# Patient Record
Sex: MALE | Race: BLACK OR AFRICAN AMERICAN | NOT HISPANIC OR LATINO | URBAN - METROPOLITAN AREA
[De-identification: names, ages, dates, MRNs, and addresses within clinical notes are randomized per-mention and may not be internally consistent; named-entity substitution may affect disease eponyms.]

---

## 2021-03-14 ENCOUNTER — INPATIENT (INPATIENT)
Facility: HOSPITAL | Age: 61
LOS: 16 days | Discharge: LEFT BEFORE DISCHARGE | DRG: 823 | End: 2021-03-31
Attending: INTERNAL MEDICINE | Admitting: INTERNAL MEDICINE
Payer: COMMERCIAL

## 2021-03-14 VITALS
SYSTOLIC BLOOD PRESSURE: 144 MMHG | WEIGHT: 149.91 LBS | HEIGHT: 68 IN | DIASTOLIC BLOOD PRESSURE: 81 MMHG | OXYGEN SATURATION: 98 % | TEMPERATURE: 98 F | HEART RATE: 77 BPM | RESPIRATION RATE: 16 BRPM

## 2021-03-14 DIAGNOSIS — R63.4 ABNORMAL WEIGHT LOSS: ICD-10-CM

## 2021-03-14 LAB
ALBUMIN SERPL ELPH-MCNC: 2.8 G/DL — LOW (ref 3.3–5)
ALP SERPL-CCNC: 130 U/L — HIGH (ref 40–120)
ALT FLD-CCNC: 21 U/L — SIGNIFICANT CHANGE UP (ref 10–45)
ANION GAP SERPL CALC-SCNC: 10 MMOL/L — SIGNIFICANT CHANGE UP (ref 5–17)
AST SERPL-CCNC: 14 U/L — SIGNIFICANT CHANGE UP (ref 10–40)
BASOPHILS # BLD AUTO: 0.05 K/UL — SIGNIFICANT CHANGE UP (ref 0–0.2)
BASOPHILS NFR BLD AUTO: 0.6 % — SIGNIFICANT CHANGE UP (ref 0–2)
BILIRUB SERPL-MCNC: 0.2 MG/DL — SIGNIFICANT CHANGE UP (ref 0.2–1.2)
BUN SERPL-MCNC: 32 MG/DL — HIGH (ref 7–23)
CALCIUM SERPL-MCNC: 9 MG/DL — SIGNIFICANT CHANGE UP (ref 8.4–10.5)
CHLORIDE SERPL-SCNC: 108 MMOL/L — SIGNIFICANT CHANGE UP (ref 96–108)
CO2 SERPL-SCNC: 23 MMOL/L — SIGNIFICANT CHANGE UP (ref 22–31)
CREAT SERPL-MCNC: 1.83 MG/DL — HIGH (ref 0.5–1.3)
EOSINOPHIL # BLD AUTO: 0.1 K/UL — SIGNIFICANT CHANGE UP (ref 0–0.5)
EOSINOPHIL NFR BLD AUTO: 1.2 % — SIGNIFICANT CHANGE UP (ref 0–6)
GLUCOSE BLDC GLUCOMTR-MCNC: 108 MG/DL — HIGH (ref 70–99)
GLUCOSE BLDC GLUCOMTR-MCNC: 256 MG/DL — HIGH (ref 70–99)
GLUCOSE SERPL-MCNC: 302 MG/DL — HIGH (ref 70–99)
HCT VFR BLD CALC: 48.6 % — SIGNIFICANT CHANGE UP (ref 39–50)
HGB BLD-MCNC: 16.1 G/DL — SIGNIFICANT CHANGE UP (ref 13–17)
IMM GRANULOCYTES NFR BLD AUTO: 0.2 % — SIGNIFICANT CHANGE UP (ref 0–1.5)
LYMPHOCYTES # BLD AUTO: 1.26 K/UL — SIGNIFICANT CHANGE UP (ref 1–3.3)
LYMPHOCYTES # BLD AUTO: 14.5 % — SIGNIFICANT CHANGE UP (ref 13–44)
MAGNESIUM SERPL-MCNC: 2 MG/DL — SIGNIFICANT CHANGE UP (ref 1.6–2.6)
MCHC RBC-ENTMCNC: 29.7 PG — SIGNIFICANT CHANGE UP (ref 27–34)
MCHC RBC-ENTMCNC: 33.1 GM/DL — SIGNIFICANT CHANGE UP (ref 32–36)
MCV RBC AUTO: 89.5 FL — SIGNIFICANT CHANGE UP (ref 80–100)
MONOCYTES # BLD AUTO: 0.76 K/UL — SIGNIFICANT CHANGE UP (ref 0–0.9)
MONOCYTES NFR BLD AUTO: 8.7 % — SIGNIFICANT CHANGE UP (ref 2–14)
NEUTROPHILS # BLD AUTO: 6.5 K/UL — SIGNIFICANT CHANGE UP (ref 1.8–7.4)
NEUTROPHILS NFR BLD AUTO: 74.8 % — SIGNIFICANT CHANGE UP (ref 43–77)
NRBC # BLD: 0 /100 WBCS — SIGNIFICANT CHANGE UP (ref 0–0)
PHOSPHATE SERPL-MCNC: 5 MG/DL — HIGH (ref 2.5–4.5)
PLATELET # BLD AUTO: 488 K/UL — HIGH (ref 150–400)
POTASSIUM SERPL-MCNC: 3.9 MMOL/L — SIGNIFICANT CHANGE UP (ref 3.5–5.3)
POTASSIUM SERPL-SCNC: 3.9 MMOL/L — SIGNIFICANT CHANGE UP (ref 3.5–5.3)
PROT SERPL-MCNC: 6.2 G/DL — SIGNIFICANT CHANGE UP (ref 6–8.3)
RBC # BLD: 5.43 M/UL — SIGNIFICANT CHANGE UP (ref 4.2–5.8)
RBC # FLD: 13.1 % — SIGNIFICANT CHANGE UP (ref 10.3–14.5)
SARS-COV-2 RNA SPEC QL NAA+PROBE: SIGNIFICANT CHANGE UP
SODIUM SERPL-SCNC: 141 MMOL/L — SIGNIFICANT CHANGE UP (ref 135–145)
WBC # BLD: 8.69 K/UL — SIGNIFICANT CHANGE UP (ref 3.8–10.5)
WBC # FLD AUTO: 8.69 K/UL — SIGNIFICANT CHANGE UP (ref 3.8–10.5)

## 2021-03-14 PROCEDURE — 76870 US EXAM SCROTUM: CPT | Mod: 26

## 2021-03-14 PROCEDURE — 71260 CT THORAX DX C+: CPT | Mod: 26,QQ

## 2021-03-14 PROCEDURE — 99285 EMERGENCY DEPT VISIT HI MDM: CPT

## 2021-03-14 PROCEDURE — 93975 VASCULAR STUDY: CPT | Mod: 26

## 2021-03-14 PROCEDURE — 74177 CT ABD & PELVIS W/CONTRAST: CPT | Mod: 26,QQ

## 2021-03-14 RX ORDER — INSULIN LISPRO 100/ML
8 VIAL (ML) SUBCUTANEOUS
Refills: 0 | Status: DISCONTINUED | OUTPATIENT
Start: 2021-03-14 | End: 2021-03-17

## 2021-03-14 RX ORDER — INSULIN GLARGINE 100 [IU]/ML
26 INJECTION, SOLUTION SUBCUTANEOUS AT BEDTIME
Refills: 0 | Status: DISCONTINUED | OUTPATIENT
Start: 2021-03-14 | End: 2021-03-15

## 2021-03-14 RX ORDER — SODIUM CHLORIDE 9 MG/ML
1000 INJECTION, SOLUTION INTRAVENOUS
Refills: 0 | Status: DISCONTINUED | OUTPATIENT
Start: 2021-03-14 | End: 2021-03-31

## 2021-03-14 RX ORDER — KETOROLAC TROMETHAMINE 30 MG/ML
15 SYRINGE (ML) INJECTION ONCE
Refills: 0 | Status: DISCONTINUED | OUTPATIENT
Start: 2021-03-14 | End: 2021-03-14

## 2021-03-14 RX ORDER — METOPROLOL TARTRATE 50 MG
50 TABLET ORAL DAILY
Refills: 0 | Status: DISCONTINUED | OUTPATIENT
Start: 2021-03-14 | End: 2021-03-17

## 2021-03-14 RX ORDER — INSULIN LISPRO 100/ML
VIAL (ML) SUBCUTANEOUS
Refills: 0 | Status: DISCONTINUED | OUTPATIENT
Start: 2021-03-14 | End: 2021-03-14

## 2021-03-14 RX ORDER — DEXTROSE 50 % IN WATER 50 %
25 SYRINGE (ML) INTRAVENOUS ONCE
Refills: 0 | Status: DISCONTINUED | OUTPATIENT
Start: 2021-03-14 | End: 2021-03-17

## 2021-03-14 RX ORDER — HEPARIN SODIUM 5000 [USP'U]/ML
5000 INJECTION INTRAVENOUS; SUBCUTANEOUS EVERY 12 HOURS
Refills: 0 | Status: DISCONTINUED | OUTPATIENT
Start: 2021-03-14 | End: 2021-03-17

## 2021-03-14 RX ORDER — SODIUM CHLORIDE 9 MG/ML
1000 INJECTION INTRAMUSCULAR; INTRAVENOUS; SUBCUTANEOUS ONCE
Refills: 0 | Status: COMPLETED | OUTPATIENT
Start: 2021-03-14 | End: 2021-03-14

## 2021-03-14 RX ORDER — ACETAMINOPHEN 500 MG
1000 TABLET ORAL ONCE
Refills: 0 | Status: COMPLETED | OUTPATIENT
Start: 2021-03-14 | End: 2021-03-14

## 2021-03-14 RX ORDER — INSULIN LISPRO 100/ML
VIAL (ML) SUBCUTANEOUS AT BEDTIME
Refills: 0 | Status: DISCONTINUED | OUTPATIENT
Start: 2021-03-14 | End: 2021-03-17

## 2021-03-14 RX ORDER — DEXTROSE 50 % IN WATER 50 %
15 SYRINGE (ML) INTRAVENOUS ONCE
Refills: 0 | Status: DISCONTINUED | OUTPATIENT
Start: 2021-03-14 | End: 2021-03-17

## 2021-03-14 RX ORDER — SODIUM CHLORIDE 9 MG/ML
1000 INJECTION, SOLUTION INTRAVENOUS
Refills: 0 | Status: DISCONTINUED | OUTPATIENT
Start: 2021-03-14 | End: 2021-03-17

## 2021-03-14 RX ORDER — INSULIN GLARGINE 100 [IU]/ML
30 INJECTION, SOLUTION SUBCUTANEOUS AT BEDTIME
Refills: 0 | Status: DISCONTINUED | OUTPATIENT
Start: 2021-03-14 | End: 2021-03-14

## 2021-03-14 RX ORDER — INSULIN LISPRO 100/ML
VIAL (ML) SUBCUTANEOUS
Refills: 0 | Status: DISCONTINUED | OUTPATIENT
Start: 2021-03-14 | End: 2021-03-17

## 2021-03-14 RX ORDER — DEXTROSE 50 % IN WATER 50 %
12.5 SYRINGE (ML) INTRAVENOUS ONCE
Refills: 0 | Status: DISCONTINUED | OUTPATIENT
Start: 2021-03-14 | End: 2021-03-17

## 2021-03-14 RX ORDER — GLUCAGON INJECTION, SOLUTION 0.5 MG/.1ML
1 INJECTION, SOLUTION SUBCUTANEOUS ONCE
Refills: 0 | Status: DISCONTINUED | OUTPATIENT
Start: 2021-03-14 | End: 2021-03-17

## 2021-03-14 RX ORDER — ATORVASTATIN CALCIUM 80 MG/1
80 TABLET, FILM COATED ORAL AT BEDTIME
Refills: 0 | Status: DISCONTINUED | OUTPATIENT
Start: 2021-03-14 | End: 2021-03-17

## 2021-03-14 RX ORDER — AMLODIPINE BESYLATE 2.5 MG/1
10 TABLET ORAL DAILY
Refills: 0 | Status: DISCONTINUED | OUTPATIENT
Start: 2021-03-14 | End: 2021-03-17

## 2021-03-14 RX ADMIN — Medication 1000 MILLIGRAM(S): at 23:30

## 2021-03-14 RX ADMIN — Medication 15 MILLIGRAM(S): at 08:59

## 2021-03-14 RX ADMIN — Medication 400 MILLIGRAM(S): at 22:53

## 2021-03-14 RX ADMIN — ATORVASTATIN CALCIUM 80 MILLIGRAM(S): 80 TABLET, FILM COATED ORAL at 22:54

## 2021-03-14 RX ADMIN — INSULIN GLARGINE 26 UNIT(S): 100 INJECTION, SOLUTION SUBCUTANEOUS at 22:53

## 2021-03-14 RX ADMIN — Medication 6: at 19:37

## 2021-03-14 RX ADMIN — Medication 15 MILLIGRAM(S): at 09:29

## 2021-03-14 RX ADMIN — SODIUM CHLORIDE 1000 MILLILITER(S): 9 INJECTION INTRAMUSCULAR; INTRAVENOUS; SUBCUTANEOUS at 11:08

## 2021-03-14 RX ADMIN — SODIUM CHLORIDE 50 MILLILITER(S): 9 INJECTION, SOLUTION INTRAVENOUS at 22:53

## 2021-03-14 NOTE — ED PROVIDER NOTE - OBJECTIVE STATEMENT
59yo M with hx HTN, DM, HLD, COVID presents with LE pain and scrotal swelling. Patient was hospitalized in [facility] in Feb 2021 where CT Ab/P found 5.8x2.5cm lesion on the right pelvis concerning for malignancy with sclerotic lesion at L5. Patient left AMA before additional malignancy workup was performed. In the last month, patient had 45lb unintentional weight loss and decreased appetite. In the last 2 weeks, he had 2 ep of night sweats and subjective fevers. In addition, he noted that he started to have abdominal swelling, mostly on the left side with tenderness along with b/l scrotal swelling and bilateral inguinal "knots". Similar scrotal swelling when hospitalized in [place] but not aware of Testicular US results. No urinary symptoms.     Today, he came to the ED for 8/10 b/l LE pain, pins and needles. At baseline, he has neuropathy and has been unable to walk since COVID last year. He uses a cane but has frequent falls. Last fall 4 days ago. Tried neurotonin in the past with no relief.     Patient has not seen PCP: Dr. Blank after leaving [place] from [facility] but follows with Endocrinologist for diabetes. No prior colonscopy screening 59yo M with hx HTN, DM, HLD, COVID 2020 presents with LE pain and scrotal swelling. Patient was hospitalized in [facility] in Feb 2021 where CT Ab/P found 5.8x2.5cm lesion on the right pelvis concerning for malignancy with sclerotic lesion at L5. Patient left AMA before additional malignancy workup was performed. In the last month, patient had 45lb unintentional weight loss and decreased appetite. In the last 2 weeks, he had 2 ep of night sweats and subjective fevers. In addition, he noted that he started to have abdominal swelling, mostly on the left side with tenderness along with b/l scrotal swelling and bilateral inguinal "knots". Similar scrotal swelling when hospitalized in [place] but not aware of Testicular US results. No urinary symptoms.     Today, he came to the ED for 8/10 b/l LE pain, pins and needles. At baseline, he has neuropathy and has been unable to walk since COVID last year. He uses a cane but has frequent falls. Last fall 4 days ago. Tried neurotonin in the past with no relief.     Patient has not seen PCP: Dr. Blank after leaving [place] from [facility] but follows with Endocrinologist for diabetes. No prior colonscopy screening

## 2021-03-14 NOTE — ED ADULT NURSE REASSESSMENT NOTE - NS ED NURSE REASSESS COMMENT FT1
pt is awake and alert, resting comfortably in stretcher, speaking in full coherent sentences. Pt endorses no pain or discomfort at this time. Pt admitted as per MD orders, awaiting bed assignment. Will continue to monitor.

## 2021-03-14 NOTE — ED PROVIDER NOTE - CLINICAL SUMMARY MEDICAL DECISION MAKING FREE TEXT BOX
59yo M with hx of HTN, DM, HLD presents with worsening LE pain and 2 weeks of scrotal swelling admitted for malignancy workup in the setting of weight loss. Ordered labs, CT chest, Ab/P, US scrotal. IV tordal for pain 59yo M with hx of HTN, DM, HLD presents with worsening LE pain and 2 weeks of scrotal swelling admitted for malignancy workup in the setting of weight loss. Ordered labs, CT chest, Ab/P, US scrotal. IV tordal for pain. 1L NS

## 2021-03-14 NOTE — CONSULT NOTE ADULT - SUBJECTIVE AND OBJECTIVE BOX
HPI:  59yo M with hx HTN, DM, HLD, COVID 2020 presents with LE pain and scrotal swelling. Patient was hospitalized in [facility] in Feb 2021 where CT Ab/P found 5.8x2.5cm lesion on the right pelvis concerning for malignancy with sclerotic lesion at L5.   Patient left AMA before additional malignancy workup was performed.   In the last month, patient had 45lb unintentional weight loss and decreased appetite.   In the last 2 weeks had some  night sweats and subjective fevers.   In addition, he noted that he started to have abdominal swelling, mostly on the left side with tenderness along with b/l scrotal swelling   Today, he came to the ED for 8/10 b/l LE pain, pins and needles. At baseline, he has neuropathy and has been unable to walk since COVID last year.   He uses a cane but has frequent falls. Last fall 4 days ago. Tried neurontin in the past with no relief.     Nephrology consulted for elevated serum creatinine. The patient was told in the past that he has CKD. he has a PCP but does not remember his name. No urinary sx, he has constipation, he said that he does not take NSAIDa. he has a poor appetite.       PAST MEDICAL & SURGICAL HISTORY:  Diabetes    Hyperlipidemia    Hypertension, unspecified type    No significant past surgical history        MEDICATIONS  (STANDING):  amLODIPine   Tablet 10 milliGRAM(s) Oral daily  atorvastatin 80 milliGRAM(s) Oral at bedtime  dextrose 40% Gel 15 Gram(s) Oral once  dextrose 5%. 1000 milliLiter(s) (50 mL/Hr) IV Continuous <Continuous>  dextrose 5%. 1000 milliLiter(s) (100 mL/Hr) IV Continuous <Continuous>  dextrose 50% Injectable 25 Gram(s) IV Push once  dextrose 50% Injectable 12.5 Gram(s) IV Push once  dextrose 50% Injectable 25 Gram(s) IV Push once  glucagon  Injectable 1 milliGRAM(s) IntraMuscular once  heparin   Injectable 5000 Unit(s) SubCutaneous every 12 hours  insulin glargine Injectable (LANTUS) 30 Unit(s) SubCutaneous at bedtime  insulin lispro (ADMELOG) corrective regimen sliding scale   SubCutaneous three times a day before meals  metoprolol succinate ER 50 milliGRAM(s) Oral daily      Allergies    No Known Allergies    Intolerances        SOCIAL HISTORY:  Denies ETOh,Smoking,     FAMILY HISTORY:  No pertinent family history in first degree relatives        REVIEW OF SYSTEMS:    As per HPI  All other review of systems is negative unless indicated above.    VITAL:  T(C): , Max: 36.8 (03-14-21 @ 11:37)  T(F): , Max: 98.2 (03-14-21 @ 11:37)  HR: 83 (03-14-21 @ 14:46)  BP: 151/75 (03-14-21 @ 14:46)  BP(mean): --  RR: 16 (03-14-21 @ 14:46)  SpO2: 96% (03-14-21 @ 14:46)  Wt(kg): --    I and O's:    Height (cm): 172.7 (03-14 @ 07:40)  Weight (kg): 68 (03-14 @ 07:40)  BMI (kg/m2): 22.8 (03-14 @ 07:40)  BSA (m2): 1.81 (03-14 @ 07:40)    PHYSICAL EXAM:    Constitutional: NAD  HEENT: PERRLA, EOMI,  MMM  Neck: No LAD, No JVD  Respiratory: + wheezing   Cardiovascular: S1 and S2  Gastrointestinal: BS+, soft, NT/ND  Extremities: No peripheral edema  Neurological: A/O x 3, no focal deficits  Psychiatric: Normal mood, normal affect  : No Weir, + scrotal swelling and left inguinal mass      LABS:                        16.1   8.69  )-----------( 488      ( 14 Mar 2021 08:38 )             48.6     03-14    141  |  108  |  32<H>  ----------------------------<  302<H>  3.9   |  23  |  1.83<H>    Ca    9.0      14 Mar 2021 08:38  Phos  5.0     03-14  Mg     2.0     03-14    TPro  6.2  /  Alb  2.8<L>  /  TBili  0.2  /  DBili  x   /  AST  14  /  ALT  21  /  AlkPhos  130<H>  03-14      Urine Studies:          RADIOLOGY & ADDITIONAL STUDIES:        ASSESSMENT:    59yo M with hx HTN, DM, HLD, COVID 2020 presents with LE pain and scrotal swelling    - GUANAKO likely pre-renal azotemia due to decreased po intake                   Patient has underlying CKD but baseline is unknown                  Patient is s/p CT abdomen with IV contrast and IV Toradol x 1 administration in the ER. Toradol and CT were done before BMP resulted  - BP controlled  - Euvolemic    PLAN:   - Urinalysis, Urine lytes, Urine Protein, Urine Creatinine  - LR at 50 cc/hr x 20 hours  - PVR  - Phos level   - BNP  - Avoid NSAIDs and IV contrast   - Trend serum creatinine- watch for KWAKU  - Renal dosing of meds to creatinine clearance of 30 ml/min      d/w ER staff    Thank you for the courtesy of the referral    Paulo Dave MD  [facility]  Cell: [phone]             HPI:  59yo M with hx HTN, DM, HLD, COVID 2020 presents with LE pain and scrotal swelling. Patient was hospitalized in [facility] in Feb 2021 where CT Ab/P found 5.8x2.5cm lesion on the right pelvis concerning for malignancy with sclerotic lesion at L5.   Patient left AMA before additional malignancy workup was performed.   In the last month, patient had 45lb unintentional weight loss and decreased appetite.   In the last 2 weeks had some  night sweats and subjective fevers.   In addition, he noted that he started to have abdominal swelling, mostly on the left side with tenderness along with b/l scrotal swelling   Today, he came to the ED for 8/10 b/l LE pain, pins and needles. At baseline, he has neuropathy and has been unable to walk since COVID last year.   He uses a cane but has frequent falls. Last fall 4 days ago. Tried neurontin in the past with no relief.     Nephrology consulted for elevated serum creatinine. The patient was told in the past that he has CKD. he has a PCP but does not remember his name. No urinary sx, he has constipation, he said that he does not take NSAIDa. he has a poor appetite.       PAST MEDICAL & SURGICAL HISTORY:  Diabetes    Hyperlipidemia    Hypertension, unspecified type    No significant past surgical history        MEDICATIONS  (STANDING):  amLODIPine   Tablet 10 milliGRAM(s) Oral daily  atorvastatin 80 milliGRAM(s) Oral at bedtime  dextrose 40% Gel 15 Gram(s) Oral once  dextrose 5%. 1000 milliLiter(s) (50 mL/Hr) IV Continuous <Continuous>  dextrose 5%. 1000 milliLiter(s) (100 mL/Hr) IV Continuous <Continuous>  dextrose 50% Injectable 25 Gram(s) IV Push once  dextrose 50% Injectable 12.5 Gram(s) IV Push once  dextrose 50% Injectable 25 Gram(s) IV Push once  glucagon  Injectable 1 milliGRAM(s) IntraMuscular once  heparin   Injectable 5000 Unit(s) SubCutaneous every 12 hours  insulin glargine Injectable (LANTUS) 30 Unit(s) SubCutaneous at bedtime  insulin lispro (ADMELOG) corrective regimen sliding scale   SubCutaneous three times a day before meals  metoprolol succinate ER 50 milliGRAM(s) Oral daily      Allergies    No Known Allergies    Intolerances        SOCIAL HISTORY:  Denies ETOh,Smoking,     FAMILY HISTORY:  No pertinent family history in first degree relatives        REVIEW OF SYSTEMS:    As per HPI  All other review of systems is negative unless indicated above.    VITAL:  T(C): , Max: 36.8 (03-14-21 @ 11:37)  T(F): , Max: 98.2 (03-14-21 @ 11:37)  HR: 83 (03-14-21 @ 14:46)  BP: 151/75 (03-14-21 @ 14:46)  BP(mean): --  RR: 16 (03-14-21 @ 14:46)  SpO2: 96% (03-14-21 @ 14:46)  Wt(kg): --    I and O's:    Height (cm): 172.7 (03-14 @ 07:40)  Weight (kg): 68 (03-14 @ 07:40)  BMI (kg/m2): 22.8 (03-14 @ 07:40)  BSA (m2): 1.81 (03-14 @ 07:40)    PHYSICAL EXAM:    Constitutional: NAD  HEENT: PERRLA, EOMI,  MMM  Neck: No LAD, No JVD  Respiratory: + wheezing   Cardiovascular: S1 and S2  Gastrointestinal: BS+, soft, NT/ND  Extremities: No peripheral edema  Neurological: A/O x 3, no focal deficits  Psychiatric: Normal mood, normal affect  : No Weir, + scrotal swelling and left inguinal mass      LABS:                        16.1   8.69  )-----------( 488      ( 14 Mar 2021 08:38 )             48.6     03-14    141  |  108  |  32<H>  ----------------------------<  302<H>  3.9   |  23  |  1.83<H>    Ca    9.0      14 Mar 2021 08:38  Phos  5.0     03-14  Mg     2.0     03-14    TPro  6.2  /  Alb  2.8<L>  /  TBili  0.2  /  DBili  x   /  AST  14  /  ALT  21  /  AlkPhos  130<H>  03-14      Urine Studies:          RADIOLOGY & ADDITIONAL STUDIES:        ASSESSMENT:    59yo M with hx HTN, DM, HLD, COVID 2020 presents with LE pain and scrotal swelling    - GUANAKO likely pre-renal azotemia due to decreased po intake                   Patient has underlying CKD but baseline is unknown                  Patient is s/p CT abdomen with IV contrast and IV Toradol x 1 administration in the ER. Toradol and CT were done before BMP resulted                  Renal cysts noted on CT- too small and bilateral- support CKD Dx  - BP controlled  - Euvolemic    PLAN:   - Urinalysis, Urine lytes, Urine Protein, Urine Creatinine  - LR at 50 cc/hr x 20 hours  - PVR  - Phos level   - BNP  - Avoid NSAIDs and IV contrast   - Trend serum creatinine- watch for KWAKU  - Renal dosing of meds to creatinine clearance of 30 ml/min      d/w ER staff    Thank you for the courtesy of the referral    Paulo Dave MD  [facility]- Nephrology  Cell: [phone]

## 2021-03-14 NOTE — ED PROVIDER NOTE - NS ED ROS FT
Review of Systems:  Constitutional: + fever, +weight loss, poor appetite/po intake  Head: No headache or dizziness   Eyes: No blurry vision, No diplopia  Neuro: No tremors, +muscle weakness   Cardiovascular: No chest pain, No palpitations  Respiratory: No SOB, No cough  GI: No nausea, No vomiting, No diarrhea  : No dysuria, No hematuria  Skin: No rash or lesions  MSK: No joint pain or swelling  Psych: No depression or mood changes

## 2021-03-14 NOTE — H&P ADULT - NSHPLABSRESULTS_GEN_ALL_CORE
16.1   8.69  )-----------( 488      ( 14 Mar 2021 08:38 )             48.6       03-14    141  |  108  |  32<H>  ----------------------------<  302<H>  3.9   |  23  |  1.83<H>    Ca    9.0      14 Mar 2021 08:38  Phos  5.0     03-14  Mg     2.0     03-14    TPro  6.2  /  Alb  2.8<L>  /  TBili  0.2  /  DBili  x   /  AST  14  /  ALT  21  /  AlkPhos  130<H>  03-14                      Lactate Trend            CAPILLARY BLOOD GLUCOSE

## 2021-03-14 NOTE — ED ADULT NURSE NOTE - OBJECTIVE STATEMENT
59 y/o M A&Ox3 PMH DM, HTN, HLD denies PSH presents to the ED c/o abdominal pain. Pt reports L sided abdominal pain and swelling for several weeks. Pt also endorses B/L L/E weakness x several months, weight loss and multiple "lumps" in his groin. Pt reports the lumps are not painful. Pt endorses constipation x2 weeks. Pt reports seeing outpatient physician where doctor told him there was a "concern for cancer", pt got outpatient scans done but is unsure of the result. Upon arrival to ED pt is well appearing. Breathing is even and unlabored, satting 100% RA. Skin is warm, dry & in act. Several firm, moveable nodules noted to pt groin on assessment. Pt abdomen is soft, distended, L side Is tender to palpation. Denies CP, SOB, N/V/D, HA, vision changes, cough. IV access obtained. Call bell within reach, comfort & safety provided.

## 2021-03-14 NOTE — CHART NOTE - NSCHARTNOTEFT_GEN_A_CORE
61yo M with hx HTN, DM, HLD, COVID 2020 presents with LE pain and scrotal swelling. Patient was hospitalized in [facility] in Feb 2021 where CT Ab/P found 5.8x2.5cm lesion on the right pelvis concerning for malignancy with sclerotic lesion at L5. Per H&P patient on tresiba 40units qhs and humalog 8 units tidac.   Endocrine consult for management of DM2    Insulin dosing determined by patient TDD at home  Recommend Lantus 26units qhs   Recommend Admelog 8 units TIDac  Start low correctional scale premeal and qhs   Fs goal 100-180  Fs premeal and qhs   Please obtain A1c    Official consult Tmw    Clare Centeno MD  Endocrine Fellow   Pager  on weekdays 9am- 5pm   Please call  after hours and on weekends

## 2021-03-14 NOTE — H&P ADULT - ASSESSMENT
pt w/ scrotal swellin g / pelvic lesion/ weight loss  r/o malignancy  onc eval  checl lext dopplers  dm  fsg riss  lantus at night  endo eval  dvt proph  htn  c/w meds  ? neuropathy  walking dificulty  neuro eval

## 2021-03-14 NOTE — ED ADULT NURSE REASSESSMENT NOTE - NS ED NURSE REASSESS COMMENT FT1
Pt AAOx3. VSS. No c/o pain or discomfort. Requesting dinner, FS taken. Pt given pre-meal insulin and tray provided. Right 18g AC IVL site patent and wdl. Pt admited pending bed placement.

## 2021-03-14 NOTE — ED ADULT NURSE NOTE - NSIMPLEMENTINTERV_GEN_ALL_ED
Implemented All Fall Risk Interventions:  [place] to call system. Call bell, personal items and telephone within reach. Instruct patient to call for assistance. Room bathroom lighting operational. Non-slip footwear when patient is off stretcher. Physically safe environment: no spills, clutter or unnecessary equipment. Stretcher in lowest position, wheels locked, appropriate side rails in place. Provide visual cue, wrist band, yellow gown, etc. Monitor gait and stability. Monitor for mental status changes and reorient to person, place, and time. Review medications for side effects contributing to fall risk. Reinforce activity limits and safety measures with patient and family.

## 2021-03-14 NOTE — ED PROVIDER NOTE - PHYSICAL EXAMINATION
Vital Signs Last 24 Hrs  T(C): 36.5 (14 Mar 2021 08:30), Max: 36.7 (14 Mar 2021 07:40)  T(F): 97.7 (14 Mar 2021 08:30), Max: 98 (14 Mar 2021 07:40)  HR: 97 (14 Mar 2021 08:30) (77 - 97)  BP: 143/89 (14 Mar 2021 08:30) (143/89 - 144/81)  BP(mean): --  RR: 16 (14 Mar 2021 08:30) (16 - 16)  SpO2: 100% (14 Mar 2021 08:30) (98% - 100%)    PHYSICAL EXAM  GENERAL: NAD, lying comfortably in bed   HEAD:  Atraumatic, Normocephalic  EYES: EOMI b/l, conjunctiva and sclera clear  CHEST/LUNG: Clear to auscultation bilaterally; No wheeze or ronchi. Intermittent inspiratory wheeze  HEART: Regular rate and rhythm; S1 and S2 present, No murmurs, rubs, or gallops  ABDOMEN: Soft, Distended, Tender Left>Right quadrants Bowel sounds present. No rebound  : bilateral inguinal lymphadenopathy 1cm mobile nodules. Bilateral scrotal swelling with tenderness on the left.  EXTREMITIES:  2+ Peripheral Pulses, no edema  NEURO: AAOx3, non-focal. 3/5 LLE motor strength. Limited in RLE 2/2 hip pain   SKIN: No rashes or lesions

## 2021-03-14 NOTE — ED ADULT NURSE REASSESSMENT NOTE - NS ED NURSE REASSESS COMMENT FT1
pt is awake and alert, resting comfortably in stretcher, speaking in full coherent sentences. Pt states his pain is more manageable at this time. Call bell within reach, comfort & safety provided. Will continue to monitor.

## 2021-03-14 NOTE — H&P ADULT - HISTORY OF PRESENT ILLNESS
: 61yo M with hx HTN, DM, HLD, COVID 2020 presents with LE pain and scrotal swelling. Patient was hospitalized in [facility] in Feb 2021 where CT Ab/P found 5.8x2.5cm lesion on the right pelvis concerning for malignancy with sclerotic lesion at L5.   Patient left AMA before additional malignancy workup was performed.   In the last month, patient had 45lb unintentional weight loss and decreased appetite.   In the last 2 weeks had some  night sweats and subjective fevers.   In addition, he noted that he started to have abdominal swelling, mostly on the left side with tenderness along with b/l scrotal swelling    	Today, he came to the ED for 8/10 b/l LE pain, pins and needles. At baseline, he has neuropathy and has been unable to walk since COVID last year.   He uses a cane but has frequent falls. Last fall 4 days ago. Tried neurotonin in the past with no relief.     left ama from [facility]

## 2021-03-14 NOTE — ED PROVIDER NOTE - ATTENDING CONTRIBUTION TO CARE
61 yo male p/w abdominal pain, leg pain, scrotal swelling.  had work-up at OSH concerning for malignancy but signed out AMA and hasn't followed up since.  L sided abdominal TTP, scrotal edema.  will get testicular sonogram, CT abdomen/pelvis, check labs, likely admit for further management.

## 2021-03-15 DIAGNOSIS — E11.65 TYPE 2 DIABETES MELLITUS WITH HYPERGLYCEMIA: ICD-10-CM

## 2021-03-15 DIAGNOSIS — E78.5 HYPERLIPIDEMIA, UNSPECIFIED: ICD-10-CM

## 2021-03-15 DIAGNOSIS — I10 ESSENTIAL (PRIMARY) HYPERTENSION: ICD-10-CM

## 2021-03-15 LAB
A1C WITH ESTIMATED AVERAGE GLUCOSE RESULT: 10.6 % — HIGH (ref 4–5.6)
ANION GAP SERPL CALC-SCNC: 12 MMOL/L — SIGNIFICANT CHANGE UP (ref 5–17)
BUN SERPL-MCNC: 28 MG/DL — HIGH (ref 7–23)
CALCIUM SERPL-MCNC: 9.1 MG/DL — SIGNIFICANT CHANGE UP (ref 8.4–10.5)
CANCER AG19-9 SERPL-ACNC: 4 U/ML — SIGNIFICANT CHANGE UP
CHLORIDE SERPL-SCNC: 110 MMOL/L — HIGH (ref 96–108)
CO2 SERPL-SCNC: 18 MMOL/L — LOW (ref 22–31)
CREAT SERPL-MCNC: 1.46 MG/DL — HIGH (ref 0.5–1.3)
ESTIMATED AVERAGE GLUCOSE: 258 MG/DL — HIGH (ref 68–114)
GLUCOSE BLDC GLUCOMTR-MCNC: 117 MG/DL — HIGH (ref 70–99)
GLUCOSE BLDC GLUCOMTR-MCNC: 147 MG/DL — HIGH (ref 70–99)
GLUCOSE BLDC GLUCOMTR-MCNC: 185 MG/DL — HIGH (ref 70–99)
GLUCOSE BLDC GLUCOMTR-MCNC: 194 MG/DL — HIGH (ref 70–99)
GLUCOSE BLDC GLUCOMTR-MCNC: 221 MG/DL — HIGH (ref 70–99)
GLUCOSE BLDC GLUCOMTR-MCNC: 228 MG/DL — HIGH (ref 70–99)
GLUCOSE BLDC GLUCOMTR-MCNC: 72 MG/DL — SIGNIFICANT CHANGE UP (ref 70–99)
GLUCOSE SERPL-MCNC: 80 MG/DL — SIGNIFICANT CHANGE UP (ref 70–99)
HCT VFR BLD CALC: 46.6 % — SIGNIFICANT CHANGE UP (ref 39–50)
HGB BLD-MCNC: 15.3 G/DL — SIGNIFICANT CHANGE UP (ref 13–17)
MCHC RBC-ENTMCNC: 29.4 PG — SIGNIFICANT CHANGE UP (ref 27–34)
MCHC RBC-ENTMCNC: 32.8 GM/DL — SIGNIFICANT CHANGE UP (ref 32–36)
MCV RBC AUTO: 89.6 FL — SIGNIFICANT CHANGE UP (ref 80–100)
NRBC # BLD: 0 /100 WBCS — SIGNIFICANT CHANGE UP (ref 0–0)
NT-PROBNP SERPL-SCNC: 324 PG/ML — HIGH (ref 0–300)
PHOSPHATE SERPL-MCNC: 4.6 MG/DL — HIGH (ref 2.5–4.5)
PLATELET # BLD AUTO: 480 K/UL — HIGH (ref 150–400)
POTASSIUM SERPL-MCNC: 3.6 MMOL/L — SIGNIFICANT CHANGE UP (ref 3.5–5.3)
POTASSIUM SERPL-SCNC: 3.6 MMOL/L — SIGNIFICANT CHANGE UP (ref 3.5–5.3)
PSA FLD-MCNC: 0.63 NG/ML — SIGNIFICANT CHANGE UP (ref 0–4)
RBC # BLD: 5.2 M/UL — SIGNIFICANT CHANGE UP (ref 4.2–5.8)
RBC # FLD: 13.1 % — SIGNIFICANT CHANGE UP (ref 10.3–14.5)
SARS-COV-2 IGG SERPL QL IA: NEGATIVE — SIGNIFICANT CHANGE UP
SARS-COV-2 IGM SERPL IA-ACNC: 0.07 INDEX — SIGNIFICANT CHANGE UP
SODIUM SERPL-SCNC: 140 MMOL/L — SIGNIFICANT CHANGE UP (ref 135–145)
TSH SERPL-MCNC: 4.86 UIU/ML — HIGH (ref 0.27–4.2)
WBC # BLD: 7.43 K/UL — SIGNIFICANT CHANGE UP (ref 3.8–10.5)
WBC # FLD AUTO: 7.43 K/UL — SIGNIFICANT CHANGE UP (ref 3.8–10.5)

## 2021-03-15 PROCEDURE — 72158 MRI LUMBAR SPINE W/O & W/DYE: CPT | Mod: 26

## 2021-03-15 PROCEDURE — 93970 EXTREMITY STUDY: CPT | Mod: 26

## 2021-03-15 RX ORDER — TRAMADOL HYDROCHLORIDE 50 MG/1
50 TABLET ORAL THREE TIMES A DAY
Refills: 0 | Status: DISCONTINUED | OUTPATIENT
Start: 2021-03-15 | End: 2021-03-17

## 2021-03-15 RX ORDER — INSULIN LISPRO 100/ML
8 VIAL (ML) SUBCUTANEOUS
Qty: 0 | Refills: 0 | DISCHARGE

## 2021-03-15 RX ORDER — INSULIN DEGLUDEC 100 U/ML
40 INJECTION, SOLUTION SUBCUTANEOUS
Qty: 0 | Refills: 0 | DISCHARGE

## 2021-03-15 RX ORDER — ATORVASTATIN CALCIUM 80 MG/1
1 TABLET, FILM COATED ORAL
Qty: 0 | Refills: 0 | DISCHARGE

## 2021-03-15 RX ORDER — METOPROLOL TARTRATE 50 MG
1 TABLET ORAL
Qty: 0 | Refills: 0 | DISCHARGE

## 2021-03-15 RX ORDER — INSULIN GLARGINE 100 [IU]/ML
23 INJECTION, SOLUTION SUBCUTANEOUS AT BEDTIME
Refills: 0 | Status: DISCONTINUED | OUTPATIENT
Start: 2021-03-15 | End: 2021-03-16

## 2021-03-15 RX ORDER — AMLODIPINE BESYLATE AND BENAZEPRIL HYDROCHLORIDE 10; 20 MG/1; MG/1
1 CAPSULE ORAL
Qty: 0 | Refills: 0 | DISCHARGE

## 2021-03-15 RX ADMIN — HEPARIN SODIUM 5000 UNIT(S): 5000 INJECTION INTRAVENOUS; SUBCUTANEOUS at 19:12

## 2021-03-15 RX ADMIN — Medication 50 MILLIGRAM(S): at 05:50

## 2021-03-15 RX ADMIN — ATORVASTATIN CALCIUM 80 MILLIGRAM(S): 80 TABLET, FILM COATED ORAL at 21:21

## 2021-03-15 RX ADMIN — INSULIN GLARGINE 23 UNIT(S): 100 INJECTION, SOLUTION SUBCUTANEOUS at 21:21

## 2021-03-15 RX ADMIN — TRAMADOL HYDROCHLORIDE 50 MILLIGRAM(S): 50 TABLET ORAL at 10:01

## 2021-03-15 RX ADMIN — HEPARIN SODIUM 5000 UNIT(S): 5000 INJECTION INTRAVENOUS; SUBCUTANEOUS at 05:50

## 2021-03-15 RX ADMIN — Medication 1: at 15:11

## 2021-03-15 RX ADMIN — AMLODIPINE BESYLATE 10 MILLIGRAM(S): 2.5 TABLET ORAL at 05:50

## 2021-03-15 RX ADMIN — Medication 1: at 09:56

## 2021-03-15 RX ADMIN — Medication 8 UNIT(S): at 15:11

## 2021-03-15 RX ADMIN — TRAMADOL HYDROCHLORIDE 50 MILLIGRAM(S): 50 TABLET ORAL at 10:45

## 2021-03-15 NOTE — CONSULT NOTE ADULT - ASSESSMENT
This is a 60y year old Male  who presents with the chief complaint of low back pain and leg weakness. 59yo M with hx HTN, DM, HLD, COVID 2020 presents with LE pain and scrotal swelling. Patient was hospitalized in [facility] in Feb 2021 where CT Ab/P found 5.8x2.5cm lesion on the right pelvis concerning for malignancy with sclerotic lesion at L5. Pt states since early 2021 inc pain and weakness in the right leg, at first he was limping but now cannot walk.  He has pain in the feet, describes as pinching and needles like sensation.  Starting to have sx in left leg as well. Patient left AMA before additional malignancy workup was performed.  In the last month, patient had 45lb unintentional weight loss and decreased appetite. Notes abdominal swelling, mostly on the left side with tenderness along with b/l scrotal swelling.  At one point given neurontin, not helpful.  Denies sx in arms, changes in speech, swallow, vision, bladder control.  Says issues with bowel movements.     exam notable for weakness in right leg prox greater than distal, some weakness in left dorsiflexion as well as altered sensation in the right leg  The exam findings also of low tone, diminished reflexes would indicate this is a peripheral nervous system process (not consistent with spinal cord pathology)    Due to this localization as well as report from outside hospital of a sclerotic lesion at L5, would start workup with MRI lumbar.  Due to elevated creatinine, will not order with contrast.      For pain control, his pain does not appear very neuropathic, thus fact neurontin may not work.  As if has sclerotic lesion, then would be bone pain.  As wish to avoid nsaids due to elevated Cr, will write for tramadol.    DVT prophylaxis    d/w pt at bedside

## 2021-03-15 NOTE — PROGRESS NOTE ADULT - ASSESSMENT
pt w/ scrotal swellin g / pelvic lesion/ weight loss  r/o malignancy  onc eval noted  excisional Ln bx by sx  checl lext dopplers  dm/uncontrolled  fsg riss  c/w insulin  endo eval  dvt proph  htn  c/w meds  ? neuropathy  walking difficulty  neuro eval noted  card eval for clearance

## 2021-03-15 NOTE — PROGRESS NOTE ADULT - SUBJECTIVE AND OBJECTIVE BOX
[facility] [phone]        Patient seen and examined         MEDICATIONS  (STANDING):  amLODIPine   Tablet 10 milliGRAM(s) Oral daily  atorvastatin 80 milliGRAM(s) Oral at bedtime  dextrose 40% Gel 15 Gram(s) Oral once  dextrose 5%. 1000 milliLiter(s) (50 mL/Hr) IV Continuous <Continuous>  dextrose 5%. 1000 milliLiter(s) (100 mL/Hr) IV Continuous <Continuous>  dextrose 50% Injectable 25 Gram(s) IV Push once  dextrose 50% Injectable 12.5 Gram(s) IV Push once  dextrose 50% Injectable 25 Gram(s) IV Push once  glucagon  Injectable 1 milliGRAM(s) IntraMuscular once  heparin   Injectable 5000 Unit(s) SubCutaneous every 12 hours  insulin glargine Injectable (LANTUS) 26 Unit(s) SubCutaneous at bedtime  insulin lispro (ADMELOG) corrective regimen sliding scale   SubCutaneous three times a day before meals  insulin lispro (ADMELOG) corrective regimen sliding scale   SubCutaneous at bedtime  insulin lispro Injectable (ADMELOG) 8 Unit(s) SubCutaneous three times a day before meals  lactated ringers. 1000 milliLiter(s) (50 mL/Hr) IV Continuous <Continuous>  metoprolol succinate ER 50 milliGRAM(s) Oral daily      VITAL:  T(C): , Max: 37.1 (03-15-21 @ 00:14)  T(F): , Max: 98.7 (03-15-21 @ 00:14)  HR: 79 (03-15-21 @ 04:32)  BP: 135/91 (03-15-21 @ 04:32)  BP(mean): --  RR: 18 (03-15-21 @ 04:32)  SpO2: 99% (03-15-21 @ 04:32)  Wt(kg): --    I and O's:    03-14 @ 07:01  -  03-15 @ 07:00  --------------------------------------------------------  IN: 0 mL / OUT: 300 mL / NET: -300 mL          PHYSICAL EXAM:    Constitutional: NAD  Neck:  No JVD  Respiratory: CTAB/L  Cardiovascular: S1 and S2  Gastrointestinal: BS+, soft, NT/ND  Extremities: No peripheral edema  Neurological: A/O x 3, no focal deficits  Psychiatric: Normal mood, normal affect  : No Weir  Skin: No rashes  Access: Not applicable    LABS:                        15.3   7.43  )-----------( 480      ( 15 Mar 2021 06:23 )             46.6     03-15    140  |  110<H>  |  28<H>  ----------------------------<  80  3.6   |  18<L>  |  1.46<H>    Ca    9.1      15 Mar 2021 06:23  Phos  4.6     03-15  Mg     2.0     03-14    TPro  6.2  /  Alb  2.8<L>  /  TBili  0.2  /  DBili  x   /  AST  14  /  ALT  21  /  AlkPhos  130<H>  03-14          Urine Studies:          RADIOLOGY & ADDITIONAL STUDIES:             [facility] [phone]        Patient seen and examined in bed.  He had an uneventful night         MEDICATIONS  (STANDING):  amLODIPine   Tablet 10 milliGRAM(s) Oral daily  atorvastatin 80 milliGRAM(s) Oral at bedtime  dextrose 40% Gel 15 Gram(s) Oral once  dextrose 5%. 1000 milliLiter(s) (50 mL/Hr) IV Continuous <Continuous>  dextrose 5%. 1000 milliLiter(s) (100 mL/Hr) IV Continuous <Continuous>  dextrose 50% Injectable 25 Gram(s) IV Push once  dextrose 50% Injectable 12.5 Gram(s) IV Push once  dextrose 50% Injectable 25 Gram(s) IV Push once  glucagon  Injectable 1 milliGRAM(s) IntraMuscular once  heparin   Injectable 5000 Unit(s) SubCutaneous every 12 hours  insulin glargine Injectable (LANTUS) 26 Unit(s) SubCutaneous at bedtime  insulin lispro (ADMELOG) corrective regimen sliding scale   SubCutaneous three times a day before meals  insulin lispro (ADMELOG) corrective regimen sliding scale   SubCutaneous at bedtime  insulin lispro Injectable (ADMELOG) 8 Unit(s) SubCutaneous three times a day before meals  lactated ringers. 1000 milliLiter(s) (50 mL/Hr) IV Continuous <Continuous>  metoprolol succinate ER 50 milliGRAM(s) Oral daily      VITAL:  T(C): , Max: 37.1 (03-15-21 @ 00:14)  T(F): , Max: 98.7 (03-15-21 @ 00:14)  HR: 79 (03-15-21 @ 04:32)  BP: 135/91 (03-15-21 @ 04:32)  BP(mean): --  RR: 18 (03-15-21 @ 04:32)  SpO2: 99% (03-15-21 @ 04:32)  Wt(kg): --    I and O's:    03-14 @ 07:01  -  03-15 @ 07:00  --------------------------------------------------------  IN: 0 mL / OUT: 300 mL / NET: -300 mL          PHYSICAL EXAM:    Constitutional: NAD  Neck:  No JVD  Respiratory: CTAB/L  Cardiovascular: S1 and S2  Gastrointestinal: BS+, soft, NT/ND  Extremities: No peripheral edema  Neurological: A/O x 3, no focal deficits  Psychiatric: Normal mood, normal affect  : No Weir  Skin: No rashes  Access: Not applicable    LABS:                        15.3   7.43  )-----------( 480      ( 15 Mar 2021 06:23 )             46.6     03-15    140  |  110<H>  |  28<H>  ----------------------------<  80  3.6   |  18<L>  |  1.46<H>    Ca    9.1      15 Mar 2021 06:23  Phos  4.6     03-15  Mg     2.0     03-14    TPro  6.2  /  Alb  2.8<L>  /  TBili  0.2  /  DBili  x   /  AST  14  /  ALT  21  /  AlkPhos  130<H>  03-14          Urine Studies:          RADIOLOGY & ADDITIONAL STUDIES:

## 2021-03-15 NOTE — PROGRESS NOTE ADULT - SUBJECTIVE AND OBJECTIVE BOX
DATE OF SERVICE: 03-15-21 @ 14:29  CHIEF COMPLAINT:Patient is a 60y old  Male who presents with a chief complaint of leg weakness (15 Mar 2021 09:05)    	        PAST MEDICAL & SURGICAL HISTORY:  Diabetes    Hyperlipidemia    Hypertension, unspecified type    No significant past surgical history            REVIEW OF SYSTEMS:  weak  RESPIRATORY: No cough, wheezing, chills or hemoptysis; No Shortness of Breath  CARDIOVASCULAR: No chest pain, palpitations, passing out,  GASTROINTESTINAL: No abdominal or epigastric pain. No nausea, vomiting, or hematemesis;   GENITOURINARY: No dysuria, frequency, hematuria, or incontinence  NEUROLOGICAL: No headaches,     Medications:  MEDICATIONS  (STANDING):  amLODIPine   Tablet 10 milliGRAM(s) Oral daily  atorvastatin 80 milliGRAM(s) Oral at bedtime  dextrose 40% Gel 15 Gram(s) Oral once  dextrose 5%. 1000 milliLiter(s) (50 mL/Hr) IV Continuous <Continuous>  dextrose 5%. 1000 milliLiter(s) (100 mL/Hr) IV Continuous <Continuous>  dextrose 50% Injectable 25 Gram(s) IV Push once  dextrose 50% Injectable 12.5 Gram(s) IV Push once  dextrose 50% Injectable 25 Gram(s) IV Push once  glucagon  Injectable 1 milliGRAM(s) IntraMuscular once  heparin   Injectable 5000 Unit(s) SubCutaneous every 12 hours  insulin glargine Injectable (LANTUS) 26 Unit(s) SubCutaneous at bedtime  insulin lispro (ADMELOG) corrective regimen sliding scale   SubCutaneous three times a day before meals  insulin lispro (ADMELOG) corrective regimen sliding scale   SubCutaneous at bedtime  insulin lispro Injectable (ADMELOG) 8 Unit(s) SubCutaneous three times a day before meals  lactated ringers. 1000 milliLiter(s) (50 mL/Hr) IV Continuous <Continuous>  metoprolol succinate ER 50 milliGRAM(s) Oral daily    MEDICATIONS  (PRN):  traMADol 50 milliGRAM(s) Oral three times a day PRN Moderate Pain (4 - 6)    	    PHYSICAL EXAM:  T(C): 36.8 (03-15-21 @ 14:26), Max: 37.1 (03-15-21 @ 00:14)  HR: 73 (03-15-21 @ 14:26) (73 - 84)  BP: 161/72 (03-15-21 @ 14:26) (134/84 - 162/81)  RR: 18 (03-15-21 @ 14:26) (16 - 18)  SpO2: 97% (03-15-21 @ 14:26) (96% - 99%)  Wt(kg): --  I&O's Summary    14 Mar 2021 07:01  -  15 Mar 2021 07:00  --------------------------------------------------------  IN: 0 mL / OUT: 300 mL / NET: -300 mL        Appearance: Normal	  HEENT:   Normal oral mucosa, PERRL, EOMI	  Cardiovascular: Normal S1 S2, No JVD,  Respiratory: Lungs clear to auscultation	  Psychiatry: A & O   Gastrointestinal:  Soft, Non-tender, + BS	  Skin: No rashes, No ecchymoses, No cyanosis	  Neurologic: Non-focal// se neuro note   no acute changes    TELEMETRY: 	    ECG:  	  RADIOLOGY:  OTHER: 	  	  LABS:	 	    CARDIAC MARKERS:                                15.3   7.43  )-----------( 480      ( 15 Mar 2021 06:23 )             46.6     03-15    140  |  110<H>  |  28<H>  ----------------------------<  80  3.6   |  18<L>  |  1.46<H>    Ca    9.1      15 Mar 2021 06:23  Phos  4.6     03-15  Mg     2.0     03-14    TPro  6.2  /  Alb  2.8<L>  /  TBili  0.2  /  DBili  x   /  AST  14  /  ALT  21  /  AlkPhos  130<H>  03-14    proBNP: Serum Pro-Brain Natriuretic Peptide: 324 pg/mL (03-15 @ 06:23)    Lipid Profile:   HgA1c:   TSH: Thyroid Stimulating Hormone, Serum: 4.86 uIU/mL (03-15 @ 09:19)

## 2021-03-15 NOTE — CONSULT NOTE ADULT - ASSESSMENT
60 year old male presents to ED with right groin and back pain, 35 lb weight loss, subjective fevers, sweats - found to have bilateral inguinal adenopathy and a sclerotic L5 lesion.    Inguinal Adenopathy and L1 Sclerotic Lesion  -- Concern for lymphoma based on fevers, weight loss and night sweats  -- Other malignancies also possible  -- Tumor markers - PSA and Ca 19,9 pending. Have ordered LDH and Uric Acid  -- Will need to get a tissue biopsy - would recommend and excisional biopsy of inguinal lymph node which would be necessary for a lymphoma diagnosis    Neuropathy  -- Secondary to COVID infection per patient  -- Doubtful that L5 lesion would cause tenderness to light palpation throughout abdominal wall  -- Neurology input appreciated    We will continue to follow patient. Thank you for the opportunity to participate in MR. Fernandez's care.    Lobo Landeros PA-C  Hematology/Oncology   [phone] (cell)  [phone] (office)  After hours please call MD on call or office   60 year old male presents to ED with right groin and back pain, 35 lb weight loss, subjective fevers, sweats - found to have bilateral inguinal adenopathy and a sclerotic L5 lesion.    Inguinal Adenopathy and L1 Sclerotic Lesion  -- Concern for lymphoma based on fevers, weight loss and night sweats  -- Other malignancies also possible, vs infectious etiology or chornic viral illness   -- Tumor markers - PSA and Ca 19,9 pending. Have ordered LDH and Uric Acid  -- Will need to get a tissue biopsy - would recommend and excisional biopsy of inguinal lymph node which would be necessary for a lymphoma diagnosis    Neuropathy  -- Secondary to COVID infection per patient  -- Doubtful that L5 lesion would cause tenderness to light palpation throughout abdominal wall  -- Neurology input appreciated    We will continue to follow patient. Thank you for the opportunity to participate in MR. Fernandez's care.    Lobo Landeros PA-C  Hematology/Oncology   [phone] (cell)  [phone] (office)  After hours please call MD on call or office

## 2021-03-15 NOTE — CONSULT NOTE ADULT - ASSESSMENT
61yo M with hx HTN, DM2, HLD, COVID 2020 presents with LE pain and scrotal swelling. Patient was hospitalized in [facility] in Feb 2021 where CT Ab/P found 5.8x2.5cm lesion on the right pelvis concerning for malignancy with sclerotic lesion at L5. Patient left AMA before additional malignancy workup was performed. Admitted for malignancy workup.   Endocrine consult requested for management of uncontrolled DM2. A1c 10.6%    Uncontrolled DM2 c/b neuropathy   A1c 10.6%, goal <7%  FS goal 100-180  FS premeal and qhs   Carb consistent diet   Recommend decrease Lantus to 23 units qhs   Recommend Admelog 8units TIDac, hold if NPO   Recommend low correctional scale premeal and qhs     On discharge, will continue basal/bolus insulin, dose to be determined  Recommend follow up with Endo in [place], Patient to find out the name    HTN   goal <130/80  Continue current regimen   Currently on amlodipine and metoprolol, ACEI held due to GUANAKO     HLD  goal LDL<70   Continue stain     Discussed with Dr Darren Murdock-Malou Centeno MD  Endocrine Fellow   Pager  on weekdays 9am- 5pm   Please call  after hours and on weekends 61yo M with hx HTN, DM2, HLD, COVID 2020 presents with LE pain and scrotal swelling. Patient was hospitalized in [facility] in Feb 2021 where CT Ab/P found 5.8x2.5cm lesion on the right pelvis concerning for malignancy with sclerotic lesion at L5. Patient left AMA before additional malignancy workup was performed. Admitted for malignancy workup.   Endocrine consult requested for management of uncontrolled DM2. A1c 10.6%    Uncontrolled DM2 c/b neuropathy   A1c 10.6%, goal <7%  FS goal 100-180  FS premeal and qhs   Carb consistent diet   Will obtain C- peptide   Recommend decrease Lantus to 23 units qhs   Recommend Admelog 8units TIDac, hold if NPO   Recommend low correctional scale premeal and qhs     On discharge, will continue basal/bolus insulin, dose to be determined  Recommend follow up with Endo in [place], Patient to find out the name    HTN   goal <130/80  Continue current regimen   Currently on amlodipine and metoprolol, ACEI held due to GUANAKO     HLD  goal LDL<70   Continue stain     Abnormal TFT  TSH mildly elevated, 4.8  Please repeat TFT 6 weeks after discharge     Discussed with Dr Banks  Discussed with primary team       Clare Centeno MD  Endocrine Fellow   Pager  on weekdays 9am- 5pm   Please call  after hours and on weekends 59yo M with hx HTN, DM2, HLD, COVID 2020 presents with LE pain and scrotal swelling. Patient was hospitalized in [facility] in Feb 2021 where CT Ab/P found 5.8x2.5cm lesion on the right pelvis concerning for malignancy with sclerotic lesion at L5. Patient left AMA before additional malignancy workup was performed. Admitted for malignancy workup.   Endocrine consult requested for management of uncontrolled DM2. A1c 10.6%    Uncontrolled DM2 c/b neuropathy   A1c 10.6%, goal <7%  FS goal 100-180 mg/dL  FS premeal and qhs   Carb consistent diet   Will obtain C- peptide   Recommend decrease Lantus to 23 units qhs   Recommend Admelog 8units TIDac, hold if NPO   Recommend low correctional scale premeal and qhs     On discharge, will continue basal/bolus insulin, dose to be determined  Recommend follow up with Endo in [place], Patient to find out the name    HTN   goal <130/80  Continue current regimen   Currently on amlodipine and metoprolol, ACEI held due to GUANAKO     HLD  goal LDL<70   Continue stain     Abnormal TFT  TSH mildly elevated, 4.8  Please repeat TFT 6 weeks after discharge     Discussed with Dr Juares   Discussed with primary team       Clare Centeno MD  Endocrine Fellow   Pager  on weekdays 9am- 5pm   Please call  after hours and on weekends

## 2021-03-15 NOTE — CONSULT NOTE ADULT - SUBJECTIVE AND OBJECTIVE BOX
Reason for consult:    HPI:  : 59yo M with hx HTN, DM, HLD, COVID 2020 presents with LE pain and scrotal swelling. Patient was hospitalized in [facility] in Feb 2021 where CT Ab/P found 5.8x2.5cm lesion on the right pelvis concerning for malignancy with sclerotic lesion at L5.   Patient left AMA before additional malignancy workup was performed.   In the last month, patient had 45lb unintentional weight loss and decreased appetite.   In the last 2 weeks had some  night sweats and subjective fevers.   In addition, he noted that he started to have abdominal swelling, mostly on the left side with tenderness along with b/l scrotal swelling    	Today, he came to the ED for 8/10 b/l LE pain, pins and needles. At baseline, he has neuropathy and has been unable to walk since COVID last year.   He uses a cane but has frequent falls. Last fall 4 days ago. Tried neurotonin in the past with no relief.     left ama from [facility] (14 Mar 2021 15:37)    Hematology/Oncology consulted for patient with bilateral inguinal adenopathy (R>L), subjective fevers, night sweats, 35 lb weight loss X past 3 months. Patient states he noticed right inguinal swelling about 3 months ago and has been growing. CT also positive for an L5 sclerotic lesion. He had been at [facility] but stated he was in a hot room and they would not change his room so he signed out AMA.      PAST MEDICAL & SURGICAL HISTORY:  Diabetes    Hyperlipidemia    Hypertension, unspecified type    No significant past surgical history        FAMILY HISTORY:  No pertinent family history in first degree relatives        Alcohol Denied  Smoking: Nonsmoker  Drug Use: Denied  Marital Status:         Allergies    No Known Allergies    Intolerances        MEDICATIONS  (STANDING):  amLODIPine   Tablet 10 milliGRAM(s) Oral daily  atorvastatin 80 milliGRAM(s) Oral at bedtime  dextrose 40% Gel 15 Gram(s) Oral once  dextrose 5%. 1000 milliLiter(s) (50 mL/Hr) IV Continuous <Continuous>  dextrose 5%. 1000 milliLiter(s) (100 mL/Hr) IV Continuous <Continuous>  dextrose 50% Injectable 25 Gram(s) IV Push once  dextrose 50% Injectable 12.5 Gram(s) IV Push once  dextrose 50% Injectable 25 Gram(s) IV Push once  glucagon  Injectable 1 milliGRAM(s) IntraMuscular once  heparin   Injectable 5000 Unit(s) SubCutaneous every 12 hours  insulin glargine Injectable (LANTUS) 26 Unit(s) SubCutaneous at bedtime  insulin lispro (ADMELOG) corrective regimen sliding scale   SubCutaneous three times a day before meals  insulin lispro (ADMELOG) corrective regimen sliding scale   SubCutaneous at bedtime  insulin lispro Injectable (ADMELOG) 8 Unit(s) SubCutaneous three times a day before meals  lactated ringers. 1000 milliLiter(s) (50 mL/Hr) IV Continuous <Continuous>  metoprolol succinate ER 50 milliGRAM(s) Oral daily    MEDICATIONS  (PRN):  traMADol 50 milliGRAM(s) Oral three times a day PRN Moderate Pain (4 - 6)      ROS  Fever, sweats per HPI  Weight loss per HPI  No epistaxis, HA, sore throat  No CP, SOB, cough, sputum  No n/v/d, abd pain, melena, hematochezia  No edema  No rash  No anxiety  Back pain  Diffuse skin pain  Lower extremity weakness - as per patient since COVID-19 infection  No bleeding, bruising  No dysuria, hematuria    T(C): 36.9 (03-15-21 @ 04:32), Max: 37.1 (03-15-21 @ 00:14)  HR: 79 (03-15-21 @ 04:32) (77 - 84)  BP: 135/91 (03-15-21 @ 04:32) (135/91 - 162/81)  RR: 18 (03-15-21 @ 04:32) (16 - 18)  SpO2: 99% (03-15-21 @ 04:32) (96% - 99%)  Wt(kg): --    PE  NAD  Awake, alert  Anicteric  RRR  Lungs clear  Abd soft, diffusely tender on light palpation, no masses  No c/c/e  Right inguinal mass - entire groin is tender to light palpation - difficult to examine  No rash grossly                            15.3   7.43  )-----------( 480      ( 15 Mar 2021 06:23 )             46.6       03-15    140  |  110<H>  |  28<H>  ----------------------------<  80  3.6   |  18<L>  |  1.46<H>    Ca    9.1      15 Mar 2021 06:23  Phos  4.6     03-15  Mg     2.0     03-14    TPro  6.2  /  Alb  2.8<L>  /  TBili  0.2  /  DBili  x   /  AST  14  /  ALT  21  /  AlkPhos  130<H>  03-14      EXAM:  US SCROTUM AND CONTENTS                          EXAM:  US DPLX SCROTUM                            PROCEDURE DATE:  03/14/2021            INTERPRETATION:  CLINICAL INFORMATION: Scrotal swelling    COMPARISON: None available.    TECHNIQUE: Testicular ultrasound utilizing color and spectral Doppler.    FINDINGS:    RIGHT:  Right testis: 3.8 cm x 1.9 x  2.4 cm. Normal echogenicity and echotexture with no masses or areas of architectural distortion. Normal arterial and venous blood flow pattern.  Right epididymis: Within normal limits.  Right hydrocele: Large, complex.  Right varicocele: None.    LEFT:  Left testis: 3.8 cm x 2.3 cm x 1.9 cm. Normal echogenicity and echotexture with no masses or areas of architectural distortion. Normal arterial and venous blood flow pattern.  Left epididymis: Not visualized.  Left hydrocele: Large, complex.  Left varicocele: None.    IMPRESSION:    Large complex bilateral hydroceles.    No testicular mass seen.      EXAM:  CT ABDOMEN AND PELVIS IC                          EXAM:  CT CHEST IC                            PROCEDURE DATE:  03/14/2021            INTERPRETATION:  CLINICAL INFORMATION: Weight loss and night sweats. Evaluate for malignancy    COMPARISON: None.    CONTRAST/COMPLICATIONS:  IV Contrast: Omnipaque 350 / 90 cc administered / 10 cc discarded.  Oral Contrast: None  Complications: None reported    PROCEDURE:  CT of the Chest, Abdomen and Pelvis was performed.  Sagittal and coronal reformats were performed.    FINDINGS:  CHEST:  LUNGS AND LARGE AIRWAYS: Patent central airways. Mild emphysema. No pulmonary nodules.  PLEURA: No pleural effusion.  VESSELS: Atherosclerotic changes of the aorta and coronary arteries.  HEART: Heart size is normal. Small pericardial effusion. Aortic valvular calcifications  MEDIASTINUM AND HORACIO: No lymphadenopathy.  CHEST WALL AND LOWER NECK: Gynecomastia.    ABDOMEN AND PELVIS:  LIVER: Within normal limits.  BILE DUCTS: Normal caliber.  GALLBLADDER: Within normal limits.  SPLEEN: Within normal limits.  PANCREAS: Within normal limits.  ADRENALS: Within normal limits.  KIDNEYS/URETERS: Bilateral cysts and additional subcentimeter hypodense foci, too small to characterize.    BLADDER: Within normal limits.  REPRODUCTIVE ORGANS: Prostate within normal limits.    BOWEL: No bowel obstruction. Appendix is normal.  PERITONEUM: No ascites.  VESSELS: Atherosclerotic changes.  RETROPERITONEUM/LYMPH NODES: An enlarged right para iliac lymph node measures 5.9 x 2.4 cm (2, 133). Additional smaller right iliac nodes are seen. Bilateral groin lymph nodes, the largest measuring 2.4 by 1.3 cm on the right. No significant para-aortic adenopathy.  ABDOMINAL WALL: Small fat-containing umbilical hernia.  BONES: Degenerative changes.    IMPRESSION:  Enlarged right iliac lymph node measuring 5.9 x 2.4 cm. Additional smaller right iliac nodes are seen. There are also bilateral groin lymph nodes.

## 2021-03-15 NOTE — CONSULT NOTE ADULT - ATTENDING COMMENTS
Pt seen and examined, agree with the above assessment and plan by NOLAN Sorto.  59 yo M with hx HTN, DM, HLD, COVID 2020 presents with LE pain and scrotal swelling  -concern for malignancy  -heme/onc eval noted  -plan LN excisional biopsy to r/o lymphoma  -check EKG, otherwise can proceed from a cv standpoint   -f/u heme    2. HTN  -bp acceptable  -c/w amlodipine and bb   -can inc bb if needed  -no ace/arb given guanako     3. HLD  -c/w statin    4. GUANAKO  -cr noted  -renal f/u    dvt ppx
Agree with above
Patient seen at bedside this morning. Reports taking Tresiba 40 units in the morning and Humalog 8 units with meals - but sometimes takes it after he eats. He was not aware that bolus insulin should be taken before meals. He is eating a little, po intake appears a little erratic. Adjust insulin regimen as described above.    Yefri Juares MD   Pager # [phone]  On evenings and weekends, please call the office at [phone] or page endocrine fellow on call. Please note that this patient may be followed by different provider tomorrow. If no answer, contact the office.

## 2021-03-15 NOTE — CONSULT NOTE ADULT - SUBJECTIVE AND OBJECTIVE BOX
CC: 60y old Male admitted with a chief complaint of leg weakness, now with right testicular swelling     HPI:   61yo M with hx HTN, DM, HLD, COVID 2020 presents with LE pain and scrotal swelling. Patient was hospitalized in [facility] in Feb 2021 where CT Ab/P found 5.8x2.5cm lesion on the right pelvis concerning for malignancy with sclerotic lesion at L5.   Patient left AMA before additional malignancy workup was performed. In the last month, patient had 45lb unintentional weight loss and decreased appetite. In the last 2 weeks had some  night sweats and subjective fevers.  Surgery consulted for evaluation for lymph node biopsy.     PMHx: Diabetes    Hyperlipidemia    Hypertension, unspecified type      PSHx: No significant past surgical history      Medications (inpatient): amLODIPine   Tablet 10 milliGRAM(s) Oral daily  atorvastatin 80 milliGRAM(s) Oral at bedtime  dextrose 40% Gel 15 Gram(s) Oral once  dextrose 5%. 1000 milliLiter(s) IV Continuous <Continuous>  dextrose 5%. 1000 milliLiter(s) IV Continuous <Continuous>  dextrose 50% Injectable 25 Gram(s) IV Push once  dextrose 50% Injectable 12.5 Gram(s) IV Push once  dextrose 50% Injectable 25 Gram(s) IV Push once  glucagon  Injectable 1 milliGRAM(s) IntraMuscular once  heparin   Injectable 5000 Unit(s) SubCutaneous every 12 hours  insulin glargine Injectable (LANTUS) 26 Unit(s) SubCutaneous at bedtime  insulin lispro (ADMELOG) corrective regimen sliding scale   SubCutaneous three times a day before meals  insulin lispro (ADMELOG) corrective regimen sliding scale   SubCutaneous at bedtime  insulin lispro Injectable (ADMELOG) 8 Unit(s) SubCutaneous three times a day before meals  lactated ringers. 1000 milliLiter(s) IV Continuous <Continuous>  metoprolol succinate ER 50 milliGRAM(s) Oral daily    Medications (PRN):traMADol 50 milliGRAM(s) Oral three times a day PRN    Allergies: No Known Allergies  (Intolerances: )  Social Hx:   Family Hx: No pertinent family history in first degree relatives        Physical Exam  T(C): 36.8  HR: 73 (73 - 84)  BP: 134/84 (134/84 - 162/81)  RR: 18 (16 - 18)  SpO2: 96% (96% - 99%)  Tmax: T(C): , Max: 37.1 (03-15-21 @ 00:14)    03-14-21  -  03-15-21  --------------------------------------------------------  IN:  Total IN: 0 mL    OUT:    Voided (mL): 300 mL  Total OUT: 300 mL    Total NET: -300 mL        General: well developed, well nourished, NAD  Neuro: alert and oriented, no focal deficits, moves all extremities spontaneously  HEENT: NCAT, EOMI, anicteric, mucosa moist  Respiratory: airway patent, respirations unlabored  CVS: regular rate and rhythm  Abdomen: soft, nontender, nondistended  Extremities: no edema, sensation and movement grossly intact, b/l groins with palpable lymphadenopathy R>L   Skin: warm, dry, appropriate color    Labs:                        15.3   7.43  )-----------( 480      ( 15 Mar 2021 06:23 )             46.6       03-15    140  |  110<H>  |  28<H>  ----------------------------<  80  3.6   |  18<L>  |  1.46<H>    Ca    9.1      15 Mar 2021 06:23  Phos  4.6     03-15  Mg     2.0     03-14    TPro  6.2  /  Alb  2.8<L>  /  TBili  0.2  /  DBili  x   /  AST  14  /  ALT  21  /  AlkPhos  130<H>  03-14            Imaging and other studies:  < from: CT Abdomen and Pelvis w/ IV Cont (03.14.21 @ 10:12) >  FINDINGS:  CHEST:  LUNGS AND LARGE AIRWAYS: Patent central airways. Mild emphysema. No pulmonary nodules.  PLEURA: No pleural effusion.  VESSELS: Atherosclerotic changes of the aorta and coronary arteries.  HEART: Heart size is normal. Small pericardial effusion. Aortic valvular calcifications  MEDIASTINUM AND HORACIO: No lymphadenopathy.  CHEST WALL AND LOWER NECK: Gynecomastia.    ABDOMEN AND PELVIS:  LIVER: Within normal limits.  BILE DUCTS: Normal caliber.  GALLBLADDER: Withinnormal limits.  SPLEEN: Within normal limits.  PANCREAS: Within normal limits.  ADRENALS: Within normal limits.  KIDNEYS/URETERS: Bilateral cysts and additional subcentimeter hypodense foci, too small to characterize.    BLADDER: Within normal limits.  REPRODUCTIVE ORGANS: Prostate within normal limits.    BOWEL: No bowel obstruction. Appendix is normal.  PERITONEUM: No ascites.  VESSELS: Atherosclerotic changes.  RETROPERITONEUM/LYMPH NODES: An enlarged right para iliac lymph node measures 5.9 x2.4 cm (2, 133). Additional smaller right iliac nodes are seen. Bilateral groin lymph nodes, the largest measuring 2.4 by 1.3 cm on the right. No significant para-aortic adenopathy.  ABDOMINAL WALL: Small fat-containing umbilical hernia.  BONES: Degenerative changes.    IMPRESSION:  Enlarged right iliac lymph node measuring 5.9 x 2.4 cm. Additional smaller right iliac nodes are seen. There are also bilateral groin lymph nodes.

## 2021-03-15 NOTE — CONSULT NOTE ADULT - SUBJECTIVE AND OBJECTIVE BOX
Admitting Diagnosis:  Abnormal weight loss [R63.4]  ABNORMAL WEIGHT LOSS        HPI:  This is a 60y year old Male  who presents with the chief complaint of low back pain and leg weakness. 61yo M with hx HTN, DM, HLD, COVID 2020 presents with LE pain and scrotal swelling. Patient was hospitalized in [facility] in Feb 2021 where CT Ab/P found 5.8x2.5cm lesion on the right pelvis concerning for malignancy with sclerotic lesion at L5. Pt states since early 2021 inc pain and weakness in the right leg, at first he was limping but now cannot walk.  He has pain in the feet, describes as pinching and needles like sensation.  Starting to have sx in left leg as well. Patient left AMA before additional malignancy workup was performed.  In the last month, patient had 45lb unintentional weight loss and decreased appetite. Notes abdominal swelling, mostly on the left side with tenderness along with b/l scrotal swelling.  At one point given neurontin, not helpful.  Denies sx in arms, changes in speech, swallow, vision, bladder control.  Says issues with bowel movements.   ******    Past Medical History:  Diabetes [E11.9]    Hyperlipidemia [E78.5]    Hypertension, unspecified type [I10]        Past Surgical History:  No significant past surgical history [[id number]]        Social History:  No toxic habits    Family History:  FAMILY HISTORY:  No pertinent family history in first degree relatives        Allergies:  No Known Allergies      ROS:  Constitutional: Patient offers no complaints of fevers or significant weight loss  Ears, Nose, Mouth and Throat: The patient presents with no abnormalities of the head, ears, eyes, nose or throat  Skin: Patient offers no concerns of new rashes or lesions  Respiratory: The patient presents with no abnormalities of the respiratory tract  Cardiovascular: The patient presents with no cardiac abnormalities  Gastrointestinal: The patient presents with no abnormalities of the GI system  Genitourinary: The patient presents with no dysuria, hematuria or frequent urination  Neurological: See HPI  Endocrine: Patient offers no complaints of excessive thirst, urination, or heat/cold intolerance    Advanced care planning reviewed and noted in the chart.    Medications:  amLODIPine   Tablet 10 milliGRAM(s) Oral daily  atorvastatin 80 milliGRAM(s) Oral at bedtime  dextrose 40% Gel 15 Gram(s) Oral once  dextrose 5%. 1000 milliLiter(s) IV Continuous <Continuous>  dextrose 5%. 1000 milliLiter(s) IV Continuous <Continuous>  dextrose 50% Injectable 25 Gram(s) IV Push once  dextrose 50% Injectable 12.5 Gram(s) IV Push once  dextrose 50% Injectable 25 Gram(s) IV Push once  glucagon  Injectable 1 milliGRAM(s) IntraMuscular once  heparin   Injectable 5000 Unit(s) SubCutaneous every 12 hours  insulin glargine Injectable (LANTUS) 26 Unit(s) SubCutaneous at bedtime  insulin lispro (ADMELOG) corrective regimen sliding scale   SubCutaneous three times a day before meals  insulin lispro (ADMELOG) corrective regimen sliding scale   SubCutaneous at bedtime  insulin lispro Injectable (ADMELOG) 8 Unit(s) SubCutaneous three times a day before meals  lactated ringers. 1000 milliLiter(s) IV Continuous <Continuous>  metoprolol succinate ER 50 milliGRAM(s) Oral daily      Labs:  CBC Full  -  ( 15 Mar 2021 06:23 )  WBC Count : 7.43 K/uL  RBC Count : 5.20 M/uL  Hemoglobin : 15.3 g/dL  Hematocrit : 46.6 %  Platelet Count - Automated : 480 K/uL  Mean Cell Volume : 89.6 fl  Mean Cell Hemoglobin : 29.4 pg  Mean Cell Hemoglobin Concentration : 32.8 gm/dL  Auto Neutrophil # : x  Auto Lymphocyte # : x  Auto Monocyte # : x  Auto Eosinophil # : x  Auto Basophil # : x  Auto Neutrophil % : x  Auto Lymphocyte % : x  Auto Monocyte % : x  Auto Eosinophil % : x  Auto Basophil % : x    03-15    140  |  110<H>  |  28<H>  ----------------------------<  80  3.6   |  18<L>  |  1.46<H>    Ca    9.1      15 Mar 2021 06:23  Phos  4.6     03-15  Mg     2.0     03-14    TPro  6.2  /  Alb  2.8<L>  /  TBili  0.2  /  DBili  x   /  AST  14  /  ALT  21  /  AlkPhos  130<H>  03-14    CAPILLARY BLOOD GLUCOSE      POCT Blood Glucose.: 108 mg/dL (14 Mar 2021 22:52)  POCT Blood Glucose.: 256 mg/dL (14 Mar 2021 19:31)    LIVER FUNCTIONS - ( 14 Mar 2021 08:38 )  Alb: 2.8 g/dL / Pro: 6.2 g/dL / ALK PHOS: 130 U/L / ALT: 21 U/L / AST: 14 U/L / GGT: x               Male    Vitals:  Vital Signs Last 24 Hrs  T(C): 36.9 (15 Mar 2021 04:32), Max: 37.1 (15 Mar 2021 00:14)  T(F): 98.4 (15 Mar 2021 04:32), Max: 98.7 (15 Mar 2021 00:14)  HR: 79 (15 Mar 2021 04:32) (75 - 84)  BP: 135/91 (15 Mar 2021 04:32) (135/91 - 162/81)  BP(mean): --  RR: 18 (15 Mar 2021 04:32) (16 - 18)  SpO2: 99% (15 Mar 2021 04:32) (96% - 99%)    NEUROLOGICAL EXAM:    Mental status: Awake, alert, and in no apparent distress. Oriented to person, place and time. Language function is normal. Recent memory, digit span and concentration were normal.     Cranial Nerves: Pupils were equal, round, reactive to light. Extraocular movements were intact. Visual field were full. Fundoscopic exam was deferred. Facial sensation was intact to light touch. There was no facial asymmetry. The palate was upgoing symmetrically and tongue was midline. Hearing acuity was intact to finger rub AU. Shoulder shrug was full bilaterally    Motor exam: Bulk and tone were normal. Strength was 5/5 in the arms.  Left leg  5/5 except dorsi 4+/5.  Right leg - low tone.  hip flexion/knee ext 2/5, dorsiflexion 4/5. Fine finger movements were symmetric and normal. There was no pronator drift    Reflexes: 2+ in the bilateral upper extremities. absent in legs. Toes were downgoing bilaterally.     Sensation: Intact to light touch, temperature, vibration and proprioception. says when touch right leg it is painful    Coordination: Finger-nose-finger was without dysmetria. cannot do heel shin    Gait: unable to ambulate    < from: CT Chest w/ IV Cont (03.14.21 @ 10:12) >    EXAM:  CT ABDOMEN AND PELVIS IC                          EXAM:  CT CHEST IC                          PROCEDURE DATE:  03/14/2021      IMPRESSION:  Enlarged right iliac lymph node measuring 5.9 x 2.4 cm. Additional smaller right iliac nodes are seen. There are also bilateral groin lymph nodes.      MOLINA PUGH M.D., RADIOLOGY RESIDENT  This documenthas been electronically signed.  GINA JENKINS M.D., ATTENDING RADIOLOGIST  This document has been electronically signed. Mar 14 2021 12:50PM    < end of copied text >

## 2021-03-15 NOTE — CONSULT NOTE ADULT - SUBJECTIVE AND OBJECTIVE BOX
HPI: 61yo M with hx HTN, DM2, HLD, COVID 2020 presents with LE pain and scrotal swelling. Patient was hospitalized in [facility] in Feb 2021 where CT Ab/P found 5.8x2.5cm lesion on the right pelvis concerning for malignancy with sclerotic lesion at L5.   Patient left AMA before additional malignancy workup was performed.   In the last month, patient had 45lb unintentional weight loss and decreased appetite.   In the last 2 weeks had some  night sweats and subjective fevers.   In addition, he noted that he started to have abdominal swelling, mostly on the left side with tenderness along with b/l scrotal swelling  Endocrine consult requested for management of uncontrolled DM2. A1c 10.6%    Endocrine History:   Patient reports he was diagnosed with DM2 in 1998, when he was feeling increase fatigue.   Patient reports he was initially started on MENA.   Reports PCP sent him to see and Endocrinologist in [place] (patient can not recall name) last year. Patient reports he was started on Metformin however did not help control BG.   Patient then started on Tresiba followed b      PAST MEDICAL & SURGICAL HISTORY:  Diabetes    Hyperlipidemia    Hypertension, unspecified type    No significant past surgical history        FAMILY HISTORY:  No pertinent family history in first degree relatives        Social History:    Outpatient Medications:    MEDICATIONS  (STANDING):  amLODIPine   Tablet 10 milliGRAM(s) Oral daily  atorvastatin 80 milliGRAM(s) Oral at bedtime  dextrose 40% Gel 15 Gram(s) Oral once  dextrose 5%. 1000 milliLiter(s) (50 mL/Hr) IV Continuous <Continuous>  dextrose 5%. 1000 milliLiter(s) (100 mL/Hr) IV Continuous <Continuous>  dextrose 50% Injectable 25 Gram(s) IV Push once  dextrose 50% Injectable 12.5 Gram(s) IV Push once  dextrose 50% Injectable 25 Gram(s) IV Push once  glucagon  Injectable 1 milliGRAM(s) IntraMuscular once  heparin   Injectable 5000 Unit(s) SubCutaneous every 12 hours  insulin glargine Injectable (LANTUS) 26 Unit(s) SubCutaneous at bedtime  insulin lispro (ADMELOG) corrective regimen sliding scale   SubCutaneous three times a day before meals  insulin lispro (ADMELOG) corrective regimen sliding scale   SubCutaneous at bedtime  insulin lispro Injectable (ADMELOG) 8 Unit(s) SubCutaneous three times a day before meals  lactated ringers. 1000 milliLiter(s) (50 mL/Hr) IV Continuous <Continuous>  metoprolol succinate ER 50 milliGRAM(s) Oral daily    MEDICATIONS  (PRN):  traMADol 50 milliGRAM(s) Oral three times a day PRN Moderate Pain (4 - 6)      Allergies    No Known Allergies    Intolerances      Review of Systems:  Constitutional: No fever  Eyes: No blurry vision  Neuro: No tremors  HEENT: No pain  Cardiovascular: No chest pain, palpitations  Respiratory: No SOB, no cough  GI: No nausea, vomiting, abdominal pain  : No dysuria  Skin: no rash  Psych: no depression  Endocrine: no polyuria, polydipsia  Hem/lymph: no swelling  Osteoporosis: no fractures    ALL OTHER SYSTEMS REVIEWED AND NEGATIVE    UNABLE TO OBTAIN    PHYSICAL EXAM:  VITALS: T(C): 36.8 (03-15-21 @ 14:26)  T(F): 98.3 (03-15-21 @ 14:26), Max: 98.7 (03-15-21 @ 00:14)  HR: 73 (03-15-21 @ 14:26) (73 - 84)  BP: 161/72 (03-15-21 @ 14:26) (134/84 - 162/81)  RR:  (18 - 18)  SpO2:  (96% - 99%)  Wt(kg): --  GENERAL: NAD, well-groomed, well-developed  EYES: No proptosis, no lid lag, anicteric  HEENT:  Atraumatic, Normocephalic, moist mucous membranes  THYROID: Normal size, no palpable nodules  RESPIRATORY: Clear to auscultation bilaterally; No rales, rhonchi, wheezing  CARDIOVASCULAR: Regular rate and rhythm; No murmurs; no peripheral edema  GI: Soft, nontender, non distended, normal bowel sounds  SKIN: Dry, intact, No rashes or lesions  MUSCULOSKELETAL: Full range of motion, normal strength  NEURO: sensation intact, extraocular movements intact, no tremor  PSYCH: Alert and oriented x 3, normal affect, normal mood  CUSHING'S SIGNS: no striae    POCT Blood Glucose.: 194 mg/dL (03-15-21 @ 15:08)  POCT Blood Glucose.: 228 mg/dL (03-15-21 @ 13:33)  POCT Blood Glucose.: 185 mg/dL (03-15-21 @ 09:38)  POCT Blood Glucose.: 108 mg/dL (03-14-21 @ 22:52)  POCT Blood Glucose.: 117 mg/dL (03-14-21 @ 22:31)  POCT Blood Glucose.: 256 mg/dL (03-14-21 @ 19:31)  POCT Blood Glucose.: 147 mg/dL (03-14-21 @ 16:25)                            15.3   7.43  )-----------( 480      ( 15 Mar 2021 06:23 )             46.6       03-15    140  |  110<H>  |  28<H>  ----------------------------<  80  3.6   |  18<L>  |  1.46<H>    EGFR if : 60  EGFR if non : 52<L>    Ca    9.1      03-15  Mg     2.0     03-14  Phos  4.6     03-15    TPro  6.2  /  Alb  2.8<L>  /  TBili  0.2  /  DBili  x   /  AST  14  /  ALT  21  /  AlkPhos  130<H>  03-14      Thyroid Function Tests:  03-15 @ 09:19 TSH 4.86                         HPI: 59yo M with hx HTN, DM2, HLD, COVID 2020 presents with LE pain and scrotal swelling. Patient was hospitalized in [facility] in Feb 2021 where CT Ab/P found 5.8x2.5cm lesion on the right pelvis concerning for malignancy with sclerotic lesion at L5.   Patient left AMA before additional malignancy workup was performed.   In the last month, patient had 45lb unintentional weight loss and decreased appetite.   In the last 2 weeks had some  night sweats and subjective fevers.   In addition, he noted that he started to have abdominal swelling, mostly on the left side with tenderness along with b/l scrotal swelling  Endocrine consult requested for management of uncontrolled DM2. A1c 10.6%    Endocrine History:   Patient reports he was diagnosed with DM2 in 1998, when he was feeling increase fatigue.   Patient reports he was initially started on MENA.   Reports PCP sent him to see and Endocrinologist in [place] (patient can not recall name) last year. Patient reports he was started on Metformin however did not help control BG.   Reports he was started on Tresiba 4 months ago then Humalog added shortly after. Patient currently on Tresiba 40untis qam and Humalog 6units TIDac.  Patient reports only having on hypoglycemic event. Report checking FS twice daily.       PAST MEDICAL & SURGICAL HISTORY:  Diabetes    Hyperlipidemia    Hypertension, unspecified type    No significant past surgical history        FAMILY HISTORY:  No pertinent family history in first degree relatives        Social History:    Outpatient Medications:    MEDICATIONS  (STANDING):  amLODIPine   Tablet 10 milliGRAM(s) Oral daily  atorvastatin 80 milliGRAM(s) Oral at bedtime  dextrose 40% Gel 15 Gram(s) Oral once  dextrose 5%. 1000 milliLiter(s) (50 mL/Hr) IV Continuous <Continuous>  dextrose 5%. 1000 milliLiter(s) (100 mL/Hr) IV Continuous <Continuous>  dextrose 50% Injectable 25 Gram(s) IV Push once  dextrose 50% Injectable 12.5 Gram(s) IV Push once  dextrose 50% Injectable 25 Gram(s) IV Push once  glucagon  Injectable 1 milliGRAM(s) IntraMuscular once  heparin   Injectable 5000 Unit(s) SubCutaneous every 12 hours  insulin glargine Injectable (LANTUS) 26 Unit(s) SubCutaneous at bedtime  insulin lispro (ADMELOG) corrective regimen sliding scale   SubCutaneous three times a day before meals  insulin lispro (ADMELOG) corrective regimen sliding scale   SubCutaneous at bedtime  insulin lispro Injectable (ADMELOG) 8 Unit(s) SubCutaneous three times a day before meals  lactated ringers. 1000 milliLiter(s) (50 mL/Hr) IV Continuous <Continuous>  metoprolol succinate ER 50 milliGRAM(s) Oral daily    MEDICATIONS  (PRN):  traMADol 50 milliGRAM(s) Oral three times a day PRN Moderate Pain (4 - 6)      Allergies    No Known Allergies    Intolerances      Review of Systems:  Constitutional: No fever  Eyes: No blurry vision  Neuro: No tremors  HEENT: No pain  Cardiovascular: No chest pain, palpitations  Respiratory: No SOB, no cough  GI: No nausea, vomiting, abdominal pain  : No dysuria  Skin: no rash  Psych: no depression  Endocrine: no polyuria, polydipsia  Hem/lymph: no swelling  Osteoporosis: no fractures    ALL OTHER SYSTEMS REVIEWED AND NEGATIVE    UNABLE TO OBTAIN    PHYSICAL EXAM:  VITALS: T(C): 36.8 (03-15-21 @ 14:26)  T(F): 98.3 (03-15-21 @ 14:26), Max: 98.7 (03-15-21 @ 00:14)  HR: 73 (03-15-21 @ 14:26) (73 - 84)  BP: 161/72 (03-15-21 @ 14:26) (134/84 - 162/81)  RR:  (18 - 18)  SpO2:  (96% - 99%)  Wt(kg): --  GENERAL: NAD, well-groomed, well-developed  EYES: No proptosis, no lid lag, anicteric  HEENT:  Atraumatic, Normocephalic, moist mucous membranes  THYROID: Normal size, no palpable nodules  RESPIRATORY: Clear to auscultation bilaterally; No rales, rhonchi, wheezing  CARDIOVASCULAR: Regular rate and rhythm; No murmurs; no peripheral edema  GI: Soft, nontender, non distended, normal bowel sounds  SKIN: Dry, intact, No rashes or lesions  MUSCULOSKELETAL: Full range of motion, normal strength  NEURO: sensation intact, extraocular movements intact, no tremor  PSYCH: Alert and oriented x 3, normal affect, normal mood  CUSHING'S SIGNS: no striae    POCT Blood Glucose.: 194 mg/dL (03-15-21 @ 15:08)  POCT Blood Glucose.: 228 mg/dL (03-15-21 @ 13:33)  POCT Blood Glucose.: 185 mg/dL (03-15-21 @ 09:38)  POCT Blood Glucose.: 108 mg/dL (03-14-21 @ 22:52)  POCT Blood Glucose.: 117 mg/dL (03-14-21 @ 22:31)  POCT Blood Glucose.: 256 mg/dL (03-14-21 @ 19:31)  POCT Blood Glucose.: 147 mg/dL (03-14-21 @ 16:25)                            15.3   7.43  )-----------( 480      ( 15 Mar 2021 06:23 )             46.6       03-15    140  |  110<H>  |  28<H>  ----------------------------<  80  3.6   |  18<L>  |  1.46<H>    EGFR if : 60  EGFR if non : 52<L>    Ca    9.1      03-15  Mg     2.0     03-14  Phos  4.6     03-15    TPro  6.2  /  Alb  2.8<L>  /  TBili  0.2  /  DBili  x   /  AST  14  /  ALT  21  /  AlkPhos  130<H>  03-14      Thyroid Function Tests:  03-15 @ 09:19 TSH 4.86                         HPI: 61yo M with hx HTN, DM2, HLD, COVID 2020 presents with LE pain and scrotal swelling. Patient was hospitalized in [facility] in Feb 2021 where CT Ab/P found 5.8x2.5cm lesion on the right pelvis concerning for malignancy with sclerotic lesion at L5.   Patient left AMA before additional malignancy workup was performed.   In the last month, patient had 45lb unintentional weight loss and decreased appetite.   In the last 2 weeks had some  night sweats and subjective fevers.   In addition, he noted that he started to have abdominal swelling, mostly on the left side with tenderness along with b/l scrotal swelling  Endocrine consult requested for management of uncontrolled DM2. A1c 10.6%    Endocrine History:   Patient reports he was diagnosed with DM2 in 1998, when he was feeling increase fatigue.   Patient reports he was initially started on MENA.   Reports PCP sent him to see and Endocrinologist in [place] (patient can not recall name) last year. Patient reports Endo started him on Metformin however did not help control BG.   Reports he was then started on Tresiba 4 months ago then Humalog added shortly after. Patient currently on Tresiba 40untis qam and Humalog 6units TIDac.  Patient reports only having one hypoglycemic event. Report checking FS twice daily. AM fasting 145-250, 5PM -200.   Patient reports neuropathy in RLE. Does not report blurred vision. Has not seen podiatry or opthalmology     PAST MEDICAL & SURGICAL HISTORY:  Diabetes mellitus 2  Hyperlipidemia  Hypertension, unspecified type  No significant past surgical history    FAMILY HISTORY:  Family history of DM in mother and siblings    Social History:  Reports no alcohol, tobacco or substance abuse since at of 28yo     Outpatient Medications:  amlodipine-benazepril 10 mg-40 mg oral capsule: 1 cap(s) orally once a day (15 Mar 2021 11:15)  HumaLOG 100 units/mL injectable solution: 8 unit(s) injectable 3 times a day (before meals) (15 Mar 2021 11:15)  Lipitor 80 mg oral tablet: 1 tab(s) orally once a day (15 Mar 2021 11:15)  Toprol-XL 50 mg oral tablet, extended release: 1 tab(s) orally once a day (15 Mar 2021 11:15)  Tresiba 100 units/mL subcutaneous solution: 40 unit(s) subcutaneous once a day (at bedtime) (15 Mar 2021 11:15)    MEDICATIONS  (STANDING):  amLODIPine   Tablet 10 milliGRAM(s) Oral daily  atorvastatin 80 milliGRAM(s) Oral at bedtime  dextrose 40% Gel 15 Gram(s) Oral once  dextrose 5%. 1000 milliLiter(s) (50 mL/Hr) IV Continuous <Continuous>  dextrose 5%. 1000 milliLiter(s) (100 mL/Hr) IV Continuous <Continuous>  dextrose 50% Injectable 25 Gram(s) IV Push once  dextrose 50% Injectable 12.5 Gram(s) IV Push once  dextrose 50% Injectable 25 Gram(s) IV Push once  glucagon  Injectable 1 milliGRAM(s) IntraMuscular once  heparin   Injectable 5000 Unit(s) SubCutaneous every 12 hours  insulin glargine Injectable (LANTUS) 26 Unit(s) SubCutaneous at bedtime  insulin lispro (ADMELOG) corrective regimen sliding scale   SubCutaneous three times a day before meals  insulin lispro (ADMELOG) corrective regimen sliding scale   SubCutaneous at bedtime  insulin lispro Injectable (ADMELOG) 8 Unit(s) SubCutaneous three times a day before meals  lactated ringers. 1000 milliLiter(s) (50 mL/Hr) IV Continuous <Continuous>  metoprolol succinate ER 50 milliGRAM(s) Oral daily    MEDICATIONS  (PRN):  traMADol 50 milliGRAM(s) Oral three times a day PRN Moderate Pain (4 - 6)    Allergies  No Known Allergies    Review of Systems:  Constitutional: No fever  Eyes: No blurry vision  Neuro: No tremors  HEENT: No pain  Cardiovascular: No chest pain, palpitations  Respiratory: No SOB, no cough  GI: No nausea, vomiting, abdominal pain  : No dysuria  Neuro: RLE weakness and falls   Endocrine: no polyuria, + polydipsia  Hem/lymph: + scrotal swelling    ALL OTHER SYSTEMS REVIEWED AND NEGATIVE    PHYSICAL EXAM:  VITALS: T(C): 36.8 (03-15-21 @ 14:26)  T(F): 98.3 (03-15-21 @ 14:26), Max: 98.7 (03-15-21 @ 00:14)  HR: 73 (03-15-21 @ 14:26) (73 - 84)  BP: 161/72 (03-15-21 @ 14:26) (134/84 - 162/81)  RR:  (18 - 18)  SpO2:  (96% - 99%)  Wt(kg): 68kg   GENERAL: NAD, well-groomed, well-developed  EYES: No proptosis, anicteric  HEENT:  Atraumatic, Normocephalic, moist mucous membranes  THYROID: Normal size, no palpable nodules, nontender   RESPIRATORY: Clear to auscultation bilaterally; No rales, rhonchi, wheezing  CARDIOVASCULAR: Regular rate and rhythm; No murmurs; no peripheral edema  GI: Soft, non distended, normal bowel sounds, left sided tenderness   SKIN: Dry, intact, No rashes or lesions  MUSCULOSKELETAL: Full range of motion, normal strength  NEURO: sensation intact, extraocular movements intact, no tremor  PSYCH: Alert and oriented x 3, reactive affect     POCT Blood Glucose.: 194 mg/dL (03-15-21 @ 15:08)  POCT Blood Glucose.: 228 mg/dL (03-15-21 @ 13:33)  POCT Blood Glucose.: 185 mg/dL (03-15-21 @ 09:38)  POCT Blood Glucose.: 108 mg/dL (03-14-21 @ 22:52)  POCT Blood Glucose.: 117 mg/dL (03-14-21 @ 22:31)  POCT Blood Glucose.: 256 mg/dL (03-14-21 @ 19:31)  POCT Blood Glucose.: 147 mg/dL (03-14-21 @ 16:25)                            15.3   7.43  )-----------( 480      ( 15 Mar 2021 06:23 )             46.6       03-15    140  |  110<H>  |  28<H>  ----------------------------<  80  3.6   |  18<L>  |  1.46<H>    EGFR if : 60  EGFR if non : 52<L>    Ca    9.1      03-15  Mg     2.0     03-14  Phos  4.6     03-15    TPro  6.2  /  Alb  2.8<L>  /  TBili  0.2  /  DBili  x   /  AST  14  /  ALT  21  /  AlkPhos  130<H>  03-14      Thyroid Function Tests:  03-15 @ 09:19 TSH 4.86                         HPI: 60 year old M man with PMH HTN, DM2, HLD, COVID 2020 presents with LE pain and scrotal swelling. Patient was hospitalized in [facility] in Feb 2021 where CT Ab/P found 5.8x2.5cm lesion on the right pelvis concerning for malignancy with sclerotic lesion at L5.   Patient left AMA before additional malignancy workup was performed.   In the last month, patient had 45lb unintentional weight loss and decreased appetite.   In the last 2 weeks had some  night sweats and subjective fevers.   In addition, he noted that he started to have abdominal swelling, mostly on the left side with tenderness along with b/l scrotal swelling  Endocrine consult requested for management of uncontrolled DM2. A1c 10.6%    Endocrine History:   Patient reports he was diagnosed with DM2 in 1998, when he was feeling increased fatigue.   Patient reports he was initially started on MENA.   Reports PCP sent him to see and Endocrinologist in [place] (patient can not recall name) last year. Patient reports Endo started him on Metformin however did not help control BG.   Reports he was then started on Tresiba 4 months ago then Humalog added shortly after. Patient currently on Tresiba 40 units in the morning and Humalog 8 units before meals (but sometimes takes it after he eats). He states that he is adherent to his insulin regimen and does not miss doses.   Patient reports only having one hypoglycemic event. Report checking FS twice daily. AM fasting 145-250, 5PM -200.   Patient reports neuropathy in RLE. Does not report blurred vision. Has not seen podiatry or opthalmology     PAST MEDICAL & SURGICAL HISTORY:  Diabetes mellitus 2  Hyperlipidemia  Hypertension, unspecified type  No significant past surgical history    FAMILY HISTORY:  Family history of DM in mother and siblings    Social History:  Reports no alcohol, tobacco or substance abuse since at of 30yo     Outpatient Medications:  amlodipine-benazepril 10 mg-40 mg oral capsule: 1 cap(s) orally once a day (15 Mar 2021 11:15)  HumaLOG 100 units/mL injectable solution: 8 unit(s) injectable 3 times a day (before meals) (15 Mar 2021 11:15)  Lipitor 80 mg oral tablet: 1 tab(s) orally once a day (15 Mar 2021 11:15)  Toprol-XL 50 mg oral tablet, extended release: 1 tab(s) orally once a day (15 Mar 2021 11:15)  Tresiba 100 units/mL subcutaneous solution: 40 unit(s) subcutaneous once a day (at bedtime) (15 Mar 2021 11:15)    MEDICATIONS  (STANDING):  amLODIPine   Tablet 10 milliGRAM(s) Oral daily  atorvastatin 80 milliGRAM(s) Oral at bedtime  dextrose 40% Gel 15 Gram(s) Oral once  dextrose 5%. 1000 milliLiter(s) (50 mL/Hr) IV Continuous <Continuous>  dextrose 5%. 1000 milliLiter(s) (100 mL/Hr) IV Continuous <Continuous>  dextrose 50% Injectable 25 Gram(s) IV Push once  dextrose 50% Injectable 12.5 Gram(s) IV Push once  dextrose 50% Injectable 25 Gram(s) IV Push once  glucagon  Injectable 1 milliGRAM(s) IntraMuscular once  heparin   Injectable 5000 Unit(s) SubCutaneous every 12 hours  insulin glargine Injectable (LANTUS) 26 Unit(s) SubCutaneous at bedtime  insulin lispro (ADMELOG) corrective regimen sliding scale   SubCutaneous three times a day before meals  insulin lispro (ADMELOG) corrective regimen sliding scale   SubCutaneous at bedtime  insulin lispro Injectable (ADMELOG) 8 Unit(s) SubCutaneous three times a day before meals  lactated ringers. 1000 milliLiter(s) (50 mL/Hr) IV Continuous <Continuous>  metoprolol succinate ER 50 milliGRAM(s) Oral daily    MEDICATIONS  (PRN):  traMADol 50 milliGRAM(s) Oral three times a day PRN Moderate Pain (4 - 6)    Allergies  No Known Allergies    Review of Systems:  Constitutional: No fever  Eyes: No blurry vision  Neuro: No tremors  HEENT: No pain  Cardiovascular: No chest pain, palpitations  Respiratory: No SOB, no cough  GI: No nausea, vomiting, abdominal pain  : No dysuria  Neuro: RLE weakness and falls   Endocrine: no polyuria, + polydipsia  Hem/lymph: + scrotal swelling    ALL OTHER SYSTEMS REVIEWED AND NEGATIVE    PHYSICAL EXAM:  VITALS: T(C): 36.8 (03-15-21 @ 14:26)  T(F): 98.3 (03-15-21 @ 14:26), Max: 98.7 (03-15-21 @ 00:14)  HR: 73 (03-15-21 @ 14:26) (73 - 84)  BP: 161/72 (03-15-21 @ 14:26) (134/84 - 162/81)  RR:  (18 - 18)  SpO2:  (96% - 99%)  Wt(kg): 68kg   GENERAL: NAD, well-groomed, well-developed  EYES: No proptosis, anicteric  HEENT:  Atraumatic, Normocephalic, moist mucous membranes  THYROID: Normal size, no palpable nodules, nontender   RESPIRATORY: Clear to auscultation bilaterally; No rales, rhonchi, wheezing  CARDIOVASCULAR: Regular rate and rhythm; No murmurs; no peripheral edema  GI: Soft, non distended, normal bowel sounds, left sided tenderness   SKIN: Dry, intact, No rashes or lesions  MUSCULOSKELETAL: Full range of motion, normal strength  PSYCH: Alert and oriented x 3, reactive affect, euthymic mood     POCT Blood Glucose.: 194 mg/dL (03-15-21 @ 15:08)  POCT Blood Glucose.: 228 mg/dL (03-15-21 @ 13:33)  POCT Blood Glucose.: 185 mg/dL (03-15-21 @ 09:38)  POCT Blood Glucose.: 108 mg/dL (03-14-21 @ 22:52)  POCT Blood Glucose.: 117 mg/dL (03-14-21 @ 22:31)  POCT Blood Glucose.: 256 mg/dL (03-14-21 @ 19:31)  POCT Blood Glucose.: 147 mg/dL (03-14-21 @ 16:25)                            15.3   7.43  )-----------( 480      ( 15 Mar 2021 06:23 )             46.6       03-15    140  |  110<H>  |  28<H>  ----------------------------<  80  3.6   |  18<L>  |  1.46<H>    EGFR if : 60  EGFR if non : 52<L>    Ca    9.1      03-15  Mg     2.0     03-14  Phos  4.6     03-15    TPro  6.2  /  Alb  2.8<L>  /  TBili  0.2  /  DBili  x   /  AST  14  /  ALT  21  /  AlkPhos  130<H>  03-14      Thyroid Function Tests:  03-15 @ 09:19 TSH 4.86

## 2021-03-15 NOTE — CONSULT NOTE ADULT - ASSESSMENT
59y/o presenting with right testicular swelling and weight loss, surgery consulted for LN excisional biopsy to r/o lymphoma     - palpable LN in right and left groin   - will plan for operative time   - document medical optimization for OR   - care per primary team     Discussed with Dr. Fish   [phone]

## 2021-03-15 NOTE — CONSULT NOTE ADULT - ASSESSMENT
a/p  59 yo M with hx HTN, DM, HLD, COVID 2020 presents with LE pain and scrotal swelling    1. LE pain/scrotal swelling  -concern for malignancy  -heme/onc eval noted  -plan LN excisional biopsy to r/o lymphoma  -check EKG, otherwise can proceed from a cv standpoint   -f/u heme    2. HTN  -bp acceptable  -c/w amlodipine and bb   -can inc bb if needed  -no ace/arb given guanako     3. HLD  -c/w statin    4. GUANAKO  -cr noted  -renal f/u    dvt ppx

## 2021-03-15 NOTE — CONSULT NOTE ADULT - SUBJECTIVE AND OBJECTIVE BOX
CARDIOLOGY CONSULT - Dr. Trimble         HPI:  59 yo M with hx HTN, DM, HLD, COVID 2020 presents with LE pain and scrotal swelling. Patient was hospitalized in [facility] in Feb 2021 where CT Ab/P found 5.8x2.5cm lesion on the right pelvis concerning for malignancy with sclerotic lesion at L5.Patient left AMA before additional malignancy workup was performed. In the last month, patient had 45lb unintentional weight loss and decreased appetite.  In the last 2 weeks had some night sweats and subjective fevers.  In addition, he noted that he started to have abdominal swelling, mostly on the left side with tenderness along with b/l scrotal swelling.  Patient does not f/u with a cardiologist, denies any recent cardiac testing.      Came to the ED for 8/10 b/l LE pain, pins and needles. At baseline, he has neuropathy and has been unable to walk since COVID last year.   He uses a cane but has frequent falls. Last fall 4 days ago. Denies prodromal symptoms prior to fall.      Patient denies any chest pain, dyspnea, palpitations, cough, syncope, exertional symptoms, nausea,  or rash.  Denies any history of CAD, MI, valve disease, cardiomyopathy, or congenital heart disease. Cardiac eval for clearance     PAST MEDICAL & SURGICAL HISTORY:  Diabetes    Hyperlipidemia    Hypertension, unspecified type    No significant past surgical history          PREVIOUS DIAGNOSTIC TESTING:    [ ] Echocardiogram:  [ ]  Catheterization:  [ ] Stress Test:  	    MEDICATIONS:  Home Medications:  amlodipine-benazepril 10 mg-40 mg oral capsule: 1 cap(s) orally once a day (15 Mar 2021 11:15)  HumaLOG 100 units/mL injectable solution: 8 unit(s) injectable 3 times a day (before meals) (15 Mar 2021 11:15)  Lipitor 80 mg oral tablet: 1 tab(s) orally once a day (15 Mar 2021 11:15)  Toprol-XL 50 mg oral tablet, extended release: 1 tab(s) orally once a day (15 Mar 2021 11:15)  Tresiba 100 units/mL subcutaneous solution: 40 unit(s) subcutaneous once a day (at bedtime) (15 Mar 2021 11:15)      MEDICATIONS  (STANDING):  amLODIPine   Tablet 10 milliGRAM(s) Oral daily  atorvastatin 80 milliGRAM(s) Oral at bedtime  dextrose 40% Gel 15 Gram(s) Oral once  dextrose 5%. 1000 milliLiter(s) (50 mL/Hr) IV Continuous <Continuous>  dextrose 5%. 1000 milliLiter(s) (100 mL/Hr) IV Continuous <Continuous>  dextrose 50% Injectable 25 Gram(s) IV Push once  dextrose 50% Injectable 12.5 Gram(s) IV Push once  dextrose 50% Injectable 25 Gram(s) IV Push once  glucagon  Injectable 1 milliGRAM(s) IntraMuscular once  heparin   Injectable 5000 Unit(s) SubCutaneous every 12 hours  insulin glargine Injectable (LANTUS) 26 Unit(s) SubCutaneous at bedtime  insulin lispro (ADMELOG) corrective regimen sliding scale   SubCutaneous three times a day before meals  insulin lispro (ADMELOG) corrective regimen sliding scale   SubCutaneous at bedtime  insulin lispro Injectable (ADMELOG) 8 Unit(s) SubCutaneous three times a day before meals  lactated ringers. 1000 milliLiter(s) (50 mL/Hr) IV Continuous <Continuous>  metoprolol succinate ER 50 milliGRAM(s) Oral daily      FAMILY HISTORY:  No pertinent family history in first degree relatives        SOCIAL HISTORY:    [ ] Non-smoker  [ ] Smoker  [ ] Alcohol    Allergies    No Known Allergies    Intolerances    	    REVIEW OF SYSTEMS:  CONSTITUTIONAL: No fever, weight loss, or fatigue  EYES: No eye pain, visual disturbances, or discharge  ENMT:  No difficulty hearing, tinnitus, vertigo; No sinus or throat pain  NECK: No pain or stiffness  RESPIRATORY: No cough, wheezing, chills or hemoptysis; No Shortness of Breath  CARDIOVASCULAR: No chest pain, palpitations, passing out, dizziness, or leg swelling  GASTROINTESTINAL: No abdominal or epigastric pain. No nausea, vomiting, or hematemesis; No diarrhea or constipation. No melena or hematochezia.  GENITOURINARY: No dysuria, frequency, hematuria, or incontinence  NEUROLOGICAL: No headaches, memory loss, loss of strength, numbness, or tremors  SKIN: No itching, burning, rashes, or lesions   	  see hpi   [ x] All others negative	  [ ] Unable to obtain    PHYSICAL EXAM:  T(C): 36.8 (03-15-21 @ 14:26), Max: 37.1 (03-15-21 @ 00:14)  HR: 73 (03-15-21 @ 14:26) (73 - 84)  BP: 161/72 (03-15-21 @ 14:26) (134/84 - 162/81)  RR: 18 (03-15-21 @ 14:26) (18 - 18)  SpO2: 97% (03-15-21 @ 14:26) (96% - 99%)  Wt(kg): --  I&O's Summary    14 Mar 2021 07:01  -  15 Mar 2021 07:00  --------------------------------------------------------  IN: 0 mL / OUT: 300 mL / NET: -300 mL        Appearance: Normal	  Psychiatry: A & O x 3, Mood & affect appropriate  HEENT:   Normal oral mucosa, PERRL, EOMI	  Lymphatic: No lymphadenopathy  Cardiovascular: Normal S1 S2,RRR, No JVD, No murmurs  Respiratory: coarse 	  Gastrointestinal:  Soft, Non-tender, + BS	  Skin: No rashes, No ecchymoses, No cyanosis	  Neurologic: Non-focal  Extremities: Normal range of motion, No clubbing, cyanosis or edema  Vascular: Peripheral pulses palpable 2+ bilaterally    TELEMETRY: 	    ECG:  	  RADIOLOGY:    CT Chest w/ IV Cont (03.14.21 @ 10:12)   PROCEDURE:  CT of the Chest, Abdomen and Pelvis was performed.  Sagittal and coronal reformatswere performed.    FINDINGS:  CHEST:  LUNGS AND LARGE AIRWAYS: Patent central airways. Mild emphysema. No pulmonary nodules.  PLEURA: No pleural effusion.  VESSELS: Atherosclerotic changes of the aorta and coronary arteries.  HEART: Heart size is normal. Small pericardial effusion. Aortic valvular calcifications  MEDIASTINUM AND HORACIO: No lymphadenopathy.  CHEST WALL AND LOWER NECK: Gynecomastia.    ABDOMEN AND PELVIS:  LIVER: Within normal limits.  BILE DUCTS: Normal caliber.  GALLBLADDER: Withinnormal limits.  SPLEEN: Within normal limits.  PANCREAS: Within normal limits.  ADRENALS: Within normal limits.  KIDNEYS/URETERS: Bilateral cysts and additional subcentimeter hypodense foci, too small to characterize.    BLADDER: Within normal limits.  REPRODUCTIVE ORGANS: Prostate within normal limits.    BOWEL: No bowel obstruction. Appendix is normal.  PERITONEUM: No ascites.  VESSELS: Atherosclerotic changes.  RETROPERITONEUM/LYMPH NODES: An enlarged right para iliac lymph node measures 5.9 x2.4 cm (2, 133). Additional smaller right iliac nodes are seen. Bilateral groin lymph nodes, the largest measuring 2.4 by 1.3 cm on the right. No significant para-aortic adenopathy.  ABDOMINAL WALL: Small fat-containing umbilical hernia.  BONES: Degenerative changes.    IMPRESSION:  Enlarged right iliac lymph node measuring 5.9 x 2.4 cm. Additional smaller right iliac nodes are seen. There are also bilateral groin lymph nodes.        VA Duplex Lower Ext Vein Scan, Bilat (03.15.21 @ 12:03)   FINDINGS: There is a 2.3 cm nodule superficial to the vascular structures in the right inguinal area, likely a lymph node.    RIGHT:  Normal compressibility of the RIGHT common femoral, femoral and popliteal veins.  Doppler examination shows normal spontaneous and phasic flow.  No RIGHT calf vein thrombosis is detected.    LEFT:  Normal compressibility of the LEFT common femoral, femoral and popliteal veins.  Doppler examination shows normal spontaneous and phasic flow.  No LEFT calf vein thrombosis is detected.    IMPRESSION: There is a 2.3 cm nodule superficial to the vascular structures in the right inguinal area, likely a lymph node.    Noevidence of deep venous thrombosis in either lower extremity.        OTHER: 	  	  LABS:	 	    CARDIAC MARKERS:                                  15.3   7.43  )-----------( 480      ( 15 Mar 2021 06:23 )             46.6     03-15    140  |  110<H>  |  28<H>  ----------------------------<  80  3.6   |  18<L>  |  1.46<H>    Ca    9.1      15 Mar 2021 06:23  Phos  4.6     03-15  Mg     2.0     03-14    TPro  6.2  /  Alb  2.8<L>  /  TBili  0.2  /  DBili  x   /  AST  14  /  ALT  21  /  AlkPhos  130<H>  03-14      proBNP: Serum Pro-Brain Natriuretic Peptide: 324 pg/mL (03-15 @ 06:23)    Lipid Profile:   HgA1c:   TSH: Thyroid Stimulating Hormone, Serum: 4.86 uIU/mL (03-15 @ 09:19)

## 2021-03-15 NOTE — PROGRESS NOTE ADULT - ASSESSMENT
61yo M with hx HTN, DM, HLD, COVID 2020 presents with LE pain and scrotal swelling    - GUANAKO likely pre-renal azotemia due to decreased po intake                   Patient has underlying CKD but baseline is unknown                  Patient is s/p CT abdomen with IV contrast and IV Toradol x 1 administration in the ER. Toradol and CT were done before BMP resulted                  Renal cysts noted on CT- too small and bilateral- support CKD Dx  - BP controlled  - Euvolemic    PLAN:   - Urinalysis, Urine lytes, Urine Protein, Urine Creatinine  - LR at 50 cc/hr x 20 hours  - PVR  -   -    - Trend serum creatinine- watch for KWAKU  - Renal dosing of meds to creatinine clearance of 30 ml/min       [facility]- Nephrology  Cell: [phone]       61yo M with hx HTN, DM, HLD, COVID 2020 presents with LE pain and scrotal swelling    - GUANAKO likely pre-renal azotemia due to decreased po intake                   Patient has underlying CKD but baseline is unknown--SP Contrast and toradol.                    Renal cysts noted on CT- too small and bilateral- support CKD Dx  - BP controlled  - Euvolemic    PLAN:   - Creatinine improving and no objection to cont IVF  - Heme/Onc eval noted;  Possible lymphoma-  Excisional lymph node bx of inguinal area needed for diagnosis   - Trend serum creatinine- watch for KWAKU(none present thus far)  - Renal dosing of meds to creatinine clearance of 40 ml/min     [facility]   [facility]   [phone]

## 2021-03-16 ENCOUNTER — APPOINTMENT (OUTPATIENT)
Dept: SURGICAL ONCOLOGY | Facility: HOSPITAL | Age: 61
End: 2021-03-16

## 2021-03-16 ENCOUNTER — TRANSCRIPTION ENCOUNTER (OUTPATIENT)
Age: 61
End: 2021-03-16

## 2021-03-16 LAB
ALBUMIN SERPL ELPH-MCNC: 2.6 G/DL — LOW (ref 3.3–5)
ALP SERPL-CCNC: 125 U/L — HIGH (ref 40–120)
ALT FLD-CCNC: 24 U/L — SIGNIFICANT CHANGE UP (ref 10–45)
ANION GAP SERPL CALC-SCNC: 14 MMOL/L — SIGNIFICANT CHANGE UP (ref 5–17)
AST SERPL-CCNC: 15 U/L — SIGNIFICANT CHANGE UP (ref 10–40)
BILIRUB SERPL-MCNC: 0.2 MG/DL — SIGNIFICANT CHANGE UP (ref 0.2–1.2)
BUN SERPL-MCNC: 23 MG/DL — SIGNIFICANT CHANGE UP (ref 7–23)
C PEPTIDE SERPL-MCNC: 2.2 NG/ML — SIGNIFICANT CHANGE UP (ref 1.1–4.4)
CALCIUM SERPL-MCNC: 9.2 MG/DL — SIGNIFICANT CHANGE UP (ref 8.4–10.5)
CHLORIDE SERPL-SCNC: 108 MMOL/L — SIGNIFICANT CHANGE UP (ref 96–108)
CO2 SERPL-SCNC: 18 MMOL/L — LOW (ref 22–31)
CREAT SERPL-MCNC: 1.26 MG/DL — SIGNIFICANT CHANGE UP (ref 0.5–1.3)
GLUCOSE BLDC GLUCOMTR-MCNC: 100 MG/DL — HIGH (ref 70–99)
GLUCOSE BLDC GLUCOMTR-MCNC: 126 MG/DL — HIGH (ref 70–99)
GLUCOSE BLDC GLUCOMTR-MCNC: 126 MG/DL — HIGH (ref 70–99)
GLUCOSE BLDC GLUCOMTR-MCNC: 147 MG/DL — HIGH (ref 70–99)
GLUCOSE BLDC GLUCOMTR-MCNC: 152 MG/DL — HIGH (ref 70–99)
GLUCOSE SERPL-MCNC: 142 MG/DL — HIGH (ref 70–99)
HCT VFR BLD CALC: 47.7 % — SIGNIFICANT CHANGE UP (ref 39–50)
HCV AB S/CO SERPL IA: 0.12 S/CO — SIGNIFICANT CHANGE UP (ref 0–0.99)
HCV AB SERPL-IMP: SIGNIFICANT CHANGE UP
HGB BLD-MCNC: 16.3 G/DL — SIGNIFICANT CHANGE UP (ref 13–17)
LDH SERPL L TO P-CCNC: 136 U/L — SIGNIFICANT CHANGE UP (ref 50–242)
MCHC RBC-ENTMCNC: 30.3 PG — SIGNIFICANT CHANGE UP (ref 27–34)
MCHC RBC-ENTMCNC: 34.2 GM/DL — SIGNIFICANT CHANGE UP (ref 32–36)
MCV RBC AUTO: 88.7 FL — SIGNIFICANT CHANGE UP (ref 80–100)
NRBC # BLD: 0 /100 WBCS — SIGNIFICANT CHANGE UP (ref 0–0)
PLATELET # BLD AUTO: 501 K/UL — HIGH (ref 150–400)
POTASSIUM SERPL-MCNC: 3.9 MMOL/L — SIGNIFICANT CHANGE UP (ref 3.5–5.3)
POTASSIUM SERPL-SCNC: 3.9 MMOL/L — SIGNIFICANT CHANGE UP (ref 3.5–5.3)
PROT SERPL-MCNC: 6 G/DL — SIGNIFICANT CHANGE UP (ref 6–8.3)
RBC # BLD: 5.38 M/UL — SIGNIFICANT CHANGE UP (ref 4.2–5.8)
RBC # FLD: 13.2 % — SIGNIFICANT CHANGE UP (ref 10.3–14.5)
SODIUM SERPL-SCNC: 140 MMOL/L — SIGNIFICANT CHANGE UP (ref 135–145)
URATE SERPL-MCNC: 6.4 MG/DL — SIGNIFICANT CHANGE UP (ref 3.4–8.8)
WBC # BLD: 7.42 K/UL — SIGNIFICANT CHANGE UP (ref 3.8–10.5)
WBC # FLD AUTO: 7.42 K/UL — SIGNIFICANT CHANGE UP (ref 3.8–10.5)

## 2021-03-16 PROCEDURE — 99255 IP/OBS CONSLTJ NEW/EST HI 80: CPT | Mod: GC

## 2021-03-16 PROCEDURE — 99254 IP/OBS CNSLTJ NEW/EST MOD 60: CPT

## 2021-03-16 PROCEDURE — 71045 X-RAY EXAM CHEST 1 VIEW: CPT | Mod: 26

## 2021-03-16 PROCEDURE — 99223 1ST HOSP IP/OBS HIGH 75: CPT | Mod: 57

## 2021-03-16 RX ORDER — INSULIN GLARGINE 100 [IU]/ML
11 INJECTION, SOLUTION SUBCUTANEOUS ONCE
Refills: 0 | Status: COMPLETED | OUTPATIENT
Start: 2021-03-16 | End: 2021-03-16

## 2021-03-16 RX ADMIN — HEPARIN SODIUM 5000 UNIT(S): 5000 INJECTION INTRAVENOUS; SUBCUTANEOUS at 04:23

## 2021-03-16 RX ADMIN — Medication 50 MILLIGRAM(S): at 04:23

## 2021-03-16 RX ADMIN — Medication 8 UNIT(S): at 18:20

## 2021-03-16 RX ADMIN — Medication 8 UNIT(S): at 09:00

## 2021-03-16 RX ADMIN — TRAMADOL HYDROCHLORIDE 50 MILLIGRAM(S): 50 TABLET ORAL at 19:23

## 2021-03-16 RX ADMIN — TRAMADOL HYDROCHLORIDE 50 MILLIGRAM(S): 50 TABLET ORAL at 20:23

## 2021-03-16 RX ADMIN — TRAMADOL HYDROCHLORIDE 50 MILLIGRAM(S): 50 TABLET ORAL at 04:52

## 2021-03-16 RX ADMIN — ATORVASTATIN CALCIUM 80 MILLIGRAM(S): 80 TABLET, FILM COATED ORAL at 21:34

## 2021-03-16 RX ADMIN — HEPARIN SODIUM 5000 UNIT(S): 5000 INJECTION INTRAVENOUS; SUBCUTANEOUS at 18:16

## 2021-03-16 RX ADMIN — INSULIN GLARGINE 11 UNIT(S): 100 INJECTION, SOLUTION SUBCUTANEOUS at 21:35

## 2021-03-16 RX ADMIN — TRAMADOL HYDROCHLORIDE 50 MILLIGRAM(S): 50 TABLET ORAL at 04:23

## 2021-03-16 RX ADMIN — AMLODIPINE BESYLATE 10 MILLIGRAM(S): 2.5 TABLET ORAL at 04:23

## 2021-03-16 NOTE — CONSULT NOTE ADULT - ASSESSMENT
Annabel Fernandeznelson  60M p/w low back pain and leg weakness. Feb 2021 CT A/P found 5.8x2.5cm lymph node on the right pelvis with nonspecific lesion at L5. Months of worsening RLE radic and weakness, RLE shooting pains. Exam: AAOx3, UE 5/5, RLE HF 2/5, KF/KE 1/5, PF/DF 4/5, LLE 5/5. MRI shows possible septic L4/5 facet, L5/S1 disc lateral bulge, L2 3 right disc bulge. Nonspecific enhancement of nerve roots possible inflammatory.  -RLE possible due to far lateral right L5/S1 disc herniation.  -Nonspecific inflammatory changes in nerve roots, unclear if contributing to clinical condition  -Could possibly benefit from L5/S1 decompression fusion after workup of lymph node for possible malignancy with LP/LN bx. Given chronicity of symptoms, nonemergent.

## 2021-03-16 NOTE — PROGRESS NOTE ADULT - ASSESSMENT
pt w/ scrotal swellin g / pelvic lesion/ weight loss  r/o malignancy  onc eval noted  excisional Ln bx by sx  neg  lext dopplers  dm/uncontrolled  fsg riss  c/w insulin  endo f/u  dvt proph  htn  c/w meds  mri noted  neuro f/u  lp in am   neurosx eval  id eval   walking difficulty  neuro eval noted  card  f/u  urology to see      pt w/ scrotal swellin g / pelvic lesion/ weight loss  r/o malignancy  onc eval noted  excisional Ln bx by sx  neg  lext dopplers  dm/uncontrolled  fsg riss  c/w insulin  endo f/u  dvt proph  htn  c/w meds  mri noted  neuro f/u  lp in am   neurosx eval  id eval   walking difficulty  neuro eval noted  card  f/u  urology to see   pt medically stable / optimized for excisional node bx

## 2021-03-16 NOTE — PROGRESS NOTE ADULT - ASSESSMENT
60 year old male presents to ED with right groin and back pain, 35 lb weight loss, subjective fevers, sweats - found to have bilateral inguinal adenopathy and a sclerotic L5 lesion.    Inguinal Adenopathy and L1 Sclerotic Lesion  -- Concern for lymphoma based on fevers, weight loss and night sweats  -- Other malignancies also possible, as can be benign inflammatory processes  -- Tumor markers - PSA,, Ca 19,9, LDH and Uric Acid are normal  -- Will need to get a tissue biopsy - would recommend and excisional biopsy of inguinal lymph node which would be necessary for a lymphoma diagnosis  --Surgery is on board - planning biopsy    Neuropathy  -- Preseumed to be secondary to COVID infection per patient  -- Markedly abnormal MRI of spine per above  -- Neurosurgery being consulted  -- May need an LP    We will continue to follow patient. Thank you for the opportunity to participate in MR. Fernandez's care.    Lobo Landeros PA-C  Hematology/Oncology   [phone] (cell)  [phone] (office)  After hours please call MD on call or office   60 year old male presents to ED with right groin and back pain, 35 lb weight loss, subjective fevers, sweats - found to have bilateral inguinal adenopathy and a sclerotic L5 lesion.    Inguinal Adenopathy and L1 Sclerotic Lesion  -- Concern for lymphoma based on fevers, weight loss and night sweats  -- Other malignancies also possible, as can be benign inflammatory processes  -- Tumor markers - PSA,, Ca 19,9, LDH and Uric Acid are normal  -- Will need to get a tissue biopsy - would recommend and excisional biopsy of inguinal lymph node which would be necessary for a lymphoma diagnosis  --Surgery is on board - planning biopsy  - ID to be consulted as well     Neuropathy  -- Preseumed to be secondary to COVID infection per patient  -- Markedly abnormal MRI of spine per above  -- Neurosurgery being consulted  -- May need an LP, following up with neuro     We will continue to follow patient. Thank you for the opportunity to participate in MR. Fernandez's care.    Lobo Landeros PA-C  Hematology/Oncology   [phone] (cell)  [phone] (office)  After hours please call MD on call or office

## 2021-03-16 NOTE — PROGRESS NOTE ADULT - SUBJECTIVE AND OBJECTIVE BOX
Patient is a 60y old  Male who presents with a chief complaint of leg weakness (15 Mar 2021 09:05)    Patient seen this morning. Still with groin pain and leg weakness  MEDICATIONS  (STANDING):  amLODIPine   Tablet 10 milliGRAM(s) Oral daily  atorvastatin 80 milliGRAM(s) Oral at bedtime  dextrose 40% Gel 15 Gram(s) Oral once  dextrose 5%. 1000 milliLiter(s) (50 mL/Hr) IV Continuous <Continuous>  dextrose 5%. 1000 milliLiter(s) (100 mL/Hr) IV Continuous <Continuous>  dextrose 50% Injectable 25 Gram(s) IV Push once  dextrose 50% Injectable 12.5 Gram(s) IV Push once  dextrose 50% Injectable 25 Gram(s) IV Push once  glucagon  Injectable 1 milliGRAM(s) IntraMuscular once  heparin   Injectable 5000 Unit(s) SubCutaneous every 12 hours  insulin glargine Injectable (LANTUS) 23 Unit(s) SubCutaneous at bedtime  insulin lispro (ADMELOG) corrective regimen sliding scale   SubCutaneous three times a day before meals  insulin lispro (ADMELOG) corrective regimen sliding scale   SubCutaneous at bedtime  insulin lispro Injectable (ADMELOG) 8 Unit(s) SubCutaneous three times a day before meals  lactated ringers. 1000 milliLiter(s) (50 mL/Hr) IV Continuous <Continuous>  metoprolol succinate ER 50 milliGRAM(s) Oral daily    MEDICATIONS  (PRN):  traMADol 50 milliGRAM(s) Oral three times a day PRN Moderate Pain (4 - 6)      Vital Signs Last 24 Hrs  T(C): 36.9 (16 Mar 2021 08:09), Max: 37.1 (16 Mar 2021 00:13)  T(F): 98.5 (16 Mar 2021 08:09), Max: 98.7 (16 Mar 2021 00:13)  HR: 74 (16 Mar 2021 08:09) (73 - 86)  BP: 158/90 (16 Mar 2021 08:09) (134/84 - 161/72)  BP(mean): --  RR: 18 (16 Mar 2021 08:09) (18 - 18)  SpO2: 96% (16 Mar 2021 08:09) (96% - 97%)    PE  NAD  Awake, alert  Anicteric  No rash grossly                            16.3   7.42  )-----------( 501      ( 16 Mar 2021 07:22 )             47.7       03-16    140  |  108  |  23  ----------------------------<  142<H>  3.9   |  18<L>  |  1.26    Ca    9.2      16 Mar 2021 07:22  Phos  4.6     03-15    TPro  6.0  /  Alb  2.6<L>  /  TBili  0.2  /  DBili  x   /  AST  15  /  ALT  24  /  AlkPhos  125<H>  03-16       Patient is a 60y old  Male who presents with a chief complaint of leg weakness (15 Mar 2021 09:05)    Patient seen this morning. Still with groin pain and leg weakness  MEDICATIONS  (STANDING):  amLODIPine   Tablet 10 milliGRAM(s) Oral daily  atorvastatin 80 milliGRAM(s) Oral at bedtime  dextrose 40% Gel 15 Gram(s) Oral once  dextrose 5%. 1000 milliLiter(s) (50 mL/Hr) IV Continuous <Continuous>  dextrose 5%. 1000 milliLiter(s) (100 mL/Hr) IV Continuous <Continuous>  dextrose 50% Injectable 25 Gram(s) IV Push once  dextrose 50% Injectable 12.5 Gram(s) IV Push once  dextrose 50% Injectable 25 Gram(s) IV Push once  glucagon  Injectable 1 milliGRAM(s) IntraMuscular once  heparin   Injectable 5000 Unit(s) SubCutaneous every 12 hours  insulin glargine Injectable (LANTUS) 23 Unit(s) SubCutaneous at bedtime  insulin lispro (ADMELOG) corrective regimen sliding scale   SubCutaneous three times a day before meals  insulin lispro (ADMELOG) corrective regimen sliding scale   SubCutaneous at bedtime  insulin lispro Injectable (ADMELOG) 8 Unit(s) SubCutaneous three times a day before meals  lactated ringers. 1000 milliLiter(s) (50 mL/Hr) IV Continuous <Continuous>  metoprolol succinate ER 50 milliGRAM(s) Oral daily    MEDICATIONS  (PRN):  traMADol 50 milliGRAM(s) Oral three times a day PRN Moderate Pain (4 - 6)      Vital Signs Last 24 Hrs  T(C): 36.9 (16 Mar 2021 08:09), Max: 37.1 (16 Mar 2021 00:13)  T(F): 98.5 (16 Mar 2021 08:09), Max: 98.7 (16 Mar 2021 00:13)  HR: 74 (16 Mar 2021 08:09) (73 - 86)  BP: 158/90 (16 Mar 2021 08:09) (134/84 - 161/72)  BP(mean): --  RR: 18 (16 Mar 2021 08:09) (18 - 18)  SpO2: 96% (16 Mar 2021 08:09) (96% - 97%)    PE  NAD  Awake, alert  Anicteric  No rash grossly                            16.3   7.42  )-----------( 501      ( 16 Mar 2021 07:22 )             47.7       03-16    140  |  108  |  23  ----------------------------<  142<H>  3.9   |  18<L>  |  1.26    Ca    9.2      16 Mar 2021 07:22  Phos  4.6     03-15    TPro  6.0  /  Alb  2.6<L>  /  TBili  0.2  /  DBili  x   /  AST  15  /  ALT  24  /  AlkPhos  125<H>  03-16        EXAM:  MR SPINE LUMBAR WAW IC                            PROCEDURE DATE:  03/15/2021            INTERPRETATION:  INDICATIONS:  Increased low back pain and right leg weakness for 4 months    TECHNIQUE:  Sagittal T1 weighted and T2 weighted images of the lumbosacral spine as well as axial T1 weighted and T2 weighted images were obtained.    COMPARISON EXAMINATION: None.    FINDINGS:  VERTEBRAL BODIES AND DISCS:  Nonspecific lesion in L1 low signal T1 hyperintense signal T2 with some enhancement.  ALIGNMENT:  No subluxations.  L1-L2 LEVEL:  Normal.  L2-L3 LEVEL:  Asymmetric bulge right with disc material in the region of the right neural foramen at this level. Clinical correlation for right L2 radiculopathy  L3-L4 LEVEL: Symmetric bulge with annular fissure in the 7:30 position. Bilateral facet arthropathy at this level.  L4-L5 LEVEL: Asymmetric bulge to the left with focal left-sided protrusion and some mass impression on the intracanal left L5 root.. Bilateral facet arthropathy.Some changes in the left paraspinal musculature extending from the left facet joint may represent septic facet as enhancement is seen in association with hyperintense T2 signal with associated muscular changes (9:19)  L5-S1 LEVEL:  Slight signal hyperintensity at the disc space with some enhancement ventrally without associated endplate abnormality favors degenerative change. Prominent asymmetric disc bulge with Bilateral foraminal disc material which is causing significant nerve root compression on the right greater than left. May be the cause of the patient's known right leg weakness.  SPINAL CANAL:  Evidence of diffuse nerve root enhancement appreciated involving the roots of the cauda equina with some mild thickening appreciated.  CONUS MEDULLARIS:  Normal.  MISCELLANEOUS:  None    IMPRESSION:  Diffuse nerve root enhancement involving the roots of the cauda equina with some mild root thickening though the dominant finding is the enhancement. Differential possibilities include acute inflammatory demyelinating polyneuropathy( [place]) versus chronic inflammatory demyelinating polyneuropathy(CIDP). Inflammatory pathologies such as sarcoid or infectious etiologies should be considered. Included in the differential is leptomeningeal carcinomatosis. Correlation with CSF strongly recommended. Nonspecific lesion in the L5 vertebral body as described. Enhancement with associated T2 abnormality involving the left L4-5 facet with extension to the paraspinal musculature may indicate a septic facet at this level. Correlation with clinical parameters suggested. Degenerative changes with disc material in the neural foramina at the L2-3 and L5-S1 levels on the right as well as in the L5-S1 neural foramen on the left. Prominent bulge with more focal protrusion L4-5 left

## 2021-03-16 NOTE — PROGRESS NOTE ADULT - SUBJECTIVE AND OBJECTIVE BOX
NEPHROLOGY-[facility] [phone]        Patient seen and examined in bed.  He was in good spirits         MEDICATIONS  (STANDING):  amLODIPine   Tablet 10 milliGRAM(s) Oral daily  atorvastatin 80 milliGRAM(s) Oral at bedtime  dextrose 40% Gel 15 Gram(s) Oral once  dextrose 5%. 1000 milliLiter(s) (50 mL/Hr) IV Continuous <Continuous>  dextrose 5%. 1000 milliLiter(s) (100 mL/Hr) IV Continuous <Continuous>  dextrose 50% Injectable 25 Gram(s) IV Push once  dextrose 50% Injectable 12.5 Gram(s) IV Push once  dextrose 50% Injectable 25 Gram(s) IV Push once  glucagon  Injectable 1 milliGRAM(s) IntraMuscular once  heparin   Injectable 5000 Unit(s) SubCutaneous every 12 hours  insulin glargine Injectable (LANTUS) 23 Unit(s) SubCutaneous at bedtime  insulin lispro (ADMELOG) corrective regimen sliding scale   SubCutaneous three times a day before meals  insulin lispro (ADMELOG) corrective regimen sliding scale   SubCutaneous at bedtime  insulin lispro Injectable (ADMELOG) 8 Unit(s) SubCutaneous three times a day before meals  lactated ringers. 1000 milliLiter(s) (50 mL/Hr) IV Continuous <Continuous>  metoprolol succinate ER 50 milliGRAM(s) Oral daily      VITAL:  T(C): , Max: 37.1 (03-16-21 @ 00:13)  T(F): , Max: 98.7 (03-16-21 @ 00:13)  HR: 74 (03-16-21 @ 08:09)  BP: 158/90 (03-16-21 @ 08:09)  BP(mean): --  RR: 18 (03-16-21 @ 08:09)  SpO2: 96% (03-16-21 @ 08:09)  Wt(kg): --    I and O's:    03-15 @ 07:01  -  03-16 @ 07:00  --------------------------------------------------------  IN: 240 mL / OUT: 600 mL / NET: -360 mL    03-16 @ 07:01 - 03-16 @ 12:21  --------------------------------------------------------  IN: 200 mL / OUT: 0 mL / NET: 200 mL          PHYSICAL EXAM:    Constitutional: NAD  Neck:  No JVD  Respiratory: CTAB/L  Cardiovascular: S1 and S2  Gastrointestinal: BS+, soft, NT/ND  Extremities: No peripheral edema  Neurological: A/O x 3, no focal deficits  Psychiatric: Normal mood, normal affect  : No Weir  Skin: No rashes  Access: Not applicable    LABS:                        16.3   7.42  )-----------( 501      ( 16 Mar 2021 07:22 )             47.7     03-16    140  |  108  |  23  ----------------------------<  142<H>  3.9   |  18<L>  |  1.26    Ca    9.2      16 Mar 2021 07:22  Phos  4.6     03-15    TPro  6.0  /  Alb  2.6<L>  /  TBili  0.2  /  DBili  x   /  AST  15  /  ALT  24  /  AlkPhos  125<H>  03-16          Urine Studies:          RADIOLOGY & ADDITIONAL STUDIES:

## 2021-03-16 NOTE — PROGRESS NOTE ADULT - SUBJECTIVE AND OBJECTIVE BOX
DATE OF SERVICE: 03-16-21 @ 12:40  CHIEF COMPLAINT:Patient is a 60y old  Male who presents with a chief complaint of leg weakness (15 Mar 2021 09:05)    	        PAST MEDICAL & SURGICAL HISTORY:  Diabetes    Hyperlipidemia    Hypertension, unspecified type    No significant past surgical history            REVIEW OF SYSTEMS:  CONSTITUTIONAL: No fever, weight loss, or fatigue  EYES: No eye pain, visual disturbances, or discharge  NECK: No pain or stiffness  RESPIRATORY: No cough, wheezing, chills or hemoptysis; No Shortness of Breath  CARDIOVASCULAR: No chest pain, palpitations,   GASTROINTESTINAL: No abdominal or epigastric pain. No nausea, vomiting, or hematemesis;   GENITOURINARY: No dysuria, frequency, hematuria,  NEUROLOGICAL: No headaches,  scrotal swelling  some back pain     Medications:  MEDICATIONS  (STANDING):  amLODIPine   Tablet 10 milliGRAM(s) Oral daily  atorvastatin 80 milliGRAM(s) Oral at bedtime  dextrose 40% Gel 15 Gram(s) Oral once  dextrose 5%. 1000 milliLiter(s) (50 mL/Hr) IV Continuous <Continuous>  dextrose 5%. 1000 milliLiter(s) (100 mL/Hr) IV Continuous <Continuous>  dextrose 50% Injectable 25 Gram(s) IV Push once  dextrose 50% Injectable 12.5 Gram(s) IV Push once  dextrose 50% Injectable 25 Gram(s) IV Push once  glucagon  Injectable 1 milliGRAM(s) IntraMuscular once  heparin   Injectable 5000 Unit(s) SubCutaneous every 12 hours  insulin glargine Injectable (LANTUS) 23 Unit(s) SubCutaneous at bedtime  insulin lispro (ADMELOG) corrective regimen sliding scale   SubCutaneous three times a day before meals  insulin lispro (ADMELOG) corrective regimen sliding scale   SubCutaneous at bedtime  insulin lispro Injectable (ADMELOG) 8 Unit(s) SubCutaneous three times a day before meals  lactated ringers. 1000 milliLiter(s) (50 mL/Hr) IV Continuous <Continuous>  metoprolol succinate ER 50 milliGRAM(s) Oral daily    MEDICATIONS  (PRN):  traMADol 50 milliGRAM(s) Oral three times a day PRN Moderate Pain (4 - 6)    	    PHYSICAL EXAM:  T(C): 36.9 (03-16-21 @ 08:09), Max: 37.1 (03-16-21 @ 00:13)  HR: 74 (03-16-21 @ 08:09) (73 - 86)  BP: 158/90 (03-16-21 @ 08:09) (134/84 - 161/72)  RR: 18 (03-16-21 @ 08:09) (18 - 18)  SpO2: 96% (03-16-21 @ 08:09) (96% - 97%)  Wt(kg): --  I&O's Summary    15 Mar 2021 07:01  -  16 Mar 2021 07:00  --------------------------------------------------------  IN: 240 mL / OUT: 600 mL / NET: -360 mL    16 Mar 2021 07:01  -  16 Mar 2021 12:40  --------------------------------------------------------  IN: 200 mL / OUT: 0 mL / NET: 200 mL        	  HEENT:   Normal oral mucosa, PERRL, EOMI	    Cardiovascular: Normal S1 S2, No JVD,   Respiratory: Lungs clear to auscultation	  Psychiatry: A & O  Gastrointestinal:  Soft, Non-tender, + BS	  scrotal edema	  Neurologic: Non-focal  Extremities:dec rom  motor equal   sensory intact     TELEMETRY: 	    ECG:  	  RADIOLOGY:  OTHER: 	  	  LABS:	 	    CARDIAC MARKERS:                                16.3   7.42  )-----------( 501      ( 16 Mar 2021 07:22 )             47.7     03-16    140  |  108  |  23  ----------------------------<  142<H>  3.9   |  18<L>  |  1.26    Ca    9.2      16 Mar 2021 07:22  Phos  4.6     03-15    TPro  6.0  /  Alb  2.6<L>  /  TBili  0.2  /  DBili  x   /  AST  15  /  ALT  24  /  AlkPhos  125<H>  03-16    proBNP:   Lipid Profile:   HgA1c:   TSH:

## 2021-03-16 NOTE — CONSULT NOTE ADULT - SUBJECTIVE AND OBJECTIVE BOX
Olivia Rey - [phone]    Patient is a 60y old  Male who presents with a chief complaint of Lymphadenopathy (16 Mar 2021 12:54)    HPI:  60M with DM (A1C=10.6), HTN, chol admitted 3/14/2021 c/o LE pain and scrotal swelling.  Per chart, he signed out AMA from OSH in February when it was noted that CT showed 5.8 x 2.5cm lesion on the right pelvis concerning for malignancy with sclerotic lesion at L5.  Associated with 45 pound weight loss and decreased appetite over past month.  He also notes fever/sweats over the past 2 weeks.  He also notes b/l leg pain, paresthesias and not able to walk since post-COVID.  Here, no fever.  CT repeat and shows enlarging right iliac lymphnode.  Awaiting biopsy.    ID asked to help management    prior hospital charts reviewed [  ]  primary team notes reviewed [ x]  other consultant notes reviewed [ x]    PAST MEDICAL & SURGICAL HISTORY:  Diabetes  Hyperlipidemia  Hypertension, unspecified type    Allergies  No Known Allergies    ANTIMICROBIALS:  none    MEDICATIONS  (STANDING):  amLODIPine   Tablet 10 daily  atorvastatin 80 at bedtime  heparin   Injectable 5000 every 12 hours  insulin glargine Injectable (LANTUS) 23 at bedtime  insulin lispro (ADMELOG) corrective regimen sliding scale  three times a day before meals  insulin lispro (ADMELOG) corrective regimen sliding scale  at bedtime  insulin lispro Injectable (ADMELOG) 8 three times a day before meals  metoprolol succinate ER 50 daily    SOCIAL HISTORY:  former heavy smoker, no drugs    FAMILY HISTORY:    REVIEW OF SYSTEMS  [  ] ROS unobtainable because:    [  ] All other systems negative except as noted below:	    Constitutional:  [ ] fever [ ] chills  [ ] weight loss  [ ] weakness  Skin:  [ ] rash [ ] phlebitis	  Eyes: [ ] icterus [ ] pain  [ ] discharge	  ENMT: [ ] sore throat  [ ] thrush [ ] ulcers [ ] exudates  Respiratory: [ ] dyspnea [ ] hemoptysis [ ] cough [ ] sputum	  Cardiovascular:  [ ] chest pain [ ] palpitations [ ] edema	  Gastrointestinal:  [ ] nausea [ ] vomiting [ ] diarrhea [ ] constipation [ ] pain	  Genitourinary:  [ ] dysuria [ ] frequency [ ] hematuria [ ] discharge [ ] flank pain  [ ] incontinence  Musculoskeletal:  [ ] myalgias [ ] arthralgias [ ] arthritis  [ ] back pain  Neurological:  [ ] headache [ ] seizures  [ ] confusion/altered mental status  Psychiatric:  [ ] anxiety [ ] depression	  Hematology/Lymphatics:  [ ] lymphadenopathy  Endocrine:  [ ] adrenal [ ] thyroid  Allergic/Immunologic:	 [ ] transplant [ ] seasonal    Vital Signs Last 24 Hrs  T(F): 98.5 (03-16-21 @ 08:09), Max: 98.7 (03-15-21 @ 00:14)  Vital Signs Last 24 Hrs  HR: 74 (03-16-21 @ 08:09) (74 - 86)  BP: 158/90 (03-16-21 @ 08:09) (150/74 - 159/78)  RR: 18 (03-16-21 @ 08:09)  SpO2: 96% (03-16-21 @ 08:09) (96% - 97%)  Wt(kg): --    PHYSICAL EXAM:                              16.3   7.42  )-----------( 501      ( 16 Mar 2021 07:22 )             47.7     140  |  108  |  23  ----------------------------<  142<H>  3.9   |  18<L>  |  1.26    Ca    9.2      16 Mar 2021 07:22  Phos  4.6     03-15    TPro  6.0  /  Alb  2.6<L>  /  TBili  0.2  /  DBili  x   /  AST  15  /  ALT  24  /  AlkPhos  125<H>  03-16    Lactate Dehydrogenase, Serum: 136 U/L (03-16-21 @ 07:22)    MICROBIOLOGY:  COVID-19 IgG Antibody Interpretation: Negative (03-15-21 @ 08:26)  COVID-19 PCR: NotDetec (03-14-21 @ 11:17)    RADIOLOGY:  imaging below personally reviewed and agree with findings    MR Lumbar Spine w/wo IV Cont (03.15.21 @ 17:55) >  IMPRESSION:  Diffuse nerve root enhancement involving the roots of the cauda equina with some mild root thickening though the dominant finding is the enhancement. Differential possibilities include acute inflammatorydemyelinating polyneuropathy( [facility] [place]) versus chronic inflammatory demyelinating polyneuropathy(CIDP). Inflammatory pathologies such as sarcoid or infectious etiologies should be considered. Included in the differential is leptomeningeal carcinomatosis. Correlation with CSF strongly recommended. Nonspecific lesion in the L5 vertebral body as described. Enhancement with associated T2 abnormality involving the left L4-5 facet with extension to the paraspinal musculature may indicate a septic facet at this level. Correlation with clinical parameters suggested. Degenerative changes with disc material in the neural foramina at the L2-3 and L5-S1 levels on the right as well as in the L5-S1 neural foramen on the left. Prominent bulge with morefocal protrusion L4-5 left    A Duplex Lower Ext Vein Scan, Bilat (03.15.21 @ 12:03) >  IMPRESSION: There is a 2.3 cm nodule superficial to the vascular structures in the right inguinal area, likely a lymph node.  Noevidence of deep venous thrombosis in either lower extremity.    CT Chest w/ IV Cont (03.14.21 @ 10:12) >  IMPRESSION:  Enlarged right iliac lymph node measuring 5.9 x 2.4 cm. Additional smaller right iliac nodes are seen. There are also bilateral groin lymph nodes.    US Doppler Scrotum (03.14.21 @ 10:57) >  IMPRESSION:  Large complex bilateral hydroceles.  No testicular mass seen   Olivia Rey - [phone]    Patient is a 60y old  Male who presents with a chief complaint of Lymphadenopathy (16 Mar 2021 12:54)    HPI:  60M with DM (A1C=10.6), HTN, chol admitted 3/14/2021 c/o LE pain and scrotal swelling.  Symptoms started early this year and have been getting progressively worse.  R leg very weak c/w left leg.  Paresthesias.  No diarrhea.  No dysuria.  Some pruritis.  No respiratory symptoms.  Was seen at OSH in February when it was noted that CT showed 5.8 x 2.5cm lesion on the right pelvis concerning for malignancy with sclerotic lesion at L5.  Associated with 45 pound weight loss and decreased appetite over past month.  He also notes subjective fever and sweats over the past 2 weeks.  He thinks he may have had COVID19 January 2020, however, at that time there was no testing.  Multiple swabs negative for flu.  Antibody test here negative.  Afterwards (timeline not clear), he started having problems with the right leg and ambulatory difficulty.  NO sick contacts.  Here, no fever.  CT repeat and shows enlarging right iliac lymph node.  Awaiting biopsy.  MRI notes diffuse nerve root enhancement involving the roots of the cauda equina with some mild root thickening though the dominant finding is the enhancement. Concerns include CIDP, per neuro less likely AIDP, sarcoid, leptomeningeal carcinomatosis, infection?  Patient did get his first COVID19 vaccine ([employer]) but does not recall exactly when.  He did not get the 2nd dose since he started to feel sicker.    ID asked to help management    prior hospital charts reviewed [  ]  primary team notes reviewed [ x]  other consultant notes reviewed [ x]    PAST MEDICAL & SURGICAL HISTORY:  Diabetes  Hyperlipidemia  Hypertension, unspecified type    Allergies  No Known Allergies    ANTIMICROBIALS:  none    MEDICATIONS  (STANDING):  amLODIPine   Tablet 10 daily  atorvastatin 80 at bedtime  heparin   Injectable 5000 every 12 hours  insulin glargine Injectable (LANTUS) 23 at bedtime  insulin lispro (ADMELOG) corrective regimen sliding scale  three times a day before meals  insulin lispro (ADMELOG) corrective regimen sliding scale  at bedtime  insulin lispro Injectable (ADMELOG) 8 three times a day before meals  metoprolol succinate ER 50 daily    SOCIAL HISTORY:  former heavy smoker, no drugs, works in environmental, lives with female partner who is a nurse    FAMILY HISTORY:  mother had diabetes    REVIEW OF SYSTEMS  [  ] ROS unobtainable because:    [ x ] All other systems negative except as noted below:	    Constitutional:  [ ] fever [ ] chills  [ x] weight loss  [ x] weakness  Skin:  [ ] rash [ ] phlebitis	  Eyes: [ ] icterus [ ] pain  [ ] discharge	  ENMT: [ ] sore throat  [ ] thrush [ ] ulcers [ ] exudates  Respiratory: [ ] dyspnea [ ] hemoptysis [ ] cough [ ] sputum	  Cardiovascular:  [ ] chest pain [ ] palpitations [ ] edema	  Gastrointestinal:  [ ] nausea [ ] vomiting [ ] diarrhea [ ] constipation [ ] pain	  Genitourinary:  [ ] dysuria [ ] frequency [ ] hematuria [ ] discharge [ ] flank pain  [ ] incontinence  Musculoskeletal:  [ ] myalgias [ ] arthralgias [ ] arthritis  [ ] back pain  Neurological:  [ ] headache [ ] seizures  [ ] confusion/altered mental status  Psychiatric:  [ ] anxiety [ ] depression	  Hematology/Lymphatics:  [x ] lymphadenopathy  Endocrine:  [ ] adrenal [ ] thyroid  Allergic/Immunologic:	 [ ] transplant [ ] seasonal    Vital Signs Last 24 Hrs  T(F): 98.5 (03-16-21 @ 08:09), Max: 98.7 (03-15-21 @ 00:14)  Vital Signs Last 24 Hrs  HR: 74 (03-16-21 @ 08:09) (74 - 86)  BP: 158/90 (03-16-21 @ 08:09) (150/74 - 159/78)  RR: 18 (03-16-21 @ 08:09)  SpO2: 96% (03-16-21 @ 08:09) (96% - 97%)  Wt(kg): --    PHYSICAL EXAM:  Constitutional: non-toxic  HEAD/EYES: anicteric  ENT:  supple,  Cardiovascular:   normal S1, S2  Respiratory:  clear BS bilaterally  GI:  soft, normal bowel sounds, tender left abd but to very light tough, no palpable mass  :  no tran  Musculoskeletal:  no synovitis  Neurologic: awake and alert, significantly decreased strength R leg  Skin:  no rash  Heme/Onc: R > L groin lymphadenopathy   Psychiatric:  awake, alert, appropriate mood                       16.3   7.42  )-----------( 501      ( 16 Mar 2021 07:22 )             47.7     140  |  108  |  23  ----------------------------<  142<H>  3.9   |  18<L>  |  1.26    Ca    9.2      16 Mar 2021 07:22  Phos  4.6     03-15    TPro  6.0  /  Alb  2.6<L>  /  TBili  0.2  /  DBili  x   /  AST  15  /  ALT  24  /  AlkPhos  125<H>  03-16    Lactate Dehydrogenase, Serum: 136 U/L (03-16-21 @ 07:22)    MICROBIOLOGY:  COVID-19 IgG Antibody Interpretation: Negative (03-15-21 @ 08:26)  COVID-19 PCR: NotDetec (03-14-21 @ 11:17)    RADIOLOGY:  imaging below personally reviewed and agree with findings    MR Lumbar Spine w/wo IV Cont (03.15.21 @ 17:55) >  IMPRESSION:  Diffuse nerve root enhancement involving the roots of the cauda equina with some mild root thickening though the dominant finding is the enhancement. Differential possibilities include acute inflammatorydemyelinating polyneuropathy( Guill[facility] [place]) versus chronic inflammatory demyelinating polyneuropathy(CIDP). Inflammatory pathologies such as sarcoid or infectious etiologies should be considered. Included in the differential is leptomeningeal carcinomatosis. Correlation with CSF strongly recommended. Nonspecific lesion in the L5 vertebral body as described. Enhancement with associated T2 abnormality involving the left L4-5 facet with extension to the paraspinal musculature may indicate a septic facet at this level. Correlation with clinical parameters suggested. Degenerative changes with disc material in the neural foramina at the L2-3 and L5-S1 levels on the right as well as in the L5-S1 neural foramen on the left. Prominent bulge with morefocal protrusion L4-5 left    A Duplex Lower Ext Vein Scan, Bilat (03.15.21 @ 12:03) >  IMPRESSION: There is a 2.3 cm nodule superficial to the vascular structures in the right inguinal area, likely a lymph node.  Noevidence of deep venous thrombosis in either lower extremity.    CT Chest w/ IV Cont (03.14.21 @ 10:12) >  IMPRESSION:  Enlarged right iliac lymph node measuring 5.9 x 2.4 cm. Additional smaller right iliac nodes are seen. There are also bilateral groin lymph nodes.    US Doppler Scrotum (03.14.21 @ 10:57) >  IMPRESSION:  Large complex bilateral hydroceles.  No testicular mass seen

## 2021-03-16 NOTE — CONSULT NOTE ADULT - ASSESSMENT
60M with DM (A1C=10.6), HTN, chol admitted 3/14/2021 c/o LE pain, scrotal swelling and weight loss found to have weakness RLE and CT that shows enlarging right iliac lymph node as well as an MRI that shows diffuse nerve root enhancement involving the roots of the cauda equina with some mild root thickening though the dominant finding is the enhancement. DOes not seem infectious.  Could this be lymphoma with B symptoms though the LDH is low.  MRI suggestive of CIDP, per neuro less likely AIDP, sarcoid, leptomeningeal carcinomatosis, infection.      Lymphadenopathy  - r/o lymphoma  - needs LN biopsy  - no antibiotics for now  - please send HIV testing, ACE level, DARRIN  - for LP tomorrow  - please send cell count, CSF PCR, LDH, flow cytometry    I have discussed plan of care as detailed above with NP [phone]

## 2021-03-16 NOTE — PROGRESS NOTE ADULT - SUBJECTIVE AND OBJECTIVE BOX
CARDIOLOGY FOLLOW UP - Dr. Trimble    CC: denies cp, sob, and palpitations       PHYSICAL EXAM:  T(C): 36.9 (03-16-21 @ 08:09), Max: 37.1 (03-16-21 @ 00:13)  HR: 74 (03-16-21 @ 08:09) (73 - 86)  BP: 158/90 (03-16-21 @ 08:09) (134/84 - 161/72)  RR: 18 (03-16-21 @ 08:09) (18 - 18)  SpO2: 96% (03-16-21 @ 08:09) (96% - 97%)  Wt(kg): --  I&O's Summary    15 Mar 2021 07:01  -  16 Mar 2021 07:00  --------------------------------------------------------  IN: 240 mL / OUT: 600 mL / NET: -360 mL    16 Mar 2021 07:01  -  16 Mar 2021 12:26  --------------------------------------------------------  IN: 200 mL / OUT: 0 mL / NET: 200 mL        Appearance: Normal	  Cardiovascular: Normal S1 S2,RRR, No JVD, No murmurs  Respiratory: Lungs clear to auscultation	  Gastrointestinal:  Soft, Non-tender, + BS	  Extremities: Normal range of motion, No clubbing, cyanosis or edema      Home Medications:  amlodipine-benazepril 10 mg-40 mg oral capsule: 1 cap(s) orally once a day (15 Mar 2021 11:15)  HumaLOG 100 units/mL injectable solution: 8 unit(s) injectable 3 times a day (before meals) (15 Mar 2021 11:15)  Lipitor 80 mg oral tablet: 1 tab(s) orally once a day (15 Mar 2021 11:15)  Toprol-XL 50 mg oral tablet, extended release: 1 tab(s) orally once a day (15 Mar 2021 11:15)  Tresiba 100 units/mL subcutaneous solution: 40 unit(s) subcutaneous once a day (at bedtime) (15 Mar 2021 11:15)      MEDICATIONS  (STANDING):  amLODIPine   Tablet 10 milliGRAM(s) Oral daily  atorvastatin 80 milliGRAM(s) Oral at bedtime  dextrose 40% Gel 15 Gram(s) Oral once  dextrose 5%. 1000 milliLiter(s) (50 mL/Hr) IV Continuous <Continuous>  dextrose 5%. 1000 milliLiter(s) (100 mL/Hr) IV Continuous <Continuous>  dextrose 50% Injectable 25 Gram(s) IV Push once  dextrose 50% Injectable 12.5 Gram(s) IV Push once  dextrose 50% Injectable 25 Gram(s) IV Push once  glucagon  Injectable 1 milliGRAM(s) IntraMuscular once  heparin   Injectable 5000 Unit(s) SubCutaneous every 12 hours  insulin glargine Injectable (LANTUS) 23 Unit(s) SubCutaneous at bedtime  insulin lispro (ADMELOG) corrective regimen sliding scale   SubCutaneous three times a day before meals  insulin lispro (ADMELOG) corrective regimen sliding scale   SubCutaneous at bedtime  insulin lispro Injectable (ADMELOG) 8 Unit(s) SubCutaneous three times a day before meals  lactated ringers. 1000 milliLiter(s) (50 mL/Hr) IV Continuous <Continuous>  metoprolol succinate ER 50 milliGRAM(s) Oral daily      TELEMETRY: 	off tele    ECG: not in chart   	  RADIOLOGY:   MR Lumbar Spine w/wo IV Cont (03.15.21 @ 17:55)   FINDINGS:  VERTEBRAL BODIES AND DISCS:  Nonspecific lesion in L1 low signal T1 hyperintense signal T2 with some enhancement.  ALIGNMENT:  No subluxations.  L1-L2 LEVEL:  Normal.  L2-L3 LEVEL:  Asymmetric bulge right with disc material in the region of the right neural foramen at this level. Clinical correlation for right L2 radiculopathy  L3-L4 LEVEL: Symmetric bulge with annular fissure in the 7:30 position. Bilateral facet arthropathy at this level.  L4-L5 LEVEL: Asymmetric bulge to the left with focal left-sided protrusion and some mass impression on the intracanal left L5 root.. Bilateral facet arthropathy.Some changes in the left paraspinal musculature extending from the left facet joint may represent septic facet as enhancement is seen in association with hyperintense T2 signal with associated muscular changes (9:19)  L5-S1 LEVEL:  Slight signal hyperintensityat the disc space with some enhancement ventrally without associated endplate abnormality favors degenerative change. Prominent asymmetric disc bulge with Bilateral foraminal disc material which is causing significant nerve root compression on the right greater than left. May be the cause of the patient's known right leg weakness.  SPINAL CANAL:  Evidence of diffuse nerve root enhancement appreciated involving the roots of the cauda equina with some mild thickening appreciated.  CONUS MEDULLARIS: Normal.  MISCELLANEOUS:  None    IMPRESSION:  Diffuse nerve root enhancement involving the roots of the cauda equina with some mild root thickening though the dominant finding is the enhancement. Differential possibilities include acute inflammatorydemyelinating polyneuropathy( Guillan [place]) versus chronic inflammatory demyelinating polyneuropathy(CIDP). Inflammatory pathologies such as sarcoid or infectious etiologies should be considered. Included in the differential is leptomeningeal carcinomatosis. Correlation with CSF strongly recommended. Nonspecific lesion in the L5 vertebral body as described. Enhancement with associated T2 abnormality involving the left L4-5 facet with extension to the paraspinal musculature may indicate a septic facet at this level. Correlation with clinical parameters suggested. Degenerative changes with disc material in the neural foramina at the L2-3 and L5-S1 levels on the right as well as in the L5-S1 neural foramen on the left. Prominent bulge with morefocal protrusion L4-5 left      DIAGNOSTIC TESTING:  [ ] Echocardiogram:  [ ]  Catheterization:  [ ] Stress Test:    OTHER: 	    LABS:	 	                            16.3   7.42  )-----------( 501      ( 16 Mar 2021 07:22 )             47.7     03-16    140  |  108  |  23  ----------------------------<  142<H>  3.9   |  18<L>  |  1.26    Ca    9.2      16 Mar 2021 07:22  Phos  4.6     03-15    TPro  6.0  /  Alb  2.6<L>  /  TBili  0.2  /  DBili  x   /  AST  15  /  ALT  24  /  AlkPhos  125<H>  03-16

## 2021-03-16 NOTE — PROGRESS NOTE ADULT - ASSESSMENT
Impression:  This is a 60y year old Male  who presents with the chief complaint of low back pain and leg weakness. 59yo M with hx HTN, DM, HLD, COVID 2020 presents with LE pain and scrotal swelling. Patient was hospitalized in [facility] in Feb 2021 where CT Ab/P found 5.8x2.5cm lesion on the right pelvis concerning for malignancy with sclerotic lesion at L5. Pt states since early 2021 inc pain and weakness in the right leg, at first he was limping but now cannot walk.  He has pain in the feet, describes as pinching and needles like sensation.  Starting to have sx in left leg as well. Patient left AMA before additional malignancy workup was performed.  In the last month, patient had 45lb unintentional weight loss and decreased appetite. Notes abdominal swelling, mostly on the left side with tenderness along with b/l scrotal swelling.  At one point given neurontin, not helpful.  Denies sx in arms, changes in speech, swallow, vision, bladder control.  Says issues with bowel movements.     exam notable for weakness in right leg prox greater than distal, some weakness in left dorsiflexion as well as altered sensation in the right leg  The exam findings also of low tone, diminished reflexes would indicate this is a peripheral nervous system process (not consistent with spinal cord pathology)    report from outside hospital of a sclerotic lesion at L5, however this was not seen on our MRI lumbar w/ and w/out, which instead was nerve thickening and enhancement of the cauda equina and a non-specific lesion at L1    Diagnosis:  Uncertain at this time  ? underlying malignancy given enhancement and lymph node and scrotal enlargement  CIDP on differential   AIDP does not fit based on time course of illness    Plan:  will attempt lumbar puncture at bedside tomorrow am  holding DVT ppx tomorrow morning

## 2021-03-16 NOTE — PROGRESS NOTE ADULT - ASSESSMENT
59yo M with hx HTN, DM, HLD, COVID 2020 presents with LE pain and scrotal swelling    - GUANAKO -resolved                  Renal cysts noted on CT- too small and bilateral- support CKD Dx  - BP controlled  - Euvolemic    PLAN:   - Creatinine improving   - Heme/Onc eval noted;  Possible lymphoma-  Excisional lymph node bx of inguinal area needed for diagnosis    - Renal dosing of meds to creatinine clearance of 50 ml/min      Will sign off.  TY      [facility]   [phone]

## 2021-03-16 NOTE — CONSULT NOTE ADULT - SUBJECTIVE AND OBJECTIVE BOX
p ([id number])     HPI:  : 61yo M with hx HTN, DM, HLD, COVID 2020 presents with LE pain and scrotal swelling. Patient was hospitalized in [facility] in Feb 2021 where CT Ab/P found 5.8x2.5cm lesion on the right pelvis concerning for malignancy with sclerotic lesion at L5.   Patient left AMA before additional malignancy workup was performed.   In the last month, patient had 45lb unintentional weight loss and decreased appetite.   In the last 2 weeks had some  night sweats and subjective fevers.   In addition, he noted that he started to have abdominal swelling, mostly on the left side with tenderness along with b/l scrotal swelling    	Today, he came to the ED for 8/10 b/l LE pain, pins and needles. At baseline, he has neuropathy and has been unable to walk since COVID last year.   He uses a cane but has frequent falls. Last fall 4 days ago. Tried neurotonin in the past with no relief.     left ama from [facility] (14 Mar 2021 15:37)      Imaging:    Exam:    --Anticoagulation:  heparin   Injectable 5000 Unit(s) SubCutaneous every 12 hours    =====================  PAST MEDICAL HISTORY   Diabetes    Hyperlipidemia    Hypertension, unspecified type      PAST SURGICAL HISTORY   No significant past surgical history          MEDICATIONS:  Antibiotics:    Neuro:  traMADol 50 milliGRAM(s) Oral three times a day PRN    Other:  amLODIPine   Tablet 10 milliGRAM(s) Oral daily  atorvastatin 80 milliGRAM(s) Oral at bedtime  dextrose 40% Gel 15 Gram(s) Oral once  dextrose 5%. 1000 milliLiter(s) IV Continuous <Continuous>  dextrose 5%. 1000 milliLiter(s) IV Continuous <Continuous>  dextrose 50% Injectable 25 Gram(s) IV Push once  dextrose 50% Injectable 12.5 Gram(s) IV Push once  dextrose 50% Injectable 25 Gram(s) IV Push once  glucagon  Injectable 1 milliGRAM(s) IntraMuscular once  insulin glargine Injectable (LANTUS) 11 Unit(s) SubCutaneous once  insulin lispro (ADMELOG) corrective regimen sliding scale   SubCutaneous three times a day before meals  insulin lispro (ADMELOG) corrective regimen sliding scale   SubCutaneous at bedtime  insulin lispro Injectable (ADMELOG) 8 Unit(s) SubCutaneous three times a day before meals  lactated ringers. 1000 milliLiter(s) IV Continuous <Continuous>  metoprolol succinate ER 50 milliGRAM(s) Oral daily      SOCIAL HISTORY:   Occupation:   Marital Status:     FAMILY HISTORY:  No pertinent family history in first degree relatives        ROS: Negative except per HPI    LABS:                          16.3   7.42  )-----------( 501      ( 16 Mar 2021 07:22 )             47.7     03-16    140  |  108  |  23  ----------------------------<  142<H>  3.9   |  18<L>  |  1.26    Ca    9.2      16 Mar 2021 07:22  Phos  4.6     03-15    TPro  6.0  /  Alb  2.6<L>  /  TBili  0.2  /  DBili  x   /  AST  15  /  ALT  24  /  AlkPhos  125<H>  03-16

## 2021-03-16 NOTE — PROGRESS NOTE ADULT - SUBJECTIVE AND OBJECTIVE BOX
Surgery Red Team Daily Progress Note    SUBJECTIVE: Patient seen and examined during the morning round, no over night event, no acute complaint.     OBJECTIVE:   Vital Signs Last 24 Hrs  T(C): 37.1 (16 Mar 2021 00:13), Max: 37.1 (16 Mar 2021 00:13)  T(F): 98.7 (16 Mar 2021 00:13), Max: 98.7 (16 Mar 2021 00:13)  HR: 86 (16 Mar 2021 00:13) (73 - 86)  BP: 159/78 (16 Mar 2021 00:13) (134/84 - 161/72)  BP(mean): --  RR: 18 (16 Mar 2021 00:13) (18 - 18)  SpO2: 96% (16 Mar 2021 00:13) (96% - 97%)    PHYSICAL EXAM:  Constitutional: resting in bed with no acute distress  Respiratory:  unlabored breathing  Lymph nodes: lymphadenopathy on both right and left inguinal region consistent with CT finding, R larger than L. No cervical or axillary lymphadenopathy noted on exam   Extremities:  No edema, no calf tenderness    RADIOLOGY & ADDITIONAL STUDIES: no new studies performed

## 2021-03-16 NOTE — CHART NOTE - NSCHARTNOTEFT_GEN_A_CORE
Surgery Pre-Op Checklist    Patient is a 60y old  Male who presents with a chief complaint of pain (16 Mar 2021 14:43)    Diagnosis: lymphadenopathy r/o malignancy  Procedure: R, possible L inguinal lymph node biopsy  Surgeon:     Labs:                         16.3   7.42  )-----------( 501      ( 16 Mar 2021 07:22 )             47.7     03-16    140  |  108  |  23  ----------------------------<  142<H>  3.9   |  18<L>  |  1.26    Ca    9.2      16 Mar 2021 07:22  Phos  4.6     03-15    TPro  6.0  /  Alb  2.6<L>  /  TBili  0.2  /  DBili  x   /  AST  15  /  ALT  24  /  AlkPhos  125<H>  03-16         Medications:  MEDICATIONS  (STANDING):  amLODIPine   Tablet 10 milliGRAM(s) Oral daily  atorvastatin 80 milliGRAM(s) Oral at bedtime  dextrose 40% Gel 15 Gram(s) Oral once  dextrose 5%. 1000 milliLiter(s) (50 mL/Hr) IV Continuous <Continuous>  dextrose 5%. 1000 milliLiter(s) (100 mL/Hr) IV Continuous <Continuous>  dextrose 50% Injectable 25 Gram(s) IV Push once  dextrose 50% Injectable 12.5 Gram(s) IV Push once  dextrose 50% Injectable 25 Gram(s) IV Push once  glucagon  Injectable 1 milliGRAM(s) IntraMuscular once  heparin   Injectable 5000 Unit(s) SubCutaneous every 12 hours  insulin glargine Injectable (LANTUS) 23 Unit(s) SubCutaneous at bedtime  insulin lispro (ADMELOG) corrective regimen sliding scale   SubCutaneous three times a day before meals  insulin lispro (ADMELOG) corrective regimen sliding scale   SubCutaneous at bedtime  insulin lispro Injectable (ADMELOG) 8 Unit(s) SubCutaneous three times a day before meals  lactated ringers. 1000 milliLiter(s) (50 mL/Hr) IV Continuous <Continuous>  metoprolol succinate ER 50 milliGRAM(s) Oral daily    MEDICATIONS  (PRN):  traMADol 50 milliGRAM(s) Oral three times a day PRN Moderate Pain (4 - 6)      Pre-Op Checklist:   [x] NPO after midnight  [ ] IVF -- per discretion of primary team; if needed, please start at midnight when patient is NPO  [x] Lantus adjusted -- normal dose 23, halved (11u) in preparation for NPO tomorrow  [x] CXR -- ordered 3/16  [x] EKG -- ordered 3/16  [x] T&S -- 2 ordered for 3/17 @ 4am  [x] AM CBC -- ordered for 3/17 @ 4am  [x] AM BMP -- ordered for 3/17 @ 4am  [x] AM PT, PTT, INR -- ordered for 3/17 @ 4am  [ ] COVID -- ordered STAT 3/16, discussed obtaining STAT with primary team  [ ] Pregnancy test -- N/A  [ ] Documentation of medical optimization -- discussed need for documenting medical optimization with primary team  [ ] Documentation of cardiac optimization -- cardiology note 3/16  [x] Consent -- surgery team will obtain and place in chart      Lynn Corey, PGY-1  Red Team Surgery  #[id number] Surgery Pre-Op Checklist    Patient is a 60y old  Male who presents with a chief complaint of pain (16 Mar 2021 14:43)    Diagnosis: lymphadenopathy r/o malignancy  Procedure: R, possible L inguinal lymph node biopsy  Surgeon:     Labs:                         16.3   7.42  )-----------( 501      ( 16 Mar 2021 07:22 )             47.7     03-16    140  |  108  |  23  ----------------------------<  142<H>  3.9   |  18<L>  |  1.26    Ca    9.2      16 Mar 2021 07:22  Phos  4.6     03-15    TPro  6.0  /  Alb  2.6<L>  /  TBili  0.2  /  DBili  x   /  AST  15  /  ALT  24  /  AlkPhos  125<H>  03-16         Medications:  MEDICATIONS  (STANDING):  amLODIPine   Tablet 10 milliGRAM(s) Oral daily  atorvastatin 80 milliGRAM(s) Oral at bedtime  dextrose 40% Gel 15 Gram(s) Oral once  dextrose 5%. 1000 milliLiter(s) (50 mL/Hr) IV Continuous <Continuous>  dextrose 5%. 1000 milliLiter(s) (100 mL/Hr) IV Continuous <Continuous>  dextrose 50% Injectable 25 Gram(s) IV Push once  dextrose 50% Injectable 12.5 Gram(s) IV Push once  dextrose 50% Injectable 25 Gram(s) IV Push once  glucagon  Injectable 1 milliGRAM(s) IntraMuscular once  heparin   Injectable 5000 Unit(s) SubCutaneous every 12 hours  insulin glargine Injectable (LANTUS) 23 Unit(s) SubCutaneous at bedtime  insulin lispro (ADMELOG) corrective regimen sliding scale   SubCutaneous three times a day before meals  insulin lispro (ADMELOG) corrective regimen sliding scale   SubCutaneous at bedtime  insulin lispro Injectable (ADMELOG) 8 Unit(s) SubCutaneous three times a day before meals  lactated ringers. 1000 milliLiter(s) (50 mL/Hr) IV Continuous <Continuous>  metoprolol succinate ER 50 milliGRAM(s) Oral daily    MEDICATIONS  (PRN):  traMADol 50 milliGRAM(s) Oral three times a day PRN Moderate Pain (4 - 6)      Pre-Op Checklist:   [x] NPO after midnight  [ ] IVF -- per discretion of primary team; if needed, please start at midnight when patient is NPO  [x] Lantus adjusted -- normal dose 23, halved (11u) in preparation for NPO tomorrow  [x] CXR -- ordered 3/16  [x] EKG -- ordered 3/16  [x] T&S -- 2 ordered for 3/17 @ 4am  [x] AM CBC -- ordered for 3/17 @ 4am  [x] AM BMP -- ordered for 3/17 @ 4am  [x] AM PT, PTT, INR -- ordered for 3/17 @ 4am  [ ] COVID -- ordered STAT 3/16, discussed obtaining STAT with primary team  [ ] Pregnancy test -- N/A  [ ] Documentation of medical optimization -- discussed need for documenting medical optimization with primary team  [x] Documentation of cardiac optimization -- cardiology note 3/16  [x] Consent -- surgery team will obtain and place in chart      Lynn Corey, PGY-1  Red Team Surgery  #[id number] Surgery Pre-Op Checklist    Patient is a 60y old  Male who presents with a chief complaint of pain (16 Mar 2021 14:43)    Diagnosis: lymphadenopathy r/o malignancy  Procedure: R, possible L inguinal lymph node biopsy  Surgeon: Dr. Lopez / Dr. Fish    Labs:                         16.3   7.42  )-----------( 501      ( 16 Mar 2021 07:22 )             47.7     03-16    140  |  108  |  23  ----------------------------<  142<H>  3.9   |  18<L>  |  1.26    Ca    9.2      16 Mar 2021 07:22  Phos  4.6     03-15    TPro  6.0  /  Alb  2.6<L>  /  TBili  0.2  /  DBili  x   /  AST  15  /  ALT  24  /  AlkPhos  125<H>  03-16         Medications:  MEDICATIONS  (STANDING):  amLODIPine   Tablet 10 milliGRAM(s) Oral daily  atorvastatin 80 milliGRAM(s) Oral at bedtime  dextrose 40% Gel 15 Gram(s) Oral once  dextrose 5%. 1000 milliLiter(s) (50 mL/Hr) IV Continuous <Continuous>  dextrose 5%. 1000 milliLiter(s) (100 mL/Hr) IV Continuous <Continuous>  dextrose 50% Injectable 25 Gram(s) IV Push once  dextrose 50% Injectable 12.5 Gram(s) IV Push once  dextrose 50% Injectable 25 Gram(s) IV Push once  glucagon  Injectable 1 milliGRAM(s) IntraMuscular once  heparin   Injectable 5000 Unit(s) SubCutaneous every 12 hours  insulin glargine Injectable (LANTUS) 23 Unit(s) SubCutaneous at bedtime  insulin lispro (ADMELOG) corrective regimen sliding scale   SubCutaneous three times a day before meals  insulin lispro (ADMELOG) corrective regimen sliding scale   SubCutaneous at bedtime  insulin lispro Injectable (ADMELOG) 8 Unit(s) SubCutaneous three times a day before meals  lactated ringers. 1000 milliLiter(s) (50 mL/Hr) IV Continuous <Continuous>  metoprolol succinate ER 50 milliGRAM(s) Oral daily    MEDICATIONS  (PRN):  traMADol 50 milliGRAM(s) Oral three times a day PRN Moderate Pain (4 - 6)      Pre-Op Checklist:   [x] NPO after midnight  [ ] IVF -- per discretion of primary team; if needed, please start at midnight when patient is NPO  [x] Lantus adjusted -- normal dose 23, halved (11u) in preparation for NPO tomorrow  [x] CXR -- ordered 3/16  [x] EKG -- ordered 3/16  [x] T&S -- 2 ordered for 3/17 @ 4am  [x] AM CBC -- ordered for 3/17 @ 4am  [x] AM BMP -- ordered for 3/17 @ 4am  [x] AM PT, PTT, INR -- ordered for 3/17 @ 4am  [ ] COVID -- ordered STAT 3/16, discussed obtaining STAT with primary team  [ ] Pregnancy test -- N/A  [ ] Documentation of medical optimization -- discussed need for documenting medical optimization with primary team  [x] Documentation of cardiac optimization -- cardiology note 3/16  [x] Consent -- surgery team will obtain and place in chart      Lynn Corey, PGY-1  Red Team Surgery  #[id number]

## 2021-03-16 NOTE — PROGRESS NOTE ADULT - ASSESSMENT
a/p  59 yo M with hx HTN, DM, HLD, COVID 2020 presents with LE pain and scrotal swelling    1. LE pain/scrotal swelling  -concern for malignancy  -heme/onc eval noted  -plan LN excisional biopsy to r/o lymphoma  -check EKG, otherwise can proceed from a cv standpoint   -MRI noted  -f/u heme/neuro     2. HTN  -bp acceptable  -c/w amlodipine and bb   -can inc bb if needed  -no ace/arb given guanako     3. HLD  -c/w statin    4. GUANAKO  -cr noted  -renal f/u    dvt ppx

## 2021-03-16 NOTE — PROGRESS NOTE ADULT - ASSESSMENT
61y/o presenting with right testicular swelling and weight loss, surgery consulted for LN excisional biopsy to r/o lymphoma     - palpable LN in right and left groin   - will plan for operative time   - document medical optimization for OR   - care per primary team

## 2021-03-16 NOTE — PROGRESS NOTE ADULT - SUBJECTIVE AND OBJECTIVE BOX
Admitting Diagnosis:  Abnormal weight loss [R63.4]  ABNORMAL WEIGHT LOSS      Background:  This is a 60y year old Male  who presents with the chief complaint of low back pain and leg weakness. 61yo M with hx HTN, DM, HLD, COVID 2020 presents with LE pain and scrotal swelling. Patient was hospitalized in [facility] in Feb 2021 where CT Ab/P found 5.8x2.5cm lesion on the right pelvis concerning for malignancy with sclerotic lesion at L5. Pt states since early 2021 inc pain and weakness in the right leg, at first he was limping but now cannot walk.  He has pain in the feet, describes as pinching and needles like sensation.  Starting to have sx in left leg as well. Patient left AMA before additional malignancy workup was performed.  In the last month, patient had 45lb unintentional weight loss and decreased appetite. Notes abdominal swelling, mostly on the left side with tenderness along with b/l scrotal swelling.  At one point given neurontin, not helpful.  Denies sx in arms, changes in speech, swallow, vision, bladder control.  Says issues with bowel movements.     Interval Hx:  continues to feel subjectively worse day by day  MRI done  ******    Past Medical History:  Diabetes [E11.9]    Hyperlipidemia [E78.5]    Hypertension, unspecified type [I10]        Past Surgical History:  No significant past surgical history [[id number]]        Social History:  No toxic habits    Family History:  FAMILY HISTORY:  No pertinent family history in first degree relatives        Allergies:  No Known Allergies      ROS:  Constitutional: Patient offers no complaints of fevers or significant weight loss  Ears, Nose, Mouth and Throat: The patient presents with no abnormalities of the head, ears, eyes, nose or throat  Skin: Patient offers no concerns of new rashes or lesions  Respiratory: The patient presents with no abnormalities of the respiratory tract  Cardiovascular: The patient presents with no cardiac abnormalities  Gastrointestinal: The patient presents with no abnormalities of the GI system  Genitourinary: The patient presents with no dysuria, hematuria or frequent urination  Neurological: See HPI  Endocrine: Patient offers no complaints of excessive thirst, urination, or heat/cold intolerance    Advanced care planning reviewed and noted in the chart.    Medications:  amLODIPine   Tablet 10 milliGRAM(s) Oral daily  atorvastatin 80 milliGRAM(s) Oral at bedtime  dextrose 40% Gel 15 Gram(s) Oral once  dextrose 5%. 1000 milliLiter(s) IV Continuous <Continuous>  dextrose 5%. 1000 milliLiter(s) IV Continuous <Continuous>  dextrose 50% Injectable 25 Gram(s) IV Push once  dextrose 50% Injectable 12.5 Gram(s) IV Push once  dextrose 50% Injectable 25 Gram(s) IV Push once  glucagon  Injectable 1 milliGRAM(s) IntraMuscular once  heparin   Injectable 5000 Unit(s) SubCutaneous every 12 hours  insulin glargine Injectable (LANTUS) 23 Unit(s) SubCutaneous at bedtime  insulin lispro (ADMELOG) corrective regimen sliding scale   SubCutaneous three times a day before meals  insulin lispro (ADMELOG) corrective regimen sliding scale   SubCutaneous at bedtime  insulin lispro Injectable (ADMELOG) 8 Unit(s) SubCutaneous three times a day before meals  lactated ringers. 1000 milliLiter(s) IV Continuous <Continuous>  metoprolol succinate ER 50 milliGRAM(s) Oral daily  traMADol 50 milliGRAM(s) Oral three times a day PRN      Labs:  CBC Full  -  ( 16 Mar 2021 07:22 )  WBC Count : 7.42 K/uL  RBC Count : 5.38 M/uL  Hemoglobin : 16.3 g/dL  Hematocrit : 47.7 %  Platelet Count - Automated : 501 K/uL  Mean Cell Volume : 88.7 fl  Mean Cell Hemoglobin : 30.3 pg  Mean Cell Hemoglobin Concentration : 34.2 gm/dL  Auto Neutrophil # : x  Auto Lymphocyte # : x  Auto Monocyte # : x  Auto Eosinophil # : x  Auto Basophil # : x  Auto Neutrophil % : x  Auto Lymphocyte % : x  Auto Monocyte % : x  Auto Eosinophil % : x  Auto Basophil % : x    03-16    140  |  108  |  23  ----------------------------<  142<H>  3.9   |  18<L>  |  1.26    Ca    9.2      16 Mar 2021 07:22  Phos  4.6     03-15    TPro  6.0  /  Alb  2.6<L>  /  TBili  0.2  /  DBili  x   /  AST  15  /  ALT  24  /  AlkPhos  125<H>  03-16    CAPILLARY BLOOD GLUCOSE      POCT Blood Glucose.: 100 mg/dL (16 Mar 2021 12:34)  POCT Blood Glucose.: 126 mg/dL (16 Mar 2021 08:19)  POCT Blood Glucose.: 221 mg/dL (15 Mar 2021 21:12)  POCT Blood Glucose.: 72 mg/dL (15 Mar 2021 18:49)  POCT Blood Glucose.: 194 mg/dL (15 Mar 2021 15:08)  POCT Blood Glucose.: 228 mg/dL (15 Mar 2021 13:33)    LIVER FUNCTIONS - ( 16 Mar 2021 07:22 )  Alb: 2.6 g/dL / Pro: 6.0 g/dL / ALK PHOS: 125 U/L / ALT: 24 U/L / AST: 15 U/L / GGT: x                   Vitals:  Vital Signs Last 24 Hrs  T(C): 36.9 (16 Mar 2021 08:09), Max: 37.1 (16 Mar 2021 00:13)  T(F): 98.5 (16 Mar 2021 08:09), Max: 98.7 (16 Mar 2021 00:13)  HR: 74 (16 Mar 2021 08:09) (73 - 86)  BP: 158/90 (16 Mar 2021 08:09) (134/84 - 161/72)  BP(mean): --  RR: 18 (16 Mar 2021 08:09) (18 - 18)  SpO2: 96% (16 Mar 2021 08:09) (96% - 97%)    NEUROLOGICAL EXAM:    Mental status: Awake, alert, and in no apparent distress. Oriented to person, place and time. Language function is normal. Recent memory, digit span and concentration were normal.     Cranial Nerves: Pupils were equal, round, reactive to light. Extraocular movements were intact. Visual field were full. Fundoscopic exam was deferred. Facial sensation was intact to light touch. There was no facial asymmetry. The palate was upgoing symmetrically and tongue was midline. Hearing acuity was intact to finger rub AU. Shoulder shrug was full bilaterally    Motor exam: Bulk and tone were normal. Strength was 5/5 in the arms.  Left leg  5/5 except dorsi 4+/5.  Right leg - low tone.  hip flexion/knee ext 2/5, dorsiflexion 4/5. Fine finger movements were symmetric and normal. There was no pronator drift    Reflexes: 2+ in the bilateral upper extremities. absent in legs. Toes were downgoing bilaterally.     Sensation: Intact to light touch, temperature, vibration and proprioception. says when touch right leg it is painful    Coordination: Finger-nose-finger was without dysmetria. cannot do heel shin    Gait: unable to ambulate    < from: CT Chest w/ IV Cont (03.14.21 @ 10:12) >    EXAM:  CT ABDOMEN AND PELVIS IC                          EXAM:  CT CHEST IC                          PROCEDURE DATE:  03/14/2021      IMPRESSION:  Enlarged right iliac lymph node measuring 5.9 x 2.4 cm. Additional smaller right iliac nodes are seen. There are also bilateral groin lymph nodes.      MOLINA PUGH M.D., RADIOLOGY RESIDENT  This documenthas been electronically signed.  GINA JENKINS M.D., ATTENDING RADIOLOGIST  This document has been electronically signed. Mar 14 2021 12:50PM    < end of copied text >          EXAM:  MR SPINE LUMBAR WAW IC                            PROCEDURE DATE:  03/15/2021            INTERPRETATION:  INDICATIONS:  Increased low back pain and right leg weakness for 4 months    TECHNIQUE:  Sagittal T1 weighted and T2 weighted images of the lumbosacral spine as well as axial T1 weighted and T2 weighted images were obtained.    COMPARISON EXAMINATION: None.    FINDINGS:  VERTEBRAL BODIES AND DISCS:  Nonspecific lesion in L1 low signal T1 hyperintense signal T2 with some enhancement.  ALIGNMENT:  No subluxations.  L1-L2 LEVEL:  Normal.  L2-L3 LEVEL:  Asymmetric bulge right with disc material in the region of the right neural foramen at this level. Clinical correlation for right L2 radiculopathy  L3-L4 LEVEL: Symmetric bulge with annular fissure in the 7:30 position. Bilateral facet arthropathy at this level.  L4-L5 LEVEL: Asymmetric bulge to the left with focal left-sided protrusion and some mass impression on the intracanal left L5 root.. Bilateral facet arthropathy.Some changes in the left paraspinal musculature extending from the left facet joint may represent septic facet as enhancement is seen in association with hyperintense T2 signal with associated muscular changes (9:19)  L5-S1 LEVEL:  Slight signal hyperintensity at the disc space with some enhancement ventrally without associated endplate abnormality favors degenerative change. Prominent asymmetric disc bulge with Bilateral foraminal disc material which is causing significant nerve root compression on the right greater than left. May be the cause of the patient's known right leg weakness.  SPINAL CANAL:  Evidence of diffuse nerve root enhancement appreciated involving the roots of the cauda equina with some mild thickening appreciated.  CONUS MEDULLARIS:  Normal.  MISCELLANEOUS:  None    IMPRESSION:  Diffuse nerve root enhancement involving the roots of the cauda equina with some mild root thickening though the dominant finding is the enhancement. Differential possibilities include acute inflammatory demyelinating polyneuropathy( Guillan Spraggs) versus chronic inflammatory demyelinating polyneuropathy(CIDP). Inflammatory pathologies such as sarcoid or infectious etiologies should be considered. Included in the differential is leptomeningeal carcinomatosis. Correlation with CSF strongly recommended. Nonspecific lesion in the L5 vertebral body as described. Enhancement with associated T2 abnormality involving the left L4-5 facet with extension to the paraspinal musculature may indicate a septic facet at this level. Correlation with clinical parameters suggested. Degenerative changes with disc material in the neural foramina at the L2-3 and L5-S1 levels on the right as well as in the L5-S1 neural foramen on the left. Prominent bulge with more focal protrusion L4-5 left                MONTY TRAN MD; Attending Radiologist  This document has been electronically signed. Mar 15 2021  8:19PM

## 2021-03-17 ENCOUNTER — RESULT REVIEW (OUTPATIENT)
Age: 61
End: 2021-03-17

## 2021-03-17 PROBLEM — Z00.00 ENCOUNTER FOR PREVENTIVE HEALTH EXAMINATION: Status: ACTIVE | Noted: 2021-03-17

## 2021-03-17 LAB
ANION GAP SERPL CALC-SCNC: 11 MMOL/L — SIGNIFICANT CHANGE UP (ref 5–17)
APPEARANCE UR: CLEAR — SIGNIFICANT CHANGE UP
APTT BLD: 31.4 SEC — SIGNIFICANT CHANGE UP (ref 27.5–35.5)
BACTERIA # UR AUTO: ABNORMAL
BILIRUB UR-MCNC: NEGATIVE — SIGNIFICANT CHANGE UP
BLD GP AB SCN SERPL QL: NEGATIVE — SIGNIFICANT CHANGE UP
BUN SERPL-MCNC: 25 MG/DL — HIGH (ref 7–23)
CALCIUM SERPL-MCNC: 9.2 MG/DL — SIGNIFICANT CHANGE UP (ref 8.4–10.5)
CHLORIDE SERPL-SCNC: 107 MMOL/L — SIGNIFICANT CHANGE UP (ref 96–108)
CO2 SERPL-SCNC: 20 MMOL/L — LOW (ref 22–31)
COLOR SPEC: SIGNIFICANT CHANGE UP
CREAT ?TM UR-MCNC: 71 MG/DL — SIGNIFICANT CHANGE UP
CREAT SERPL-MCNC: 1.34 MG/DL — HIGH (ref 0.5–1.3)
DIFF PNL FLD: ABNORMAL
EPI CELLS # UR: 2 /HPF — SIGNIFICANT CHANGE UP
GLUCOSE BLDC GLUCOMTR-MCNC: 102 MG/DL — HIGH (ref 70–99)
GLUCOSE BLDC GLUCOMTR-MCNC: 104 MG/DL — HIGH (ref 70–99)
GLUCOSE BLDC GLUCOMTR-MCNC: 114 MG/DL — HIGH (ref 70–99)
GLUCOSE BLDC GLUCOMTR-MCNC: 139 MG/DL — HIGH (ref 70–99)
GLUCOSE SERPL-MCNC: 150 MG/DL — HIGH (ref 70–99)
GLUCOSE UR QL: ABNORMAL
HCT VFR BLD CALC: 48.6 % — SIGNIFICANT CHANGE UP (ref 39–50)
HGB BLD-MCNC: 16.1 G/DL — SIGNIFICANT CHANGE UP (ref 13–17)
HYALINE CASTS # UR AUTO: 4 /LPF — HIGH (ref 0–2)
INR BLD: 0.98 RATIO — SIGNIFICANT CHANGE UP (ref 0.88–1.16)
KETONES UR-MCNC: NEGATIVE — SIGNIFICANT CHANGE UP
LEUKOCYTE ESTERASE UR-ACNC: NEGATIVE — SIGNIFICANT CHANGE UP
MAGNESIUM SERPL-MCNC: 2 MG/DL — SIGNIFICANT CHANGE UP (ref 1.6–2.6)
MCHC RBC-ENTMCNC: 29.4 PG — SIGNIFICANT CHANGE UP (ref 27–34)
MCHC RBC-ENTMCNC: 33.1 GM/DL — SIGNIFICANT CHANGE UP (ref 32–36)
MCV RBC AUTO: 88.7 FL — SIGNIFICANT CHANGE UP (ref 80–100)
NITRITE UR-MCNC: NEGATIVE — SIGNIFICANT CHANGE UP
NRBC # BLD: 0 /100 WBCS — SIGNIFICANT CHANGE UP (ref 0–0)
PH UR: 6.5 — SIGNIFICANT CHANGE UP (ref 5–8)
PHOSPHATE SERPL-MCNC: 5.2 MG/DL — HIGH (ref 2.5–4.5)
PLATELET # BLD AUTO: 511 K/UL — HIGH (ref 150–400)
POTASSIUM SERPL-MCNC: 3.7 MMOL/L — SIGNIFICANT CHANGE UP (ref 3.5–5.3)
POTASSIUM SERPL-SCNC: 3.7 MMOL/L — SIGNIFICANT CHANGE UP (ref 3.5–5.3)
PROT UR-MCNC: ABNORMAL
PROTHROM AB SERPL-ACNC: 11.6 SEC — SIGNIFICANT CHANGE UP (ref 10.6–13.6)
RBC # BLD: 5.48 M/UL — SIGNIFICANT CHANGE UP (ref 4.2–5.8)
RBC # FLD: 13.1 % — SIGNIFICANT CHANGE UP (ref 10.3–14.5)
RBC CASTS # UR COMP ASSIST: 3 /HPF — SIGNIFICANT CHANGE UP (ref 0–4)
RH IG SCN BLD-IMP: POSITIVE — SIGNIFICANT CHANGE UP
SARS-COV-2 RNA SPEC QL NAA+PROBE: SIGNIFICANT CHANGE UP
SODIUM SERPL-SCNC: 138 MMOL/L — SIGNIFICANT CHANGE UP (ref 135–145)
SODIUM UR-SCNC: 72 MMOL/L — SIGNIFICANT CHANGE UP
SP GR SPEC: 1.01 — SIGNIFICANT CHANGE UP (ref 1.01–1.02)
UROBILINOGEN FLD QL: NEGATIVE — SIGNIFICANT CHANGE UP
WBC # BLD: 7.21 K/UL — SIGNIFICANT CHANGE UP (ref 3.8–10.5)
WBC # FLD AUTO: 7.21 K/UL — SIGNIFICANT CHANGE UP (ref 3.8–10.5)
WBC UR QL: 3 /HPF — SIGNIFICANT CHANGE UP (ref 0–5)

## 2021-03-17 PROCEDURE — 88307 TISSUE EXAM BY PATHOLOGIST: CPT | Mod: 26

## 2021-03-17 PROCEDURE — 99232 SBSQ HOSP IP/OBS MODERATE 35: CPT

## 2021-03-17 PROCEDURE — 88360 TUMOR IMMUNOHISTOCHEM/MANUAL: CPT | Mod: 26

## 2021-03-17 PROCEDURE — 88189 FLOWCYTOMETRY/READ 16 & >: CPT

## 2021-03-17 PROCEDURE — 88364 INSITU HYBRIDIZATION (FISH): CPT | Mod: 26

## 2021-03-17 PROCEDURE — 88365 INSITU HYBRIDIZATION (FISH): CPT | Mod: 26,59

## 2021-03-17 PROCEDURE — 88341 IMHCHEM/IMCYTCHM EA ADD ANTB: CPT | Mod: 26,59

## 2021-03-17 PROCEDURE — 88188 FLOWCYTOMETRY/READ 9-15: CPT | Mod: 59

## 2021-03-17 PROCEDURE — 88342 IMHCHEM/IMCYTCHM 1ST ANTB: CPT | Mod: 26,59

## 2021-03-17 PROCEDURE — 88331 PATH CONSLTJ SURG 1 BLK 1SPC: CPT | Mod: 26

## 2021-03-17 PROCEDURE — 38531 OPEN BX/EXC INGUINOFEM NODES: CPT

## 2021-03-17 RX ORDER — TRAMADOL HYDROCHLORIDE 50 MG/1
50 TABLET ORAL THREE TIMES A DAY
Refills: 0 | Status: DISCONTINUED | OUTPATIENT
Start: 2021-03-17 | End: 2021-03-24

## 2021-03-17 RX ORDER — DEXTROSE 50 % IN WATER 50 %
12.5 SYRINGE (ML) INTRAVENOUS ONCE
Refills: 0 | Status: DISCONTINUED | OUTPATIENT
Start: 2021-03-17 | End: 2021-03-31

## 2021-03-17 RX ORDER — HYDROMORPHONE HYDROCHLORIDE 2 MG/ML
0.25 INJECTION INTRAMUSCULAR; INTRAVENOUS; SUBCUTANEOUS
Refills: 0 | Status: DISCONTINUED | OUTPATIENT
Start: 2021-03-17 | End: 2021-03-17

## 2021-03-17 RX ORDER — DEXTROSE 50 % IN WATER 50 %
25 SYRINGE (ML) INTRAVENOUS ONCE
Refills: 0 | Status: DISCONTINUED | OUTPATIENT
Start: 2021-03-17 | End: 2021-03-31

## 2021-03-17 RX ORDER — HEPARIN SODIUM 5000 [USP'U]/ML
5000 INJECTION INTRAVENOUS; SUBCUTANEOUS EVERY 12 HOURS
Refills: 0 | Status: DISCONTINUED | OUTPATIENT
Start: 2021-03-17 | End: 2021-03-30

## 2021-03-17 RX ORDER — SODIUM CHLORIDE 9 MG/ML
1000 INJECTION, SOLUTION INTRAVENOUS
Refills: 0 | Status: DISCONTINUED | OUTPATIENT
Start: 2021-03-17 | End: 2021-03-31

## 2021-03-17 RX ORDER — ACETAMINOPHEN 500 MG
650 TABLET ORAL EVERY 6 HOURS
Refills: 0 | Status: DISCONTINUED | OUTPATIENT
Start: 2021-03-17 | End: 2021-03-31

## 2021-03-17 RX ORDER — ATORVASTATIN CALCIUM 80 MG/1
80 TABLET, FILM COATED ORAL AT BEDTIME
Refills: 0 | Status: DISCONTINUED | OUTPATIENT
Start: 2021-03-17 | End: 2021-03-31

## 2021-03-17 RX ORDER — INSULIN LISPRO 100/ML
VIAL (ML) SUBCUTANEOUS
Refills: 0 | Status: DISCONTINUED | OUTPATIENT
Start: 2021-03-17 | End: 2021-03-20

## 2021-03-17 RX ORDER — INSULIN LISPRO 100/ML
8 VIAL (ML) SUBCUTANEOUS
Refills: 0 | Status: DISCONTINUED | OUTPATIENT
Start: 2021-03-17 | End: 2021-03-18

## 2021-03-17 RX ORDER — DEXTROSE 50 % IN WATER 50 %
15 SYRINGE (ML) INTRAVENOUS ONCE
Refills: 0 | Status: DISCONTINUED | OUTPATIENT
Start: 2021-03-17 | End: 2021-03-31

## 2021-03-17 RX ORDER — AMLODIPINE BESYLATE 2.5 MG/1
10 TABLET ORAL DAILY
Refills: 0 | Status: DISCONTINUED | OUTPATIENT
Start: 2021-03-17 | End: 2021-03-31

## 2021-03-17 RX ORDER — GLUCAGON INJECTION, SOLUTION 0.5 MG/.1ML
1 INJECTION, SOLUTION SUBCUTANEOUS ONCE
Refills: 0 | Status: DISCONTINUED | OUTPATIENT
Start: 2021-03-17 | End: 2021-03-31

## 2021-03-17 RX ORDER — INSULIN GLARGINE 100 [IU]/ML
23 INJECTION, SOLUTION SUBCUTANEOUS AT BEDTIME
Refills: 0 | Status: DISCONTINUED | OUTPATIENT
Start: 2021-03-17 | End: 2021-03-18

## 2021-03-17 RX ORDER — INSULIN LISPRO 100/ML
VIAL (ML) SUBCUTANEOUS AT BEDTIME
Refills: 0 | Status: DISCONTINUED | OUTPATIENT
Start: 2021-03-17 | End: 2021-03-29

## 2021-03-17 RX ORDER — METOPROLOL TARTRATE 50 MG
50 TABLET ORAL DAILY
Refills: 0 | Status: DISCONTINUED | OUTPATIENT
Start: 2021-03-17 | End: 2021-03-22

## 2021-03-17 RX ADMIN — TRAMADOL HYDROCHLORIDE 50 MILLIGRAM(S): 50 TABLET ORAL at 19:31

## 2021-03-17 RX ADMIN — AMLODIPINE BESYLATE 10 MILLIGRAM(S): 2.5 TABLET ORAL at 05:34

## 2021-03-17 RX ADMIN — TRAMADOL HYDROCHLORIDE 50 MILLIGRAM(S): 50 TABLET ORAL at 20:04

## 2021-03-17 RX ADMIN — ATORVASTATIN CALCIUM 80 MILLIGRAM(S): 80 TABLET, FILM COATED ORAL at 21:53

## 2021-03-17 RX ADMIN — INSULIN GLARGINE 23 UNIT(S): 100 INJECTION, SOLUTION SUBCUTANEOUS at 21:53

## 2021-03-17 RX ADMIN — Medication 50 MILLIGRAM(S): at 05:34

## 2021-03-17 NOTE — PROGRESS NOTE ADULT - ASSESSMENT
a/p  61 yo M with hx HTN, DM, HLD, COVID 2020 presents with LE pain and scrotal swelling    1. LE pain/weakness/scrotal swelling  -concern for malignancy  -plan LN excisional biopsy to r/o lymphoma- can proceed from a cv standpoint   -MRI noted  -urology eval noted - No  intervention during this admission  -neurosx eval noted -no interventions at this time, possibly benefit from L5/S1 decompression fusion after workup of lymph node  -f/u heme/neuro     2. HTN  -bp acceptable  -c/w amlodipine and bb   -can inc bb if needed  -no ace/arb given guanako     3. HLD  -c/w statin    4. GUANAKO  -cr noted  -renal f/u    dvt ppx

## 2021-03-17 NOTE — PROGRESS NOTE ADULT - ASSESSMENT
pt w/ scrotal swellin g / pelvic lesion/ weight loss  r/o malignancy  onc eval noted  excisional Ln bx by sx  neg  lext dopplers  dm/uncontrolled  fsg riss  c/w insulin  endo f/u  dvt proph  htn  c/w meds  mri noted  neuro f/u  lp in am   neurosx eval  id eval noted  walking difficulty  neuro eval noted  card  f/u  urology to f'u  b/l hydrocele  pt medically stable / optimized for excisional node bx

## 2021-03-17 NOTE — CONSULT NOTE ADULT - SUBJECTIVE AND OBJECTIVE BOX
Urology Consult Note    Chief Complaint:  scrotal swelling  hydroceles    History of Present Illness:  M with DM and lymphadenopathy presenting with scrotal swelling. Reports swelling for months. In scrotum. Nothing makes it better. Nothing makes it worse. Denies hematuria dysuria urgency frequency renal colic. Has lymphadenopathy Planned for lymph node biopsy during this admission. Differential includes malignancy    PAST MEDICAL & SURGICAL HISTORY:  Diabetes    Hyperlipidemia    Hypertension, unspecified type    No significant past surgical history        FAMILY HISTORY:  No pertinent family history in first degree relatives        Allergies    No Known Allergies    Intolerances        Social History:  Denies tobacco or alcohol use    Review of Systems:   Constitutional: No weight loss, no weakness  HEENT: No visual loss, no hearing loss, no sneezing  Skin: No rash or itching  CV: No chest pain, no chest pressure  Pulm: No shortness of breath, cough, or sputum  GI: No melena, no constipation  : Per HPI  Neuro: No headache, dizziness  MSK: No muscle pain, no joint pain  Heme: No anemia, bruising, or bleeding  Lymphatics: No enlarged nodes, no history of splenectomy  Psych: No depression of anxiety  Endo: No cold or heat intolerance  Allergies: No asthma, hives    Physical Exam:  Vital signs  T(C): 36.8 (21 @ 23:39), Max: 36.8 (21 @ 23:39)  HR: 96 (21 @ 05:32)  BP: 168/95 (21 @ 05:32)  SpO2: 95% (21 @ 23:39)  Wt(kg): --    Gen: No acute distress. Normal mood  HEENT: Normocephalic, neck supple  CV: Hemodynamically stable  Pulm: No increased work of breathing  Abd: soft, non-tender, non-distended  Back: No CVA tenderness, no midline pain  Extremities: No significant deformity or joint abnormality  Neuro: Alert & oriented x3, No focal deficits  Skin: Skin normal color, normal texture  Psych: Normal affect, normal behavior  : Nonpalpable bladder. Normal bladder. Normal phallus, meatus. Scrotum within normal limits. Normal testes, epididymis,   Rectal: Unable to perform due to patient clinical status and positioning.    Labs:       @ 04:26    WBC 7.21  / Hct 48.6  / SCr 1.34      @ 07:22    WBC 7.42  / Hct 47.7  / SCr 1.26         138  |  107  |  25<H>  ----------------------------<  150<H>  3.7   |  20<L>  |  1.34<H>    Ca    9.2      17 Mar 2021 04:26  Phos  5.2       Mg     2.0         TPro  6.0  /  Alb  2.6<L>  /  TBili  0.2  /  DBili  x   /  AST  15  /  ALT  24  /  AlkPhos  125<H>        Urinalysis Basic - ( 17 Mar 2021 04:26 )    Color: Light Yellow / Appearance: Clear / S.015 / pH: x  Gluc: x / Ketone: Negative  / Bili: Negative / Urobili: Negative   Blood: x / Protein: 300 mg/dL / Nitrite: Negative   Leuk Esterase: Negative / RBC: 3 /hpf / WBC 3 /HPF   Sq Epi: x / Non Sq Epi: 2 /hpf / Bacteria: Moderate    US scrotum:  IMPRESSION:    Large complex bilateral hydroceles.    No testicular mass seen.

## 2021-03-17 NOTE — PROGRESS NOTE ADULT - ASSESSMENT
60M with DM (A1C=10.6), HTN, chol admitted 3/14/2021 c/o LE pain, scrotal swelling and weight loss found to have weakness RLE and CT that shows enlarging right iliac lymph node as well as an MRI that shows diffuse nerve root enhancement involving the roots of the cauda equina with some mild root thickening though the dominant finding is the enhancement. DOes not seem infectious.  Could this be lymphoma with B symptoms though the LDH is low.  MRI suggestive of CIDP, per neuro less likely AIDP, sarcoid, leptomeningeal carcinomatosis, infection.  s/p LN biopsy    Lymphadenopathy  - f/u pathology  - no antibiotics for now  - f/u HIV, ACE level, DARRIN  - for LP by IR  - please send cell count, CSF PCR, LDH, flow cytometry

## 2021-03-17 NOTE — CHART NOTE - NSCHARTNOTEFT_GEN_A_CORE
[facility] POST-OP NOTE:     Status post: excisional biopsy of right inguinal lymph node    Subjective:  Patient seen and examined at bedside  In no acute distress  C/o pain at incision site, spoke to RN re-giving pain medications  Voiding appropriately   Denies N/V, CP, SOB, Dizziness     Objective:    Vital Signs Last 24 Hrs  T(C): 36.9 (17 Mar 2021 15:06), Max: 36.9 (17 Mar 2021 15:06)  T(F): 98.4 (17 Mar 2021 15:06), Max: 98.4 (17 Mar 2021 15:06)  HR: 73 (17 Mar 2021 15:06) (73 - 96)  BP: 151/81 (17 Mar 2021 15:06) (142/87 - 168/95)  BP(mean): 114 (17 Mar 2021 14:45) (109 - 114)  RR: 18 (17 Mar 2021 15:06) (12 - 18)  SpO2: 96% (17 Mar 2021 15:06) (95% - 100%)    Physical Exam:  General Appearance:  Appears well, NAD, AAO x3  Chest: Equal expansion bilaterally, equal breath sounds  CV: Pulse regular presently  Abdomen: Soft, nondistended, R. inguinal incision appropriately tender, incision c/d/i with glue    Extremities: Grossly symmetric, Warm and well perfused x4,      I&O's Detail    16 Mar 2021 07:01  -  17 Mar 2021 07:00  --------------------------------------------------------  IN:    Oral Fluid: 440 mL  Total IN: 440 mL    OUT:    Voided (mL): 1400 mL  Total OUT: 1400 mL    Total NET: -960 mL      17 Mar 2021 07:01  -  17 Mar 2021 20:22  --------------------------------------------------------  IN:  Total IN: 0 mL    OUT:    Voided (mL): 325 mL  Total OUT: 325 mL    Total NET: -325 mL          LABS:             16.1   7.21  )-----------( 511      ( 17 Mar 2021 04:26 )             48.6     03-17    138  |  107  |  25<H>  ----------------------------<  150<H>  3.7   |  20<L>  |  1.34<H>    Ca    9.2      17 Mar 2021 04:26  Phos  5.2     03-17  Mg     2.0     03-17    TPro  6.0  /  Alb  2.6<L>  /  TBili  0.2  /  DBili  x   /  AST  15  /  ALT  24  /  AlkPhos  125<H>  03-16    PT/INR - ( 17 Mar 2021 09:34 )   PT: 11.6 sec;   INR: 0.98 ratio    PTT - ( 17 Mar 2021 09:34 )  PTT:31.4 sec    Urinalysis Basic - ( 17 Mar 2021 04:26 )    Color: Light Yellow / Appearance: Clear / S.015 / pH: x  Gluc: x / Ketone: Negative  / Bili: Negative / Urobili: Negative   Blood: x / Protein: 300 mg/dL / Nitrite: Negative   Leuk Esterase: Negative / RBC: 3 /hpf / WBC 3 /HPF   Sq Epi: x / Non Sq Epi: 2 /hpf / Bacteria: Moderate      Daily     Daily Weight in k.7 (17 Mar 2021 10:30)    MEDICATIONS  (STANDING):  amLODIPine   Tablet 10 milliGRAM(s) Oral daily  atorvastatin 80 milliGRAM(s) Oral at bedtime  dextrose 40% Gel 15 Gram(s) Oral once  dextrose 5%. 1000 milliLiter(s) (50 mL/Hr) IV Continuous <Continuous>  dextrose 5%. 1000 milliLiter(s) (100 mL/Hr) IV Continuous <Continuous>  dextrose 50% Injectable 12.5 Gram(s) IV Push once  dextrose 50% Injectable 25 Gram(s) IV Push once  dextrose 50% Injectable 25 Gram(s) IV Push once  glucagon  Injectable 1 milliGRAM(s) IntraMuscular once  heparin   Injectable 5000 Unit(s) SubCutaneous every 12 hours  insulin glargine Injectable (LANTUS) 23 Unit(s) SubCutaneous at bedtime  insulin lispro (ADMELOG) corrective regimen sliding scale   SubCutaneous at bedtime  insulin lispro (ADMELOG) corrective regimen sliding scale   SubCutaneous three times a day before meals  insulin lispro Injectable (ADMELOG) 8 Unit(s) SubCutaneous three times a day before meals  lactated ringers. 1000 milliLiter(s) (50 mL/Hr) IV Continuous <Continuous>  metoprolol succinate ER 50 milliGRAM(s) Oral daily    MEDICATIONS  (PRN):  acetaminophen   Tablet .. 650 milliGRAM(s) Oral every 6 hours PRN Mild Pain (1 - 3)  traMADol 50 milliGRAM(s) Oral three times a day PRN Moderate Pain (4 - 6)      ASSESSMENT: 60M now several hours status post excisional biopsy of right inguinal lymph node, recovering well.    PLAN:  - Pain management: Tylenol + Tramadol PRN  - Diet: regular  - f/u pathology  - Care per primary team   - Please page with any questions    [facility]  [phone]

## 2021-03-17 NOTE — BRIEF OPERATIVE NOTE - OPERATION/FINDINGS
Large right palpable inguinal lymph node. Excisional biopsy sent for intra-operative frozen and permanent culture. The skin and subcutaneous tissue were closed in layers with suture.

## 2021-03-17 NOTE — PROGRESS NOTE ADULT - SUBJECTIVE AND OBJECTIVE BOX
Patient is a 60y old  Male who presents with a chief complaint of Lymphadenopathy (16 Mar 2021 12:54)    f/u abnl MRI    Interval History/ROS:  no fever.  LP unable this morning.  s/p excisional inguinal LN biopsy today.  back in his room.  hungry.    PAST MEDICAL & SURGICAL HISTORY:  Diabetes  Hyperlipidemia  Hypertension, unspecified type    Allergies  No Known Allergies    ANTIMICROBIALS:  none    MEDICATIONS  (STANDING):  amLODIPine   Tablet 10 daily  atorvastatin 80 at bedtime  heparin   Injectable 5000 every 12 hours  insulin glargine Injectable (LANTUS) 23 at bedtime  insulin lispro (ADMELOG) corrective regimen sliding scale  at bedtime  insulin lispro (ADMELOG) corrective regimen sliding scale  three times a day before meals  insulin lispro Injectable (ADMELOG) 8 three times a day before meals  metoprolol succinate ER 50 daily    Vital Signs Last 24 Hrs  T(F): 98.4 (03-17-21 @ 15:06), Max: 98.4 (03-17-21 @ 15:06)  HR: 73 (03-17-21 @ 15:06)  BP: 151/81 (03-17-21 @ 15:06)  RR: 18 (03-17-21 @ 15:06)  SpO2: 96% (03-17-21 @ 15:06) (95% - 100%)    PHYSICAL EXAM:  Constitutional: non-toxic  HEAD/EYES: anicteric  ENT:  supple,  Cardiovascular:   normal S1, S2  Respiratory:  clear BS bilaterally  GI:  soft  :  no tran  Musculoskeletal:  no synovitis  Neurologic: awake and alert, significantly decreased strength R leg  Skin:  no rash  Heme/Onc: post-op groin  Psychiatric:  awake, alert, appropriate mood                                16.1   7.21  )-----------( 511      ( 17 Mar 2021 04:26 )             48.6 03-17    138  |  107  |  25  ----------------------------<  150  3.7   |  20  |  1.34  Ca    9.2      17 Mar 2021 04:26Phos  5.2     03-17Mg     2.0     03-17  TPro  6.0  /  Alb  2.6  /  TBili  0.2  /  DBili  x   /  AST  15  /  ALT  24  /  AlkPhos  125  03-16    Lactate Dehydrogenase, Serum: 136 U/L (03-16-21 @ 07:22)    HIV  ACE  DARRIN  pending    MICROBIOLOGY:  COVID-19 IgG Antibody Interpretation: Negative (03-15-21 @ 08:26)  COVID-19 PCR: NotDetec (03-14-21 @ 11:17)    RADIOLOGY:  imaging below personally reviewed and agree with findings    MR Lumbar Spine w/wo IV Cont (03.15.21 @ 17:55) >  IMPRESSION:  Diffuse nerve root enhancement involving the roots of the cauda equina with some mild root thickening though the dominant finding is the enhancement. Differential possibilities include acute inflammatorydemyelinating polyneuropathy( [facility]) versus chronic inflammatory demyelinating polyneuropathy(CIDP). Inflammatory pathologies such as sarcoid or infectious etiologies should be considered. Included in the differential is leptomeningeal carcinomatosis. Correlation with CSF strongly recommended. Nonspecific lesion in the L5 vertebral body as described. Enhancement with associated T2 abnormality involving the left L4-5 facet with extension to the paraspinal musculature may indicate a septic facet at this level. Correlation with clinical parameters suggested. Degenerative changes with disc material in the neural foramina at the L2-3 and L5-S1 levels on the right as well as in the L5-S1 neural foramen on the left. Prominent bulge with morefocal protrusion L4-5 left    A Duplex Lower Ext Vein Scan, Bilat (03.15.21 @ 12:03) >  IMPRESSION: There is a 2.3 cm nodule superficial to the vascular structures in the right inguinal area, likely a lymph node.  Noevidence of deep venous thrombosis in either lower extremity.    CT Chest w/ IV Cont (03.14.21 @ 10:12) >  IMPRESSION:  Enlarged right iliac lymph node measuring 5.9 x 2.4 cm. Additional smaller right iliac nodes are seen. There are also bilateral groin lymph nodes.    US Doppler Scrotum (03.14.21 @ 10:57) >  IMPRESSION:  Large complex bilateral hydroceles.  No testicular mass seen

## 2021-03-17 NOTE — PROGRESS NOTE ADULT - SUBJECTIVE AND OBJECTIVE BOX
DATE OF SERVICE: 03-17-21 @ 08:56  CHIEF COMPLAINT:Patient is a 60y old  Male who presents with a chief complaint of pain (16 Mar 2021 14:43)    	        PAST MEDICAL & SURGICAL HISTORY:  Diabetes    Hyperlipidemia    Hypertension, unspecified type    No significant past surgical history            some back pain   NECK: No pain or stiffness  RESPIRATORY: No cough, wheezing, chills or hemoptysis; No Shortness of Breath  CARDIOVASCULAR: No chest pain, palpitations, passing out, dizziness,   GASTROINTESTINAL: No abdominal or epigastric pain. No nausea, vomiting, or hematemesis; No diarrhea or constipation.   GENITOURINARY: No dysuria,   swollen scrotum    Medications:  MEDICATIONS  (STANDING):  amLODIPine   Tablet 10 milliGRAM(s) Oral daily  atorvastatin 80 milliGRAM(s) Oral at bedtime  dextrose 40% Gel 15 Gram(s) Oral once  dextrose 5%. 1000 milliLiter(s) (50 mL/Hr) IV Continuous <Continuous>  dextrose 5%. 1000 milliLiter(s) (100 mL/Hr) IV Continuous <Continuous>  dextrose 50% Injectable 25 Gram(s) IV Push once  dextrose 50% Injectable 12.5 Gram(s) IV Push once  dextrose 50% Injectable 25 Gram(s) IV Push once  glucagon  Injectable 1 milliGRAM(s) IntraMuscular once  heparin   Injectable 5000 Unit(s) SubCutaneous every 12 hours  insulin lispro (ADMELOG) corrective regimen sliding scale   SubCutaneous three times a day before meals  insulin lispro (ADMELOG) corrective regimen sliding scale   SubCutaneous at bedtime  insulin lispro Injectable (ADMELOG) 8 Unit(s) SubCutaneous three times a day before meals  lactated ringers. 1000 milliLiter(s) (50 mL/Hr) IV Continuous <Continuous>  metoprolol succinate ER 50 milliGRAM(s) Oral daily    MEDICATIONS  (PRN):  traMADol 50 milliGRAM(s) Oral three times a day PRN Moderate Pain (4 - 6)    	    PHYSICAL EXAM:  T(C): 36.8 (03-16-21 @ 23:39), Max: 36.8 (03-16-21 @ 23:39)  HR: 96 (03-17-21 @ 05:32) (75 - 96)  BP: 168/95 (03-17-21 @ 05:32) (154/89 - 168/95)  RR: 18 (03-16-21 @ 23:39) (18 - 18)  SpO2: 95% (03-16-21 @ 23:39) (95% - 96%)  Wt(kg): --  I&O's Summary    16 Mar 2021 07:01  -  17 Mar 2021 07:00  --------------------------------------------------------  IN: 440 mL / OUT: 1400 mL / NET: -960 mL        Appearance: Normal	  HEENT:   Normal oral mucosa, PERRL, EOMI	  Lymphatic: No lymphadenopathy  Cardiovascular: Normal S1 S2, No JVD,   Respiratory: Lungs clear to auscultation	  Psychiatry: A & O x 3,   Gastrointestinal:  Soft, Non-tender, + BS	  Skin: No rashes, No ecchymoses, No cyanosis	  Neurologic: Non-focal  Extremities: dec rom   scrotal edema    TELEMETRY: 	    ECG:  	  RADIOLOGY:  OTHER: 	  	  LABS:	 	    CARDIAC MARKERS:                                16.1   7.21  )-----------( 511      ( 17 Mar 2021 04:26 )             48.6     03-17    138  |  107  |  25<H>  ----------------------------<  150<H>  3.7   |  20<L>  |  1.34<H>    Ca    9.2      17 Mar 2021 04:26  Phos  5.2     03-17  Mg     2.0     03-17    TPro  6.0  /  Alb  2.6<L>  /  TBili  0.2  /  DBili  x   /  AST  15  /  ALT  24  /  AlkPhos  125<H>  03-16    proBNP:   Lipid Profile:   HgA1c:   TSH:

## 2021-03-17 NOTE — PROGRESS NOTE ADULT - ASSESSMENT
Impression:  This is a 60y year old Male  who presents with the chief complaint of low back pain and leg weakness. 61yo M with hx HTN, DM, HLD, COVID 2020 presents with LE pain and scrotal swelling. Patient was hospitalized in [facility] in Feb 2021 where CT Ab/P found 5.8x2.5cm lesion on the right pelvis concerning for malignancy with sclerotic lesion at L5. Pt states since early 2021 inc pain and weakness in the right leg, at first he was limping but now cannot walk.  He has pain in the feet, describes as pinching and needles like sensation.  Starting to have sx in left leg as well. Patient left AMA before additional malignancy workup was performed.  In the last month, patient had 45lb unintentional weight loss and decreased appetite. Notes abdominal swelling, mostly on the left side with tenderness along with b/l scrotal swelling.  At one point given neurontin, not helpful.  Denies sx in arms, changes in speech, swallow, vision, bladder control.  Says issues with bowel movements.     MRI done showing diffuse nerve root enhancement involving the roots of the cauda equina with some mild root thickening though the dominant finding is the enhancement.  There is also a nonspecific lesion in the L5 vertebral body and a possible septic facet at L4-5.       Attempted lumbar puncture at bedside this morning and was unsuccessful.    Going for lymph node biopsy      Diagnosis:  Uncertain at this time  ? underlying malignancy given enhancement and lymph node and scrotal enlargement  ? infectious etiology given possible septic L4-5 facet   CIDP on differential though this would be an extremely atypical presentation   AIDP does not fit based on time course of illness    Plan:  LP for CSF: cell count, cell culture, gram stain, protein, glucose, fungal culture, AFB, cryptococcal, VDRL, HSV, PCR for infections, and perhaps most importantly cytology and flow cytometry.  Sending a broad w/up given wide differential  I attempted to perform at bedside and was unsuccessful.  Spoke to NP to consult IR to perform under flouro   CT chest not suspicious for sarcoid  Enlarged right iliac lymph node measuring 5.9 x 2.4 cm going for biopsy

## 2021-03-17 NOTE — CONSULT NOTE ADULT - ASSESSMENT
M with scrotal swelling, hydroceles    -He has evidence of hydroceles. Right >L. Bilateral  -Would monitor hydroceles. Likely idiopathic etiology  -Options for management include observation, aspiration vs hydrocelectomy. These options can be considered as outpatient  -No  intervention during this admission  -F/u node biopsy  -Fu paulatez reccs

## 2021-03-17 NOTE — PROGRESS NOTE ADULT - SUBJECTIVE AND OBJECTIVE BOX
CARDIOLOGY FOLLOW UP - Dr. Trimble    CC: denies cp, sob, and palpitations       PHYSICAL EXAM:  T(C): 36.8 (03-17-21 @ 10:21), Max: 36.8 (03-16-21 @ 23:39)  HR: 79 (03-17-21 @ 10:21) (75 - 96)  BP: 151/86 (03-17-21 @ 10:21) (151/86 - 168/95)  RR: 18 (03-17-21 @ 10:21) (18 - 18)  SpO2: 96% (03-17-21 @ 10:21) (95% - 96%)  Wt(kg): --  I&O's Summary    16 Mar 2021 07:01  -  17 Mar 2021 07:00  --------------------------------------------------------  IN: 440 mL / OUT: 1400 mL / NET: -960 mL    17 Mar 2021 07:01  -  17 Mar 2021 13:33  --------------------------------------------------------  IN: 0 mL / OUT: 325 mL / NET: -325 mL        Appearance: Normal	  Cardiovascular: Normal S1 S2,RRR, No JVD, No murmurs  Respiratory: Lungs clear to auscultation	  Gastrointestinal:  Soft, Non-tender, + BS	  Extremities: Normal range of motion, No clubbing, cyanosis or edema      Home Medications:  amlodipine-benazepril 10 mg-40 mg oral capsule: 1 cap(s) orally once a day (15 Mar 2021 11:15)  HumaLOG 100 units/mL injectable solution: 8 unit(s) injectable 3 times a day (before meals) (15 Mar 2021 11:15)  Lipitor 80 mg oral tablet: 1 tab(s) orally once a day (15 Mar 2021 11:15)  Toprol-XL 50 mg oral tablet, extended release: 1 tab(s) orally once a day (15 Mar 2021 11:15)  Tresiba 100 units/mL subcutaneous solution: 40 unit(s) subcutaneous once a day (at bedtime) (15 Mar 2021 11:15)      MEDICATIONS  (STANDING):  amLODIPine   Tablet 10 milliGRAM(s) Oral daily  atorvastatin 80 milliGRAM(s) Oral at bedtime  dextrose 40% Gel 15 Gram(s) Oral once  dextrose 5%. 1000 milliLiter(s) (50 mL/Hr) IV Continuous <Continuous>  dextrose 5%. 1000 milliLiter(s) (100 mL/Hr) IV Continuous <Continuous>  dextrose 50% Injectable 12.5 Gram(s) IV Push once  dextrose 50% Injectable 25 Gram(s) IV Push once  dextrose 50% Injectable 25 Gram(s) IV Push once  glucagon  Injectable 1 milliGRAM(s) IntraMuscular once  heparin   Injectable 5000 Unit(s) SubCutaneous every 12 hours  insulin glargine Injectable (LANTUS) 23 Unit(s) SubCutaneous at bedtime  insulin lispro (ADMELOG) corrective regimen sliding scale   SubCutaneous at bedtime  insulin lispro (ADMELOG) corrective regimen sliding scale   SubCutaneous three times a day before meals  insulin lispro Injectable (ADMELOG) 8 Unit(s) SubCutaneous three times a day before meals  lactated ringers. 1000 milliLiter(s) (50 mL/Hr) IV Continuous <Continuous>  metoprolol succinate ER 50 milliGRAM(s) Oral daily      TELEMETRY: 	    ECG:  	  RADIOLOGY:   DIAGNOSTIC TESTING:  [ ] Echocardiogram:  [ ]  Catheterization:  [ ] Stress Test:    OTHER: 	    LABS:	 	                            16.1   7.21  )-----------( 511      ( 17 Mar 2021 04:26 )             48.6     03-17    138  |  107  |  25<H>  ----------------------------<  150<H>  3.7   |  20<L>  |  1.34<H>    Ca    9.2      17 Mar 2021 04:26  Phos  5.2     03-17  Mg     2.0     03-17    TPro  6.0  /  Alb  2.6<L>  /  TBili  0.2  /  DBili  x   /  AST  15  /  ALT  24  /  AlkPhos  125<H>  03-16    PT/INR - ( 17 Mar 2021 09:34 )   PT: 11.6 sec;   INR: 0.98 ratio         PTT - ( 17 Mar 2021 09:34 )  PTT:31.4 sec

## 2021-03-17 NOTE — PROGRESS NOTE ADULT - SUBJECTIVE AND OBJECTIVE BOX
Admitting Diagnosis:  Abnormal weight loss [R63.4]  ABNORMAL WEIGHT LOSS      Background:  This is a 60y year old Male  who presents with the chief complaint of low back pain and leg weakness. 61yo M with hx HTN, DM, HLD, COVID  presents with LE pain and scrotal swelling. Patient was hospitalized in [facility] in 2021 where CT Ab/P found 5.8x2.5cm lesion on the right pelvis concerning for malignancy with sclerotic lesion at L5. Pt states since early  inc pain and weakness in the right leg, at first he was limping but now cannot walk.  He has pain in the feet, describes as pinching and needles like sensation.  Starting to have sx in left leg as well. Patient left AMA before additional malignancy workup was performed.  In the last month, patient had 45lb unintentional weight loss and decreased appetite. Notes abdominal swelling, mostly on the left side with tenderness along with b/l scrotal swelling.  At one point given neurontin, not helpful.  Denies sx in arms, changes in speech, swallow, vision, bladder control.  Says issues with bowel movements.     MRI done showing diffuse nerve root enhancement involving the roots of the cauda equina with some mild root thickening though the dominant finding is the enhancement.  There is also a nonspecific lesion in the L5 vertebral body and a possible septic facet at L4-5.       Interval Hx:  Attempted lumbar puncture at bedside this morning and was unsuccessful.    Going for lymph node biopsy    ******    Past Medical History:  Diabetes [E11.9]    Hyperlipidemia [E78.5]    Hypertension, unspecified type [I10]        Past Surgical History:  No significant past surgical history [[id number]]        Social History:  No toxic habits    Family History:  FAMILY HISTORY:  No pertinent family history in first degree relatives        Allergies:  No Known Allergies      ROS:  Constitutional: Patient offers no complaints of fevers or significant weight loss  Ears, Nose, Mouth and Throat: The patient presents with no abnormalities of the head, ears, eyes, nose or throat  Skin: Patient offers no concerns of new rashes or lesions  Respiratory: The patient presents with no abnormalities of the respiratory tract  Cardiovascular: The patient presents with no cardiac abnormalities  Gastrointestinal: The patient presents with no abnormalities of the GI system  Genitourinary: The patient presents with no dysuria, hematuria or frequent urination  Neurological: See HPI  Endocrine: Patient offers no complaints of excessive thirst, urination, or heat/cold intolerance    Advanced care planning reviewed and noted in the chart.    Medications:  amLODIPine   Tablet 10 milliGRAM(s) Oral daily  atorvastatin 80 milliGRAM(s) Oral at bedtime  dextrose 40% Gel 15 Gram(s) Oral once  dextrose 5%. 1000 milliLiter(s) IV Continuous <Continuous>  dextrose 5%. 1000 milliLiter(s) IV Continuous <Continuous>  dextrose 50% Injectable 25 Gram(s) IV Push once  dextrose 50% Injectable 12.5 Gram(s) IV Push once  dextrose 50% Injectable 25 Gram(s) IV Push once  glucagon  Injectable 1 milliGRAM(s) IntraMuscular once  heparin   Injectable 5000 Unit(s) SubCutaneous every 12 hours  insulin lispro (ADMELOG) corrective regimen sliding scale   SubCutaneous three times a day before meals  insulin lispro (ADMELOG) corrective regimen sliding scale   SubCutaneous at bedtime  insulin lispro Injectable (ADMELOG) 8 Unit(s) SubCutaneous three times a day before meals  lactated ringers. 1000 milliLiter(s) IV Continuous <Continuous>  metoprolol succinate ER 50 milliGRAM(s) Oral daily  traMADol 50 milliGRAM(s) Oral three times a day PRN      Labs:  CBC Full  -  ( 17 Mar 2021 04:26 )  WBC Count : 7.21 K/uL  RBC Count : 5.48 M/uL  Hemoglobin : 16.1 g/dL  Hematocrit : 48.6 %  Platelet Count - Automated : 511 K/uL  Mean Cell Volume : 88.7 fl  Mean Cell Hemoglobin : 29.4 pg  Mean Cell Hemoglobin Concentration : 33.1 gm/dL  Auto Neutrophil # : x  Auto Lymphocyte # : x  Auto Monocyte # : x  Auto Eosinophil # : x  Auto Basophil # : x  Auto Neutrophil % : x  Auto Lymphocyte % : x  Auto Monocyte % : x  Auto Eosinophil % : x  Auto Basophil % : x    03-    138  |  107  |  25<H>  ----------------------------<  150<H>  3.7   |  20<L>  |  1.34<H>    Ca    9.2      17 Mar 2021 04:26  Phos  5.2     03-17  Mg     2.0     03-17    TPro  6.0  /  Alb  2.6<L>  /  TBili  0.2  /  DBili  x   /  AST  15  /  ALT  24  /  AlkPhos  125<H>  03-16    CAPILLARY BLOOD GLUCOSE      POCT Blood Glucose.: 114 mg/dL (17 Mar 2021 08:06)  POCT Blood Glucose.: 152 mg/dL (16 Mar 2021 21:16)  POCT Blood Glucose.: 147 mg/dL (16 Mar 2021 18:19)  POCT Blood Glucose.: 126 mg/dL (16 Mar 2021 17:16)  POCT Blood Glucose.: 100 mg/dL (16 Mar 2021 12:34)    LIVER FUNCTIONS - ( 16 Mar 2021 07:22 )  Alb: 2.6 g/dL / Pro: 6.0 g/dL / ALK PHOS: 125 U/L / ALT: 24 U/L / AST: 15 U/L / GGT: x           PT/INR - ( 17 Mar 2021 09:34 )   PT: 11.6 sec;   INR: 0.98 ratio         PTT - ( 17 Mar 2021 09:34 )  PTT:31.4 sec  Urinalysis Basic - ( 17 Mar 2021 04:26 )    Color: Light Yellow / Appearance: Clear / S.015 / pH: x  Gluc: x / Ketone: Negative  / Bili: Negative / Urobili: Negative   Blood: x / Protein: 300 mg/dL / Nitrite: Negative   Leuk Esterase: Negative / RBC: 3 /hpf / WBC 3 /HPF   Sq Epi: x / Non Sq Epi: 2 /hpf / Bacteria: Moderate          Vitals:  Vital Signs Last 24 Hrs  T(C): 36.8 (17 Mar 2021 10:21), Max: 36.8 (16 Mar 2021 23:39)  T(F): 98.2 (17 Mar 2021 10:21), Max: 98.3 (16 Mar 2021 23:39)  HR: 79 (17 Mar 2021 10:21) (75 - 96)  BP: 151/86 (17 Mar 2021 10:21) (151/86 - 168/95)  BP(mean): --  RR: 18 (17 Mar 2021 10:21) (18 - 18)  SpO2: 96% (17 Mar 2021 10:21) (95% - 96%)    NEUROLOGICAL EXAM:    Mental status: Awake, alert, and in no apparent distress. Oriented to person, place and time. Language function is normal. Recent memory, digit span and concentration were normal.     Cranial Nerves: Pupils were equal, round, reactive to light. Extraocular movements were intact. Visual field were full. Fundoscopic exam was deferred. Facial sensation was intact to light touch. There was no facial asymmetry. The palate was upgoing symmetrically and tongue was midline. Hearing acuity was intact to finger rub AU. Shoulder shrug was full bilaterally    Motor exam: Bulk and tone were normal. Strength was 5/5 in the arms.  Left leg  5/5 except dorsi 4+/5.  Right leg - low tone.  hip flexion/knee ext 2/5, dorsiflexion 4/5. Fine finger movements were symmetric and normal. There was no pronator drift    Reflexes: 2+ in the bilateral upper extremities. absent in legs. Toes were downgoing bilaterally.     Sensation: Intact to light touch, temperature, vibration and proprioception. says when touch right leg it is painful    Coordination: Finger-nose-finger was without dysmetria. cannot do heel shin    Gait: unable to ambulate    < from: CT Chest w/ IV Cont (21 @ 10:12) >    EXAM:  CT ABDOMEN AND PELVIS IC                          EXAM:  CT CHEST IC                          PROCEDURE DATE:  2021      IMPRESSION:  Enlarged right iliac lymph node measuring 5.9 x 2.4 cm. Additional smaller right iliac nodes are seen. There are also bilateral groin lymph nodes.      MOLINA PUGH M.D., RADIOLOGY RESIDENT  This documenthas been electronically signed.  GINA JENKINS M.D., ATTENDING RADIOLOGIST  This document has been electronically signed. Mar 14 2021 12:50PM    < end of copied text >          EXAM:  MR SPINE LUMBAR WAW IC                            PROCEDURE DATE:  03/15/2021            INTERPRETATION:  INDICATIONS:  Increased low back pain and right leg weakness for 4 months    TECHNIQUE:  Sagittal T1 weighted and T2 weighted images of the lumbosacral spine as well as axial T1 weighted and T2 weighted images were obtained.    COMPARISON EXAMINATION: None.    FINDINGS:  VERTEBRAL BODIES AND DISCS:  Nonspecific lesion in L1 low signal T1 hyperintense signal T2 with some enhancement.  ALIGNMENT:  No subluxations.  L1-L2 LEVEL:  Normal.  L2-L3 LEVEL:  Asymmetric bulge right with disc material in the region of the right neural foramen at this level. Clinical correlation for right L2 radiculopathy  L3-L4 LEVEL: Symmetric bulge with annular fissure in the 7:30 position. Bilateral facet arthropathy at this level.  L4-L5 LEVEL: Asymmetric bulge to the left with focal left-sided protrusion and some mass impression on the intracanal left L5 root.. Bilateral facet arthropathy.Some changes in the left paraspinal musculature extending from the left facet joint may represent septic facet as enhancement is seen in association with hyperintense T2 signal with associated muscular changes (9:19)  L5-S1 LEVEL:  Slight signal hyperintensity at the disc space with some enhancement ventrally without associated endplate abnormality favors degenerative change. Prominent asymmetric disc bulge with Bilateral foraminal disc material which is causing significant nerve root compression on the right greater than left. May be the cause of the patient's known right leg weakness.  SPINAL CANAL:  Evidence of diffuse nerve root enhancement appreciated involving the roots of the cauda equina with some mild thickening appreciated.  CONUS MEDULLARIS:  Normal.  MISCELLANEOUS:  None    IMPRESSION:  Diffuse nerve root enhancement involving the roots of the cauda equina with some mild root thickening though the dominant finding is the enhancement. Differential possibilities include acute inflammatory demyelinating polyneuropathy( Guillan [place]) versus chronic inflammatory demyelinating polyneuropathy(CIDP). Inflammatory pathologies such as sarcoid or infectious etiologies should be considered. Included in the differential is leptomeningeal carcinomatosis. Correlation with CSF strongly recommended. Nonspecific lesion in the L5 vertebral body as described. Enhancement with associated T2 abnormality involving the left L4-5 facet with extension to the paraspinal musculature may indicate a septic facet at this level. Correlation with clinical parameters suggested. Degenerative changes with disc material in the neural foramina at the L2-3 and L5-S1 levels on the right as well as in the L5-S1 neural foramen on the left. Prominent bulge with more focal protrusion L4-5 left                MONTY TRAN MD; Attending Radiologist  This document has been electronically signed. Mar 15 2021  8:19PM

## 2021-03-18 LAB
ACE SERPL-CCNC: <5 U/L — LOW (ref 14–82)
ANA TITR SER: NEGATIVE — SIGNIFICANT CHANGE UP
ANION GAP SERPL CALC-SCNC: 13 MMOL/L — SIGNIFICANT CHANGE UP (ref 5–17)
BUN SERPL-MCNC: 27 MG/DL — HIGH (ref 7–23)
CALCIUM SERPL-MCNC: 8.9 MG/DL — SIGNIFICANT CHANGE UP (ref 8.4–10.5)
CHLORIDE SERPL-SCNC: 105 MMOL/L — SIGNIFICANT CHANGE UP (ref 96–108)
CO2 SERPL-SCNC: 20 MMOL/L — LOW (ref 22–31)
CREAT SERPL-MCNC: 1.45 MG/DL — HIGH (ref 0.5–1.3)
GLUCOSE BLDC GLUCOMTR-MCNC: 106 MG/DL — HIGH (ref 70–99)
GLUCOSE BLDC GLUCOMTR-MCNC: 133 MG/DL — HIGH (ref 70–99)
GLUCOSE BLDC GLUCOMTR-MCNC: 149 MG/DL — HIGH (ref 70–99)
GLUCOSE BLDC GLUCOMTR-MCNC: 170 MG/DL — HIGH (ref 70–99)
GLUCOSE BLDC GLUCOMTR-MCNC: 199 MG/DL — HIGH (ref 70–99)
GLUCOSE SERPL-MCNC: 107 MG/DL — HIGH (ref 70–99)
HCT VFR BLD CALC: 47 % — SIGNIFICANT CHANGE UP (ref 39–50)
HGB BLD-MCNC: 15.2 G/DL — SIGNIFICANT CHANGE UP (ref 13–17)
MCHC RBC-ENTMCNC: 29 PG — SIGNIFICANT CHANGE UP (ref 27–34)
MCHC RBC-ENTMCNC: 32.3 GM/DL — SIGNIFICANT CHANGE UP (ref 32–36)
MCV RBC AUTO: 89.5 FL — SIGNIFICANT CHANGE UP (ref 80–100)
NRBC # BLD: 0 /100 WBCS — SIGNIFICANT CHANGE UP (ref 0–0)
PLATELET # BLD AUTO: 502 K/UL — HIGH (ref 150–400)
POTASSIUM SERPL-MCNC: 3.8 MMOL/L — SIGNIFICANT CHANGE UP (ref 3.5–5.3)
POTASSIUM SERPL-SCNC: 3.8 MMOL/L — SIGNIFICANT CHANGE UP (ref 3.5–5.3)
RBC # BLD: 5.25 M/UL — SIGNIFICANT CHANGE UP (ref 4.2–5.8)
RBC # FLD: 13.2 % — SIGNIFICANT CHANGE UP (ref 10.3–14.5)
SODIUM SERPL-SCNC: 138 MMOL/L — SIGNIFICANT CHANGE UP (ref 135–145)
WBC # BLD: 8.17 K/UL — SIGNIFICANT CHANGE UP (ref 3.8–10.5)
WBC # FLD AUTO: 8.17 K/UL — SIGNIFICANT CHANGE UP (ref 3.8–10.5)

## 2021-03-18 PROCEDURE — 99232 SBSQ HOSP IP/OBS MODERATE 35: CPT | Mod: GC

## 2021-03-18 PROCEDURE — 99232 SBSQ HOSP IP/OBS MODERATE 35: CPT

## 2021-03-18 RX ORDER — INSULIN LISPRO 100/ML
4 VIAL (ML) SUBCUTANEOUS
Refills: 0 | Status: DISCONTINUED | OUTPATIENT
Start: 2021-03-18 | End: 2021-03-18

## 2021-03-18 RX ORDER — INSULIN LISPRO 100/ML
5 VIAL (ML) SUBCUTANEOUS
Refills: 0 | Status: DISCONTINUED | OUTPATIENT
Start: 2021-03-18 | End: 2021-03-19

## 2021-03-18 RX ORDER — INSULIN GLARGINE 100 [IU]/ML
20 INJECTION, SOLUTION SUBCUTANEOUS AT BEDTIME
Refills: 0 | Status: DISCONTINUED | OUTPATIENT
Start: 2021-03-18 | End: 2021-03-20

## 2021-03-18 RX ORDER — POLYETHYLENE GLYCOL 3350 17 G/17G
17 POWDER, FOR SOLUTION ORAL DAILY
Refills: 0 | Status: DISCONTINUED | OUTPATIENT
Start: 2021-03-18 | End: 2021-03-31

## 2021-03-18 RX ADMIN — TRAMADOL HYDROCHLORIDE 50 MILLIGRAM(S): 50 TABLET ORAL at 22:30

## 2021-03-18 RX ADMIN — TRAMADOL HYDROCHLORIDE 50 MILLIGRAM(S): 50 TABLET ORAL at 16:13

## 2021-03-18 RX ADMIN — ATORVASTATIN CALCIUM 80 MILLIGRAM(S): 80 TABLET, FILM COATED ORAL at 22:30

## 2021-03-18 RX ADMIN — INSULIN GLARGINE 20 UNIT(S): 100 INJECTION, SOLUTION SUBCUTANEOUS at 22:31

## 2021-03-18 RX ADMIN — TRAMADOL HYDROCHLORIDE 50 MILLIGRAM(S): 50 TABLET ORAL at 06:12

## 2021-03-18 RX ADMIN — Medication 4 UNIT(S): at 17:32

## 2021-03-18 RX ADMIN — Medication 1: at 17:33

## 2021-03-18 RX ADMIN — HEPARIN SODIUM 5000 UNIT(S): 5000 INJECTION INTRAVENOUS; SUBCUTANEOUS at 17:33

## 2021-03-18 RX ADMIN — TRAMADOL HYDROCHLORIDE 50 MILLIGRAM(S): 50 TABLET ORAL at 23:14

## 2021-03-18 RX ADMIN — TRAMADOL HYDROCHLORIDE 50 MILLIGRAM(S): 50 TABLET ORAL at 05:05

## 2021-03-18 RX ADMIN — HEPARIN SODIUM 5000 UNIT(S): 5000 INJECTION INTRAVENOUS; SUBCUTANEOUS at 05:04

## 2021-03-18 RX ADMIN — Medication 50 MILLIGRAM(S): at 05:04

## 2021-03-18 RX ADMIN — AMLODIPINE BESYLATE 10 MILLIGRAM(S): 2.5 TABLET ORAL at 05:04

## 2021-03-18 RX ADMIN — Medication 4 UNIT(S): at 13:03

## 2021-03-18 NOTE — PROGRESS NOTE ADULT - ASSESSMENT
59yo M with hx HTN, DM2, HLD, COVID 2020 presents with LE pain and scrotal swelling. Patient was hospitalized in [facility] in Feb 2021 where CT Ab/P found 5.8x2.5cm lesion on the right pelvis concerning for malignancy with sclerotic lesion at L5. Patient left AMA before additional malignancy workup was performed. Admitted for malignancy workup.   Endocrine consult requested for management of uncontrolled DM2. A1c 10.6%    Uncontrolled DM2 c/b neuropathy   A1c 10.6%, goal <7%  FS goal 100-180 mg/dL  FS premeal and qhs   Carb consistent diet   C-peptide normal, not obtained with BMP, not suggestive of DM1  Agree with Lantus 20 units qhs   Recommend Admelog 5 units TIDac, hold if NPO   Recommend low correctional scale premeal and qhs     On Discharge recommend tresiba 20 units qhs and NOvolog 6units premeal   Recommend follow up with Endo in [place], Patient to find out the name    HTN   goal <130/80  Continue current regimen   Currently on amlodipine and metoprolol, ACEI held due to GUANAKO     HLD  goal LDL<70   Continue stain     Abnormal TFT  TSH mildly elevated, 4.8  Please repeat TFT 6 weeks after discharge     Discussed with Dr Sanjeev Murdock-Malou Centeno MD  Endocrine Fellow   Pager  on weekdays 9am- 5pm   Please call  after hours and on weekends

## 2021-03-18 NOTE — PROGRESS NOTE ADULT - ASSESSMENT
Cody Fernandez  60M p/w low back pain and leg weakness. Feb 2021 CT A/P found 5.8x2.5cm lymph node on the right pelvis with nonspecific lesion at L5. Months of worsening RLE radic and weakness, RLE shooting pains. Exam: AAOx3, UE 5/5, RLE HF 2/5, KF/KE 1/5, PF/DF 4/5, LLE 5/5. MRI shows possible septic L4/5 facet, L5/S1 disc lateral bulge, L2 3 right disc bulge. Nonspecific enhancement of nerve roots possible inflammatory.  -RLE possible due to far lateral right L5/S1 disc herniation.  -Nonspecific inflammatory changes in nerve roots, unclear if contributing to clinical condition  -Could possibly benefit from L5/S1 decompression fusion after workup of lymph node for possible malignancy with LP/LN bx. Given chronicity of symptoms, nonemergent.  -Differential including lepto, CIDP, AIDP, sarcoid  -Could consider ESR/CRP for inflammatory markers  -S/P biopsy of lymph node, f/u path  -Pending LP

## 2021-03-18 NOTE — PROGRESS NOTE ADULT - SUBJECTIVE AND OBJECTIVE BOX
Patient is a 60y old  Male who presents with a chief complaint of Lymphadenopathy (16 Mar 2021 12:54)    f/u abnl MRI    Interval History/ROS:  no fever.  awaiting LP by IR.  s/p LN biopsy yesterday, per neuro note, frozen abnl.  minimal pain at surgical site.  constipated.  No dysuria or retention.  Remainder of ROS otherwise negative.    PAST MEDICAL & SURGICAL HISTORY:  Diabetes  Hyperlipidemia  Hypertension, unspecified type    Allergies  No Known Allergies    ANTIMICROBIALS:  none    MEDICATIONS  (STANDING):  amLODIPine   Tablet 10 daily  atorvastatin 80 at bedtime  heparin   Injectable 5000 every 12 hours  insulin glargine Injectable (LANTUS) 20 at bedtime  insulin lispro (ADMELOG) corrective regimen sliding scale  at bedtime  insulin lispro (ADMELOG) corrective regimen sliding scale  three times a day before meals  insulin lispro Injectable (ADMELOG) 4 three times a day before meals  metoprolol succinate ER 50 daily    Vital Signs Last 24 Hrs  T(F): 98.8 (03-18-21 @ 08:51), Max: 98.8 (03-18-21 @ 08:51)  HR: 85 (03-18-21 @ 08:51)  BP: 150/75 (03-18-21 @ 08:51)  RR: 18 (03-18-21 @ 08:51)  SpO2: 96% (03-18-21 @ 08:51) (95% - 100%)    PHYSICAL EXAM:  Constitutional: non-toxic  HEAD/EYES: anicteric  ENT:  supple,  Cardiovascular:   normal S1, S2  Respiratory:  clear BS bilaterally  GI:  soft  :  no tran  Musculoskeletal:  no synovitis  Neurologic: awake and alert, significantly decreased strength R leg  Skin:  surgical site c/d/i  Psychiatric:  awake, alert, appropriate mood                           15.2   8.17  )-----------( 502      ( 18 Mar 2021 07:24 )             47.0 03-18    138  |  105  |  27  ----------------------------<  107  3.8   |  20  |  1.45  Ca    8.9      18 Mar 2021 07:14Phos  5.2     03-17Mg     2.0     03-17    Lactate Dehydrogenase, Serum: 136 U/L (03-16-21 @ 07:22)    Pending: HIV, ACE, DARRIN    MICROBIOLOGY:  COVID-19 IgG Antibody Interpretation: Negative (03-15-21 @ 08:26)  COVID-19 PCR: NotDetec (03-14-21 @ 11:17)    RADIOLOGY:  imaging below personally reviewed and agree with findings    MR Lumbar Spine w/wo IV Cont (03.15.21 @ 17:55) >  IMPRESSION:  Diffuse nerve root enhancement involving the roots of the cauda equina with some mild root thickening though the dominant finding is the enhancement. Differential possibilities include acute inflammatorydemyelinating polyneuropathy( Guillan [place]) versus chronic inflammatory demyelinating polyneuropathy(CIDP). Inflammatory pathologies such as sarcoid or infectious etiologies should be considered. Included in the differential is leptomeningeal carcinomatosis. Correlation with CSF strongly recommended. Nonspecific lesion in the L5 vertebral body as described. Enhancement with associated T2 abnormality involving the left L4-5 facet with extension to the paraspinal musculature may indicate a septic facet at this level. Correlation with clinical parameters suggested. Degenerative changes with disc material in the neural foramina at the L2-3 and L5-S1 levels on the right as well as in the L5-S1 neural foramen on the left. Prominent bulge with morefocal protrusion L4-5 left    A Duplex Lower Ext Vein Scan, Bilat (03.15.21 @ 12:03) >  IMPRESSION: There is a 2.3 cm nodule superficial to the vascular structures in the right inguinal area, likely a lymph node.  Noevidence of deep venous thrombosis in either lower extremity.    CT Chest w/ IV Cont (03.14.21 @ 10:12) >  IMPRESSION:  Enlarged right iliac lymph node measuring 5.9 x 2.4 cm. Additional smaller right iliac nodes are seen. There are also bilateral groin lymph nodes.    US Doppler Scrotum (03.14.21 @ 10:57) >  IMPRESSION:  Large complex bilateral hydroceles.  No testicular mass seen

## 2021-03-18 NOTE — PROGRESS NOTE ADULT - ASSESSMENT
60M with scrotal swelling, hydroceles  -He has evidence of hydroceles. Right >L. Bilateral  -Options for management include observation, aspiration vs hydrocelectomy. These options can be considered as outpatient  -No  intervention during this admission, fu outpatient with Dr. Salazar  -F/u pathology  -Please do not hesitate to call  back with additional questions

## 2021-03-18 NOTE — PROGRESS NOTE ADULT - ASSESSMENT
Impression:  This is a 60y year old Male  who presents with the chief complaint of low back pain and leg weakness. 59yo M with hx HTN, DM, HLD, COVID 2020 presents with LE pain and scrotal swelling. Patient was hospitalized in [facility] in Feb 2021 where CT Ab/P found 5.8x2.5cm lesion on the right pelvis concerning for malignancy with sclerotic lesion at L5. Pt states since early 2021 inc pain and weakness in the right leg, at first he was limping but now cannot walk.  He has pain in the feet, describes as pinching and needles like sensation.  Starting to have sx in left leg as well. Patient left AMA before additional malignancy workup was performed.  In the last month, patient had 45lb unintentional weight loss and decreased appetite. Notes abdominal swelling, mostly on the left side with tenderness along with b/l scrotal swelling.  At one point given neurontin, not helpful.  Denies sx in arms, changes in speech, swallow, vision, bladder control.  Says issues with bowel movements.     MRI done showing diffuse nerve root enhancement involving the roots of the cauda equina with some mild root thickening though the dominant finding is the enhancement.  There is also a nonspecific lesion in the L5 vertebral body and a possible septic facet at L4-5.       lymph node biopsy done with frozen section suggestive of B cell lymphoma    Diagnosis:  Suspect B-cell lymphoma with leptomeningeal spread  less likely but still possible would be infectious etiology given possible septic L4-5 facet and potential immunosuppression in setting of possible lymphoma   doubt demyelinating (AIDP or CIDP)    Plan:  LP for CSF: cell count, cell culture, gram stain, protein, glucose, fungal culture, AFB, cryptococcal, VDRL, HSV, PCR for infections, and perhaps most importantly cytology and flow cytometry.  Sending a broad w/up given wide differential  I attempted to perform at bedside and was unsuccessful.  Spoke to NP to consult IR to perform under flouro   CT chest not suspicious for sarcoid  Hematology/oncology for suspected B cell lymphoma  Patient asked my about the lymph node biopsy.  I discussed that frozen section was abnormal but did not go into specifics given my role in this patient's care, defer to heme/onc and/or primary team.  I advised him that there is abnormal pathology and that we need to do further testing to get an exact answer   He is very pleasant and understanding but has poor medical literacy and I educated him on some of the background and medical terms that people have been using

## 2021-03-18 NOTE — PROGRESS NOTE ADULT - ASSESSMENT
61y/o presenting with right testicular swelling and weight loss, s/p lymph node biopsy, frozen + diffuse B cell, follow up pathology     - care per primary team, further work up per oncology. Surgical oncology will sign off, please call back with questions

## 2021-03-18 NOTE — PROGRESS NOTE ADULT - ASSESSMENT
pt w/ scrotal swellin g / pelvic lesion/ weight loss    onc eval noted  excisional Ln bx by sx  + for Bcell  tx asper onc  neg  lext dopplers  dm/uncontrolled  fsg riss  c/w insulin  endo f/u  dvt proph  htn  c/w meds  mri noted  neuro f/u  lp in am   neurosx eval noted  id eval noted  walking difficulty  neuro eval noted  card  f/u  urology f/u noted  no sx now  b/l hydrocele

## 2021-03-18 NOTE — PROGRESS NOTE ADULT - ASSESSMENT
a/p  61 yo M with hx HTN, DM, HLD, COVID 2020 presents with LE pain and scrotal swelling    1. LE pain/weakness/scrotal swelling  -concern for malignancy  -s/p LN excisional biopsy to r/o lymphoma- frozen + diffuse B cell, follow up pathology   -MRI noted  -UA noted  -LE duplex neg for DVT  -urology eval noted - No  intervention during this admission  -neurosx eval noted -no interventions at this time, possibly benefit from L5/S1 decompression fusion after workup of lymph node  -plan for LP tomorrow   -f/u heme/neuro     2. HTN  -bp acceptable  -c/w amlodipine and bb   -can inc bb if needed  -no ace/arb given guanako     3. HLD  -c/w statin    4. GUANAKO  -cr noted  -renal f/u    dvt ppx

## 2021-03-18 NOTE — PROGRESS NOTE ADULT - ASSESSMENT
60 year old male presents to ED with right groin and back pain, 35 lb weight loss, subjective fevers, sweats - found to have bilateral inguinal adenopathy and a sclerotic L5 lesion.    Inguinal Adenopathy and L1 Sclerotic Lesion  -- Concern for lymphoma based on fevers, weight loss and night sweats  -- Other malignancies also possible, as can be benign inflammatory processes  -- Tumor markers - PSA,, Ca 19,9, LDH and Uric Acid are normal  -- S/P excisional biopsy of inguinal lymph node  --Results pending but frozen section consistent with B cell lymphoma  - ID to be consulted as well     Neuropathy  -- Preseumed to be secondary to COVID infection per patient  -- Markedly abnormal MRI of spine per above  -- Neurosurgery being consulted  -- May have leptomeningeal spread  -- May need an LP, following up with neuro     We will continue to follow patient. Thank you for the opportunity to participate in MR. Fernandez's care.    Lobo Landeros PA-C  Hematology/Oncology   [phone] (cell)  [phone] (office)  After hours please call MD on call or office   60 year old male presents to ED with right groin and back pain, 35 lb weight loss, subjective fevers, sweats - found to have bilateral inguinal adenopathy and a sclerotic L5 lesion.    Inguinal Adenopathy and L1 Sclerotic Lesion  -- Concern for lymphoma based on fevers, weight loss and night sweats  -- Other malignancies also possible, as can be benign inflammatory processes  -- Tumor markers - PSA,, Ca 19,9, LDH and Uric Acid are normal  -- S/P excisional biopsy of inguinal lymph node  --Results pending but frozen section consistent with B cell lymphoma; awaiting subtype  - ID to be consulted as well     Neuropathy  -- Preseumed to be secondary to COVID infection per patient  -- Markedly abnormal MRI of spine per above  -- Neurosurgery being consulted  -- May have leptomeningeal spread  -- May need an LP, following up with neuro     We will continue to follow patient. Thank you for the opportunity to participate in MR. Fernandez's care.    Lobo Landeros PA-C  Hematology/Oncology   [phone] (cell)  [phone] (office)  After hours please call MD on call or office

## 2021-03-18 NOTE — PROGRESS NOTE ADULT - SUBJECTIVE AND OBJECTIVE BOX
Patient is a 60y old  Male who presents with a chief complaint of leg pain (18 Mar 2021 12:27)    Patient had lymph node biopsy yesterday. No new complaints      MEDICATIONS  (STANDING):  amLODIPine   Tablet 10 milliGRAM(s) Oral daily  atorvastatin 80 milliGRAM(s) Oral at bedtime  dextrose 40% Gel 15 Gram(s) Oral once  dextrose 5%. 1000 milliLiter(s) (50 mL/Hr) IV Continuous <Continuous>  dextrose 5%. 1000 milliLiter(s) (100 mL/Hr) IV Continuous <Continuous>  dextrose 50% Injectable 12.5 Gram(s) IV Push once  dextrose 50% Injectable 25 Gram(s) IV Push once  dextrose 50% Injectable 25 Gram(s) IV Push once  glucagon  Injectable 1 milliGRAM(s) IntraMuscular once  heparin   Injectable 5000 Unit(s) SubCutaneous every 12 hours  insulin glargine Injectable (LANTUS) 20 Unit(s) SubCutaneous at bedtime  insulin lispro (ADMELOG) corrective regimen sliding scale   SubCutaneous at bedtime  insulin lispro (ADMELOG) corrective regimen sliding scale   SubCutaneous three times a day before meals  insulin lispro Injectable (ADMELOG) 4 Unit(s) SubCutaneous three times a day before meals  lactated ringers. 1000 milliLiter(s) (50 mL/Hr) IV Continuous <Continuous>  metoprolol succinate ER 50 milliGRAM(s) Oral daily    MEDICATIONS  (PRN):  acetaminophen   Tablet .. 650 milliGRAM(s) Oral every 6 hours PRN Mild Pain (1 - 3)  traMADol 50 milliGRAM(s) Oral three times a day PRN Moderate Pain (4 - 6)          Vital Signs Last 24 Hrs  T(C): 36.3 (18 Mar 2021 13:52), Max: 37.1 (18 Mar 2021 08:51)  T(F): 97.4 (18 Mar 2021 13:52), Max: 98.8 (18 Mar 2021 08:51)  HR: 69 (18 Mar 2021 13:52) (69 - 88)  BP: 133/75 (18 Mar 2021 13:52) (133/75 - 174/88)  BP(mean): --  RR: 18 (18 Mar 2021 13:52) (18 - 18)  SpO2: 95% (18 Mar 2021 13:52) (95% - 96%)    PE  NAD  Awake, alert  Anicteric  No rash grossly                            15.2   8.17  )-----------( 502      ( 18 Mar 2021 07:24 )             47.0       03-18    138  |  105  |  27<H>  ----------------------------<  107<H>  3.8   |  20<L>  |  1.45<H>    Ca    8.9      18 Mar 2021 07:14  Phos  5.2     03-17  Mg     2.0     03-17

## 2021-03-18 NOTE — PROGRESS NOTE ADULT - SUBJECTIVE AND OBJECTIVE BOX
CARDIOLOGY FOLLOW UP - Dr. Trimble    CC: denies cp, sob, and palpitations       PHYSICAL EXAM:  T(C): 37.1 (03-18-21 @ 08:51), Max: 37.1 (03-18-21 @ 08:51)  HR: 85 (03-18-21 @ 08:51) (73 - 91)  BP: 150/75 (03-18-21 @ 08:51) (135/72 - 174/88)  RR: 18 (03-18-21 @ 08:51) (12 - 18)  SpO2: 96% (03-18-21 @ 08:51) (95% - 100%)  Wt(kg): --  I&O's Summary    17 Mar 2021 07:01  -  18 Mar 2021 07:00  --------------------------------------------------------  IN: 200 mL / OUT: 1675 mL / NET: -1475 mL    18 Mar 2021 07:01  -  18 Mar 2021 11:44  --------------------------------------------------------  IN: 240 mL / OUT: 400 mL / NET: -160 mL        Appearance: Normal	  Cardiovascular: Normal S1 S2,RRR, No JVD, No murmurs  Respiratory: coarse   Gastrointestinal:  Soft, Non-tender, + BS	  Extremities: Normal range of motion, No clubbing, cyanosis or edema      Home Medications:  amlodipine-benazepril 10 mg-40 mg oral capsule: 1 cap(s) orally once a day (15 Mar 2021 11:15)  HumaLOG 100 units/mL injectable solution: 8 unit(s) injectable 3 times a day (before meals) (15 Mar 2021 11:15)  Lipitor 80 mg oral tablet: 1 tab(s) orally once a day (15 Mar 2021 11:15)  Toprol-XL 50 mg oral tablet, extended release: 1 tab(s) orally once a day (15 Mar 2021 11:15)  Tresiba 100 units/mL subcutaneous solution: 40 unit(s) subcutaneous once a day (at bedtime) (15 Mar 2021 11:15)      MEDICATIONS  (STANDING):  amLODIPine   Tablet 10 milliGRAM(s) Oral daily  atorvastatin 80 milliGRAM(s) Oral at bedtime  dextrose 40% Gel 15 Gram(s) Oral once  dextrose 5%. 1000 milliLiter(s) (50 mL/Hr) IV Continuous <Continuous>  dextrose 5%. 1000 milliLiter(s) (100 mL/Hr) IV Continuous <Continuous>  dextrose 50% Injectable 12.5 Gram(s) IV Push once  dextrose 50% Injectable 25 Gram(s) IV Push once  dextrose 50% Injectable 25 Gram(s) IV Push once  glucagon  Injectable 1 milliGRAM(s) IntraMuscular once  heparin   Injectable 5000 Unit(s) SubCutaneous every 12 hours  insulin glargine Injectable (LANTUS) 20 Unit(s) SubCutaneous at bedtime  insulin lispro (ADMELOG) corrective regimen sliding scale   SubCutaneous at bedtime  insulin lispro (ADMELOG) corrective regimen sliding scale   SubCutaneous three times a day before meals  insulin lispro Injectable (ADMELOG) 4 Unit(s) SubCutaneous three times a day before meals  lactated ringers. 1000 milliLiter(s) (50 mL/Hr) IV Continuous <Continuous>  metoprolol succinate ER 50 milliGRAM(s) Oral daily      TELEMETRY: 	    ECG:  	  RADIOLOGY:   DIAGNOSTIC TESTING:  [ ] Echocardiogram:  [ ]  Catheterization:  [ ] Stress Test:    OTHER: 	    LABS:	 	                            15.2   8.17  )-----------( 502      ( 18 Mar 2021 07:24 )             47.0     03-18    138  |  105  |  27<H>  ----------------------------<  107<H>  3.8   |  20<L>  |  1.45<H>    Ca    8.9      18 Mar 2021 07:14  Phos  5.2     03-17  Mg     2.0     03-17      PT/INR - ( 17 Mar 2021 09:34 )   PT: 11.6 sec;   INR: 0.98 ratio         PTT - ( 17 Mar 2021 09:34 )  PTT:31.4 sec

## 2021-03-18 NOTE — PROGRESS NOTE ADULT - SUBJECTIVE AND OBJECTIVE BOX
Surgery Red Team Daily Progress Note    SUBJECTIVE: Patient seen and examined during the morning round, no over night event, no acute complaint.     OBJECTIVE:   Vital Signs Last 24 Hrs  T(C): 36.9 (18 Mar 2021 04:31), Max: 36.9 (17 Mar 2021 15:06)  T(F): 98.4 (18 Mar 2021 04:31), Max: 98.5 (17 Mar 2021 22:54)  HR: 84 (18 Mar 2021 04:31) (73 - 91)  BP: 174/88 (18 Mar 2021 04:31) (135/72 - 174/88)  BP(mean): 114 (17 Mar 2021 14:45) (109 - 114)  RR: 18 (18 Mar 2021 04:31) (12 - 18)  SpO2: 95% (18 Mar 2021 04:31) (95% - 100%)    PHYSICAL EXAM:  Constitutional: resting in bed with no acute distress  Respiratory:  unlabored breathing  Genitourinary: incision intact and clean, no firmness, no fluid collection    Extremities:  No edema, no calf tenderness    RADIOLOGY & ADDITIONAL STUDIES: no new studies performed

## 2021-03-18 NOTE — PROGRESS NOTE ADULT - SUBJECTIVE AND OBJECTIVE BOX
DATE OF SERVICE: 03-18-21 @ 11:28  CHIEF COMPLAINT:Patient is a 60y old  Male who presents with a chief complaint of leg weakness (18 Mar 2021 09:46)    	        PAST MEDICAL & SURGICAL HISTORY:  Diabetes    Hyperlipidemia    Hypertension, unspecified type    No significant past surgical history            REVIEW OF SYSTEMS:  weal  RESPIRATORY: No cough, wheezing, chills or hemoptysis; No Shortness of Breath  CARDIOVASCULAR: No chest pain, palpitations, passing out,   GASTROINTESTINAL: No abdominal or epigastric pain. No nausea, vomiting, or hematemesis;  GENITOURINARY: No dysuria, frequency, hematuria, or incontinence  NEUROLOGICAL: No headaches,   back pain at times    Medications:  MEDICATIONS  (STANDING):  amLODIPine   Tablet 10 milliGRAM(s) Oral daily  atorvastatin 80 milliGRAM(s) Oral at bedtime  dextrose 40% Gel 15 Gram(s) Oral once  dextrose 5%. 1000 milliLiter(s) (50 mL/Hr) IV Continuous <Continuous>  dextrose 5%. 1000 milliLiter(s) (100 mL/Hr) IV Continuous <Continuous>  dextrose 50% Injectable 12.5 Gram(s) IV Push once  dextrose 50% Injectable 25 Gram(s) IV Push once  dextrose 50% Injectable 25 Gram(s) IV Push once  glucagon  Injectable 1 milliGRAM(s) IntraMuscular once  heparin   Injectable 5000 Unit(s) SubCutaneous every 12 hours  insulin glargine Injectable (LANTUS) 20 Unit(s) SubCutaneous at bedtime  insulin lispro (ADMELOG) corrective regimen sliding scale   SubCutaneous at bedtime  insulin lispro (ADMELOG) corrective regimen sliding scale   SubCutaneous three times a day before meals  insulin lispro Injectable (ADMELOG) 4 Unit(s) SubCutaneous three times a day before meals  lactated ringers. 1000 milliLiter(s) (50 mL/Hr) IV Continuous <Continuous>  metoprolol succinate ER 50 milliGRAM(s) Oral daily    MEDICATIONS  (PRN):  acetaminophen   Tablet .. 650 milliGRAM(s) Oral every 6 hours PRN Mild Pain (1 - 3)  traMADol 50 milliGRAM(s) Oral three times a day PRN Moderate Pain (4 - 6)    	    PHYSICAL EXAM:  T(C): 37.1 (03-18-21 @ 08:51), Max: 37.1 (03-18-21 @ 08:51)  HR: 85 (03-18-21 @ 08:51) (73 - 91)  BP: 150/75 (03-18-21 @ 08:51) (135/72 - 174/88)  RR: 18 (03-18-21 @ 08:51) (12 - 18)  SpO2: 96% (03-18-21 @ 08:51) (95% - 100%)  Wt(kg): --  I&O's Summary    17 Mar 2021 07:01  -  18 Mar 2021 07:00  --------------------------------------------------------  IN: 200 mL / OUT: 1675 mL / NET: -1475 mL    18 Mar 2021 07:01  -  18 Mar 2021 11:28  --------------------------------------------------------  IN: 240 mL / OUT: 400 mL / NET: -160 mL        Appearance: Normal	  HEENT:   Normal oral mucosa, PERRL, EOMI	  Lymphatic: No lymphadenopathy  Cardiovascular: Normal S1 S2, No JVD, No murmurs, No edema  Respiratory: Lungs clear to auscultation	  Psychiatry: A & O   Gastrointestinal:  Soft, Non-tender, + BS	  Skin: No rashes, No ecchymoses, No cyanosis	  Neurologic: Non-focal  Extremities: Normal range of motion, No clubbing, cyanosis or edema  scrotal swelling    TELEMETRY: 	    ECG:  	  RADIOLOGY:  OTHER: 	  	  LABS:	 	    CARDIAC MARKERS:                                15.2   8.17  )-----------( 502      ( 18 Mar 2021 07:24 )             47.0     03-18    138  |  105  |  27<H>  ----------------------------<  107<H>  3.8   |  20<L>  |  1.45<H>    Ca    8.9      18 Mar 2021 07:14  Phos  5.2     03-17  Mg     2.0     03-17      proBNP:   Lipid Profile:   HgA1c:   TSH:

## 2021-03-18 NOTE — PROGRESS NOTE ADULT - SUBJECTIVE AND OBJECTIVE BOX
Patient seen and examined at bedside.    --Anticoagulation--  heparin   Injectable 5000 Unit(s) SubCutaneous every 12 hours    T(C): 36.9 (03-18-21 @ 04:31), Max: 36.9 (03-17-21 @ 15:06)  HR: 84 (03-18-21 @ 04:31) (73 - 91)  BP: 174/88 (03-18-21 @ 04:31) (135/72 - 174/88)  RR: 18 (03-18-21 @ 04:31) (12 - 18)  SpO2: 95% (03-18-21 @ 04:31) (95% - 100%)  Wt(kg): --    Exam:  AAOx3, UE 5/5, RLE HF 2/5, KF/KE 1/5, PF/DF 4/5, LLE 5/5

## 2021-03-18 NOTE — PROGRESS NOTE ADULT - SUBJECTIVE AND OBJECTIVE BOX
Chief Complaint: uncontrolled DM2    History: Patient seen and examined at bedside. Reports appetite improved since he has been in the hospital. However has not been eating as much due to smaller portions.  Patient noted to be taking few bites out of deli sandwich. Asked him to wait until dinner so he can receive insulin first.     MEDICATIONS  (STANDING):  amLODIPine   Tablet 10 milliGRAM(s) Oral daily  atorvastatin 80 milliGRAM(s) Oral at bedtime  dextrose 40% Gel 15 Gram(s) Oral once  dextrose 5%. 1000 milliLiter(s) (50 mL/Hr) IV Continuous <Continuous>  dextrose 5%. 1000 milliLiter(s) (100 mL/Hr) IV Continuous <Continuous>  dextrose 50% Injectable 12.5 Gram(s) IV Push once  dextrose 50% Injectable 25 Gram(s) IV Push once  dextrose 50% Injectable 25 Gram(s) IV Push once  glucagon  Injectable 1 milliGRAM(s) IntraMuscular once  heparin   Injectable 5000 Unit(s) SubCutaneous every 12 hours  insulin glargine Injectable (LANTUS) 20 Unit(s) SubCutaneous at bedtime  insulin lispro (ADMELOG) corrective regimen sliding scale   SubCutaneous at bedtime  insulin lispro (ADMELOG) corrective regimen sliding scale   SubCutaneous three times a day before meals  insulin lispro Injectable (ADMELOG) 4 Unit(s) SubCutaneous three times a day before meals  lactated ringers. 1000 milliLiter(s) (50 mL/Hr) IV Continuous <Continuous>  metoprolol succinate ER 50 milliGRAM(s) Oral daily    MEDICATIONS  (PRN):  acetaminophen   Tablet .. 650 milliGRAM(s) Oral every 6 hours PRN Mild Pain (1 - 3)  traMADol 50 milliGRAM(s) Oral three times a day PRN Moderate Pain (4 - 6)    PHYSICAL EXAM:  VITALS: T(C): 36.3 (03-18-21 @ 13:52)  T(F): 97.4 (03-18-21 @ 13:52), Max: 98.8 (03-18-21 @ 08:51)  HR: 69 (03-18-21 @ 13:52) (69 - 88)  BP: 133/75 (03-18-21 @ 13:52) (133/75 - 174/88)  RR:  (18 - 18)  SpO2:  (95% - 96%)  Wt(kg): 68kg   GENERAL: NAD, well-groomed, well-developed  RESPIRATORY: Clear to auscultation bilaterally; No rales, rhonchi, wheezing, or rubs  CARDIOVASCULAR: Regular rate and rhythm; No murmurs; no peripheral edema  GI: Soft, tender, non distended, normal bowel sounds  MUSCULOSKELETAL: Full range of motion, normal strength  PSYCH: Alert and oriented x 3, reactive affect     POCT Blood Glucose.: 133 mg/dL (03-18-21 @ 12:41) 4   POCT Blood Glucose.: 106 mg/dL (03-18-21 @ 08:25) x  POCT Blood Glucose.: 139 mg/dL (03-17-21 @ 21:37) L23  POCT Blood Glucose.: 104 mg/dL (03-17-21 @ 17:22) x  POCT Blood Glucose.: 102 mg/dL (03-17-21 @ 13:26) x  POCT Blood Glucose.: 114 mg/dL (03-17-21 @ 08:06) x  POCT Blood Glucose.: 152 mg/dL (03-16-21 @ 21:16)  POCT Blood Glucose.: 147 mg/dL (03-16-21 @ 18:19)  POCT Blood Glucose.: 126 mg/dL (03-16-21 @ 17:16)  POCT Blood Glucose.: 100 mg/dL (03-16-21 @ 12:34)  POCT Blood Glucose.: 126 mg/dL (03-16-21 @ 08:19)  POCT Blood Glucose.: 221 mg/dL (03-15-21 @ 21:12)  POCT Blood Glucose.: 72 mg/dL (03-15-21 @ 18:49)      03-18    138  |  105  |  27<H>  ----------------------------<  107<H>  3.8   |  20<L>  |  1.45<H>    EGFR if : 61  EGFR if non : 52<L>    Ca    8.9      03-18  Mg     2.0     03-17  Phos  5.2     03-17    TPro  6.0  /  Alb  2.6<L>  /  TBili  0.2  /  DBili  x   /  AST  15  /  ALT  24  /  AlkPhos  125<H>  03-16    Thyroid Function Tests:  03-15 @ 09:19 TSH 4.86 FreeT4 -- T3 -- Anti TPO -- Anti Thyroglobulin Ab -- TSI --

## 2021-03-18 NOTE — PROGRESS NOTE ADULT - ASSESSMENT
60M with DM (A1C=10.6), HTN, chol admitted 3/14/2021 c/o LE pain, scrotal swelling and weight loss found to have weakness RLE and CT that shows enlarging right iliac lymph node as well as an MRI that shows diffuse nerve root enhancement involving the roots of the cauda equina with some mild root thickening though the dominant finding is the enhancement. DOes not seem infectious.  Could this be lymphoma with B symptoms though the LDH is low.  MRI suggestive of CIDP, per neuro less likely AIDP, sarcoid, leptomeningeal carcinomatosis, infection.  s/p LN biopsy    Lymphadenopathy  - f/u pathology  - no antibiotics for now  - f/u HIV, ACE level, DARRIN  - for LP by IR  - please send cell count, CSF PCR, LDH, fungal/bacterial cultures, flow cytometry

## 2021-03-18 NOTE — PROGRESS NOTE ADULT - SUBJECTIVE AND OBJECTIVE BOX
Urology Progress Note    Overnight Events:  No acute urologic events overnight. Underwent right groin lymph node biopsy yesterday. Minimal pain at incision. no scrotal pain    Review of Systems:   Constitutional: No weight loss, no weakness  CV: No chest pain, no chest pressure  Pulm: No shortness of breath, cough, or sputum    Physical Exam:  Vital signs  T(C): 36.9 (21 @ 04:31), Max: 36.9 (21 @ 15:06)  HR: 84 (21 @ 04:31)  BP: 174/88 (21 @ 04:31)  SpO2: 95% (21 @ 04:31)  Wt(kg): --    Gen: No acute distress. Normal mood  Abd: soft, non-tender, non-distended  : Non-palpable bladder, bilateral scrotal swelling R>L    Labs:       @ 04:26    WBC 7.21  / Hct 48.6  / SCr 1.34         138  |  107  |  25<H>  ----------------------------<  150<H>  3.7   |  20<L>  |  1.34<H>    Ca    9.2      17 Mar 2021 04:26  Phos  5.2       Mg     2.0           PT/INR - ( 17 Mar 2021 09:34 )   PT: 11.6 sec;   INR: 0.98 ratio         PTT - ( 17 Mar 2021 09:34 )  PTT:31.4 sec  Urinalysis Basic - ( 17 Mar 2021 04:26 )    Color: Light Yellow / Appearance: Clear / S.015 / pH: x  Gluc: x / Ketone: Negative  / Bili: Negative / Urobili: Negative   Blood: x / Protein: 300 mg/dL / Nitrite: Negative   Leuk Esterase: Negative / RBC: 3 /hpf / WBC 3 /HPF   Sq Epi: x / Non Sq Epi: 2 /hpf / Bacteria: Moderate

## 2021-03-18 NOTE — PROGRESS NOTE ADULT - SUBJECTIVE AND OBJECTIVE BOX
Admitting Diagnosis:  Abnormal weight loss [R63.4]  ABNORMAL WEIGHT LOSS      Background:  This is a 60y year old Male  who presents with the chief complaint of low back pain and leg weakness. 59yo M with hx HTN, DM, HLD, COVID 2020 presents with LE pain and scrotal swelling. Patient was hospitalized in [facility] in 2021 where CT Ab/P found 5.8x2.5cm lesion on the right pelvis concerning for malignancy with sclerotic lesion at L5. Pt states since early  inc pain and weakness in the right leg, at first he was limping but now cannot walk.  He has pain in the feet, describes as pinching and needles like sensation.  Starting to have sx in left leg as well. Patient left AMA before additional malignancy workup was performed.  In the last month, patient had 45lb unintentional weight loss and decreased appetite. Notes abdominal swelling, mostly on the left side with tenderness along with b/l scrotal swelling.  At one point given neurontin, not helpful.  Denies sx in arms, changes in speech, swallow, vision, bladder control.  Says issues with bowel movements.     MRI done showing diffuse nerve root enhancement involving the roots of the cauda equina with some mild root thickening though the dominant finding is the enhancement.  There is also a nonspecific lesion in the L5 vertebral body and a possible septic facet at L4-5.       Interval Hx:  s/p lymph node biopsy  frozen ? diffuse B cell    ******    Past Medical History:  Diabetes [E11.9]    Hyperlipidemia [E78.5]    Hypertension, unspecified type [I10]        Past Surgical History:  No significant past surgical history [[id number]]        Social History:  No toxic habits    Family History:  FAMILY HISTORY:  No pertinent family history in first degree relatives        Allergies:  No Known Allergies      ROS:  Constitutional: Patient offers no complaints of fevers or significant weight loss  Ears, Nose, Mouth and Throat: The patient presents with no abnormalities of the head, ears, eyes, nose or throat  Skin: Patient offers no concerns of new rashes or lesions  Respiratory: The patient presents with no abnormalities of the respiratory tract  Cardiovascular: The patient presents with no cardiac abnormalities  Gastrointestinal: The patient presents with no abnormalities of the GI system  Genitourinary: The patient presents with no dysuria, hematuria or frequent urination  Neurological: See HPI  Endocrine: Patient offers no complaints of excessive thirst, urination, or heat/cold intolerance    Advanced care planning reviewed and noted in the chart.    Medications:  acetaminophen   Tablet .. 650 milliGRAM(s) Oral every 6 hours PRN  amLODIPine   Tablet 10 milliGRAM(s) Oral daily  atorvastatin 80 milliGRAM(s) Oral at bedtime  dextrose 40% Gel 15 Gram(s) Oral once  dextrose 5%. 1000 milliLiter(s) IV Continuous <Continuous>  dextrose 5%. 1000 milliLiter(s) IV Continuous <Continuous>  dextrose 50% Injectable 12.5 Gram(s) IV Push once  dextrose 50% Injectable 25 Gram(s) IV Push once  dextrose 50% Injectable 25 Gram(s) IV Push once  glucagon  Injectable 1 milliGRAM(s) IntraMuscular once  heparin   Injectable 5000 Unit(s) SubCutaneous every 12 hours  insulin glargine Injectable (LANTUS) 20 Unit(s) SubCutaneous at bedtime  insulin lispro (ADMELOG) corrective regimen sliding scale   SubCutaneous at bedtime  insulin lispro (ADMELOG) corrective regimen sliding scale   SubCutaneous three times a day before meals  insulin lispro Injectable (ADMELOG) 4 Unit(s) SubCutaneous three times a day before meals  lactated ringers. 1000 milliLiter(s) IV Continuous <Continuous>  metoprolol succinate ER 50 milliGRAM(s) Oral daily  traMADol 50 milliGRAM(s) Oral three times a day PRN      Labs:  CBC Full  -  ( 18 Mar 2021 07:24 )  WBC Count : 8.17 K/uL  RBC Count : 5.25 M/uL  Hemoglobin : 15.2 g/dL  Hematocrit : 47.0 %  Platelet Count - Automated : 502 K/uL  Mean Cell Volume : 89.5 fl  Mean Cell Hemoglobin : 29.0 pg  Mean Cell Hemoglobin Concentration : 32.3 gm/dL  Auto Neutrophil # : x  Auto Lymphocyte # : x  Auto Monocyte # : x  Auto Eosinophil # : x  Auto Basophil # : x  Auto Neutrophil % : x  Auto Lymphocyte % : x  Auto Monocyte % : x  Auto Eosinophil % : x  Auto Basophil % : x    03-18    138  |  105  |  27<H>  ----------------------------<  107<H>  3.8   |  20<L>  |  1.45<H>    Ca    8.9      18 Mar 2021 07:14  Phos  5.2     03-17  Mg     2.0     -17      CAPILLARY BLOOD GLUCOSE      POCT Blood Glucose.: 106 mg/dL (18 Mar 2021 08:25)  POCT Blood Glucose.: 139 mg/dL (17 Mar 2021 21:37)  POCT Blood Glucose.: 104 mg/dL (17 Mar 2021 17:22)  POCT Blood Glucose.: 102 mg/dL (17 Mar 2021 13:26)      PT/INR - ( 17 Mar 2021 09:34 )   PT: 11.6 sec;   INR: 0.98 ratio         PTT - ( 17 Mar 2021 09:34 )  PTT:31.4 sec  Urinalysis Basic - ( 17 Mar 2021 04:26 )    Color: Light Yellow / Appearance: Clear / S.015 / pH: x  Gluc: x / Ketone: Negative  / Bili: Negative / Urobili: Negative   Blood: x / Protein: 300 mg/dL / Nitrite: Negative   Leuk Esterase: Negative / RBC: 3 /hpf / WBC 3 /HPF   Sq Epi: x / Non Sq Epi: 2 /hpf / Bacteria: Moderate          Vitals:  Vital Signs Last 24 Hrs  T(C): 37.1 (18 Mar 2021 08:51), Max: 37.1 (18 Mar 2021 08:51)  T(F): 98.8 (18 Mar 2021 08:51), Max: 98.8 (18 Mar 2021 08:51)  HR: 85 (18 Mar 2021 08:51) (73 - 91)  BP: 150/75 (18 Mar 2021 08:51) (135/72 - 174/88)  BP(mean): 114 (17 Mar 2021 14:45) (109 - 114)  RR: 18 (18 Mar 2021 08:51) (12 - 18)  SpO2: 96% (18 Mar 2021 08:51) (95% - 100%)    NEUROLOGICAL EXAM:    Mental status: Awake, alert, and in no apparent distress. Oriented to person, place and time. Language function is normal. Recent memory, digit span and concentration were normal.     Cranial Nerves: Pupils were equal, round, reactive to light. Extraocular movements were intact. Visual field were full. Fundoscopic exam was deferred. Facial sensation was intact to light touch. There was no facial asymmetry. The palate was upgoing symmetrically and tongue was midline. Hearing acuity was intact to finger rub AU. Shoulder shrug was full bilaterally    Motor exam: Bulk and tone were normal. Strength was 5/5 in the arms.  Left leg  5/5 except dorsi 4+/5.  Right leg - low tone.  hip flexion/knee ext 2/5, dorsiflexion 4/5. Fine finger movements were symmetric and normal. There was no pronator drift    Reflexes: 2+ in the bilateral upper extremities. absent in legs. Toes were downgoing bilaterally.     Sensation: Intact to light touch, temperature, vibration and proprioception. says when touch right leg it is painful    Coordination: Finger-nose-finger was without dysmetria. cannot do heel shin    Gait: unable to ambulate    < from: CT Chest w/ IV Cont (21 @ 10:12) >    EXAM:  CT ABDOMEN AND PELVIS IC                          EXAM:  CT CHEST IC                          PROCEDURE DATE:  2021      IMPRESSION:  Enlarged right iliac lymph node measuring 5.9 x 2.4 cm. Additional smaller right iliac nodes are seen. There are also bilateral groin lymph nodes.      MOLINA PUGH M.D., RADIOLOGY RESIDENT  This documenthas been electronically signed.  GINA JENKINS M.D., ATTENDING RADIOLOGIST  This document has been electronically signed. Mar 14 2021 12:50PM    < end of copied text >          EXAM:  MR SPINE LUMBAR WAW IC                            PROCEDURE DATE:  03/15/2021            INTERPRETATION:  INDICATIONS:  Increased low back pain and right leg weakness for 4 months    TECHNIQUE:  Sagittal T1 weighted and T2 weighted images of the lumbosacral spine as well as axial T1 weighted and T2 weighted images were obtained.    COMPARISON EXAMINATION: None.    FINDINGS:  VERTEBRAL BODIES AND DISCS:  Nonspecific lesion in L1 low signal T1 hyperintense signal T2 with some enhancement.  ALIGNMENT:  No subluxations.  L1-L2 LEVEL:  Normal.  L2-L3 LEVEL:  Asymmetric bulge right with disc material in the region of the right neural foramen at this level. Clinical correlation for right L2 radiculopathy  L3-L4 LEVEL: Symmetric bulge with annular fissure in the 7:30 position. Bilateral facet arthropathy at this level.  L4-L5 LEVEL: Asymmetric bulge to the left with focal left-sided protrusion and some mass impression on the intracanal left L5 root.. Bilateral facet arthropathy.Some changes in the left paraspinal musculature extending from the left facet joint may represent septic facet as enhancement is seen in association with hyperintense T2 signal with associated muscular changes (9:19)  L5-S1 LEVEL:  Slight signal hyperintensity at the disc space with some enhancement ventrally without associated endplate abnormality favors degenerative change. Prominent asymmetric disc bulge with Bilateral foraminal disc material which is causing significant nerve root compression on the right greater than left. May be the cause of the patient's known right leg weakness.  SPINAL CANAL:  Evidence of diffuse nerve root enhancement appreciated involving the roots of the cauda equina with some mild thickening appreciated.  CONUS MEDULLARIS:  Normal.  MISCELLANEOUS:  None    IMPRESSION:  Diffuse nerve root enhancement involving the roots of the cauda equina with some mild root thickening though the dominant finding is the enhancement. Differential possibilities include acute inflammatory demyelinating polyneuropathy( [place]) versus chronic inflammatory demyelinating polyneuropathy(CIDP). Inflammatory pathologies such as sarcoid or infectious etiologies should be considered. Included in the differential is leptomeningeal carcinomatosis. Correlation with CSF strongly recommended. Nonspecific lesion in the L5 vertebral body as described. Enhancement with associated T2 abnormality involving the left L4-5 facet with extension to the paraspinal musculature may indicate a septic facet at this level. Correlation with clinical parameters suggested. Degenerative changes with disc material in the neural foramina at the L2-3 and L5-S1 levels on the right as well as in the L5-S1 neural foramen on the left. Prominent bulge with more focal protrusion L4-5 left                MONTY TRAN MD; Attending Radiologist  This document has been electronically signed. Mar 15 2021  8:19PM

## 2021-03-19 ENCOUNTER — RESULT REVIEW (OUTPATIENT)
Age: 61
End: 2021-03-19

## 2021-03-19 LAB
ALBUMIN SERPL ELPH-MCNC: 2.7 G/DL — LOW (ref 3.3–5)
ALP SERPL-CCNC: 132 U/L — HIGH (ref 40–120)
ALT FLD-CCNC: 29 U/L — SIGNIFICANT CHANGE UP (ref 10–45)
ANION GAP SERPL CALC-SCNC: 13 MMOL/L — SIGNIFICANT CHANGE UP (ref 5–17)
APPEARANCE CSF: CLEAR — SIGNIFICANT CHANGE UP
APPEARANCE CSF: CLEAR — SIGNIFICANT CHANGE UP
AST SERPL-CCNC: 33 U/L — SIGNIFICANT CHANGE UP (ref 10–40)
BILIRUB SERPL-MCNC: 0.2 MG/DL — SIGNIFICANT CHANGE UP (ref 0.2–1.2)
BUN SERPL-MCNC: 30 MG/DL — HIGH (ref 7–23)
CALCIUM SERPL-MCNC: 8.9 MG/DL — SIGNIFICANT CHANGE UP (ref 8.4–10.5)
CHLORIDE SERPL-SCNC: 103 MMOL/L — SIGNIFICANT CHANGE UP (ref 96–108)
CO2 SERPL-SCNC: 22 MMOL/L — SIGNIFICANT CHANGE UP (ref 22–31)
COLOR CSF: SIGNIFICANT CHANGE UP
COLOR CSF: SIGNIFICANT CHANGE UP
CREAT SERPL-MCNC: 1.46 MG/DL — HIGH (ref 0.5–1.3)
CRYPTOC AG CSF-ACNC: NEGATIVE — SIGNIFICANT CHANGE UP
CSF PCR RESULT: SIGNIFICANT CHANGE UP
GLUCOSE BLDC GLUCOMTR-MCNC: 117 MG/DL — HIGH (ref 70–99)
GLUCOSE BLDC GLUCOMTR-MCNC: 149 MG/DL — HIGH (ref 70–99)
GLUCOSE BLDC GLUCOMTR-MCNC: 81 MG/DL — SIGNIFICANT CHANGE UP (ref 70–99)
GLUCOSE CSF-MCNC: 78 MG/DL — HIGH (ref 40–70)
GLUCOSE SERPL-MCNC: 98 MG/DL — SIGNIFICANT CHANGE UP (ref 70–99)
GRAM STN FLD: SIGNIFICANT CHANGE UP
HIV 1+2 AB+HIV1 P24 AG SERPL QL IA: SIGNIFICANT CHANGE UP
LABORATORY COMMENT REPORT: SIGNIFICANT CHANGE UP
LDH CSF L TO P-CCNC: 21 U/L — SIGNIFICANT CHANGE UP
LDH FLD-CCNC: 21 U/L — SIGNIFICANT CHANGE UP
LYMPHOCYTES # CSF: 92 % — HIGH (ref 40–80)
LYMPHOCYTES # CSF: 95 % — HIGH (ref 40–80)
MONOS+MACROS NFR CSF: 5 % — LOW (ref 15–45)
MONOS+MACROS NFR CSF: 8 % — LOW (ref 15–45)
NEUTROPHILS # CSF: 0 % — SIGNIFICANT CHANGE UP (ref 0–6)
NEUTROPHILS # CSF: 0 % — SIGNIFICANT CHANGE UP (ref 0–6)
NIGHT BLUE STAIN TISS: SIGNIFICANT CHANGE UP
NRBC NFR CSF: 2 /UL — SIGNIFICANT CHANGE UP (ref 0–5)
NRBC NFR CSF: 3 /UL — SIGNIFICANT CHANGE UP (ref 0–5)
POTASSIUM SERPL-MCNC: 3.7 MMOL/L — SIGNIFICANT CHANGE UP (ref 3.5–5.3)
POTASSIUM SERPL-SCNC: 3.7 MMOL/L — SIGNIFICANT CHANGE UP (ref 3.5–5.3)
PROT CSF-MCNC: 79 MG/DL — HIGH (ref 15–45)
PROT SERPL-MCNC: 5.9 G/DL — LOW (ref 6–8.3)
RBC # CSF: 0 /UL — SIGNIFICANT CHANGE UP (ref 0–0)
RBC # CSF: 0 /UL — SIGNIFICANT CHANGE UP (ref 0–0)
SODIUM SERPL-SCNC: 138 MMOL/L — SIGNIFICANT CHANGE UP (ref 135–145)
SOURCE HSV 1/2: SIGNIFICANT CHANGE UP
SPECIMEN SOURCE: SIGNIFICANT CHANGE UP
SPECIMEN SOURCE: SIGNIFICANT CHANGE UP
TM INTERPRETATION: SIGNIFICANT CHANGE UP
TUBE TYPE: SIGNIFICANT CHANGE UP
TUBE TYPE: SIGNIFICANT CHANGE UP

## 2021-03-19 PROCEDURE — 93306 TTE W/DOPPLER COMPLETE: CPT | Mod: 26

## 2021-03-19 PROCEDURE — 88108 CYTOPATH CONCENTRATE TECH: CPT | Mod: 26

## 2021-03-19 PROCEDURE — 62328 DX LMBR SPI PNXR W/FLUOR/CT: CPT

## 2021-03-19 PROCEDURE — 99232 SBSQ HOSP IP/OBS MODERATE 35: CPT

## 2021-03-19 RX ORDER — DEXAMETHASONE 0.5 MG/5ML
4 ELIXIR ORAL EVERY 6 HOURS
Refills: 0 | Status: DISCONTINUED | OUTPATIENT
Start: 2021-03-19 | End: 2021-03-29

## 2021-03-19 RX ORDER — DEXAMETHASONE 0.5 MG/5ML
10 ELIXIR ORAL ONCE
Refills: 0 | Status: COMPLETED | OUTPATIENT
Start: 2021-03-19 | End: 2021-03-19

## 2021-03-19 RX ORDER — INSULIN LISPRO 100/ML
4 VIAL (ML) SUBCUTANEOUS
Refills: 0 | Status: DISCONTINUED | OUTPATIENT
Start: 2021-03-19 | End: 2021-03-20

## 2021-03-19 RX ORDER — DEXAMETHASONE 0.5 MG/5ML
4 ELIXIR ORAL EVERY 6 HOURS
Refills: 0 | Status: DISCONTINUED | OUTPATIENT
Start: 2021-03-19 | End: 2021-03-19

## 2021-03-19 RX ADMIN — POLYETHYLENE GLYCOL 3350 17 GRAM(S): 17 POWDER, FOR SOLUTION ORAL at 18:42

## 2021-03-19 RX ADMIN — Medication 4 MILLIGRAM(S): at 18:42

## 2021-03-19 RX ADMIN — TRAMADOL HYDROCHLORIDE 50 MILLIGRAM(S): 50 TABLET ORAL at 18:42

## 2021-03-19 RX ADMIN — Medication 5 UNIT(S): at 18:38

## 2021-03-19 RX ADMIN — AMLODIPINE BESYLATE 10 MILLIGRAM(S): 2.5 TABLET ORAL at 05:20

## 2021-03-19 RX ADMIN — INSULIN GLARGINE 20 UNIT(S): 100 INJECTION, SOLUTION SUBCUTANEOUS at 21:26

## 2021-03-19 RX ADMIN — TRAMADOL HYDROCHLORIDE 50 MILLIGRAM(S): 50 TABLET ORAL at 08:28

## 2021-03-19 RX ADMIN — Medication 50 MILLIGRAM(S): at 05:20

## 2021-03-19 RX ADMIN — HEPARIN SODIUM 5000 UNIT(S): 5000 INJECTION INTRAVENOUS; SUBCUTANEOUS at 18:39

## 2021-03-19 RX ADMIN — TRAMADOL HYDROCHLORIDE 50 MILLIGRAM(S): 50 TABLET ORAL at 19:15

## 2021-03-19 RX ADMIN — Medication 5 UNIT(S): at 12:34

## 2021-03-19 RX ADMIN — Medication 102 MILLIGRAM(S): at 18:38

## 2021-03-19 RX ADMIN — ATORVASTATIN CALCIUM 80 MILLIGRAM(S): 80 TABLET, FILM COATED ORAL at 21:08

## 2021-03-19 RX ADMIN — TRAMADOL HYDROCHLORIDE 50 MILLIGRAM(S): 50 TABLET ORAL at 09:00

## 2021-03-19 RX ADMIN — Medication 5 UNIT(S): at 08:28

## 2021-03-19 NOTE — PROGRESS NOTE ADULT - ASSESSMENT
61yo M with hx HTN, DM2, HLD, COVID 2020 presents with LE pain and scrotal swelling. Patient was hospitalized in [facility] in Feb 2021 where CT Ab/P found 5.8x2.5cm lesion on the right pelvis concerning for malignancy with sclerotic lesion at L5. Patient left AMA before additional malignancy workup was performed. Admitted for malignancy workup.   Endocrine consult requested for management of uncontrolled DM2. A1c 10.6%    Uncontrolled DM2 c/b neuropathy   A1c 10.6%, goal <7%  FS goal 100-180 mg/dL  FS premeal and qhs   Carb consistent diet   C-peptide normal, not obtained with BMP, not suggestive of DM1  Agree with Lantus 20 units qhs   Recommend Admelog 5 units TIDac, hold if NPO   Recommend low correctional scale premeal and qhs     On Discharge recommend tresiba 20 units qhs and NOvolog 6units premeal   Recommend follow up with Endo in [place], Patient to find out the name    HTN   goal <130/80  Continue current regimen   Currently on amlodipine and metoprolol, ACEI held due to GUANAKO     HLD  goal LDL<70   Continue stain     Abnormal TFT  TSH mildly elevated, 4.8  Please repeat TFT 6 weeks after discharge     Discussed with Dr Sanjeev Murdock-Malou Centeno MD  Endocrine Fellow   Pager  on weekdays 9am- 5pm   Please call  after hours and on weekends 59yo M with hx HTN, DM2, HLD, COVID 2020 presents with LE pain and scrotal swelling. Patient was hospitalized in [facility] in Feb 2021 where CT Ab/P found 5.8x2.5cm lesion on the right pelvis concerning for malignancy with sclerotic lesion at L5. Patient left AMA before additional malignancy workup was performed. Admitted for malignancy workup.   Endocrine consult requested for management of uncontrolled DM2. A1c 10.6%    Uncontrolled DM2 c/b neuropathy   A1c 10.6%, goal <7%  FS goal 100-180 mg/dL  FS premeal and qhs   Carb consistent diet   C-peptide normal, not obtained with BMP, not suggestive of DM1  Agree with Lantus 20 units qhs   Recommend Admelog 4 units TIDac, hold if NPO   Recommend low correctional scale premeal and qhs     On Discharge recommend tresiba and NOvolog  Recommend follow up with Endo in [place], Patient to find out the name    HTN   goal <130/80  Continue current regimen   Currently on amlodipine and metoprolol, ACEI held due to GUANAKO     HLD  goal LDL<70   Continue stain     Abnormal TFT  TSH mildly elevated, 4.8  Please repeat TFT 6 weeks after discharge

## 2021-03-19 NOTE — PROGRESS NOTE ADULT - SUBJECTIVE AND OBJECTIVE BOX
Status post lumbar puncture at the L2/L3  level utilizing a 20g spinal needle.      20cc of clear  cerebral spinal fluid was obtained.    No immediate complications.    Status post lumbar puncture at the L2/L3  level utilizing a 20g spinal needle.      16 cc of clear cerebral spinal fluid was obtained.    No immediate complications.  Pt tolerated the procedure well.    Tom Valle RPA-ADEBAYO  [facility] [id number]  Ext [phone]

## 2021-03-19 NOTE — PROGRESS NOTE ADULT - SUBJECTIVE AND OBJECTIVE BOX
Patient is a 60y old  Male who presents with a chief complaint of leg pain (18 Mar 2021 12:27)    Patient without new complaints. LP done this morning -  increased lymphocytes - pathology/cytology from lymph node and CSF still pendng.      MEDICATIONS  (STANDING):  amLODIPine   Tablet 10 milliGRAM(s) Oral daily  atorvastatin 80 milliGRAM(s) Oral at bedtime  dextrose 40% Gel 15 Gram(s) Oral once  dextrose 5%. 1000 milliLiter(s) (50 mL/Hr) IV Continuous <Continuous>  dextrose 5%. 1000 milliLiter(s) (100 mL/Hr) IV Continuous <Continuous>  dextrose 50% Injectable 12.5 Gram(s) IV Push once  dextrose 50% Injectable 25 Gram(s) IV Push once  dextrose 50% Injectable 25 Gram(s) IV Push once  glucagon  Injectable 1 milliGRAM(s) IntraMuscular once  heparin   Injectable 5000 Unit(s) SubCutaneous every 12 hours  insulin glargine Injectable (LANTUS) 20 Unit(s) SubCutaneous at bedtime  insulin lispro (ADMELOG) corrective regimen sliding scale   SubCutaneous at bedtime  insulin lispro (ADMELOG) corrective regimen sliding scale   SubCutaneous three times a day before meals  insulin lispro Injectable (ADMELOG) 5 Unit(s) SubCutaneous three times a day before meals  lactated ringers. 1000 milliLiter(s) (50 mL/Hr) IV Continuous <Continuous>  metoprolol succinate ER 50 milliGRAM(s) Oral daily    MEDICATIONS  (PRN):  acetaminophen   Tablet .. 650 milliGRAM(s) Oral every 6 hours PRN Mild Pain (1 - 3)  polyethylene glycol 3350 17 Gram(s) Oral daily PRN Constipation  traMADol 50 milliGRAM(s) Oral three times a day PRN Moderate Pain (4 - 6)      ROS  No fever, sweats, chills  No epistaxis, HA, sore throat  No CP, SOB, cough, sputum  No n/v/d, abd pain, melena, hematochezia  No edema  No rash  No anxiety  No back pain, joint pain  No bleeding, bruising  No dysuria, hematuria    Vital Signs Last 24 Hrs  T(C): 36.7 (19 Mar 2021 05:17), Max: 37 (18 Mar 2021 18:55)  T(F): 98.1 (19 Mar 2021 05:17), Max: 98.6 (18 Mar 2021 18:55)  HR: 67 (19 Mar 2021 05:17) (67 - 103)  BP: 155/72 (19 Mar 2021 05:17) (122/71 - 161/82)  BP(mean): --  RR: 18 (19 Mar 2021 05:17) (18 - 18)  SpO2: 95% (19 Mar 2021 05:17) (95% - 95%)    PE  NAD  Awake, alert  Anicteric  No rash grossly                            15.2   8.17  )-----------( 502      ( 18 Mar 2021 07:24 )             47.0       03-19    138  |  103  |  30<H>  ----------------------------<  98  3.7   |  22  |  1.46<H>    Ca    8.9      19 Mar 2021 07:16    TPro  5.9<L>  /  Alb  2.7<L>  /  TBili  0.2  /  DBili  x   /  AST  33  /  ALT  29  /  AlkPhos  132<H>  03-19      Patient name: MARVIN PHAM  YOB: 1960   Age: 60 (M)   MR#: [id number]  Study Date: 3/19/2021  Location: [address] [place], [place] [id number]: Arleen Queen RDCS  Study quality: Technically fair  Referring Physician: Caden Ennis MD  Blood Pressure: 155/72 mmHg  Height: 173 cm  Weight: 68 kg  BSA: 1.8 m2  ------------------------------------------------------------------------  PROCEDURE: Transthoracic echocardiogram with 2-D, M-Mode  and complete spectral and color flow Doppler.  INDICATION: Hypertensive heart disease without heart  failure (I11.9)  ------------------------------------------------------------------------  Dimensions:    Normal Values:  LA:     2.7    2.0 - 4.0 cm  Ao:     2.9    2.0 - 3.8 cm  SEPTUM: 0.7    0.6 - 1.2 cm  PWT:    1.2    0.6 - 1.1 cm  LVIDd:  4.4    3.0 - 5.6 cm  LVIDs:  3.0    1.8 - 4.0 cm  Derived variables:  LVMI: 76 g/m2  RWT: 0.54  Fractional short: 32 %  Doppler Peak Velocity (m/sec): AoV=1.4  ------------------------------------------------------------------------  Observations:  Mitral Valve: Normal mitral valve. Minimal mitral  regurgitation.  Aortic Valve/Aorta: Calcified trileaflet aortic valve with  normal opening. Peak transaortic valve gradient equals 8 mm  Hg, mean transaortic valve gradient equals 4 mm Hg, aortic  valve velocity time integral equals 25 cm, estimated aortic  valve area equals 2.1 sqcm. Peak left ventricular outflow  tract gradient equals 4 mm Hg, mean gradient is equal to 2  mm Hg, LVOT velocity time integral equals 21 cm.  Aortic Root: 2.9 cm.  Left Atrium: Normal left atrium.  LA volume index = 18  cc/m2.  Left Ventricle: Normal left ventricular systolic function.  No segmental wall motion abnormalities. Increased relative  wall thickness with normal left ventricular mass index,  consistent with concentric left ventricular remodeling.  Mild diastolic dysfunction (Stage I).  Right Heart: Normal right atrium. Normal right ventricular  size and function. Normal tricuspid valve. Mild tricuspid  regurgitation. Normal pulmonic valve. Minimal pulmonic  regurgitation.  Pericardium/Pleura: Smalll to moderate pericardial  effusion, measuring up to 1.5 cm lateral and anterior to  the right ventricle.    No echocardiographic evidence of  pericardial tamponade.  Hemodynamic: Estimated right atrial pressure is 8 mm Hg.  ------------------------------------------------------------------------  Conclusions:  1. Increased relative wall thickness with normal left  ventricular mass index, consistent with concentric left  ventricular remodeling.  2. Normal left ventricular systolic function. No segmental  wall motion abnormalities.  3. Mild diastolic dysfunction (Stage I).  4. Smalll to moderate pericardial effusion, measuring up to  1.5 cm lateral and anterior to the right ventricle.    No  echocardiographic evidence of pericardial tamponade.  *** No previous Echo exam.

## 2021-03-19 NOTE — PROGRESS NOTE ADULT - SUBJECTIVE AND OBJECTIVE BOX
DATE OF SERVICE: 03-19-21 @ 09:25  CHIEF COMPLAINT:Patient is a 60y old  Male who presents with a chief complaint of leg pain (18 Mar 2021 12:27)    	        PAST MEDICAL & SURGICAL HISTORY:  Diabetes    Hyperlipidemia    Hypertension, unspecified type    No significant past surgical history            weak  RESPIRATORY: No cough, wheezing, chills or hemoptysis; No Shortness of Breath  CARDIOVASCULAR: No chest pain, palpitations, passing out, dizziness, or leg swelling  GASTROINTESTINAL: No abdominal or epigastric pain. No nausea, vomiting,   some constipation  GENITOURINARY: No dysuria, frequency, hematuria, or incontinence  NEUROLOGICAL: No headaches,   back pain controlled  Medications:  MEDICATIONS  (STANDING):  amLODIPine   Tablet 10 milliGRAM(s) Oral daily  atorvastatin 80 milliGRAM(s) Oral at bedtime  dextrose 40% Gel 15 Gram(s) Oral once  dextrose 5%. 1000 milliLiter(s) (50 mL/Hr) IV Continuous <Continuous>  dextrose 5%. 1000 milliLiter(s) (100 mL/Hr) IV Continuous <Continuous>  dextrose 50% Injectable 12.5 Gram(s) IV Push once  dextrose 50% Injectable 25 Gram(s) IV Push once  dextrose 50% Injectable 25 Gram(s) IV Push once  glucagon  Injectable 1 milliGRAM(s) IntraMuscular once  heparin   Injectable 5000 Unit(s) SubCutaneous every 12 hours  insulin glargine Injectable (LANTUS) 20 Unit(s) SubCutaneous at bedtime  insulin lispro (ADMELOG) corrective regimen sliding scale   SubCutaneous at bedtime  insulin lispro (ADMELOG) corrective regimen sliding scale   SubCutaneous three times a day before meals  insulin lispro Injectable (ADMELOG) 5 Unit(s) SubCutaneous three times a day before meals  lactated ringers. 1000 milliLiter(s) (50 mL/Hr) IV Continuous <Continuous>  metoprolol succinate ER 50 milliGRAM(s) Oral daily    MEDICATIONS  (PRN):  acetaminophen   Tablet .. 650 milliGRAM(s) Oral every 6 hours PRN Mild Pain (1 - 3)  polyethylene glycol 3350 17 Gram(s) Oral daily PRN Constipation  traMADol 50 milliGRAM(s) Oral three times a day PRN Moderate Pain (4 - 6)    	    PHYSICAL EXAM:  T(C): 36.7 (03-19-21 @ 05:17), Max: 37 (03-18-21 @ 18:55)  HR: 67 (03-19-21 @ 05:17) (67 - 103)  BP: 155/72 (03-19-21 @ 05:17) (122/71 - 161/82)  RR: 18 (03-19-21 @ 05:17) (18 - 18)  SpO2: 95% (03-19-21 @ 05:17) (95% - 95%)  Wt(kg): --  I&O's Summary    18 Mar 2021 07:01  -  19 Mar 2021 07:00  --------------------------------------------------------  IN: 540 mL / OUT: 1800 mL / NET: -1260 mL        Appearance: Normal	  HEENT:   Normal oral mucosa, PERRL, EOMI	  Lymphatic: No lymphadenopathy  Cardiovascular: Normal S1 S2, No JVD,  Respiratory: Lungs clear to auscultation	  Psychiatry: A & O  Gastrointestinal:  Soft, Non-tender, + BS	  Skin: No rashes, No ecchymoses, No cyanosis	  Neurologic: Non-focal  dec rom  sensory intact motor equal   dtr equal   TELEMETRY: 	    ECG:  	  RADIOLOGY:  OTHER: 	  	  LABS:	 	    CARDIAC MARKERS:                                15.2   8.17  )-----------( 502      ( 18 Mar 2021 07:24 )             47.0     03-19    138  |  103  |  30<H>  ----------------------------<  98  3.7   |  22  |  1.46<H>    Ca    8.9      19 Mar 2021 07:16    TPro  5.9<L>  /  Alb  2.7<L>  /  TBili  0.2  /  DBili  x   /  AST  33  /  ALT  29  /  AlkPhos  132<H>  03-19    proBNP:   Lipid Profile:   HgA1c:   TSH:

## 2021-03-19 NOTE — PROGRESS NOTE ADULT - SUBJECTIVE AND OBJECTIVE BOX
CARDIOLOGY FOLLOW UP - Dr. Trimble    CC: denies cp, sob, and palpitations       PHYSICAL EXAM:  T(C): 36.7 (03-19-21 @ 05:17), Max: 37 (03-18-21 @ 18:55)  HR: 67 (03-19-21 @ 05:17) (67 - 103)  BP: 155/72 (03-19-21 @ 05:17) (122/71 - 161/82)  RR: 18 (03-19-21 @ 05:17) (18 - 18)  SpO2: 95% (03-19-21 @ 05:17) (95% - 95%)  Wt(kg): --  I&O's Summary    18 Mar 2021 07:01  -  19 Mar 2021 07:00  --------------------------------------------------------  IN: 540 mL / OUT: 1800 mL / NET: -1260 mL        Appearance: Normal	  Cardiovascular: Normal S1 S2,RRR, No JVD, No murmurs  Respiratory: Lungs clear to auscultation	  Gastrointestinal:  Soft, Non-tender, + BS	  Extremities: Normal range of motion, No clubbing, cyanosis or edema      Home Medications:  amlodipine-benazepril 10 mg-40 mg oral capsule: 1 cap(s) orally once a day (15 Mar 2021 11:15)  HumaLOG 100 units/mL injectable solution: 8 unit(s) injectable 3 times a day (before meals) (15 Mar 2021 11:15)  Lipitor 80 mg oral tablet: 1 tab(s) orally once a day (15 Mar 2021 11:15)  Toprol-XL 50 mg oral tablet, extended release: 1 tab(s) orally once a day (15 Mar 2021 11:15)  Tresiba 100 units/mL subcutaneous solution: 40 unit(s) subcutaneous once a day (at bedtime) (15 Mar 2021 11:15)      MEDICATIONS  (STANDING):  amLODIPine   Tablet 10 milliGRAM(s) Oral daily  atorvastatin 80 milliGRAM(s) Oral at bedtime  dextrose 40% Gel 15 Gram(s) Oral once  dextrose 5%. 1000 milliLiter(s) (50 mL/Hr) IV Continuous <Continuous>  dextrose 5%. 1000 milliLiter(s) (100 mL/Hr) IV Continuous <Continuous>  dextrose 50% Injectable 12.5 Gram(s) IV Push once  dextrose 50% Injectable 25 Gram(s) IV Push once  dextrose 50% Injectable 25 Gram(s) IV Push once  glucagon  Injectable 1 milliGRAM(s) IntraMuscular once  heparin   Injectable 5000 Unit(s) SubCutaneous every 12 hours  insulin glargine Injectable (LANTUS) 20 Unit(s) SubCutaneous at bedtime  insulin lispro (ADMELOG) corrective regimen sliding scale   SubCutaneous at bedtime  insulin lispro (ADMELOG) corrective regimen sliding scale   SubCutaneous three times a day before meals  insulin lispro Injectable (ADMELOG) 5 Unit(s) SubCutaneous three times a day before meals  lactated ringers. 1000 milliLiter(s) (50 mL/Hr) IV Continuous <Continuous>  metoprolol succinate ER 50 milliGRAM(s) Oral daily      TELEMETRY: 	    ECG:  	  RADIOLOGY:   DIAGNOSTIC TESTING:  [ x] Echocardiogram:  Transthoracic Echocardiogram (03.19.21 @ 09:24)   Dimensions:    Normal Values:  LA:     2.7    2.0 - 4.0 cm  Ao:     2.9    2.0 - 3.8 cm  SEPTUM: 0.7    0.6 - 1.2 cm  PWT:    1.2    0.6 - 1.1 cm  LVIDd:  4.4    3.0 - 5.6 cm  LVIDs:  3.0    1.8 - 4.0 cm  Derived variables:  LVMI: 76 g/m2  RWT: 0.54  Fractional short: 32 %  Doppler Peak Velocity (m/sec): AoV=1.4  ------------------------------------------------------------------------  Observations:  Mitral Valve: Normal mitral valve. Minimal mitral  regurgitation.  Aortic Valve/Aorta: Calcified trileaflet aortic valve with  normal opening. Peak transaortic valve gradient equals 8 mm  Hg, mean transaortic valve gradient equals 4 mm Hg, aortic  valve velocity time integral equals 25 cm, estimated aortic  valve area equals 2.1 sqcm. Peak left ventricular outflow  tract gradient equals 4 mm Hg, mean gradient is equal to 2  mm Hg, LVOT velocity time integral equals 21 cm.  Aortic Root: 2.9 cm.  Left Atrium: Normal left atrium.  LA volume index = 18  cc/m2.  Left Ventricle: Normal left ventricular systolic function.  No segmental wall motion abnormalities. Increased relative  wall thickness with normal leftventricular mass index,  consistent with concentric left ventricular remodeling.  Mild diastolic dysfunction (Stage I).  Right Heart: Normal right atrium. Normal right ventricular  size and function. Normal tricuspid valve. Mild tricuspid  regurgitation. Normal pulmonic valve. Minimal pulmonic  regurgitation.  Pericardium/Pleura: Smalll to moderate pericardial  effusion, measuring up to 1.5 cm lateral and anterior to  the right ventricle.    No echocardiographic evidence of  pericardial tamponade.  Hemodynamic: Estimated right atrial pressure is 8 mm Hg.  ------------------------------------------------------------------------  Conclusions:  1. Increased relative wall thickness with normal left  ventricular mass index, consistent with concentric left  ventricular remodeling.  2. Normal left ventricular systolic function. No segmental  wall motion abnormalities.  3. Mild diastolic dysfunction (Stage I).  4. Smalll to moderate pericardial effusion, measuring up to  1.5 cm lateral and anterior to the right ventricle.    No  echocardiographic evidence of pericardial tamponade.  *** No previous Echo exam.        [ ]  Catheterization:  [ ] Stress Test:    OTHER: 	    LABS:	 	                            15.2   8.17  )-----------( 502      ( 18 Mar 2021 07:24 )             47.0     03-19    138  |  103  |  30<H>  ----------------------------<  98  3.7   |  22  |  1.46<H>    Ca    8.9      19 Mar 2021 07:16    TPro  5.9<L>  /  Alb  2.7<L>  /  TBili  0.2  /  DBili  x   /  AST  33  /  ALT  29  /  AlkPhos  132<H>  03-19

## 2021-03-19 NOTE — PROGRESS NOTE ADULT - ASSESSMENT
pt w/ scrotal swellin g / pelvic lesion/ weight loss    onc eval noted  excisional Ln bx by sx  + for Bcell  f/u final path   tx asper onc  neg  lext dopplers  dm/uncontrolled  fsg riss  c/w insulin  endo f/u  dvt proph  htn  c/w meds  mri noted  neuro f/u  lp  pending  neurosx eval noted  id eval noted  walking difficulty  neuro eval noted  card  f/u  urology f/u noted  no sx now  b/l hydrocele  bowel regimen

## 2021-03-19 NOTE — PROGRESS NOTE ADULT - ASSESSMENT
Echo 3/18/21: min MR, nl lv sys fx, small-moderate pericardial effusion, no evidence of pericardial tamponade     a/p  61 yo M with hx HTN, DM, HLD, COVID 2020 presents with LE pain and scrotal swelling    1. LE pain/weakness/scrotal swelling  -concern for malignancy  -s/p LN excisional biopsy to r/o lymphoma- frozen + diffuse B cell, follow up pathology   -MRI noted  -UA noted  -LE duplex neg for DVT  -urology eval noted - No  intervention during this admission  -neurosx eval noted -no interventions at this time, possibly benefit from L5/S1 decompression fusion after workup of lymph node  -s/p LP today   -f/u heme/neuro     2. HTN  -bp acceptable  -c/w amlodipine and bb   -can inc bb if needed    3. HLD  -c/w statin    4. GUANAKO  -cr noted  -renal f/u    5. Pericardial effusion  -echo noted with min MR, nl lv sys fx, small-moderate pericardial effusion, no evidence of pericardial tamponade   -cont to monitor     dvt ppx

## 2021-03-19 NOTE — PROGRESS NOTE ADULT - SUBJECTIVE AND OBJECTIVE BOX
Chief Complaint:     History:    MEDICATIONS  (STANDING):  amLODIPine   Tablet 10 milliGRAM(s) Oral daily  atorvastatin 80 milliGRAM(s) Oral at bedtime  dextrose 40% Gel 15 Gram(s) Oral once  dextrose 5%. 1000 milliLiter(s) (50 mL/Hr) IV Continuous <Continuous>  dextrose 5%. 1000 milliLiter(s) (100 mL/Hr) IV Continuous <Continuous>  dextrose 50% Injectable 12.5 Gram(s) IV Push once  dextrose 50% Injectable 25 Gram(s) IV Push once  dextrose 50% Injectable 25 Gram(s) IV Push once  glucagon  Injectable 1 milliGRAM(s) IntraMuscular once  heparin   Injectable 5000 Unit(s) SubCutaneous every 12 hours  insulin glargine Injectable (LANTUS) 20 Unit(s) SubCutaneous at bedtime  insulin lispro (ADMELOG) corrective regimen sliding scale   SubCutaneous at bedtime  insulin lispro (ADMELOG) corrective regimen sliding scale   SubCutaneous three times a day before meals  insulin lispro Injectable (ADMELOG) 5 Unit(s) SubCutaneous three times a day before meals  lactated ringers. 1000 milliLiter(s) (50 mL/Hr) IV Continuous <Continuous>  metoprolol succinate ER 50 milliGRAM(s) Oral daily    MEDICATIONS  (PRN):  acetaminophen   Tablet .. 650 milliGRAM(s) Oral every 6 hours PRN Mild Pain (1 - 3)  polyethylene glycol 3350 17 Gram(s) Oral daily PRN Constipation  traMADol 50 milliGRAM(s) Oral three times a day PRN Moderate Pain (4 - 6)      Allergies    No Known Allergies    Intolerances      Review of Systems:  Constitutional: No fever  Eyes: No blurry vision  Neuro: No tremors  HEENT: No pain  Cardiovascular: No chest pain, palpitations  Respiratory: No SOB, no cough  GI: No nausea, vomiting, abdominal pain  : No dysuria  Skin: no rash  Psych: no depression  Endocrine: no polyuria, polydipsia  Hem/lymph: no swelling  Osteoporosis: no fractures    ALL OTHER SYSTEMS REVIEWED AND NEGATIVE    UNABLE TO OBTAIN    PHYSICAL EXAM:  VITALS: T(C): 36.7 (03-19-21 @ 05:17)  T(F): 98.1 (03-19-21 @ 05:17), Max: 98.6 (03-18-21 @ 18:55)  HR: 67 (03-19-21 @ 05:17) (67 - 103)  BP: 155/72 (03-19-21 @ 05:17) (122/71 - 161/82)  RR:  (18 - 18)  SpO2:  (95% - 95%)  Wt(kg): --  GENERAL: NAD, well-groomed, well-developed  EYES: No proptosis, no lid lag, anicteric  HEENT:  Atraumatic, Normocephalic, moist mucous membranes  THYROID: Normal size, no palpable nodules  RESPIRATORY: Clear to auscultation bilaterally; No rales, rhonchi, wheezing, or rubs  CARDIOVASCULAR: Regular rate and rhythm; No murmurs; no peripheral edema  GI: Soft, nontender, non distended, normal bowel sounds  SKIN: Dry, intact, No rashes or lesions  MUSCULOSKELETAL: Full range of motion, normal strength  NEURO: sensation intact, extraocular movements intact, no tremor, normal reflexes  PSYCH: Alert and oriented x 3, normal affect, normal mood  CUSHING'S SIGNS: no striae    POCT Blood Glucose.: 81 mg/dL (03-19-21 @ 08:06)  POCT Blood Glucose.: 149 mg/dL (03-18-21 @ 22:23)  POCT Blood Glucose.: 170 mg/dL (03-18-21 @ 21:10)  POCT Blood Glucose.: 199 mg/dL (03-18-21 @ 17:01)  POCT Blood Glucose.: 133 mg/dL (03-18-21 @ 12:41)  POCT Blood Glucose.: 106 mg/dL (03-18-21 @ 08:25)  POCT Blood Glucose.: 139 mg/dL (03-17-21 @ 21:37)  POCT Blood Glucose.: 104 mg/dL (03-17-21 @ 17:22)  POCT Blood Glucose.: 102 mg/dL (03-17-21 @ 13:26)  POCT Blood Glucose.: 114 mg/dL (03-17-21 @ 08:06)  POCT Blood Glucose.: 152 mg/dL (03-16-21 @ 21:16)  POCT Blood Glucose.: 147 mg/dL (03-16-21 @ 18:19)  POCT Blood Glucose.: 126 mg/dL (03-16-21 @ 17:16)  POCT Blood Glucose.: 100 mg/dL (03-16-21 @ 12:34)      03-19    138  |  103  |  30<H>  ----------------------------<  98  3.7   |  22  |  1.46<H>    EGFR if : 60  EGFR if non : 52<L>    Ca    8.9      03-19  Mg     2.0     03-17  Phos  5.2     03-17    TPro  5.9<L>  /  Alb  2.7<L>  /  TBili  0.2  /  DBili  x   /  AST  33  /  ALT  29  /  AlkPhos  132<H>  03-19          Thyroid Function Tests:  03-15 @ 09:19 TSH 4.86 FreeT4 -- T3 -- Anti TPO -- Anti Thyroglobulin Ab -- TSI --                           Chief Complaint: DM2    History: No acute events. Tolerating diet. No hypoglycemia.     MEDICATIONS  (STANDING):  amLODIPine   Tablet 10 milliGRAM(s) Oral daily  atorvastatin 80 milliGRAM(s) Oral at bedtime  dextrose 40% Gel 15 Gram(s) Oral once  dextrose 5%. 1000 milliLiter(s) (50 mL/Hr) IV Continuous <Continuous>  dextrose 5%. 1000 milliLiter(s) (100 mL/Hr) IV Continuous <Continuous>  dextrose 50% Injectable 12.5 Gram(s) IV Push once  dextrose 50% Injectable 25 Gram(s) IV Push once  dextrose 50% Injectable 25 Gram(s) IV Push once  glucagon  Injectable 1 milliGRAM(s) IntraMuscular once  heparin   Injectable 5000 Unit(s) SubCutaneous every 12 hours  insulin glargine Injectable (LANTUS) 20 Unit(s) SubCutaneous at bedtime  insulin lispro (ADMELOG) corrective regimen sliding scale   SubCutaneous at bedtime  insulin lispro (ADMELOG) corrective regimen sliding scale   SubCutaneous three times a day before meals  insulin lispro Injectable (ADMELOG) 5 Unit(s) SubCutaneous three times a day before meals  lactated ringers. 1000 milliLiter(s) (50 mL/Hr) IV Continuous <Continuous>  metoprolol succinate ER 50 milliGRAM(s) Oral daily    MEDICATIONS  (PRN):  acetaminophen   Tablet .. 650 milliGRAM(s) Oral every 6 hours PRN Mild Pain (1 - 3)  polyethylene glycol 3350 17 Gram(s) Oral daily PRN Constipation  traMADol 50 milliGRAM(s) Oral three times a day PRN Moderate Pain (4 - 6)      Allergies    No Known Allergies    Intolerances      Review of Systems:  Constitutional: No fever  Eyes: No blurry vision  Neuro: No tremors  HEENT: No pain  Cardiovascular: No chest pain, palpitations  Respiratory: No SOB, no cough  GI: No nausea, vomiting, abdominal pain  : No dysuria  Skin: no rash  Psych: no depression  Endocrine: no polyuria, polydipsia  Hem/lymph: no swelling  Osteoporosis: no fractures    ALL OTHER SYSTEMS REVIEWED AND NEGATIVE        PHYSICAL EXAM:  VITALS: T(C): 36.7 (03-19-21 @ 05:17)  T(F): 98.1 (03-19-21 @ 05:17), Max: 98.6 (03-18-21 @ 18:55)  HR: 67 (03-19-21 @ 05:17) (67 - 103)  BP: 155/72 (03-19-21 @ 05:17) (122/71 - 161/82)  RR:  (18 - 18)  SpO2:  (95% - 95%)  Wt(kg): --  GENERAL: NAD, well-groomed, well-developed  EYES: No proptosis, no lid lag, anicteric  HEENT:  Atraumatic, Normocephalic, moist mucous membranes  RESPIRATORY: Clear to auscultation bilaterally; No rales, rhonchi, wheezing, or rubs  CARDIOVASCULAR: Regular rate and rhythm  GI: Soft, nontender, non distended, normal bowel sounds        POCT Blood Glucose.: 81 mg/dL (03-19-21 @ 08:06)  POCT Blood Glucose.: 149 mg/dL (03-18-21 @ 22:23)  POCT Blood Glucose.: 170 mg/dL (03-18-21 @ 21:10)  POCT Blood Glucose.: 199 mg/dL (03-18-21 @ 17:01)  POCT Blood Glucose.: 133 mg/dL (03-18-21 @ 12:41)  POCT Blood Glucose.: 106 mg/dL (03-18-21 @ 08:25)  POCT Blood Glucose.: 139 mg/dL (03-17-21 @ 21:37)  POCT Blood Glucose.: 104 mg/dL (03-17-21 @ 17:22)  POCT Blood Glucose.: 102 mg/dL (03-17-21 @ 13:26)  POCT Blood Glucose.: 114 mg/dL (03-17-21 @ 08:06)  POCT Blood Glucose.: 152 mg/dL (03-16-21 @ 21:16)  POCT Blood Glucose.: 147 mg/dL (03-16-21 @ 18:19)  POCT Blood Glucose.: 126 mg/dL (03-16-21 @ 17:16)  POCT Blood Glucose.: 100 mg/dL (03-16-21 @ 12:34)      03-19    138  |  103  |  30<H>  ----------------------------<  98  3.7   |  22  |  1.46<H>    EGFR if : 60  EGFR if non : 52<L>    Ca    8.9      03-19  Mg     2.0     03-17  Phos  5.2     03-17    TPro  5.9<L>  /  Alb  2.7<L>  /  TBili  0.2  /  DBili  x   /  AST  33  /  ALT  29  /  AlkPhos  132<H>  03-19          Thyroid Function Tests:  03-15 @ 09:19 TSH 4.86 FreeT4 -- T3 -- Anti TPO -- Anti Thyroglobulin Ab -- TSI --

## 2021-03-19 NOTE — PROGRESS NOTE ADULT - SUBJECTIVE AND OBJECTIVE BOX
NEPHROLOGY-[facility] [phone]        Patient seen and examined in bed.  He was the same         MEDICATIONS  (STANDING):  amLODIPine   Tablet 10 milliGRAM(s) Oral daily  atorvastatin 80 milliGRAM(s) Oral at bedtime  dextrose 40% Gel 15 Gram(s) Oral once  dextrose 5%. 1000 milliLiter(s) (50 mL/Hr) IV Continuous <Continuous>  dextrose 5%. 1000 milliLiter(s) (100 mL/Hr) IV Continuous <Continuous>  dextrose 50% Injectable 12.5 Gram(s) IV Push once  dextrose 50% Injectable 25 Gram(s) IV Push once  dextrose 50% Injectable 25 Gram(s) IV Push once  glucagon  Injectable 1 milliGRAM(s) IntraMuscular once  heparin   Injectable 5000 Unit(s) SubCutaneous every 12 hours  insulin glargine Injectable (LANTUS) 20 Unit(s) SubCutaneous at bedtime  insulin lispro (ADMELOG) corrective regimen sliding scale   SubCutaneous at bedtime  insulin lispro (ADMELOG) corrective regimen sliding scale   SubCutaneous three times a day before meals  insulin lispro Injectable (ADMELOG) 5 Unit(s) SubCutaneous three times a day before meals  lactated ringers. 1000 milliLiter(s) (50 mL/Hr) IV Continuous <Continuous>  metoprolol succinate ER 50 milliGRAM(s) Oral daily      VITAL:  T(C): , Max: 37 (03-18-21 @ 18:55)  T(F): , Max: 98.6 (03-18-21 @ 18:55)  HR: 67 (03-19-21 @ 05:17)  BP: 155/72 (03-19-21 @ 05:17)  BP(mean): --  RR: 18 (03-19-21 @ 05:17)  SpO2: 95% (03-19-21 @ 05:17)  Wt(kg): --    I and O's:    03-18 @ 07:01  -  03-19 @ 07:00  --------------------------------------------------------  IN: 540 mL / OUT: 1800 mL / NET: -1260 mL          PHYSICAL EXAM:    Constitutional: NAD  Neck:  No JVD  Respiratory: CTAB/L  Cardiovascular: S1 and S2  Gastrointestinal: BS+, soft, NT/ND  Extremities: No peripheral edema  Neurological: A/O x 3, no focal deficits  Psychiatric: Normal mood, normal affect  : No Weir  Skin: No rashes  Access: Not applicable    LABS:                        15.2   8.17  )-----------( 502      ( 18 Mar 2021 07:24 )             47.0     03-19    138  |  103  |  30<H>  ----------------------------<  98  3.7   |  22  |  1.46<H>    Ca    8.9      19 Mar 2021 07:16    TPro  5.9<L>  /  Alb  2.7<L>  /  TBili  0.2  /  DBili  x   /  AST  33  /  ALT  29  /  AlkPhos  132<H>  03-19          Urine Studies:          RADIOLOGY & ADDITIONAL STUDIES:

## 2021-03-19 NOTE — PROGRESS NOTE ADULT - ASSESSMENT
59yo M with hx HTN, DM, HLD, COVID 2020 presents with LE pain and scrotal swelling    - GUANAKO -resolved but the creatinine is creeping upwards     Renal cysts noted on CT- too small and bilateral- support CKD Dx  - BP controlled  - Euvolemic    PLAN:   - Creatinine slightly increased.  There is no evidence of tumor lysis syndrome at present (LDH not elevated and potassium is stable;  Uric acid is 6.4)  - Heme/Onc eval noted;  Possible lymphoma-   LP today    - Renal dosing of meds to creatinine clearance of 50 ml/min    DW Heme/onc team         Sayed [facility]   [phone]

## 2021-03-19 NOTE — PROGRESS NOTE ADULT - ASSESSMENT
60 year old male presents to ED with right groin and back pain, 35 lb weight loss, subjective fevers, sweats - found to have bilateral inguinal adenopathy and a sclerotic L5 lesion.    Inguinal Adenopathy and L1 Sclerotic Lesion  -- Concern for lymphoma based on fevers, weight loss and night sweats  -- Other malignancies also possible, as can be benign inflammatory processes  -- Tumor markers - PSA,, Ca 19,9, LDH and Uric Acid are normal  -- S/P excisional biopsy of inguinal lymph node  --Results pending but frozen section consistent with B cell lymphoma; awaiting subtype  -- Have ordered Hepatitis profile, Quantiferon in anticipation for chemotherapy  --Chemotherapy will be determined (as well as possible spinal RT) once we have final pathology results - will most likely be given as outpatient  --We request IR consultation for a medi port placement to be done prior to discharge    Neuropathy  -- Presumed to be secondary to COVID infection per patient  -- Markedly abnormal MRI of spine per above  -- Neurosurgery is following  -- Concern for leptomeningeal spread  -- S/P LP today - increased lymphocytes on CSF cell count - awaiting cytology    We will continue to follow patient. Thank you for the opportunity to participate in MR. Fernandez's care.    Lobo Landeros PA-C  Hematology/Oncology   [phone] (cell)  [phone] (office)  After hours please call MD on call or office   60 year old male presents to ED with right groin and back pain, 35 lb weight loss, subjective fevers, sweats - found to have bilateral inguinal adenopathy and a sclerotic L5 lesion.    Inguinal Adenopathy and L1 Sclerotic Lesion  -- Concern for lymphoma based on fevers, weight loss and night sweats  -- Other malignancies also possible, as can be benign inflammatory processes  -- Tumor markers - PSA,, Ca 19,9, LDH and Uric Acid are normal  -- S/P excisional biopsy of inguinal lymph node  --Results pending but frozen section consistent with B cell lymphoma; awaiting subtype  -- Have ordered Hepatitis profile, Quantiferon in anticipation for chemotherapy  --Chemotherapy will be determined (as well as possible spinal RT) once we have final pathology results - will most likely be given as outpatient  --We request IR consultation for a medi port placement to be done prior to discharge    Neuropathy  -- Presumed to be secondary to COVID infection per patient  -- Markedly abnormal MRI of spine per above  -- Neurosurgery is following  -- Concern for leptomeningeal spread  -- S/P LP today - increased lymphocytes on CSF cell count - awaiting cytology  -- Starting Dexamethasone for cauda equina 10 mg loading dose then 4 mg IV q6H    We will continue to follow patient. Thank you for the opportunity to participate in MR. Fernandez's care.    Lobo Landeros PA-C  Hematology/Oncology   [phone] (cell)  [phone] (office)  After hours please call MD on call or office

## 2021-03-19 NOTE — CHART NOTE - NSCHARTNOTEFT_GEN_A_CORE
D/w attending, will defer any neurosurgical intervention until after workup/treatment of hematopathology. Reconsult as needed

## 2021-03-19 NOTE — PROGRESS NOTE ADULT - SUBJECTIVE AND OBJECTIVE BOX
CLINICAL INDICATION:     Patient presents for fluoroscopically guided lumbar puncture.  Risks and benefits were discussed with the patient and/or patient health care proxy.  Risks discussed included bleeding, infection, nerve damage, and headache.

## 2021-03-19 NOTE — PRE PROCEDURE NOTE - PRE PROCEDURE EVALUATION
Interventional Radiology    HPI: 60y Male with suspected B-cell lymphoma with leptomeningeal spread referred to IR for image-guided LP.    Neurology note from 3/18/2021 appreciated stating, "Suspect B-cell lymphoma with leptomeningeal spread  less likely but still possible would be infectious etiology given possible septic L4-5 facet and potential immunosuppression in setting of possible lymphoma  doubt demyelinating (AIDP or CIDP)"    less likely but still possible would be infectious etiology given possible septic L4-5 facet and potential immunosuppression in setting of possible lymphoma   doubt demyelinating (AIDP or CIDP)      Allergies:   Medications (Abx/Cardiac/Anticoagulation/Blood Products)  amLODIPine   Tablet: 10 milliGRAM(s) Oral (03-19 @ 05:20)  heparin   Injectable: 5000 Unit(s) SubCutaneous (03-18 @ 17:33)  metoprolol succinate ER: 50 milliGRAM(s) Oral (03-19 @ 05:20)    Data:    T(C): 36.7  HR: 67  BP: 155/72  RR: 18  SpO2: 95%    Exam  General: No acute distress  Chest: Non labored breathing  Abdomen: Non-distended  Extremities: No swelling, warm    -WBC 8.17 / HgB 15.2 / Hct 47.0 / Plt 502  -Na 138 / Cl 103 / BUN 30 / Glucose 98  -K 3.7 / CO2 22 / Cr 1.46  -ALT 29 / Alk Phos 132 / T.Bili 0.2  -INR0.98    Labs:                        15.2   8.17  )-----------( 502      ( 18 Mar 2021 07:24 )             47.0     03-19    138  |  103  |  30<H>  ----------------------------<  98  3.7   |  22  |  1.46<H>    Ca    8.9      19 Mar 2021 07:16    TPro  5.9<L>  /  Alb  2.7<L>  /  TBili  0.2  /  DBili  x   /  AST  33  /  ALT  29  /  AlkPhos  132<H>  03-19    Activated Partial Thromboplastin Time in AM (03.17.21 @ 09:34)    Activated Partial Thromboplastin Time: 31.4:   Prothrombin Time and INR, Plasma in AM (03.17.21 @ 09:34)    Prothrombin Time, Plasma: 11.6 sec    INR: 0.98:       -Risks/Benefits/alternatives explained with the patient and/or healthcare proxy and witnessed informed consent obtained. Will plan image-guided LP

## 2021-03-20 LAB
ANION GAP SERPL CALC-SCNC: 14 MMOL/L — SIGNIFICANT CHANGE UP (ref 5–17)
BUN SERPL-MCNC: 32 MG/DL — HIGH (ref 7–23)
CALCIUM SERPL-MCNC: 9.3 MG/DL — SIGNIFICANT CHANGE UP (ref 8.4–10.5)
CHLORIDE SERPL-SCNC: 105 MMOL/L — SIGNIFICANT CHANGE UP (ref 96–108)
CO2 SERPL-SCNC: 18 MMOL/L — LOW (ref 22–31)
CREAT SERPL-MCNC: 1.21 MG/DL — SIGNIFICANT CHANGE UP (ref 0.5–1.3)
GLUCOSE BLDC GLUCOMTR-MCNC: 190 MG/DL — HIGH (ref 70–99)
GLUCOSE BLDC GLUCOMTR-MCNC: 204 MG/DL — HIGH (ref 70–99)
GLUCOSE BLDC GLUCOMTR-MCNC: 229 MG/DL — HIGH (ref 70–99)
GLUCOSE BLDC GLUCOMTR-MCNC: 336 MG/DL — HIGH (ref 70–99)
GLUCOSE SERPL-MCNC: 225 MG/DL — HIGH (ref 70–99)
HAV IGM SER-ACNC: SIGNIFICANT CHANGE UP
HBV CORE IGM SER-ACNC: SIGNIFICANT CHANGE UP
HBV SURFACE AG SER-ACNC: SIGNIFICANT CHANGE UP
HCT VFR BLD CALC: 54.4 % — HIGH (ref 39–50)
HCV AB S/CO SERPL IA: 0.16 S/CO — SIGNIFICANT CHANGE UP (ref 0–0.99)
HCV AB SERPL-IMP: SIGNIFICANT CHANGE UP
HGB BLD-MCNC: 17.9 G/DL — HIGH (ref 13–17)
MCHC RBC-ENTMCNC: 29 PG — SIGNIFICANT CHANGE UP (ref 27–34)
MCHC RBC-ENTMCNC: 32.9 GM/DL — SIGNIFICANT CHANGE UP (ref 32–36)
MCV RBC AUTO: 88.2 FL — SIGNIFICANT CHANGE UP (ref 80–100)
NRBC # BLD: 0 /100 WBCS — SIGNIFICANT CHANGE UP (ref 0–0)
PLATELET # BLD AUTO: 585 K/UL — HIGH (ref 150–400)
POTASSIUM SERPL-MCNC: 4.7 MMOL/L — SIGNIFICANT CHANGE UP (ref 3.5–5.3)
POTASSIUM SERPL-SCNC: 4.7 MMOL/L — SIGNIFICANT CHANGE UP (ref 3.5–5.3)
RBC # BLD: 6.17 M/UL — HIGH (ref 4.2–5.8)
RBC # FLD: 13.1 % — SIGNIFICANT CHANGE UP (ref 10.3–14.5)
SODIUM SERPL-SCNC: 137 MMOL/L — SIGNIFICANT CHANGE UP (ref 135–145)
WBC # BLD: 7.18 K/UL — SIGNIFICANT CHANGE UP (ref 3.8–10.5)
WBC # FLD AUTO: 7.18 K/UL — SIGNIFICANT CHANGE UP (ref 3.8–10.5)

## 2021-03-20 PROCEDURE — 99232 SBSQ HOSP IP/OBS MODERATE 35: CPT

## 2021-03-20 RX ORDER — INSULIN LISPRO 100/ML
VIAL (ML) SUBCUTANEOUS
Refills: 0 | Status: DISCONTINUED | OUTPATIENT
Start: 2021-03-20 | End: 2021-03-31

## 2021-03-20 RX ORDER — INSULIN LISPRO 100/ML
6 VIAL (ML) SUBCUTANEOUS
Refills: 0 | Status: DISCONTINUED | OUTPATIENT
Start: 2021-03-20 | End: 2021-03-21

## 2021-03-20 RX ORDER — INSULIN GLARGINE 100 [IU]/ML
24 INJECTION, SOLUTION SUBCUTANEOUS AT BEDTIME
Refills: 0 | Status: DISCONTINUED | OUTPATIENT
Start: 2021-03-20 | End: 2021-03-21

## 2021-03-20 RX ADMIN — Medication 1: at 08:24

## 2021-03-20 RX ADMIN — Medication 50 MILLIGRAM(S): at 05:53

## 2021-03-20 RX ADMIN — Medication 6 UNIT(S): at 17:22

## 2021-03-20 RX ADMIN — Medication 4 UNIT(S): at 12:01

## 2021-03-20 RX ADMIN — Medication 8: at 17:22

## 2021-03-20 RX ADMIN — AMLODIPINE BESYLATE 10 MILLIGRAM(S): 2.5 TABLET ORAL at 05:53

## 2021-03-20 RX ADMIN — TRAMADOL HYDROCHLORIDE 50 MILLIGRAM(S): 50 TABLET ORAL at 17:26

## 2021-03-20 RX ADMIN — Medication 4 UNIT(S): at 08:24

## 2021-03-20 RX ADMIN — Medication 2: at 12:01

## 2021-03-20 RX ADMIN — Medication 4 MILLIGRAM(S): at 05:53

## 2021-03-20 RX ADMIN — TRAMADOL HYDROCHLORIDE 50 MILLIGRAM(S): 50 TABLET ORAL at 09:15

## 2021-03-20 RX ADMIN — Medication 4 MILLIGRAM(S): at 17:21

## 2021-03-20 RX ADMIN — Medication 4 MILLIGRAM(S): at 12:01

## 2021-03-20 RX ADMIN — HEPARIN SODIUM 5000 UNIT(S): 5000 INJECTION INTRAVENOUS; SUBCUTANEOUS at 05:52

## 2021-03-20 RX ADMIN — HEPARIN SODIUM 5000 UNIT(S): 5000 INJECTION INTRAVENOUS; SUBCUTANEOUS at 17:21

## 2021-03-20 RX ADMIN — INSULIN GLARGINE 24 UNIT(S): 100 INJECTION, SOLUTION SUBCUTANEOUS at 21:56

## 2021-03-20 RX ADMIN — ATORVASTATIN CALCIUM 80 MILLIGRAM(S): 80 TABLET, FILM COATED ORAL at 21:56

## 2021-03-20 RX ADMIN — TRAMADOL HYDROCHLORIDE 50 MILLIGRAM(S): 50 TABLET ORAL at 08:23

## 2021-03-20 NOTE — PROGRESS NOTE ADULT - SUBJECTIVE AND OBJECTIVE BOX
Admitting Diagnosis:  Abnormal weight loss [R63.4]  ABNORMAL WEIGHT LOSS      Background:  This is a 60y year old Male  who presents with the chief complaint of low back pain and leg weakness. 61yo M with hx HTN, DM, HLD, COVID 2020 presents with LE pain and scrotal swelling. Patient was hospitalized in [facility] in Feb 2021 where CT Ab/P found 5.8x2.5cm lesion on the right pelvis concerning for malignancy with sclerotic lesion at L5. Pt states since early 2021 inc pain and weakness in the right leg, at first he was limping but now cannot walk.  He has pain in the feet, describes as pinching and needles like sensation.  Starting to have sx in left leg as well. Patient left AMA before additional malignancy workup was performed.  In the last month, patient had 45lb unintentional weight loss and decreased appetite. Notes abdominal swelling, mostly on the left side with tenderness along with b/l scrotal swelling.  At one point given neurontin, not helpful.  Denies sx in arms, changes in speech, swallow, vision, bladder control.  Says issues with bowel movements.     MRI done showing diffuse nerve root enhancement involving the roots of the cauda equina with some mild root thickening though the dominant finding is the enhancement.  There is also a nonspecific lesion in the L5 vertebral body and a possible septic facet at L4-5.       Interval Hx:  s/p LP , high lymphocytes prelim, protein slightly elevated, no cx growth, awaiting cytology    ******    Past Medical History:  Diabetes [E11.9]    Hyperlipidemia [E78.5]    Hypertension, unspecified type [I10]        Past Surgical History:  No significant past surgical history [[id number]]        Social History:  No toxic habits    Family History:  FAMILY HISTORY:  No pertinent family history in first degree relatives        Allergies:  No Known Allergies      ROS:  Constitutional: Patient offers no complaints of fevers or significant weight loss  Ears, Nose, Mouth and Throat: The patient presents with no abnormalities of the head, ears, eyes, nose or throat  Skin: Patient offers no concerns of new rashes or lesions  Respiratory: The patient presents with no abnormalities of the respiratory tract  Cardiovascular: The patient presents with no cardiac abnormalities  Gastrointestinal: The patient presents with no abnormalities of the GI system  Genitourinary: The patient presents with no dysuria, hematuria or frequent urination  Neurological: See HPI  Endocrine: Patient offers no complaints of excessive thirst, urination, or heat/cold intolerance    Advanced care planning reviewed and noted in the chart.    Medications:  acetaminophen   Tablet .. 650 milliGRAM(s) Oral every 6 hours PRN  amLODIPine   Tablet 10 milliGRAM(s) Oral daily  atorvastatin 80 milliGRAM(s) Oral at bedtime  dexAMETHasone  Injectable 4 milliGRAM(s) IV Push every 6 hours  dextrose 40% Gel 15 Gram(s) Oral once  dextrose 5%. 1000 milliLiter(s) IV Continuous <Continuous>  dextrose 5%. 1000 milliLiter(s) IV Continuous <Continuous>  dextrose 50% Injectable 12.5 Gram(s) IV Push once  dextrose 50% Injectable 25 Gram(s) IV Push once  dextrose 50% Injectable 25 Gram(s) IV Push once  glucagon  Injectable 1 milliGRAM(s) IntraMuscular once  heparin   Injectable 5000 Unit(s) SubCutaneous every 12 hours  insulin glargine Injectable (LANTUS) 24 Unit(s) SubCutaneous at bedtime  insulin lispro (ADMELOG) corrective regimen sliding scale   SubCutaneous at bedtime  insulin lispro (ADMELOG) corrective regimen sliding scale   SubCutaneous three times a day before meals  insulin lispro Injectable (ADMELOG) 6 Unit(s) SubCutaneous three times a day before meals  lactated ringers. 1000 milliLiter(s) IV Continuous <Continuous>  metoprolol succinate ER 50 milliGRAM(s) Oral daily  polyethylene glycol 3350 17 Gram(s) Oral daily PRN  traMADol 50 milliGRAM(s) Oral three times a day PRN      Labs:  CBC Full  -  ( 20 Mar 2021 09:21 )  WBC Count : 7.18 K/uL  RBC Count : 6.17 M/uL  Hemoglobin : 17.9 g/dL  Hematocrit : 54.4 %  Platelet Count - Automated : 585 K/uL  Mean Cell Volume : 88.2 fl  Mean Cell Hemoglobin : 29.0 pg  Mean Cell Hemoglobin Concentration : 32.9 gm/dL  Auto Neutrophil # : x  Auto Lymphocyte # : x  Auto Monocyte # : x  Auto Eosinophil # : x  Auto Basophil # : x  Auto Neutrophil % : x  Auto Lymphocyte % : x  Auto Monocyte % : x  Auto Eosinophil % : x  Auto Basophil % : x    03-20    137  |  105  |  32<H>  ----------------------------<  225<H>  4.7   |  18<L>  |  1.21    Ca    9.3      20 Mar 2021 09:21    TPro  5.9<L>  /  Alb  2.7<L>  /  TBili  0.2  /  DBili  x   /  AST  33  /  ALT  29  /  AlkPhos  132<H>  03-19    CAPILLARY BLOOD GLUCOSE      POCT Blood Glucose.: 204 mg/dL (20 Mar 2021 11:57)  POCT Blood Glucose.: 190 mg/dL (20 Mar 2021 08:09)  POCT Blood Glucose.: 149 mg/dL (19 Mar 2021 21:19)  POCT Blood Glucose.: 117 mg/dL (19 Mar 2021 17:41)    LIVER FUNCTIONS - ( 19 Mar 2021 07:16 )  Alb: 2.7 g/dL / Pro: 5.9 g/dL / ALK PHOS: 132 U/L / ALT: 29 U/L / AST: 33 U/L / GGT: x                   Vitals:  Vital Signs Last 24 Hrs  T(C): 36.7 (20 Mar 2021 12:35), Max: 36.9 (19 Mar 2021 16:04)  T(F): 98 (20 Mar 2021 12:35), Max: 98.5 (19 Mar 2021 16:04)  HR: 78 (20 Mar 2021 12:35) (78 - 97)  BP: 161/85 (20 Mar 2021 12:35) (138/73 - 169/92)  BP(mean): --  RR: 18 (20 Mar 2021 12:35) (18 - 18)  SpO2: 95% (20 Mar 2021 12:35) (95% - 97%)    NEUROLOGICAL EXAM:    Mental status: Awake, alert, and in no apparent distress. Oriented to person, place and time. Language function is normal. speech clear and fluent. Recent memory, digit span and concentration were normal.     Cranial Nerves: Pupils were equal, round, reactive to light. Extraocular movements were intact. Visual field were full. Fundoscopic exam was deferred. Facial sensation was intact to light touch. There was no facial asymmetry. The palate was upgoing symmetrically and tongue was midline. Hearing acuity was intact to finger rub AU. Shoulder shrug was full bilaterally    Motor exam: Bulk and tone were normal. Strength was 5/5 in the arms.  Left leg  5/5 except dorsi 4+/5.  Right leg - low tone.  hip flexion/knee ext 2/5, dorsiflexion 4/5. Fine finger movements were symmetric and normal. There was no pronator drift    Reflexes: 2+ in the bilateral upper extremities. absent in legs. Toes were downgoing bilaterally.     Sensation: Intact to light touch, temperature, vibration and proprioception. says when touch right leg it is painful    Coordination: Finger-nose-finger was without dysmetria. cannot do heel shin    Gait: deferred    < from: CT Chest w/ IV Cont (03.14.21 @ 10:12) >    EXAM:  CT ABDOMEN AND PELVIS IC                          EXAM:  CT CHEST IC                          PROCEDURE DATE:  03/14/2021      IMPRESSION:  Enlarged right iliac lymph node measuring 5.9 x 2.4 cm. Additional smaller right iliac nodes are seen. There are also bilateral groin lymph nodes.      MOLINA PUGH M.D., RADIOLOGY RESIDENT  This documenthas been electronically signed.  GINA JENKINS M.D., ATTENDING RADIOLOGIST  This document has been electronically signed. Mar 14 2021 12:50PM    < end of copied text >          EXAM:  MR SPINE LUMBAR WAW IC                            PROCEDURE DATE:  03/15/2021            INTERPRETATION:  INDICATIONS:  Increased low back pain and right leg weakness for 4 months    TECHNIQUE:  Sagittal T1 weighted and T2 weighted images of the lumbosacral spine as well as axial T1 weighted and T2 weighted images were obtained.    COMPARISON EXAMINATION: None.    FINDINGS:  VERTEBRAL BODIES AND DISCS:  Nonspecific lesion in L1 low signal T1 hyperintense signal T2 with some enhancement.  ALIGNMENT:  No subluxations.  L1-L2 LEVEL:  Normal.  L2-L3 LEVEL:  Asymmetric bulge right with disc material in the region of the right neural foramen at this level. Clinical correlation for right L2 radiculopathy  L3-L4 LEVEL: Symmetric bulge with annular fissure in the 7:30 position. Bilateral facet arthropathy at this level.  L4-L5 LEVEL: Asymmetric bulge to the left with focal left-sided protrusion and some mass impression on the intracanal left L5 root.. Bilateral facet arthropathy.Some changes in the left paraspinal musculature extending from the left facet joint may represent septic facet as enhancement is seen in association with hyperintense T2 signal with associated muscular changes (9:19)  L5-S1 LEVEL:  Slight signal hyperintensity at the disc space with some enhancement ventrally without associated endplate abnormality favors degenerative change. Prominent asymmetric disc bulge with Bilateral foraminal disc material which is causing significant nerve root compression on the right greater than left. May be the cause of the patient's known right leg weakness.  SPINAL CANAL:  Evidence of diffuse nerve root enhancement appreciated involving the roots of the cauda equina with some mild thickening appreciated.  CONUS MEDULLARIS:  Normal.  MISCELLANEOUS:  None    IMPRESSION:  Diffuse nerve root enhancement involving the roots of the cauda equina with some mild root thickening though the dominant finding is the enhancement. Differential possibilities include acute inflammatory demyelinating polyneuropathy( Guillan Gilbert) versus chronic inflammatory demyelinating polyneuropathy(CIDP). Inflammatory pathologies such as sarcoid or infectious etiologies should be considered. Included in the differential is leptomeningeal carcinomatosis. Correlation with CSF strongly recommended. Nonspecific lesion in the L5 vertebral body as described. Enhancement with associated T2 abnormality involving the left L4-5 facet with extension to the paraspinal musculature may indicate a septic facet at this level. Correlation with clinical parameters suggested. Degenerative changes with disc material in the neural foramina at the L2-3 and L5-S1 levels on the right as well as in the L5-S1 neural foramen on the left. Prominent bulge with more focal protrusion L4-5 left                MONTY TRAN MD; Attending Radiologist  This document has been electronically signed. Mar 15 2021  8:19PM

## 2021-03-20 NOTE — PROGRESS NOTE ADULT - SUBJECTIVE AND OBJECTIVE BOX
Chief Complaint:     History:    MEDICATIONS  (STANDING):  amLODIPine   Tablet 10 milliGRAM(s) Oral daily  atorvastatin 80 milliGRAM(s) Oral at bedtime  dexAMETHasone  Injectable 4 milliGRAM(s) IV Push every 6 hours  dextrose 40% Gel 15 Gram(s) Oral once  dextrose 5%. 1000 milliLiter(s) (50 mL/Hr) IV Continuous <Continuous>  dextrose 5%. 1000 milliLiter(s) (100 mL/Hr) IV Continuous <Continuous>  dextrose 50% Injectable 12.5 Gram(s) IV Push once  dextrose 50% Injectable 25 Gram(s) IV Push once  dextrose 50% Injectable 25 Gram(s) IV Push once  glucagon  Injectable 1 milliGRAM(s) IntraMuscular once  heparin   Injectable 5000 Unit(s) SubCutaneous every 12 hours  insulin glargine Injectable (LANTUS) 20 Unit(s) SubCutaneous at bedtime  insulin lispro (ADMELOG) corrective regimen sliding scale   SubCutaneous at bedtime  insulin lispro (ADMELOG) corrective regimen sliding scale   SubCutaneous three times a day before meals  insulin lispro Injectable (ADMELOG) 4 Unit(s) SubCutaneous three times a day before meals  lactated ringers. 1000 milliLiter(s) (50 mL/Hr) IV Continuous <Continuous>  metoprolol succinate ER 50 milliGRAM(s) Oral daily    MEDICATIONS  (PRN):  acetaminophen   Tablet .. 650 milliGRAM(s) Oral every 6 hours PRN Mild Pain (1 - 3)  polyethylene glycol 3350 17 Gram(s) Oral daily PRN Constipation  traMADol 50 milliGRAM(s) Oral three times a day PRN Moderate Pain (4 - 6)      Allergies    No Known Allergies    Intolerances      Review of Systems:  Constitutional: No fever  Eyes: No blurry vision  Neuro: No tremors  HEENT: No pain  Cardiovascular: No chest pain, palpitations  Respiratory: No SOB, no cough  GI: No nausea, vomiting, abdominal pain  : No dysuria  Skin: no rash  Psych: no depression  Endocrine: no polyuria, polydipsia  Hem/lymph: no swelling  Osteoporosis: no fractures    ALL OTHER SYSTEMS REVIEWED AND NEGATIVE    UNABLE TO OBTAIN    PHYSICAL EXAM:  VITALS: T(C): 36.4 (03-20-21 @ 09:14)  T(F): 97.5 (03-20-21 @ 09:14), Max: 98.5 (03-19-21 @ 16:04)  HR: 88 (03-20-21 @ 09:14) (74 - 97)  BP: 160/88 (03-20-21 @ 09:14) (134/74 - 169/92)  RR:  (18 - 18)  SpO2:  (95% - 97%)  Wt(kg): --  GENERAL: NAD, well-groomed, well-developed  EYES: No proptosis, no lid lag, anicteric  HEENT:  Atraumatic, Normocephalic, moist mucous membranes  THYROID: Normal size, no palpable nodules  RESPIRATORY: Clear to auscultation bilaterally; No rales, rhonchi, wheezing, or rubs  CARDIOVASCULAR: Regular rate and rhythm; No murmurs; no peripheral edema  GI: Soft, nontender, non distended, normal bowel sounds  SKIN: Dry, intact, No rashes or lesions  MUSCULOSKELETAL: Full range of motion, normal strength  NEURO: sensation intact, extraocular movements intact, no tremor, normal reflexes  PSYCH: Alert and oriented x 3, normal affect, normal mood  CUSHING'S SIGNS: no striae    POCT Blood Glucose.: 204 mg/dL (03-20-21 @ 11:57)  POCT Blood Glucose.: 190 mg/dL (03-20-21 @ 08:09)  POCT Blood Glucose.: 149 mg/dL (03-19-21 @ 21:19)  POCT Blood Glucose.: 117 mg/dL (03-19-21 @ 17:41)  POCT Blood Glucose.: 81 mg/dL (03-19-21 @ 08:06)  POCT Blood Glucose.: 149 mg/dL (03-18-21 @ 22:23)  POCT Blood Glucose.: 170 mg/dL (03-18-21 @ 21:10)  POCT Blood Glucose.: 199 mg/dL (03-18-21 @ 17:01)  POCT Blood Glucose.: 133 mg/dL (03-18-21 @ 12:41)  POCT Blood Glucose.: 106 mg/dL (03-18-21 @ 08:25)  POCT Blood Glucose.: 139 mg/dL (03-17-21 @ 21:37)  POCT Blood Glucose.: 104 mg/dL (03-17-21 @ 17:22)  POCT Blood Glucose.: 102 mg/dL (03-17-21 @ 13:26)      03-20    137  |  105  |  32<H>  ----------------------------<  225<H>  4.7   |  18<L>  |  1.21    EGFR if : 75  EGFR if non : 65    Ca    9.3      03-20    TPro  5.9<L>  /  Alb  2.7<L>  /  TBili  0.2  /  DBili  x   /  AST  33  /  ALT  29  /  AlkPhos  132<H>  03-19          Thyroid Function Tests:  03-15 @ 09:19 TSH 4.86 FreeT4 -- T3 -- Anti TPO -- Anti Thyroglobulin Ab -- TSI --                           Chief Complaint: T2DM    History: Patient is feeling better. States his appetite has improved. Received dex 10 mg yesterday and started on dex 4mg q 6 hours for cauda equina.     MEDICATIONS  (STANDING):  amLODIPine   Tablet 10 milliGRAM(s) Oral daily  atorvastatin 80 milliGRAM(s) Oral at bedtime  dexAMETHasone  Injectable 4 milliGRAM(s) IV Push every 6 hours  dextrose 40% Gel 15 Gram(s) Oral once  dextrose 5%. 1000 milliLiter(s) (50 mL/Hr) IV Continuous <Continuous>  dextrose 5%. 1000 milliLiter(s) (100 mL/Hr) IV Continuous <Continuous>  dextrose 50% Injectable 12.5 Gram(s) IV Push once  dextrose 50% Injectable 25 Gram(s) IV Push once  dextrose 50% Injectable 25 Gram(s) IV Push once  glucagon  Injectable 1 milliGRAM(s) IntraMuscular once  heparin   Injectable 5000 Unit(s) SubCutaneous every 12 hours  insulin glargine Injectable (LANTUS) 20 Unit(s) SubCutaneous at bedtime  insulin lispro (ADMELOG) corrective regimen sliding scale   SubCutaneous at bedtime  insulin lispro (ADMELOG) corrective regimen sliding scale   SubCutaneous three times a day before meals  insulin lispro Injectable (ADMELOG) 4 Unit(s) SubCutaneous three times a day before meals  lactated ringers. 1000 milliLiter(s) (50 mL/Hr) IV Continuous <Continuous>  metoprolol succinate ER 50 milliGRAM(s) Oral daily    MEDICATIONS  (PRN):  acetaminophen   Tablet .. 650 milliGRAM(s) Oral every 6 hours PRN Mild Pain (1 - 3)  polyethylene glycol 3350 17 Gram(s) Oral daily PRN Constipation  traMADol 50 milliGRAM(s) Oral three times a day PRN Moderate Pain (4 - 6)      Allergies    No Known Allergies    Intolerances      Review of Systems:  Constitutional: No fever  Eyes: No blurry vision  Neuro: No tremors  HEENT: No pain  Cardiovascular: No chest pain, palpitations  Respiratory: No SOB, no cough  GI: No nausea, vomiting, abdominal pain  : No dysuria  Skin: no rash  Psych: no depression  Endocrine: no polyuria, polydipsia      ALL OTHER SYSTEMS REVIEWED AND NEGATIVE        PHYSICAL EXAM:  VITALS: T(C): 36.4 (03-20-21 @ 09:14)  T(F): 97.5 (03-20-21 @ 09:14), Max: 98.5 (03-19-21 @ 16:04)  HR: 88 (03-20-21 @ 09:14) (74 - 97)  BP: 160/88 (03-20-21 @ 09:14) (134/74 - 169/92)  RR:  (18 - 18)  SpO2:  (95% - 97%)  Wt(kg): --  GENERAL: NAD, well-groomed, well-developed  EYES: No proptosis, no lid lag, anicteric  HEENT:  Atraumatic, Normocephalic, moist mucous membranes  RESPIRATORY: Clear to auscultation bilaterally; No rales, rhonchi, wheezing, or rubs  CARDIOVASCULAR: Regular rate and rhythm  GI: Soft, nontender, non distended, normal bowel sounds  SKIN: Dry, intact, No rashes or lesions  PSYCH: Alert and oriented x 3, normal affect, normal mood      POCT Blood Glucose.: 204 mg/dL (03-20-21 @ 11:57)  POCT Blood Glucose.: 190 mg/dL (03-20-21 @ 08:09)  POCT Blood Glucose.: 149 mg/dL (03-19-21 @ 21:19)  POCT Blood Glucose.: 117 mg/dL (03-19-21 @ 17:41)  POCT Blood Glucose.: 81 mg/dL (03-19-21 @ 08:06)  POCT Blood Glucose.: 149 mg/dL (03-18-21 @ 22:23)  POCT Blood Glucose.: 170 mg/dL (03-18-21 @ 21:10)  POCT Blood Glucose.: 199 mg/dL (03-18-21 @ 17:01)  POCT Blood Glucose.: 133 mg/dL (03-18-21 @ 12:41)  POCT Blood Glucose.: 106 mg/dL (03-18-21 @ 08:25)  POCT Blood Glucose.: 139 mg/dL (03-17-21 @ 21:37)  POCT Blood Glucose.: 104 mg/dL (03-17-21 @ 17:22)  POCT Blood Glucose.: 102 mg/dL (03-17-21 @ 13:26)      03-20    137  |  105  |  32<H>  ----------------------------<  225<H>  4.7   |  18<L>  |  1.21    EGFR if : 75  EGFR if non : 65    Ca    9.3      03-20    TPro  5.9<L>  /  Alb  2.7<L>  /  TBili  0.2  /  DBili  x   /  AST  33  /  ALT  29  /  AlkPhos  132<H>  03-19          Thyroid Function Tests:  03-15 @ 09:19 TSH 4.86 FreeT4 -- T3 -- Anti TPO -- Anti Thyroglobulin Ab -- TSI --

## 2021-03-20 NOTE — PROGRESS NOTE ADULT - ASSESSMENT
Impression:  This is a 60y year old Male  who presents with the chief complaint of low back pain and leg weakness. 59yo M with hx HTN, DM, HLD, COVID 2020 presents with LE pain and scrotal swelling. Patient was hospitalized in [facility] in Feb 2021 where CT Ab/P found 5.8x2.5cm lesion on the right pelvis concerning for malignancy with sclerotic lesion at L5. Pt states since early 2021 inc pain and weakness in the right leg, at first he was limping but now cannot walk.  He has pain in the feet, describes as pinching and needles like sensation.  Starting to have sx in left leg as well. Patient left AMA before additional malignancy workup was performed.  In the last month, patient had 45lb unintentional weight loss and decreased appetite. Notes abdominal swelling, mostly on the left side with tenderness along with b/l scrotal swelling.  At one point given neurontin, not helpful.  Denies sx in arms, changes in speech, swallow, vision, bladder control.  Says issues with bowel movements.     MRI done showing diffuse nerve root enhancement involving the roots of the cauda equina with some mild root thickening though the dominant finding is the enhancement.  There is also a nonspecific lesion in the L5 vertebral body and a possible septic facet at L4-5.       lymph node biopsy done with frozen section suggestive of B cell lymphoma  s/p LP , high lymphocytes and slightly elevated protein so far. no growth on cx, awaiting cytology    Diagnosis:  Suspect B-cell lymphoma with leptomeningeal spread  less likely but still possible would be infectious etiology given possible septic L4-5 facet and potential immunosuppression in setting of possible lymphoma   doubt demyelinating (AIDP or CIDP)    Plan:  LP for CSF: cell count, cell culture, gram stain, protein, glucose, fungal culture, AFB (not enough CSF sent to get AFB), cryptococcal, VDRL, HSV, PCR for infections, and perhaps most importantly cytology and flow cytometry.  Sending a broad w/up given wide differential  -heme/ onc following suspected B Cell lymphoma,   -awaiting cytology for further plan. steroids per heme, started decadron.    CT chest not suspicious for sarcoid    pain control, PT if able  avoid deconditioning    further plan pending more CSF results and per heme/onc

## 2021-03-20 NOTE — PROGRESS NOTE ADULT - ASSESSMENT
pt w/ scrotal swellin g / pelvic lesion/ weight loss    onc f/u noted  started on steroids   excisional Ln bx by sx  + for Bcell  f/u final path   tx as per onc  neg  lext dopplers  dm/uncontrolled  fsg riss  c/w insulin  endo f/u  dvt proph  htn  c/w meds  mri noted  neuro f/u  lp  pending  neurosx eval noted  id eval noted  walking difficulty/spinal stenosis   neuro eval noted  card  f/u  urology f/u noted  no sx now  b/l hydrocele  bowel regimen

## 2021-03-20 NOTE — PROGRESS NOTE ADULT - SUBJECTIVE AND OBJECTIVE BOX
CARDIOLOGY FOLLOW UP NOTE - DR. GAMINO    Subjective:    denies chest pain, dyspnea, palpitations, dizziness  ROS: otherwise negative   overnight events:      PHYSICAL EXAM:  T(C): 36.7 (03-20-21 @ 12:35), Max: 36.9 (03-19-21 @ 16:04)  HR: 78 (03-20-21 @ 12:35) (78 - 97)  BP: 161/85 (03-20-21 @ 12:35) (138/73 - 169/92)  RR: 18 (03-20-21 @ 12:35) (18 - 18)  SpO2: 95% (03-20-21 @ 12:35) (95% - 97%)  Wt(kg): --  I&O's Summary    19 Mar 2021 07:01  -  20 Mar 2021 07:00  --------------------------------------------------------  IN: 0 mL / OUT: 1400 mL / NET: -1400 mL    20 Mar 2021 07:01  -  20 Mar 2021 13:42  --------------------------------------------------------  IN: 300 mL / OUT: 400 mL / NET: -100 mL      Daily     Daily     Appearance: Normal	  Cardiovascular: Normal S1 S2,RRR, No JVD, No murmurs  Respiratory: Lungs clear to auscultation	  Gastrointestinal:  Soft, Non-tender, + BS	  Extremities: Normal range of motion, No clubbing, cyanosis or edema      Home Medications:  amlodipine-benazepril 10 mg-40 mg oral capsule: 1 cap(s) orally once a day (15 Mar 2021 11:15)  HumaLOG 100 units/mL injectable solution: 8 unit(s) injectable 3 times a day (before meals) (15 Mar 2021 11:15)  Lipitor 80 mg oral tablet: 1 tab(s) orally once a day (15 Mar 2021 11:15)  Toprol-XL 50 mg oral tablet, extended release: 1 tab(s) orally once a day (15 Mar 2021 11:15)  Tresiba 100 units/mL subcutaneous solution: 40 unit(s) subcutaneous once a day (at bedtime) (15 Mar 2021 11:15)      MEDICATIONS  (STANDING):  amLODIPine   Tablet 10 milliGRAM(s) Oral daily  atorvastatin 80 milliGRAM(s) Oral at bedtime  dexAMETHasone  Injectable 4 milliGRAM(s) IV Push every 6 hours  dextrose 40% Gel 15 Gram(s) Oral once  dextrose 5%. 1000 milliLiter(s) (50 mL/Hr) IV Continuous <Continuous>  dextrose 5%. 1000 milliLiter(s) (100 mL/Hr) IV Continuous <Continuous>  dextrose 50% Injectable 12.5 Gram(s) IV Push once  dextrose 50% Injectable 25 Gram(s) IV Push once  dextrose 50% Injectable 25 Gram(s) IV Push once  glucagon  Injectable 1 milliGRAM(s) IntraMuscular once  heparin   Injectable 5000 Unit(s) SubCutaneous every 12 hours  insulin glargine Injectable (LANTUS) 20 Unit(s) SubCutaneous at bedtime  insulin lispro (ADMELOG) corrective regimen sliding scale   SubCutaneous at bedtime  insulin lispro (ADMELOG) corrective regimen sliding scale   SubCutaneous three times a day before meals  insulin lispro Injectable (ADMELOG) 4 Unit(s) SubCutaneous three times a day before meals  lactated ringers. 1000 milliLiter(s) (50 mL/Hr) IV Continuous <Continuous>  metoprolol succinate ER 50 milliGRAM(s) Oral daily      TELEMETRY: 	    ECG:  	  RADIOLOGY:   DIAGNOSTIC TESTING:  [ ] Echocardiogram:  [ ] Catheterization:  [ ] Stress Test:    OTHER: 	    LABS:	 	    CARDIAC MARKERS:                                17.9   7.18  )-----------( 585      ( 20 Mar 2021 09:21 )             54.4     03-20    137  |  105  |  32<H>  ----------------------------<  225<H>  4.7   |  18<L>  |  1.21    Ca    9.3      20 Mar 2021 09:21    TPro  5.9<L>  /  Alb  2.7<L>  /  TBili  0.2  /  DBili  x   /  AST  33  /  ALT  29  /  AlkPhos  132<H>  03-19    proBNP:     Lipid Profile:   HgA1c:     Creatinine, Serum: 1.21 mg/dL (03-20-21 @ 09:21)  Creatinine, Serum: 1.46 mg/dL (03-19-21 @ 07:16)  Creatinine, Serum: 1.45 mg/dL (03-18-21 @ 07:14)

## 2021-03-20 NOTE — PROGRESS NOTE ADULT - SUBJECTIVE AND OBJECTIVE BOX
DATE OF SERVICE: 03-20-21 @ 10:59  CHIEF COMPLAINT:Patient is a 60y old  Male who presents with a chief complaint of leg pain (18 Mar 2021 12:27)    	        PAST MEDICAL & SURGICAL HISTORY:  Diabetes    Hyperlipidemia    Hypertension, unspecified type    No significant past surgical history            REVIEW OF SYSTEMS:  weak  RESPIRATORY: No cough, wheezing, chills or hemoptysis; No Shortness of Breath  CARDIOVASCULAR: No chest pain, palpitations, passing out, dizziness, or leg swelling  GASTROINTESTINAL: No abdominal or epigastric pain. No nausea, vomiting, or hematemesis;   GENITOURINARY: No dysuria, frequency, hematuria, or incontinence  NEUROLOGICAL: No headaches,   back pain better    Medications:  MEDICATIONS  (STANDING):  amLODIPine   Tablet 10 milliGRAM(s) Oral daily  atorvastatin 80 milliGRAM(s) Oral at bedtime  dexAMETHasone  Injectable 4 milliGRAM(s) IV Push every 6 hours  dextrose 40% Gel 15 Gram(s) Oral once  dextrose 5%. 1000 milliLiter(s) (50 mL/Hr) IV Continuous <Continuous>  dextrose 5%. 1000 milliLiter(s) (100 mL/Hr) IV Continuous <Continuous>  dextrose 50% Injectable 12.5 Gram(s) IV Push once  dextrose 50% Injectable 25 Gram(s) IV Push once  dextrose 50% Injectable 25 Gram(s) IV Push once  glucagon  Injectable 1 milliGRAM(s) IntraMuscular once  heparin   Injectable 5000 Unit(s) SubCutaneous every 12 hours  insulin glargine Injectable (LANTUS) 20 Unit(s) SubCutaneous at bedtime  insulin lispro (ADMELOG) corrective regimen sliding scale   SubCutaneous at bedtime  insulin lispro (ADMELOG) corrective regimen sliding scale   SubCutaneous three times a day before meals  insulin lispro Injectable (ADMELOG) 4 Unit(s) SubCutaneous three times a day before meals  lactated ringers. 1000 milliLiter(s) (50 mL/Hr) IV Continuous <Continuous>  metoprolol succinate ER 50 milliGRAM(s) Oral daily    MEDICATIONS  (PRN):  acetaminophen   Tablet .. 650 milliGRAM(s) Oral every 6 hours PRN Mild Pain (1 - 3)  polyethylene glycol 3350 17 Gram(s) Oral daily PRN Constipation  traMADol 50 milliGRAM(s) Oral three times a day PRN Moderate Pain (4 - 6)    	    PHYSICAL EXAM:  T(C): 36.4 (03-20-21 @ 09:14), Max: 36.9 (03-19-21 @ 16:04)  HR: 88 (03-20-21 @ 09:14) (74 - 97)  BP: 160/88 (03-20-21 @ 09:14) (134/74 - 169/92)  RR: 18 (03-20-21 @ 09:14) (18 - 18)  SpO2: 95% (03-20-21 @ 09:14) (95% - 97%)  Wt(kg): --  I&O's Summary    19 Mar 2021 07:01  -  20 Mar 2021 07:00  --------------------------------------------------------  IN: 0 mL / OUT: 1400 mL / NET: -1400 mL        Appearance: Normal	  HEENT:   Normal oral mucosa, PERRL, EOMI	  Lymphatic: No lymphadenopathy  Cardiovascular: Normal S1 S2, No JVD,   Respiratory: Lungs clear to auscultation	  Psychiatry: A & O   Gastrointestinal:  Soft, Non-tender, + BS	  Skin: No rashes, No ecchymoses, No cyanosis	  Neurologic: Non-focal/motor equal sensory intact   Extremities: Normal range of motion, No clubbing,   Vascular: Peripheral pulses palpable    TELEMETRY: 	    ECG:  	  RADIOLOGY:  OTHER: 	  	  LABS:	 	    CARDIAC MARKERS:                                17.9   7.18  )-----------( 585      ( 20 Mar 2021 09:21 )             54.4     03-20    137  |  105  |  32<H>  ----------------------------<  225<H>  4.7   |  18<L>  |  1.21    Ca    9.3      20 Mar 2021 09:21    TPro  5.9<L>  /  Alb  2.7<L>  /  TBili  0.2  /  DBili  x   /  AST  33  /  ALT  29  /  AlkPhos  132<H>  03-19    proBNP:   Lipid Profile:   HgA1c:   TSH:

## 2021-03-20 NOTE — PROGRESS NOTE ADULT - ASSESSMENT
Seen and examined with wife present  Denies complaints    acetaminophen   Tablet .. 650 milliGRAM(s) Oral every 6 hours PRN  amLODIPine   Tablet 10 milliGRAM(s) Oral daily  atorvastatin 80 milliGRAM(s) Oral at bedtime  dexAMETHasone  Injectable 4 milliGRAM(s) IV Push every 6 hours  dextrose 40% Gel 15 Gram(s) Oral once  dextrose 5%. 1000 milliLiter(s) IV Continuous <Continuous>  dextrose 5%. 1000 milliLiter(s) IV Continuous <Continuous>  dextrose 50% Injectable 12.5 Gram(s) IV Push once  dextrose 50% Injectable 25 Gram(s) IV Push once  dextrose 50% Injectable 25 Gram(s) IV Push once  glucagon  Injectable 1 milliGRAM(s) IntraMuscular once  heparin   Injectable 5000 Unit(s) SubCutaneous every 12 hours  insulin glargine Injectable (LANTUS) 24 Unit(s) SubCutaneous at bedtime  insulin lispro (ADMELOG) corrective regimen sliding scale   SubCutaneous at bedtime  insulin lispro (ADMELOG) corrective regimen sliding scale   SubCutaneous three times a day before meals  insulin lispro Injectable (ADMELOG) 6 Unit(s) SubCutaneous three times a day before meals  lactated ringers. 1000 milliLiter(s) IV Continuous <Continuous>  metoprolol succinate ER 50 milliGRAM(s) Oral daily  polyethylene glycol 3350 17 Gram(s) Oral daily PRN  traMADol 50 milliGRAM(s) Oral three times a day PRN      VITAL:  T(C): , Max: 36.9 (03-19-21 @ 16:04)  T(F): , Max: 98.5 (03-19-21 @ 16:04)  HR: 78 (03-20-21 @ 12:35)  BP: 161/85 (03-20-21 @ 12:35)  BP(mean): --  RR: 18 (03-20-21 @ 12:35)  SpO2: 95% (03-20-21 @ 12:35)  Wt(kg): --    03-19-21 @ 07:01  -  03-20-21 @ 07:00  --------------------------------------------------------  IN: 0 mL / OUT: 1400 mL / NET: -1400 mL    03-20-21 @ 07:01  -  03-20-21 @ 15:48  --------------------------------------------------------  IN: 300 mL / OUT: 400 mL / NET: -100 mL        PHYSICAL EXAM:  Constitutional: NAD  Neck:  No JVD  Respiratory: CTAB/L  Cardiovascular: S1 and S2  Gastrointestinal: BS+, soft, NT/ND  Extremities: No peripheral edema  Neurological: A/O x 3, no focal deficits  Psychiatric: Normal mood, normal affect  : No Weir  Skin: No rashes  Access: Not applicable    LABS:                          17.9   7.18  )-----------( 585      ( 20 Mar 2021 09:21 )             54.4     Na(137)/K(4.7)/Cl(105)/HCO3(18)/BUN(32)/Cr(1.21)Glu(225)/Ca(9.3)/Mg(--)/PO4(--)    03-20 @ 09:21  Na(138)/K(3.7)/Cl(103)/HCO3(22)/BUN(30)/Cr(1.46)Glu(98)/Ca(8.9)/Mg(--)/PO4(--)    03-19 @ 07:16  Na(138)/K(3.8)/Cl(105)/HCO3(20)/BUN(27)/Cr(1.45)Glu(107)/Ca(8.9)/Mg(--)/PO4(--)    03-18 @ 07:14            ASSESSMENT/PLAN  61yo M with hx HTN, DM, HLD, COVID 2020 presents with LE pain and scrotal swelling    - GUANAKO -resolving/ resolved       -Lytes controlled    -CV- BP  sub optimally controlled    -Heme/onc- Eval noted for pending pathology/ cytology from lymph node and CSF pending    - Renal dosing of meds to CrCL of 50ml/min           Jose Enrique Pedraza NP-BC  [employer]  [phone]

## 2021-03-20 NOTE — PROGRESS NOTE ADULT - ASSESSMENT
Echo 3/18/21: min MR, nl lv sys fx, small-moderate pericardial effusion, no evidence of pericardial tamponade     a/p  61 yo M with hx HTN, DM, HLD, COVID 2020 presents with LE pain and scrotal swelling    1. LE pain/weakness/scrotal swelling  -concern for malignancy  -s/p LN excisional biopsy to r/o lymphoma- frozen + diffuse B cell, follow up pathology   -MRI noted  -UA noted  -LE duplex neg for DVT  -urology eval noted - No  intervention during this admission  -neurosx eval noted -no interventions at this time, possibly benefit from L5/S1 decompression fusion after workup of lymph node  -s/p LP   -f/u heme/neuro     2. HTN  -bp elevated  -c/w amlodipine and bb, inc toprol to 75mg daily     3. HLD  -c/w statin    4. GUANAKO  -cr improved  -renal f/u    5. Pericardial effusion  -echo noted with min MR, nl lv sys fx, small-moderate pericardial effusion, no evidence of pericardial tamponade   -cont to monitor     dvt ppx

## 2021-03-20 NOTE — PROGRESS NOTE ADULT - ASSESSMENT
61yo M with hx HTN, DM2, HLD, COVID 2020 presents with LE pain and scrotal swelling. Patient was hospitalized in [facility] in Feb 2021 where CT Ab/P found 5.8x2.5cm lesion on the right pelvis concerning for malignancy with sclerotic lesion at L5. Patient left AMA before additional malignancy workup was performed. Admitted for malignancy workup.   Endocrine consult requested for management of uncontrolled DM2. A1c 10.6%    Uncontrolled DM2 c/b neuropathy   A1c 10.6%, goal <7%  FS goal 100-180 mg/dL  FS premeal and qhs   Carb consistent diet   C-peptide normal, not obtained with BMP, not suggestive of DM1  Agree with Lantus 20 units qhs   Recommend Admelog 4 units TIDac, hold if NPO   Recommend low correctional scale premeal and qhs     On Discharge recommend tresiba and NOvolog  Recommend follow up with Endo in [place], Patient to find out the name    HTN   goal <130/80  Continue current regimen   Currently on amlodipine and metoprolol, ACEI held due to GUANAKO     HLD  goal LDL<70   Continue stain     Abnormal TFT  TSH mildly elevated, 4.8  Please repeat TFT 6 weeks after discharge      61yo M with hx HTN, DM2, HLD, COVID 2020 presents with LE pain and scrotal swelling. Patient was hospitalized in [facility] in Feb 2021 where CT Ab/P found 5.8x2.5cm lesion on the right pelvis concerning for malignancy with sclerotic lesion at L5. Now with steroid exacerbated hyperglycemia due to being started on decadron for cauda equina.   Endocrine on board for management of uncontrolled DM2. A1c 10.6%    Uncontrolled DM2 c/b neuropathy   A1c 10.6%, goal <7%  FS goal 100-180 mg/dL  FS premeal and qhs   Carb consistent diet   C-peptide normal, not obtained with BMP, not suggestive of DM1  Increase Lantus 24 units qhs   Recommend increase Admelog 6 units TIDac, hold if NPO   Recommend mod correctional scale premeal and qhs     On Discharge recommend tresiba and NOvolog  Recommend follow up with Endo in [place], Patient to find out the name    HTN   goal <130/80  Continue current regimen   Currently on amlodipine and metoprolol, ACEI held due to GUANAKO     HLD  goal LDL<70   Continue stain     Abnormal TFT  TSH mildly elevated, 4.8  Please repeat TFT 6 weeks after discharge

## 2021-03-21 LAB
CK MB CFR SERPL CALC: 1.8 NG/ML — SIGNIFICANT CHANGE UP (ref 0–6.7)
CK SERPL-CCNC: 40 U/L — SIGNIFICANT CHANGE UP (ref 30–200)
GAMMA INTERFERON BACKGROUND BLD IA-ACNC: 0.02 IU/ML — SIGNIFICANT CHANGE UP
GLUCOSE BLDC GLUCOMTR-MCNC: 159 MG/DL — HIGH (ref 70–99)
GLUCOSE BLDC GLUCOMTR-MCNC: 199 MG/DL — HIGH (ref 70–99)
GLUCOSE BLDC GLUCOMTR-MCNC: 257 MG/DL — HIGH (ref 70–99)
GLUCOSE BLDC GLUCOMTR-MCNC: 320 MG/DL — HIGH (ref 70–99)
M TB IFN-G BLD-IMP: ABNORMAL
M TB IFN-G CD4+ BCKGRND COR BLD-ACNC: -0.01 IU/ML — SIGNIFICANT CHANGE UP
M TB IFN-G CD4+CD8+ BCKGRND COR BLD-ACNC: -0.01 IU/ML — SIGNIFICANT CHANGE UP
QUANT TB PLUS MITOGEN MINUS NIL: 0.46 IU/ML — SIGNIFICANT CHANGE UP
SARS-COV-2 RNA SPEC QL NAA+PROBE: SIGNIFICANT CHANGE UP
TROPONIN T, HIGH SENSITIVITY RESULT: 17 NG/L — SIGNIFICANT CHANGE UP (ref 0–51)

## 2021-03-21 PROCEDURE — 93010 ELECTROCARDIOGRAM REPORT: CPT

## 2021-03-21 PROCEDURE — 99232 SBSQ HOSP IP/OBS MODERATE 35: CPT

## 2021-03-21 RX ORDER — SENNA PLUS 8.6 MG/1
2 TABLET ORAL AT BEDTIME
Refills: 0 | Status: DISCONTINUED | OUTPATIENT
Start: 2021-03-21 | End: 2021-03-31

## 2021-03-21 RX ORDER — INSULIN LISPRO 100/ML
8 VIAL (ML) SUBCUTANEOUS
Refills: 0 | Status: DISCONTINUED | OUTPATIENT
Start: 2021-03-21 | End: 2021-03-25

## 2021-03-21 RX ORDER — INSULIN GLARGINE 100 [IU]/ML
30 INJECTION, SOLUTION SUBCUTANEOUS AT BEDTIME
Refills: 0 | Status: DISCONTINUED | OUTPATIENT
Start: 2021-03-21 | End: 2021-03-25

## 2021-03-21 RX ADMIN — SENNA PLUS 2 TABLET(S): 8.6 TABLET ORAL at 22:07

## 2021-03-21 RX ADMIN — Medication 8: at 08:51

## 2021-03-21 RX ADMIN — TRAMADOL HYDROCHLORIDE 50 MILLIGRAM(S): 50 TABLET ORAL at 20:00

## 2021-03-21 RX ADMIN — INSULIN GLARGINE 30 UNIT(S): 100 INJECTION, SOLUTION SUBCUTANEOUS at 22:07

## 2021-03-21 RX ADMIN — TRAMADOL HYDROCHLORIDE 50 MILLIGRAM(S): 50 TABLET ORAL at 18:02

## 2021-03-21 RX ADMIN — HEPARIN SODIUM 5000 UNIT(S): 5000 INJECTION INTRAVENOUS; SUBCUTANEOUS at 05:43

## 2021-03-21 RX ADMIN — Medication 50 MILLIGRAM(S): at 05:43

## 2021-03-21 RX ADMIN — TRAMADOL HYDROCHLORIDE 50 MILLIGRAM(S): 50 TABLET ORAL at 23:53

## 2021-03-21 RX ADMIN — Medication 4 MILLIGRAM(S): at 00:20

## 2021-03-21 RX ADMIN — Medication 6: at 17:43

## 2021-03-21 RX ADMIN — HEPARIN SODIUM 5000 UNIT(S): 5000 INJECTION INTRAVENOUS; SUBCUTANEOUS at 17:43

## 2021-03-21 RX ADMIN — Medication 8 UNIT(S): at 17:43

## 2021-03-21 RX ADMIN — Medication 4 MILLIGRAM(S): at 05:44

## 2021-03-21 RX ADMIN — TRAMADOL HYDROCHLORIDE 50 MILLIGRAM(S): 50 TABLET ORAL at 06:47

## 2021-03-21 RX ADMIN — TRAMADOL HYDROCHLORIDE 50 MILLIGRAM(S): 50 TABLET ORAL at 06:00

## 2021-03-21 RX ADMIN — Medication 6 UNIT(S): at 08:50

## 2021-03-21 RX ADMIN — AMLODIPINE BESYLATE 10 MILLIGRAM(S): 2.5 TABLET ORAL at 05:43

## 2021-03-21 RX ADMIN — Medication 2: at 13:00

## 2021-03-21 RX ADMIN — Medication 6 UNIT(S): at 13:00

## 2021-03-21 RX ADMIN — Medication 4 MILLIGRAM(S): at 13:01

## 2021-03-21 RX ADMIN — ATORVASTATIN CALCIUM 80 MILLIGRAM(S): 80 TABLET, FILM COATED ORAL at 22:07

## 2021-03-21 NOTE — PROGRESS NOTE ADULT - ASSESSMENT
59yo M with hx HTN, DM2, HLD, COVID 2020 presents with LE pain and scrotal swelling. Patient was hospitalized in [facility] in Feb 2021 where CT Ab/P found 5.8x2.5cm lesion on the right pelvis concerning for malignancy with sclerotic lesion at L5. Now with steroid exacerbated hyperglycemia due to being started on decadron for cauda equina.   Endocrine on board for management of uncontrolled DM2. A1c 10.6%. Tolerating POs w/variable glycemic control. BG goal (100-180mg/dl).

## 2021-03-21 NOTE — PROGRESS NOTE ADULT - ASSESSMENT
Echo 3/18/21: min MR, nl lv sys fx, small-moderate pericardial effusion, no evidence of pericardial tamponade     a/p  59 yo M with hx HTN, DM, HLD, COVID 2020 presents with LE pain and scrotal swelling    1. LE pain/weakness/scrotal swelling  -concern for malignancy  -s/p LN excisional biopsy to r/o lymphoma- frozen + diffuse B cell, follow up pathology   -MRI noted  -UA noted  -LE duplex neg for DVT  -urology eval noted - No  intervention during this admission  -neurosx eval noted -no interventions at this time, possibly benefit from L5/S1 decompression fusion after workup of lymph node  -s/p LP   -f/u heme/neuro     2. HTN  -bp elevated  -c/w amlodipine and bb, inc toprol to 75mg daily     3. HLD  -c/w statin    4. GUANAKO  -cr improved  -renal f/u    5. Pericardial effusion  -echo noted with min MR, nl lv sys fx, small-moderate pericardial effusion, no evidence of pericardial tamponade   -cont to monitor     dvt ppx

## 2021-03-21 NOTE — PROGRESS NOTE ADULT - SUBJECTIVE AND OBJECTIVE BOX
DATE OF SERVICE: 03-21-21 @ 11:39  CHIEF COMPLAINT:Patient is a 60y old  Male who presents with a chief complaint of leg weakness (20 Mar 2021 15:41)    	        PAST MEDICAL & SURGICAL HISTORY:  Diabetes    Hyperlipidemia    Hypertension, unspecified type    No significant past surgical history              NECK: No pain or stiffness  RESPIRATORY: No cough, wheezing, chills or hemoptysis; No Shortness of Breath  CARDIOVASCULAR: No chest pain, palpitations, passing out, dizziness,   GASTROINTESTINAL: No abdominal or epigastric pain. No nausea, vomiting, or hematemesis;  GENITOURINARY: No dysuria, frequency, hematuria, or incontinence  NEUROLOGICAL: No headaches, memory loss,dec pain     Medications:  MEDICATIONS  (STANDING):  amLODIPine   Tablet 10 milliGRAM(s) Oral daily  atorvastatin 80 milliGRAM(s) Oral at bedtime  dexAMETHasone  Injectable 4 milliGRAM(s) IV Push every 6 hours  dextrose 40% Gel 15 Gram(s) Oral once  dextrose 5%. 1000 milliLiter(s) (50 mL/Hr) IV Continuous <Continuous>  dextrose 5%. 1000 milliLiter(s) (100 mL/Hr) IV Continuous <Continuous>  dextrose 50% Injectable 12.5 Gram(s) IV Push once  dextrose 50% Injectable 25 Gram(s) IV Push once  dextrose 50% Injectable 25 Gram(s) IV Push once  glucagon  Injectable 1 milliGRAM(s) IntraMuscular once  heparin   Injectable 5000 Unit(s) SubCutaneous every 12 hours  insulin glargine Injectable (LANTUS) 24 Unit(s) SubCutaneous at bedtime  insulin lispro (ADMELOG) corrective regimen sliding scale   SubCutaneous at bedtime  insulin lispro (ADMELOG) corrective regimen sliding scale   SubCutaneous three times a day before meals  insulin lispro Injectable (ADMELOG) 6 Unit(s) SubCutaneous three times a day before meals  lactated ringers. 1000 milliLiter(s) (50 mL/Hr) IV Continuous <Continuous>  metoprolol succinate ER 50 milliGRAM(s) Oral daily    MEDICATIONS  (PRN):  acetaminophen   Tablet .. 650 milliGRAM(s) Oral every 6 hours PRN Mild Pain (1 - 3)  polyethylene glycol 3350 17 Gram(s) Oral daily PRN Constipation  traMADol 50 milliGRAM(s) Oral three times a day PRN Moderate Pain (4 - 6)    	    PHYSICAL EXAM:  T(C): 37.1 (03-21-21 @ 07:52), Max: 37.1 (03-21-21 @ 07:52)  HR: 78 (03-21-21 @ 07:52) (77 - 88)  BP: 155/85 (03-21-21 @ 07:52) (155/85 - 170/90)  RR: 18 (03-21-21 @ 07:52) (18 - 18)  SpO2: 97% (03-21-21 @ 07:52) (94% - 97%)  Wt(kg): --  I&O's Summary    20 Mar 2021 07:01  -  21 Mar 2021 07:00  --------------------------------------------------------  IN: 660 mL / OUT: 2250 mL / NET: -1590 mL        Appearance: Normal	  HEENT:   Normal oral mucosa, PERRL, EOMI	  Lymphatic: No lymphadenopathy  Cardiovascular: Normal S1 S2, No JVD, No murmurs  Respiratory: Lungs clear to auscultation	  Psychiatry: A & O   Gastrointestinal:  Soft, Non-tender, + BS	  Skin: No rashes, No ecchymoses, No cyanosis	  Neurologic: Non-focal  Extremities: Normal range of motion, No clubbing, cyanosis  Vascular: Peripheral pulses palpable     TELEMETRY: 	    ECG:  	  RADIOLOGY:  OTHER: 	  	  LABS:	 	    CARDIAC MARKERS:                                17.9   7.18  )-----------( 585      ( 20 Mar 2021 09:21 )             54.4     03-20    137  |  105  |  32<H>  ----------------------------<  225<H>  4.7   |  18<L>  |  1.21    Ca    9.3      20 Mar 2021 09:21      proBNP:   Lipid Profile:   HgA1c:   TSH:

## 2021-03-21 NOTE — PROGRESS NOTE ADULT - PROBLEM SELECTOR PLAN 1
-test BG AC/HS  -Increase Lantus 30 units QHS  -Increase Admelog 8 units AC meals  -c/w Admelog moderate correction scale AC and mod HS scale  Carb consistent diet   C-peptide normal, not obtained with BMP, not suggestive of DM1  -notify endocrine team if steroid dose changed or discontinued  On Discharge recommend tresiba and NOvolog  Recommend follow up with Endo in [place], Patient to find out the name

## 2021-03-21 NOTE — PROGRESS NOTE ADULT - SUBJECTIVE AND OBJECTIVE BOX
Diabetes Follow up note:    Chief complaint: Uncontrolled T2DM w/steroid induced hyperglycemia    Interval Hx: Pt remains on decadron 4mg Q6h. Fasting glucose >300mg/dl but improved to mid 100s prior to lunchtime. Pt seen at bedside. Reports good appetite.     Review of Systems:  General: + leg weakness/tingling in feet  GI: Tolerating POs. Denies N/V/D/Abd pain  CV: Denies CP/SOB  ENDO: No S&Sx of hypoglycemia  MEDS:  atorvastatin 80 milliGRAM(s) Oral at bedtime  dexAMETHasone  Injectable 4 milliGRAM(s) IV Push every 6 hours  insulin glargine Injectable (LANTUS) 24 Unit(s) SubCutaneous at bedtime  insulin lispro (ADMELOG) corrective regimen sliding scale   SubCutaneous at bedtime  insulin lispro (ADMELOG) corrective regimen sliding scale   SubCutaneous three times a day before meals  insulin lispro Injectable (ADMELOG) 6 Unit(s) SubCutaneous three times a day before meals      Allergies    No Known Allergies          PE:  General: Male lying in bed. NAD.   Vital Signs Last 24 Hrs  T(C): 37.1 (21 Mar 2021 07:52), Max: 37.1 (21 Mar 2021 07:52)  T(F): 98.8 (21 Mar 2021 07:52), Max: 98.8 (21 Mar 2021 07:52)  HR: 78 (21 Mar 2021 07:52) (77 - 88)  BP: 155/85 (21 Mar 2021 07:52) (155/85 - 170/90)  BP(mean): --  RR: 18 (21 Mar 2021 07:52) (18 - 18)  SpO2: 97% (21 Mar 2021 07:52) (94% - 97%)  Abd: Soft, NT,ND,   Extremities: Warm. no edema.   Neuro: A&O X3.    LABS:  POCT Blood Glucose.: 159 mg/dL (03-21-21 @ 12:32)  POCT Blood Glucose.: 320 mg/dL (03-21-21 @ 08:22)  POCT Blood Glucose.: 229 mg/dL (03-20-21 @ 21:07)  POCT Blood Glucose.: 336 mg/dL (03-20-21 @ 17:16)  POCT Blood Glucose.: 204 mg/dL (03-20-21 @ 11:57)  POCT Blood Glucose.: 190 mg/dL (03-20-21 @ 08:09)  POCT Blood Glucose.: 149 mg/dL (03-19-21 @ 21:19)  POCT Blood Glucose.: 117 mg/dL (03-19-21 @ 17:41)  POCT Blood Glucose.: 81 mg/dL (03-19-21 @ 08:06)  POCT Blood Glucose.: 149 mg/dL (03-18-21 @ 22:23)  POCT Blood Glucose.: 170 mg/dL (03-18-21 @ 21:10)  POCT Blood Glucose.: 199 mg/dL (03-18-21 @ 17:01)                            17.9   7.18  )-----------( 585      ( 20 Mar 2021 09:21 )             54.4       03-20    137  |  105  |  32<H>  ----------------------------<  225<H>  4.7   |  18<L>  |  1.21    Ca    9.3      20 Mar 2021 09:21        Thyroid Function Tests:  03-15 @ 09:19 TSH 4.86 FreeT4 -- T3 -- Anti TPO -- Anti Thyroglobulin Ab -- TSI --      A1C with Estimated Average Glucose Result: 10.6 % (03-15-21 @ 08:59)      C-Peptide, Serum: 2.2 ng/mL (03-16 @ 09:15)      Contact number: beeper [phone] or [phone]

## 2021-03-21 NOTE — PROGRESS NOTE ADULT - ASSESSMENT
pt w/ scrotal swellin g / pelvic lesion/ weight loss    onc f/u noted  started on steroids   excisional Ln bx by sx  + for Bcell  f/u final path   tx as per onc  neg  lext dopplers  dm/uncontrolled  fsg riss  c/w insulin  endo f/u  dvt proph  htn  c/w meds  mri noted  neuro f/u  lp  neg thus far for leptomeningeal disease  neurosx eval noted  will need reeval ? sx now while in hospital  id eval noted  walking difficulty/spinal stenosis   neuro eval noted  card  f/u  urology f/u noted  no sx now  b/l hydrocele  bowel regimen

## 2021-03-21 NOTE — PROGRESS NOTE ADULT - SUBJECTIVE AND OBJECTIVE BOX
CARDIOLOGY FOLLOW UP NOTE - DR. GAMINO    Subjective:    denies chest pain, dyspnea, palpitations, dizziness  ROS: otherwise negative   overnight events:      PHYSICAL EXAM:  T(C): 37.1 (03-21-21 @ 07:52), Max: 37.1 (03-21-21 @ 07:52)  HR: 78 (03-21-21 @ 07:52) (77 - 88)  BP: 155/85 (03-21-21 @ 07:52) (155/85 - 170/90)  RR: 18 (03-21-21 @ 07:52) (18 - 18)  SpO2: 97% (03-21-21 @ 07:52) (94% - 97%)  Wt(kg): --  I&O's Summary    20 Mar 2021 07:01  -  21 Mar 2021 07:00  --------------------------------------------------------  IN: 660 mL / OUT: 2250 mL / NET: -1590 mL      Daily     Daily     Appearance: Normal	  Cardiovascular: Normal S1 S2,RRR, No JVD, No murmurs  Respiratory: Lungs clear to auscultation	  Gastrointestinal:  Soft, Non-tender, + BS	  Extremities: Normal range of motion, No clubbing, cyanosis or edema      Home Medications:  amlodipine-benazepril 10 mg-40 mg oral capsule: 1 cap(s) orally once a day (15 Mar 2021 11:15)  HumaLOG 100 units/mL injectable solution: 8 unit(s) injectable 3 times a day (before meals) (15 Mar 2021 11:15)  Lipitor 80 mg oral tablet: 1 tab(s) orally once a day (15 Mar 2021 11:15)  Toprol-XL 50 mg oral tablet, extended release: 1 tab(s) orally once a day (15 Mar 2021 11:15)  Tresiba 100 units/mL subcutaneous solution: 40 unit(s) subcutaneous once a day (at bedtime) (15 Mar 2021 11:15)      MEDICATIONS  (STANDING):  amLODIPine   Tablet 10 milliGRAM(s) Oral daily  atorvastatin 80 milliGRAM(s) Oral at bedtime  dexAMETHasone  Injectable 4 milliGRAM(s) IV Push every 6 hours  dextrose 40% Gel 15 Gram(s) Oral once  dextrose 5%. 1000 milliLiter(s) (50 mL/Hr) IV Continuous <Continuous>  dextrose 5%. 1000 milliLiter(s) (100 mL/Hr) IV Continuous <Continuous>  dextrose 50% Injectable 12.5 Gram(s) IV Push once  dextrose 50% Injectable 25 Gram(s) IV Push once  dextrose 50% Injectable 25 Gram(s) IV Push once  glucagon  Injectable 1 milliGRAM(s) IntraMuscular once  heparin   Injectable 5000 Unit(s) SubCutaneous every 12 hours  insulin glargine Injectable (LANTUS) 30 Unit(s) SubCutaneous at bedtime  insulin lispro (ADMELOG) corrective regimen sliding scale   SubCutaneous at bedtime  insulin lispro (ADMELOG) corrective regimen sliding scale   SubCutaneous three times a day before meals  insulin lispro Injectable (ADMELOG) 8 Unit(s) SubCutaneous three times a day before meals  lactated ringers. 1000 milliLiter(s) (50 mL/Hr) IV Continuous <Continuous>  metoprolol succinate ER 50 milliGRAM(s) Oral daily      TELEMETRY: 	    ECG:  	  RADIOLOGY:   DIAGNOSTIC TESTING:  [ ] Echocardiogram:  [ ] Catheterization:  [ ] Stress Test:    OTHER: 	    LABS:	 	    CARDIAC MARKERS:                                17.9   7.18  )-----------( 585      ( 20 Mar 2021 09:21 )             54.4     03-20    137  |  105  |  32<H>  ----------------------------<  225<H>  4.7   |  18<L>  |  1.21    Ca    9.3      20 Mar 2021 09:21      proBNP:     Lipid Profile:   HgA1c:     Creatinine, Serum: 1.21 mg/dL (03-20-21 @ 09:21)  Creatinine, Serum: 1.46 mg/dL (03-19-21 @ 07:16)

## 2021-03-22 LAB
CULTURE RESULTS: NO GROWTH — SIGNIFICANT CHANGE UP
GLUCOSE BLDC GLUCOMTR-MCNC: 147 MG/DL — HIGH (ref 70–99)
GLUCOSE BLDC GLUCOMTR-MCNC: 167 MG/DL — HIGH (ref 70–99)
GLUCOSE BLDC GLUCOMTR-MCNC: 201 MG/DL — HIGH (ref 70–99)
GLUCOSE BLDC GLUCOMTR-MCNC: 90 MG/DL — SIGNIFICANT CHANGE UP (ref 70–99)
SPECIMEN SOURCE: SIGNIFICANT CHANGE UP
VDRL CSF-TITR: SIGNIFICANT CHANGE UP

## 2021-03-22 PROCEDURE — 99232 SBSQ HOSP IP/OBS MODERATE 35: CPT

## 2021-03-22 PROCEDURE — 99232 SBSQ HOSP IP/OBS MODERATE 35: CPT | Mod: GC

## 2021-03-22 RX ORDER — MAGNESIUM HYDROXIDE 400 MG/1
30 TABLET, CHEWABLE ORAL DAILY
Refills: 0 | Status: DISCONTINUED | OUTPATIENT
Start: 2021-03-22 | End: 2021-03-31

## 2021-03-22 RX ORDER — METOPROLOL TARTRATE 50 MG
75 TABLET ORAL DAILY
Refills: 0 | Status: DISCONTINUED | OUTPATIENT
Start: 2021-03-23 | End: 2021-03-31

## 2021-03-22 RX ORDER — METOPROLOL TARTRATE 50 MG
25 TABLET ORAL ONCE
Refills: 0 | Status: COMPLETED | OUTPATIENT
Start: 2021-03-22 | End: 2021-03-22

## 2021-03-22 RX ADMIN — Medication 8 UNIT(S): at 09:10

## 2021-03-22 RX ADMIN — Medication 25 MILLIGRAM(S): at 18:09

## 2021-03-22 RX ADMIN — Medication 4 MILLIGRAM(S): at 05:20

## 2021-03-22 RX ADMIN — Medication 2: at 13:13

## 2021-03-22 RX ADMIN — Medication 8 UNIT(S): at 13:12

## 2021-03-22 RX ADMIN — Medication 4 MILLIGRAM(S): at 13:14

## 2021-03-22 RX ADMIN — HEPARIN SODIUM 5000 UNIT(S): 5000 INJECTION INTRAVENOUS; SUBCUTANEOUS at 18:08

## 2021-03-22 RX ADMIN — INSULIN GLARGINE 30 UNIT(S): 100 INJECTION, SOLUTION SUBCUTANEOUS at 21:07

## 2021-03-22 RX ADMIN — MAGNESIUM HYDROXIDE 30 MILLILITER(S): 400 TABLET, CHEWABLE ORAL at 10:02

## 2021-03-22 RX ADMIN — TRAMADOL HYDROCHLORIDE 50 MILLIGRAM(S): 50 TABLET ORAL at 00:55

## 2021-03-22 RX ADMIN — HEPARIN SODIUM 5000 UNIT(S): 5000 INJECTION INTRAVENOUS; SUBCUTANEOUS at 05:18

## 2021-03-22 RX ADMIN — AMLODIPINE BESYLATE 10 MILLIGRAM(S): 2.5 TABLET ORAL at 05:18

## 2021-03-22 RX ADMIN — Medication 4 MILLIGRAM(S): at 18:09

## 2021-03-22 RX ADMIN — ATORVASTATIN CALCIUM 80 MILLIGRAM(S): 80 TABLET, FILM COATED ORAL at 21:07

## 2021-03-22 RX ADMIN — Medication 50 MILLIGRAM(S): at 05:18

## 2021-03-22 NOTE — PROGRESS NOTE ADULT - PROBLEM SELECTOR PLAN 1
-test BG AC/HS  -Continue Lantus 30 units QHS  -Continue Admelog 8 units AC meals  -c/w Admelog moderate correction scale AC and mod HS scale  Carb consistent diet   C-peptide normal, not obtained with BMP, not suggestive of DM1  -notify endocrine team if steroid dose changed or discontinued  On Discharge recommend tresiba and NOvolog  Recommend follow up with Endo in [place], Patient to find out the name

## 2021-03-22 NOTE — DIETITIAN INITIAL EVALUATION ADULT. - OTHER INFO
Pt with NKFA. No chewing/swallowing difficulties. Pt reported usually good intake at home until he started feeling ill. Diet recall before "this all started" included honeycutt and eggs, soup, a lot of bread, and whatever his "lady" makes (of note, pt would not clarify if "lady" was a caretaker or significant other). Reported drinking mostly water with occasional intake of juice or soda. Stated he does not eat any fruits or vegetables. Pt reported he checks blood glucose levels x2/day in the morning and around 3pm. Reported blood glucose levels range from 100+ to 300+ on a daily basis. Of note, pt with HgbA1c of 10.6% and was unable to identify foods which contain carbohydrates in his diet recall.    Since onset of symptoms, pt with poor appetite and intake. Reports only being able to eat a few bites. Pt reported UBW of 195 pounds. Weight as per flowsheets of 153.6 pounds. Pt reported he feels he has lost a lot of muscle mass and fat. Pt reported being unable to walk since having COVID in 2020, may be contributing to his muscle weakening.     Since admission pt still with poor appetite but improving intake. As per flowsheets, pt with % intake. Recommended Glucerna to patient. Pt refused as he has had them in the past and does not like "anything milky." Recommended Ensure Clear, pt rejected as he doesn't "want any of those Ensure things." Discussed benefits of oral nutrition supplements with patient, pt refused. Encouraged adequate PO intake. Reminded patient if he is not hungry to focus on protein foods to promote adequate protein-energy intake.    Pt denies nausea, vomiting, or diarrhea, but complains of constipation and significant abdominal pain. Pt stated he has not had BM in >10 days. Of note, pt has been given Miralax, senna, magnesium hydroxide and a FLEET enema with no success in producing a BM. Offered pt prunes/prune juice but he refused. Encouraged pt to drink water. Also encouraged pt to walk around with RN/MD permission.    Provided patient with verbal and written DM education. Discussed sources of carbohydrates, and appropriate carbohydrate intake. Reminded patient to have well balanced meals and include all food groups in appropriate proportions. Reviewed carbohydrate counting and label reading. Encouraged pt to pair carbohydrates with protein and healthy fats to help control blood glucose levels. Discussed importance of fresh fruits and vegetables in the diet. Encouraged pt to include a wide variety of colors and textures in his diet to make it interesting and appealing. Pt in the contemplation phase, not ready to take action to change yet. All disciplines to reenforce DM education as feasible. Pt made aware that RD remains available for further questions. Pt with NKFA. No chewing/swallowing difficulties. Pt reported usually good intake at home until he started feeling ill. Diet recall before "this all started" included honeycutt and eggs, soup, a lot of bread, and whatever his "lady" makes (of note, pt would not clarify if "lady" was a caretaker or significant other). Reported drinking mostly water with occasional intake of juice or soda. Stated he does not eat any fruits or vegetables. Pt reported he checks blood glucose levels x2/day in the morning and around 3pm. Reported blood glucose levels range from 100+ to 300+ on a daily basis. Of note, pt with HgbA1c of 10.6% and was unable to identify foods which contain carbohydrates in his diet recall. Home meds include HumaLog, and Tresiba.     Since onset of symptoms, pt with poor appetite and intake. Reports only being able to eat a few bites. Pt reported UBW of 195 pounds. Weight as per flowsheets of 153.6 pounds. Pt reported he feels he has lost a lot of muscle mass and fat. Pt reported being unable to walk since having COVID in 2020, may be contributing to his muscle weakening.     Since admission pt still with poor appetite but improving intake. As per flowsheets, pt with % intake. Recommended Glucerna to patient. Pt refused as he has had them in the past and does not like "anything milky." Recommended Ensure Clear, pt rejected as he doesn't "want any of those Ensure things." Discussed benefits of oral nutrition supplements with patient, pt refused. Encouraged adequate PO intake. Reminded patient if he is not hungry to focus on protein foods to promote adequate protein-energy intake.    Pt denies nausea, vomiting, or diarrhea, but complains of constipation and significant abdominal pain. Pt stated he has not had BM in >10 days. Of note, pt has been given Miralax, senna, magnesium hydroxide and a FLEET enema with no success in producing a BM. Offered pt prunes/prune juice but he refused. Encouraged pt to drink water.     Provided patient with verbal and written DM education. Discussed sources of carbohydrates, and appropriate carbohydrate intake. Reminded patient to have well balanced meals and include all food groups in appropriate proportions. Reviewed carbohydrate counting and label reading. Encouraged pt to pair carbohydrates with protein and healthy fats to help control blood glucose levels. Discussed importance of fresh fruits and vegetables in the diet. Encouraged pt to include a wide variety of colors and textures in his diet to make it interesting and appealing. Pt in the contemplation phase, not ready to take action to change yet. All disciplines to reenforce DM education as feasible. Pt made aware that RD remains available for further questions.

## 2021-03-22 NOTE — PROGRESS NOTE ADULT - ASSESSMENT
61yo M with hx HTN, DM2, HLD, COVID 2020 presents with LE pain and scrotal swelling. Patient was hospitalized in [facility] in Feb 2021 where CT Ab/P found 5.8x2.5cm lesion on the right pelvis concerning for malignancy with sclerotic lesion at L5. Now with steroid exacerbated hyperglycemia due to being started on decadron for cauda equina.   Endocrine on board for management of uncontrolled DM2. A1c 10.6%. Tolerating POs w/variable glycemic control. BG goal (100-180mg/dl).

## 2021-03-22 NOTE — DIETITIAN INITIAL EVALUATION ADULT. - REASON
Unable to conduct nutrition focused physical exam as pt was uncomfortable and did not want to be touched. Unable to visually assess patient as he was covered in blankets

## 2021-03-22 NOTE — DIETITIAN INITIAL EVALUATION ADULT. - ADD RECOMMEND
1. Recommend continuing current diet order of consistent carbohydrate diet with evening snack to assist with blood glucose levels and allow pt ample food options 2. Encourage PO intake 3. Provided DM education- written and verbal (reenforce by all disciplines as feasible) 4. Monitor labs, PO intake, tolerance to diet, weights, skin integrity 5. RD remains available for further questions 6. Malnutrition sticker placed, provider notified 1. Recommend continuing current diet order of consistent carbohydrate diet with evening snack to assist with blood glucose levels and allow pt ample food options 2. Encourage PO intake- reassess pt for oral nutrition supplement needs at follow up interview  3. Provided DM education- written and verbal (reenforce by all disciplines as feasible) 4. Monitor labs, PO intake, tolerance to diet, weights, skin integrity 5. RD remains available for further questions 6. Malnutrition sticker placed, provider notified

## 2021-03-22 NOTE — PROGRESS NOTE ADULT - SUBJECTIVE AND OBJECTIVE BOX
Admitting Diagnosis:  Abnormal weight loss [R63.4]  ABNORMAL WEIGHT LOSS      Background:  This is a 60y year old Male  who presents with the chief complaint of low back pain and leg weakness. 59yo M with hx HTN, DM, HLD, COVID 2020 presents with LE pain and scrotal swelling. Patient was hospitalized in [facility] in Feb 2021 where CT Ab/P found 5.8x2.5cm lesion on the right pelvis concerning for malignancy with sclerotic lesion at L5. Pt states since early 2021 inc pain and weakness in the right leg, at first he was limping but now cannot walk.  He has pain in the feet, describes as pinching and needles like sensation.  Starting to have sx in left leg as well. Patient left AMA before additional malignancy workup was performed.  In the last month, patient had 45lb unintentional weight loss and decreased appetite. Notes abdominal swelling, mostly on the left side with tenderness along with b/l scrotal swelling.  At one point given neurontin, not helpful.  Denies sx in arms, changes in speech, swallow, vision, bladder control.  Says issues with bowel movements.     MRI done showing diffuse nerve root enhancement involving the roots of the cauda equina with some mild root thickening though the dominant finding is the enhancement.  There is also a nonspecific lesion in the L5 vertebral body and a possible septic facet at L4-5.       Interval Hx:  s/p LP , high lymphocytes prelim, protein slightly elevated, no cx growth, awaiting cytology  flow cytometry apparently without lymphoma cells   getting steroids   has not had BM for 6 days per patient    ******    Past Medical History:  Diabetes [E11.9]    Hyperlipidemia [E78.5]    Hypertension, unspecified type [I10]        Past Surgical History:  No significant past surgical history [[id number]]        Social History:  No toxic habits    Family History:  FAMILY HISTORY:  No pertinent family history in first degree relatives        Allergies:  No Known Allergies      ROS:  Constitutional: Patient offers no complaints of fevers or significant weight loss  Ears, Nose, Mouth and Throat: The patient presents with no abnormalities of the head, ears, eyes, nose or throat  Skin: Patient offers no concerns of new rashes or lesions  Respiratory: The patient presents with no abnormalities of the respiratory tract  Cardiovascular: The patient presents with no cardiac abnormalities  Gastrointestinal: The patient presents with no abnormalities of the GI system  Genitourinary: The patient presents with no dysuria, hematuria or frequent urination  Neurological: See HPI  Endocrine: Patient offers no complaints of excessive thirst, urination, or heat/cold intolerance    Advanced care planning reviewed and noted in the chart.    Medications:  acetaminophen   Tablet .. 650 milliGRAM(s) Oral every 6 hours PRN  amLODIPine   Tablet 10 milliGRAM(s) Oral daily  atorvastatin 80 milliGRAM(s) Oral at bedtime  dexAMETHasone  Injectable 4 milliGRAM(s) IV Push every 6 hours  dextrose 40% Gel 15 Gram(s) Oral once  dextrose 5%. 1000 milliLiter(s) IV Continuous <Continuous>  dextrose 5%. 1000 milliLiter(s) IV Continuous <Continuous>  dextrose 50% Injectable 12.5 Gram(s) IV Push once  dextrose 50% Injectable 25 Gram(s) IV Push once  dextrose 50% Injectable 25 Gram(s) IV Push once  glucagon  Injectable 1 milliGRAM(s) IntraMuscular once  heparin   Injectable 5000 Unit(s) SubCutaneous every 12 hours  insulin glargine Injectable (LANTUS) 30 Unit(s) SubCutaneous at bedtime  insulin lispro (ADMELOG) corrective regimen sliding scale   SubCutaneous three times a day before meals  insulin lispro (ADMELOG) corrective regimen sliding scale   SubCutaneous at bedtime  insulin lispro Injectable (ADMELOG) 8 Unit(s) SubCutaneous three times a day before meals  lactated ringers. 1000 milliLiter(s) IV Continuous <Continuous>  magnesium hydroxide Suspension 30 milliLiter(s) Oral daily PRN  metoprolol succinate ER 50 milliGRAM(s) Oral daily  polyethylene glycol 3350 17 Gram(s) Oral daily PRN  saline laxative (FLEET) Rectal Enema 1 Enema Rectal once  senna 2 Tablet(s) Oral at bedtime  traMADol 50 milliGRAM(s) Oral three times a day PRN      Labs:          CAPILLARY BLOOD GLUCOSE      POCT Blood Glucose.: 147 mg/dL (22 Mar 2021 08:23)  POCT Blood Glucose.: 199 mg/dL (21 Mar 2021 21:46)  POCT Blood Glucose.: 257 mg/dL (21 Mar 2021 16:57)  POCT Blood Glucose.: 159 mg/dL (21 Mar 2021 12:32)              Vitals:  Vital Signs Last 24 Hrs  T(C): 36.8 (22 Mar 2021 09:52), Max: 36.9 (21 Mar 2021 16:38)  T(F): 98.2 (22 Mar 2021 09:52), Max: 98.4 (21 Mar 2021 16:38)  HR: 79 (22 Mar 2021 09:52) (69 - 81)  BP: 143/80 (22 Mar 2021 09:52) (143/80 - 167/89)  BP(mean): --  RR: 18 (22 Mar 2021 09:52) (16 - 18)  SpO2: 97% (22 Mar 2021 09:52) (93% - 98%)    NEUROLOGICAL EXAM:    Mental status: Awake, alert, and in no apparent distress. Oriented to person, place and time. Language function is normal. speech clear and fluent. Recent memory, digit span and concentration were normal.     Cranial Nerves: Pupils were equal, round, reactive to light. Extraocular movements were intact. Visual field were full. Fundoscopic exam was deferred. Facial sensation was intact to light touch. There was no facial asymmetry. The palate was upgoing symmetrically and tongue was midline. Hearing acuity was intact to finger rub AU. Shoulder shrug was full bilaterally    Motor exam: Bulk and tone were normal. Strength was 5/5 in the arms.  Left leg  5/5 except dorsi 4+/5.  Right leg - low tone.  hip flexion/knee ext 2/5, dorsiflexion 4/5. Fine finger movements were symmetric and normal. There was no pronator drift    Reflexes: 2+ in the bilateral upper extremities. absent in legs. Toes were downgoing bilaterally.     Sensation: Intact to light touch, temperature, vibration and proprioception. says when touch right leg it is painful    Coordination: Finger-nose-finger was without dysmetria. cannot do heel shin    Gait: deferred    < from: CT Chest w/ IV Cont (03.14.21 @ 10:12) >    EXAM:  CT ABDOMEN AND PELVIS IC                          EXAM:  CT CHEST IC                          PROCEDURE DATE:  03/14/2021      IMPRESSION:  Enlarged right iliac lymph node measuring 5.9 x 2.4 cm. Additional smaller right iliac nodes are seen. There are also bilateral groin lymph nodes.      MOLINA PGUH M.D., RADIOLOGY RESIDENT  This documenthas been electronically signed.  GINA JENKINS M.D., ATTENDING RADIOLOGIST  This document has been electronically signed. Mar 14 2021 12:50PM    < end of copied text >          EXAM:  MR SPINE LUMBAR WAW IC                            PROCEDURE DATE:  03/15/2021            INTERPRETATION:  INDICATIONS:  Increased low back pain and right leg weakness for 4 months    TECHNIQUE:  Sagittal T1 weighted and T2 weighted images of the lumbosacral spine as well as axial T1 weighted and T2 weighted images were obtained.    COMPARISON EXAMINATION: None.    FINDINGS:  VERTEBRAL BODIES AND DISCS:  Nonspecific lesion in L1 low signal T1 hyperintense signal T2 with some enhancement.  ALIGNMENT:  No subluxations.  L1-L2 LEVEL:  Normal.  L2-L3 LEVEL:  Asymmetric bulge right with disc material in the region of the right neural foramen at this level. Clinical correlation for right L2 radiculopathy  L3-L4 LEVEL: Symmetric bulge with annular fissure in the 7:30 position. Bilateral facet arthropathy at this level.  L4-L5 LEVEL: Asymmetric bulge to the left with focal left-sided protrusion and some mass impression on the intracanal left L5 root.. Bilateral facet arthropathy.Some changes in the left paraspinal musculature extending from the left facet joint may represent septic facet as enhancement is seen in association with hyperintense T2 signal with associated muscular changes (9:19)  L5-S1 LEVEL:  Slight signal hyperintensity at the disc space with some enhancement ventrally without associated endplate abnormality favors degenerative change. Prominent asymmetric disc bulge with Bilateral foraminal disc material which is causing significant nerve root compression on the right greater than left. May be the cause of the patient's known right leg weakness.  SPINAL CANAL:  Evidence of diffuse nerve root enhancement appreciated involving the roots of the cauda equina with some mild thickening appreciated.  CONUS MEDULLARIS:  Normal.  MISCELLANEOUS:  None    IMPRESSION:  Diffuse nerve root enhancement involving the roots of the cauda equina with some mild root thickening though the dominant finding is the enhancement. Differential possibilities include acute inflammatory demyelinating polyneuropathy( [place]) versus chronic inflammatory demyelinating polyneuropathy(CIDP). Inflammatory pathologies such as sarcoid or infectious etiologies should be considered. Included in the differential is leptomeningeal carcinomatosis. Correlation with CSF strongly recommended. Nonspecific lesion in the L5 vertebral body as described. Enhancement with associated T2 abnormality involving the left L4-5 facet with extension to the paraspinal musculature may indicate a septic facet at this level. Correlation with clinical parameters suggested. Degenerative changes with disc material in the neural foramina at the L2-3 and L5-S1 levels on the right as well as in the L5-S1 neural foramen on the left. Prominent bulge with more focal protrusion L4-5 left                MONTY TRAN MD; Attending Radiologist  This document has been electronically signed. Mar 15 2021  8:19PM

## 2021-03-22 NOTE — PROGRESS NOTE ADULT - SUBJECTIVE AND OBJECTIVE BOX
Patient is a 60y old  Male who presents with a chief complaint of leg weakness (22 Mar 2021 10:58)    Patient seen this morning. He had chest pains last night - troponins were negative, EKG was stable. Patient had refused Dex but has resumed. Neuropathy and leg weakness are unchanged. Quanteferon results were indeterminate.      MEDICATIONS  (STANDING):  amLODIPine   Tablet 10 milliGRAM(s) Oral daily  atorvastatin 80 milliGRAM(s) Oral at bedtime  dexAMETHasone  Injectable 4 milliGRAM(s) IV Push every 6 hours  dextrose 40% Gel 15 Gram(s) Oral once  dextrose 5%. 1000 milliLiter(s) (50 mL/Hr) IV Continuous <Continuous>  dextrose 5%. 1000 milliLiter(s) (100 mL/Hr) IV Continuous <Continuous>  dextrose 50% Injectable 12.5 Gram(s) IV Push once  dextrose 50% Injectable 25 Gram(s) IV Push once  dextrose 50% Injectable 25 Gram(s) IV Push once  glucagon  Injectable 1 milliGRAM(s) IntraMuscular once  heparin   Injectable 5000 Unit(s) SubCutaneous every 12 hours  insulin glargine Injectable (LANTUS) 30 Unit(s) SubCutaneous at bedtime  insulin lispro (ADMELOG) corrective regimen sliding scale   SubCutaneous at bedtime  insulin lispro (ADMELOG) corrective regimen sliding scale   SubCutaneous three times a day before meals  insulin lispro Injectable (ADMELOG) 8 Unit(s) SubCutaneous three times a day before meals  lactated ringers. 1000 milliLiter(s) (50 mL/Hr) IV Continuous <Continuous>  metoprolol succinate ER 50 milliGRAM(s) Oral daily  saline laxative (FLEET) Rectal Enema 1 Enema Rectal once  senna 2 Tablet(s) Oral at bedtime    MEDICATIONS  (PRN):  acetaminophen   Tablet .. 650 milliGRAM(s) Oral every 6 hours PRN Mild Pain (1 - 3)  magnesium hydroxide Suspension 30 milliLiter(s) Oral daily PRN Constipation  polyethylene glycol 3350 17 Gram(s) Oral daily PRN Constipation  traMADol 50 milliGRAM(s) Oral three times a day PRN Moderate Pain (4 - 6)      Vital Signs Last 24 Hrs  T(C): 36.8 (22 Mar 2021 09:52), Max: 36.9 (21 Mar 2021 16:38)  T(F): 98.2 (22 Mar 2021 09:52), Max: 98.4 (21 Mar 2021 16:38)  HR: 79 (22 Mar 2021 09:52) (69 - 81)  BP: 143/80 (22 Mar 2021 09:52) (143/80 - 167/89)  BP(mean): --  RR: 18 (22 Mar 2021 09:52) (16 - 18)  SpO2: 97% (22 Mar 2021 09:52) (93% - 98%)    PE  NAD  Awake, alert  Anicteric  No rash grossly          Flow Cytometry Final Report  ________________________________________________________________________  Specimen: Right inguinal lymph node  Collected: 03/17/2021 12:34  Received: 03/17/2021 14:50  Processed: 03/17/2021 18:30  Reported: 03/19/2021 12:41  Accession #: [id number]  FL -KM  ________________________________________________________________________  CLINICAL DATA: Rule out lymphoma    ________________________________________________________________________  DIAGNOSIS:  Right inguinal lymph node:       - The lymphocyte immunophenotypic findings show no diagnostic abnormalities.  Please see interpretation.    INTERPRETATION:  MORPHOLOGY:  CYTOSPIN: Heterogeneous population of lymphocytes.    IMMUNOPHENOTYPE: Lymphocytes (94% of cells): Heterogeneous population of T-cells (with normal CD4  to CD8 ratio),  and polytypic B-cells.    The lymphocyte immunophenotypic findings show no diagnostic abnormalities.  Correlation with morphologic evaluation of the original tissue section is recommended for final  diagnosis. Hodgkin lymphoma or similar disorders with scarce, large, transformed, neoplastic cells  (e.g., subset of large cell lymphoma, especially those with fibrosis or predominance of reactive  elements, such as T-cell-rich large B-cell lymphoma), partial (focal) involvement by lymphoma, and  non-hematopoietic tumors cannot be excluded by negative flow cytometry findings.  __________________________________________________________

## 2021-03-22 NOTE — PROGRESS NOTE ADULT - ASSESSMENT
60 year old male presents to ED with right groin and back pain, 35 lb weight loss, subjective fevers, sweats - found to have bilateral inguinal adenopathy and a sclerotic L5 lesion.    Inguinal Adenopathy and L1 Sclerotic Lesion  -- Concern for lymphoma based on fevers, weight loss and night sweats  -- Other malignancies also possible, as can be benign inflammatory processes  -- Tumor markers - PSA,, Ca 19,9, LDH and Uric Acid are normal  -- S/P excisional biopsy of inguinal lymph node  --Results pending but frozen section consistent with B cell lymphoma; awaiting subtype  --Flow cytometry from lymph node, however, negative for neoplasm  -- Have ordered Hepatitis profile, Quantiferon in anticipation for chemotherapy  --Quantiferon results are indeterminate - consider input from ID  --Chemotherapy (if indicatedwill be determined (as well as possible spinal RT) once we have final pathology results - will most likely be given as outpatient  --We request IR consultation for a medi port placement to be done prior to discharge but will wait until pathology results    Neuropathy  -- Presumed to be secondary to COVID infection per patient  -- Markedly abnormal MRI of spine per above  -- Neurology and Neurosurgery are following  -- Concern for leptomeningeal spread  -- S/P LP - increased lymphocytes on CSF cell count - awaiting cytology  -- Starting Dexamethasone for cauda equina 10 mg loading dose then 4 mg IV q6H  -- Discussed with neurology, we will need MRI of cervical, thoracic spine as well as MRI head    We will continue to follow patient. Thank you for the opportunity to participate in MR. Fernandez's care.    Lobo Landeros PA-C  Hematology/Oncology   [phone] (cell)  [phone] (office)  After hours please call MD on call or office

## 2021-03-22 NOTE — DIETITIAN INITIAL EVALUATION ADULT. - ETIOLOGY
increased needs for oncologic disease process, decreased appetie increased needs for oncologic disease process, decreased appetite limited prior exposure to DM education

## 2021-03-22 NOTE — PROGRESS NOTE ADULT - SUBJECTIVE AND OBJECTIVE BOX
Patient is a 60y old  Male who presents with a chief complaint of Lymphadenopathy (16 Mar 2021 12:54)    f/u abnl MRI    Interval History/ROS:  CSF negative flow cytometry.  c/o back pain which is chronic.  biopsy s/o lymphoma on prelim.  still with RLE weakness.  no fever.  Remainder of ROS otherwise negative.    PAST MEDICAL & SURGICAL HISTORY:  Diabetes  Hyperlipidemia  Hypertension, unspecified type    Allergies  No Known Allergies    ANTIMICROBIALS:  none    MEDICATIONS  (STANDING):  amLODIPine   Tablet 10 daily  atorvastatin 80 at bedtime  dexAMETHasone  Injectable 4 every 6 hours  glucagon  Injectable 1 once  heparin   Injectable 5000 every 12 hours  insulin glargine Injectable (LANTUS) 30 at bedtime  insulin lispro (ADMELOG) corrective regimen sliding scale  at bedtime  insulin lispro (ADMELOG) corrective regimen sliding scale  three times a day before meals  insulin lispro Injectable (ADMELOG) 8 three times a day before meals  metoprolol succinate ER 25 once  saline laxative (FLEET) Rectal Enema 1 once  senna 2 at bedtime    Vital Signs Last 24 Hrs  T(F): 98.2 (03-22-21 @ 09:52), Max: 98.4 (03-21-21 @ 16:38)  HR: 79 (03-22-21 @ 09:52)  BP: 143/80 (03-22-21 @ 09:52)  RR: 18 (03-22-21 @ 09:52)  SpO2: 97% (03-22-21 @ 09:52) (93% - 98%)    PHYSICAL EXAM:  Constitutional: non-toxic, lying prone  HEAD/EYES: anicteric  ENT:  supple,  Cardiovascular:   normal S1, S2  Respiratory:  clear BS bilaterally  GI:  soft  :  no tran  Musculoskeletal:  no synovitis  Neurologic: awake and alert, significantly decreased strength R leg  Skin:  surgical site c/d/i  Psychiatric:  awake, alert, appropriate mood    WBC Count: 7.18 (03-20-21 @ 09:21)  WBC Count: 8.17 (03-18-21 @ 07:24)  WBC Count: 7.21 (03-17-21 @ 04:26)  WBC Count: 7.42 (03-16-21 @ 07:22)    Quant gold indeterminant but was on steroids    Lactate Dehydrogenase, Serum: 136 U/L (03-16-21 @ 07:22)    HIV-1/2 Combo Result: Nonreact:  Angiotensin Converting Enzyme, Serum: <5  Anti Nuclear Factor Titer: Negative    MICROBIOLOGY:  COVID-19 IgG Antibody Interpretation: Negative (03-15-21 @ 08:26)  COVID-19 PCR: NotDetec (03-14-21 @ 11:17)    RADIOLOGY:  imaging below personally reviewed and agree with findings    MR Lumbar Spine w/wo IV Cont (03.15.21 @ 17:55) >  IMPRESSION:  Diffuse nerve root enhancement involving the roots of the cauda equina with some mild root thickening though the dominant finding is the enhancement. Differential possibilities include acute inflammatorydemyelinating polyneuropathy( Guillan Kinsman) versus chronic inflammatory demyelinating polyneuropathy(CIDP). Inflammatory pathologies such as sarcoid or infectious etiologies should be considered. Included in the differential is leptomeningeal carcinomatosis. Correlation with CSF strongly recommended. Nonspecific lesion in the L5 vertebral body as described. Enhancement with associated T2 abnormality involving the left L4-5 facet with extension to the paraspinal musculature may indicate a septic facet at this level. Correlation with clinical parameters suggested. Degenerative changes with disc material in the neural foramina at the L2-3 and L5-S1 levels on the right as well as in the L5-S1 neural foramen on the left. Prominent bulge with morefocal protrusion L4-5 left    A Duplex Lower Ext Vein Scan, Bilat (03.15.21 @ 12:03) >  IMPRESSION: There is a 2.3 cm nodule superficial to the vascular structures in the right inguinal area, likely a lymph node.  Noevidence of deep venous thrombosis in either lower extremity.    CT Chest w/ IV Cont (03.14.21 @ 10:12) >  IMPRESSION:  Enlarged right iliac lymph node measuring 5.9 x 2.4 cm. Additional smaller right iliac nodes are seen. There are also bilateral groin lymph nodes.    US Doppler Scrotum (03.14.21 @ 10:57) >  IMPRESSION:  Large complex bilateral hydroceles.  No testicular mass seen

## 2021-03-22 NOTE — PROGRESS NOTE ADULT - SUBJECTIVE AND OBJECTIVE BOX
[facility] [phone]        Patient seen and examined in bed.  He was about the same         MEDICATIONS  (STANDING):  amLODIPine   Tablet 10 milliGRAM(s) Oral daily  atorvastatin 80 milliGRAM(s) Oral at bedtime  dexAMETHasone  Injectable 4 milliGRAM(s) IV Push every 6 hours  dextrose 40% Gel 15 Gram(s) Oral once  dextrose 5%. 1000 milliLiter(s) (50 mL/Hr) IV Continuous <Continuous>  dextrose 5%. 1000 milliLiter(s) (100 mL/Hr) IV Continuous <Continuous>  dextrose 50% Injectable 12.5 Gram(s) IV Push once  dextrose 50% Injectable 25 Gram(s) IV Push once  dextrose 50% Injectable 25 Gram(s) IV Push once  glucagon  Injectable 1 milliGRAM(s) IntraMuscular once  heparin   Injectable 5000 Unit(s) SubCutaneous every 12 hours  insulin glargine Injectable (LANTUS) 30 Unit(s) SubCutaneous at bedtime  insulin lispro (ADMELOG) corrective regimen sliding scale   SubCutaneous at bedtime  insulin lispro (ADMELOG) corrective regimen sliding scale   SubCutaneous three times a day before meals  insulin lispro Injectable (ADMELOG) 8 Unit(s) SubCutaneous three times a day before meals  lactated ringers. 1000 milliLiter(s) (50 mL/Hr) IV Continuous <Continuous>  metoprolol succinate ER 25 milliGRAM(s) Oral once  saline laxative (FLEET) Rectal Enema 1 Enema Rectal once  senna 2 Tablet(s) Oral at bedtime      VITAL:  T(C): , Max: 36.9 (03-21-21 @ 16:38)  T(F): , Max: 98.4 (03-21-21 @ 16:38)  HR: 79 (03-22-21 @ 09:52)  BP: 143/80 (03-22-21 @ 09:52)  BP(mean): --  RR: 18 (03-22-21 @ 09:52)  SpO2: 97% (03-22-21 @ 09:52)  Wt(kg): --    I and O's:    03-21 @ 07:01  -  03-22 @ 07:00  --------------------------------------------------------  IN: 0 mL / OUT: 675 mL / NET: -675 mL          PHYSICAL EXAM:    Constitutional: NAD  Neck:  No JVD  Respiratory: CTAB/L  Cardiovascular: S1 and S2  Gastrointestinal: BS+, soft, NT/ND  Extremities: No peripheral edema  Neurological: A/O x 3, no focal deficits  Psychiatric: Normal mood, normal affect  : No Weir  Skin: No rashes  Access: Not applicable    LABS:                Urine Studies:          RADIOLOGY & ADDITIONAL STUDIES:

## 2021-03-22 NOTE — PROGRESS NOTE ADULT - ASSESSMENT
Impression:  This is a 60y year old Male  who presents with the chief complaint of low back pain and leg weakness. 61yo M with hx HTN, DM, HLD, COVID 2020 presents with LE pain and scrotal swelling. Patient was hospitalized in [facility] in Feb 2021 where CT Ab/P found 5.8x2.5cm lesion on the right pelvis concerning for malignancy with sclerotic lesion at L5. Pt states since early 2021 inc pain and weakness in the right leg, at first he was limping but now cannot walk.  He has pain in the feet, describes as pinching and needles like sensation.  Starting to have sx in left leg as well. Patient left AMA before additional malignancy workup was performed.  In the last month, patient had 45lb unintentional weight loss and decreased appetite. Notes abdominal swelling, mostly on the left side with tenderness along with b/l scrotal swelling.  At one point given neurontin, not helpful.  Denies sx in arms, changes in speech, swallow, vision, bladder control.  Says issues with bowel movements.     MRI done showing diffuse nerve root enhancement involving the roots of the cauda equina with some mild root thickening though the dominant finding is the enhancement.  There is also a nonspecific lesion in the L5 vertebral body and a possible septic facet at L4-5.       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  Need to discuss with heme/onc  infectious etiology though perhaps less likely still possible given septic L4-5 facet and potential immunosuppression in setting of possible lymphoma. doubt demyelinating (AIDP or CIDP)    Plan:  LP for CSF: cell count, cell culture, gram stain, protein, glucose, fungal culture, AFB (not enough CSF sent to get AFB), cryptococcal, VDRL, HSV, PCR for infections, cytology, and flow cytometry.  Lymphocytes seen, mildly increased protein, flow apparently negative, rest still pending.      Heme/ onc suspected B Cell lymphoma and possible leptomeningeal spread, although apparently normal flow which would be atypical.  Awaiting cytology for further plan. steroids per heme,     ID input given new information from LP would be appreciated     CT chest not suspicious for sarcoid

## 2021-03-22 NOTE — DIETITIAN NUTRITION RISK NOTIFICATION - TREATMENT: THE FOLLOWING DIET HAS BEEN RECOMMENDED
Diet, Regular:   Consistent Carbohydrate {Evening Snack} (CSTCHOSN) (03-19-21 @ 08:35) [Active]

## 2021-03-22 NOTE — PROGRESS NOTE ADULT - ASSESSMENT
ASSESSMENT/PLAN  59yo M with hx HTN, DM, HLD, COVID 2020 presents with LE pain and scrotal swelling    - GUANAKO -resolving/ resolved;  Increase BUN may be from Steroids        -CV- BP  sub optimally controlled    -Heme/onc- Eval noted for pending pathology/ cytology from lymph node and CSF pending;  Possible mediport before dc     - Renal dosing of meds to CrCL of 50ml/min--At present no evidence of tumor lysis syndrome            [facility]   Hayley Crysalin, "[employer]  [phone]

## 2021-03-22 NOTE — PROGRESS NOTE ADULT - ASSESSMENT
60M with DM (A1C=10.6), HTN, chol admitted 3/14/2021 c/o LE pain, scrotal swelling and weight loss found to have weakness RLE and CT that shows enlarging right iliac lymph node as well as an MRI that shows diffuse nerve root enhancement involving the roots of the cauda equina with some mild root thickening though the dominant finding is the enhancement. DOes not seem infectious.  Could this be lymphoma with B symptoms though the LDH is low.  MRI suggestive of CIDP, per neuro less likely AIDP, sarcoid, leptomeningeal carcinomatosis, infection.  s/p LN biopsy    Lymphadenopathy  - f/u pathology  - negative HIV, ACE, DARRIN  - indeterminant quant gold due to dexamethasone  - f/u all cultures (csf afb cx also was sent)      Please call Infectious Diseases if there is a change in status.  Thank you.  [phone].

## 2021-03-22 NOTE — PROGRESS NOTE ADULT - SUBJECTIVE AND OBJECTIVE BOX
CARDIOLOGY FOLLOW UP - Dr. Trimble    CC: events noted, currently denies cp, sob, and palpitations       PHYSICAL EXAM:  T(C): 36.8 (03-22-21 @ 09:52), Max: 36.9 (03-21-21 @ 16:38)  HR: 79 (03-22-21 @ 09:52) (69 - 81)  BP: 143/80 (03-22-21 @ 09:52) (143/80 - 167/89)  RR: 18 (03-22-21 @ 09:52) (16 - 18)  SpO2: 97% (03-22-21 @ 09:52) (93% - 98%)  Wt(kg): --  I&O's Summary    21 Mar 2021 07:01  -  22 Mar 2021 07:00  --------------------------------------------------------  IN: 0 mL / OUT: 675 mL / NET: -675 mL        Appearance: Normal	  Cardiovascular: Normal S1 S2,RRR, No JVD, No murmurs  Respiratory: Lungs clear to auscultation	  Gastrointestinal:  Soft, Non-tender, + BS	  Extremities: Normal range of motion, No clubbing, cyanosis or edema      Home Medications:  amlodipine-benazepril 10 mg-40 mg oral capsule: 1 cap(s) orally once a day (15 Mar 2021 11:15)  HumaLOG 100 units/mL injectable solution: 8 unit(s) injectable 3 times a day (before meals) (15 Mar 2021 11:15)  Lipitor 80 mg oral tablet: 1 tab(s) orally once a day (15 Mar 2021 11:15)  Toprol-XL 50 mg oral tablet, extended release: 1 tab(s) orally once a day (15 Mar 2021 11:15)  Tresiba 100 units/mL subcutaneous solution: 40 unit(s) subcutaneous once a day (at bedtime) (15 Mar 2021 11:15)      MEDICATIONS  (STANDING):  amLODIPine   Tablet 10 milliGRAM(s) Oral daily  atorvastatin 80 milliGRAM(s) Oral at bedtime  dexAMETHasone  Injectable 4 milliGRAM(s) IV Push every 6 hours  dextrose 40% Gel 15 Gram(s) Oral once  dextrose 5%. 1000 milliLiter(s) (50 mL/Hr) IV Continuous <Continuous>  dextrose 5%. 1000 milliLiter(s) (100 mL/Hr) IV Continuous <Continuous>  dextrose 50% Injectable 12.5 Gram(s) IV Push once  dextrose 50% Injectable 25 Gram(s) IV Push once  dextrose 50% Injectable 25 Gram(s) IV Push once  glucagon  Injectable 1 milliGRAM(s) IntraMuscular once  heparin   Injectable 5000 Unit(s) SubCutaneous every 12 hours  insulin glargine Injectable (LANTUS) 30 Unit(s) SubCutaneous at bedtime  insulin lispro (ADMELOG) corrective regimen sliding scale   SubCutaneous at bedtime  insulin lispro (ADMELOG) corrective regimen sliding scale   SubCutaneous three times a day before meals  insulin lispro Injectable (ADMELOG) 8 Unit(s) SubCutaneous three times a day before meals  lactated ringers. 1000 milliLiter(s) (50 mL/Hr) IV Continuous <Continuous>  metoprolol succinate ER 50 milliGRAM(s) Oral daily  saline laxative (FLEET) Rectal Enema 1 Enema Rectal once  senna 2 Tablet(s) Oral at bedtime      TELEMETRY: 	    ECG:  	  RADIOLOGY:   DIAGNOSTIC TESTING:  [ ] Echocardiogram:  [ ]  Catheterization:  [ ] Stress Test:    OTHER: 	    LABS:	 	    Creatine Kinase, Serum: 40 U/L [30 - 200] (03-21 @ 18:00)  CKMB Units: 1.8 ng/mL [0.0 - 6.7] (03-21 @ 18:00)  Troponin T, High Sensitivity Result: 17 ng/L [0 - 51] (03-21 @ 18:00)

## 2021-03-22 NOTE — PROGRESS NOTE ADULT - SUBJECTIVE AND OBJECTIVE BOX
DATE OF SERVICE: 03-22-21 @ 15:59  CHIEF COMPLAINT:Patient is a 60y old  Male who presents with a chief complaint of leg pain (22 Mar 2021 13:00)    	        PAST MEDICAL & SURGICAL HISTORY:  Diabetes    Hyperlipidemia    Hypertension, unspecified type    No significant past surgical history            REVIEW OF SYSTEMS:    RESPIRATORY: No cough, wheezing, chills or hemoptysis;   CARDIOVASCULAR: No chest pain, palpitations, passing out, dizziness, or leg swelling  GASTROINTESTINAL: No abdominal or epigastric pain. No nausea, vomiting, or hematemesis; No diarrhea or constipation. No melena or hematochezia.  GENITOURINARY: No dysuria, frequency, hematuria, or incontinence  NEUROLOGICAL: No headaches,  back pain better    Medications:  MEDICATIONS  (STANDING):  amLODIPine   Tablet 10 milliGRAM(s) Oral daily  atorvastatin 80 milliGRAM(s) Oral at bedtime  dexAMETHasone  Injectable 4 milliGRAM(s) IV Push every 6 hours  dextrose 40% Gel 15 Gram(s) Oral once  dextrose 5%. 1000 milliLiter(s) (50 mL/Hr) IV Continuous <Continuous>  dextrose 5%. 1000 milliLiter(s) (100 mL/Hr) IV Continuous <Continuous>  dextrose 50% Injectable 12.5 Gram(s) IV Push once  dextrose 50% Injectable 25 Gram(s) IV Push once  dextrose 50% Injectable 25 Gram(s) IV Push once  glucagon  Injectable 1 milliGRAM(s) IntraMuscular once  heparin   Injectable 5000 Unit(s) SubCutaneous every 12 hours  insulin glargine Injectable (LANTUS) 30 Unit(s) SubCutaneous at bedtime  insulin lispro (ADMELOG) corrective regimen sliding scale   SubCutaneous at bedtime  insulin lispro (ADMELOG) corrective regimen sliding scale   SubCutaneous three times a day before meals  insulin lispro Injectable (ADMELOG) 8 Unit(s) SubCutaneous three times a day before meals  lactated ringers. 1000 milliLiter(s) (50 mL/Hr) IV Continuous <Continuous>  metoprolol succinate ER 25 milliGRAM(s) Oral once  saline laxative (FLEET) Rectal Enema 1 Enema Rectal once  senna 2 Tablet(s) Oral at bedtime    MEDICATIONS  (PRN):  acetaminophen   Tablet .. 650 milliGRAM(s) Oral every 6 hours PRN Mild Pain (1 - 3)  magnesium hydroxide Suspension 30 milliLiter(s) Oral daily PRN Constipation  polyethylene glycol 3350 17 Gram(s) Oral daily PRN Constipation  traMADol 50 milliGRAM(s) Oral three times a day PRN Moderate Pain (4 - 6)    	    PHYSICAL EXAM:  T(C): 36.8 (03-22-21 @ 09:52), Max: 36.9 (03-21-21 @ 16:38)  HR: 79 (03-22-21 @ 09:52) (69 - 81)  BP: 143/80 (03-22-21 @ 09:52) (143/80 - 167/89)  RR: 18 (03-22-21 @ 09:52) (16 - 18)  SpO2: 97% (03-22-21 @ 09:52) (93% - 98%)  Wt(kg): --  I&O's Summary    21 Mar 2021 07:01  -  22 Mar 2021 07:00  --------------------------------------------------------  IN: 0 mL / OUT: 675 mL / NET: -675 mL        poor dentition  Cardiovascular: Normal S1 S2, No JVD, No murmurs, No edema  Respiratory: Lungs clear to auscultation	  Psychiatry: A & O x 3  Gastrointestinal:  Soft, Non-tender, + BS	  Skin: No rashes, No ecchymoses, No cyanosis	  Neurologic: Non-focal/motor equal b/l  sensory intact dtr equal   Extremities: Normal range of motion, No clubbing,   Vascular: Peripheral pulses palpable 2+ bilaterally    TELEMETRY: 	    ECG:  	  RADIOLOGY:  OTHER: 	  	  LABS:	 	    CARDIAC MARKERS:  CARDIAC MARKERS ( 21 Mar 2021 18:00 )  x     / x     / 40 U/L / x     / 1.8 ng/mL                  proBNP:   Lipid Profile:   HgA1c:   TSH:

## 2021-03-22 NOTE — CHART NOTE - NSCHARTNOTEFT_GEN_A_CORE
Patient can move forward with outpatient fu with Dr. Sarabia for potential treatment of herniated disc as needed, currently pain improving with PT and pain mgmt.     Please schedule appointment to see Dr. Sarabia in the office in 1 week after discharge.

## 2021-03-22 NOTE — DIETITIAN INITIAL EVALUATION ADULT. - SIGNS/SYMPTOMS
21% weight loss x1 month, <50% intake >5 days Pt was unable to identify foods in his diet which contain carbohydrates Pt was unable to identify foods in his diet which contain carbohydrates, HgbA1c 10.6%

## 2021-03-22 NOTE — DIETITIAN INITIAL EVALUATION ADULT. - REASON FOR ADMISSION
60 year old M man with PMH HTN, DM2, HLD, COVID 2020 presents with LE pain and scrotal swelling. Patient was hospitalized in [facility] in Feb 2021 where CT Ab/P found 5.8x2.5cm lesion on the right pelvis concerning for malignancy with sclerotic lesion at L5. Patient left AMA before additional malignancy workup was performed. In the last month, patient had 45lb unintentional weight loss and decreased appetite. In the last 2 weeks had some night sweats and subjective fevers. In addition, he noted that he started to have abdominal swelling, mostly on the left side with tenderness along with b/l scrotal swelling

## 2021-03-22 NOTE — PROGRESS NOTE ADULT - SUBJECTIVE AND OBJECTIVE BOX
Chief Complaint:  steroid induced hyperglycemia.     History: Patient seen and examined at bedside. Patient in NAD. Reports continued weakness and pain in LE.   Patient started on Decadron on 3/19/21 for cauda equina syndrome. Insulin doses increase BG at goal. Patient reports appetite is good, however has not gone to bathroom in 6 days.      MEDICATIONS  (STANDING):  amLODIPine   Tablet 10 milliGRAM(s) Oral daily  atorvastatin 80 milliGRAM(s) Oral at bedtime  dexAMETHasone  Injectable 4 milliGRAM(s) IV Push every 6 hours  dextrose 40% Gel 15 Gram(s) Oral once  dextrose 5%. 1000 milliLiter(s) (50 mL/Hr) IV Continuous <Continuous>  dextrose 5%. 1000 milliLiter(s) (100 mL/Hr) IV Continuous <Continuous>  dextrose 50% Injectable 12.5 Gram(s) IV Push once  dextrose 50% Injectable 25 Gram(s) IV Push once  dextrose 50% Injectable 25 Gram(s) IV Push once  glucagon  Injectable 1 milliGRAM(s) IntraMuscular once  heparin   Injectable 5000 Unit(s) SubCutaneous every 12 hours  insulin glargine Injectable (LANTUS) 30 Unit(s) SubCutaneous at bedtime  insulin lispro (ADMELOG) corrective regimen sliding scale   SubCutaneous at bedtime  insulin lispro (ADMELOG) corrective regimen sliding scale   SubCutaneous three times a day before meals  insulin lispro Injectable (ADMELOG) 8 Unit(s) SubCutaneous three times a day before meals  lactated ringers. 1000 milliLiter(s) (50 mL/Hr) IV Continuous <Continuous>  metoprolol succinate ER 25 milliGRAM(s) Oral once  saline laxative (FLEET) Rectal Enema 1 Enema Rectal once  senna 2 Tablet(s) Oral at bedtime    MEDICATIONS  (PRN):  acetaminophen   Tablet .. 650 milliGRAM(s) Oral every 6 hours PRN Mild Pain (1 - 3)  magnesium hydroxide Suspension 30 milliLiter(s) Oral daily PRN Constipation  polyethylene glycol 3350 17 Gram(s) Oral daily PRN Constipation  traMADol 50 milliGRAM(s) Oral three times a day PRN Moderate Pain (4 - 6)    PHYSICAL EXAM:  VITALS: T(C): 36.6 (03-22-21 @ 16:08)  T(F): 97.9 (03-22-21 @ 16:08), Max: 98.2 (03-22-21 @ 09:52)  HR: 66 (03-22-21 @ 16:08) (66 - 81)  BP: 138/78 (03-22-21 @ 16:08) (138/78 - 167/89)  RR:  (16 - 18)  SpO2:  (93% - 98%)  Wt(kg): 68kg   GENERAL: NAD, well-groomed, well-developed  RESPIRATORY: Clear to auscultation bilaterally; No rales, rhonchi, wheezing, or rubs  CARDIOVASCULAR: Regular rate and rhythm; No murmurs; no peripheral edema  GI: Soft, nontender, non distended, normal bowel sounds  SKIN: Dry, intact, No rashes or lesions  MUSCULOSKELETAL: Full range of motion, normal strength  PSYCH: Alert and oriented x 3, reactive affect     POCT Blood Glucose.: 167 mg/dL (03-22-21 @ 12:02)  POCT Blood Glucose.: 147 mg/dL (03-22-21 @ 08:23)  POCT Blood Glucose.: 199 mg/dL (03-21-21 @ 21:46)  POCT Blood Glucose.: 257 mg/dL (03-21-21 @ 16:57)  POCT Blood Glucose.: 159 mg/dL (03-21-21 @ 12:32)  POCT Blood Glucose.: 320 mg/dL (03-21-21 @ 08:22)  POCT Blood Glucose.: 229 mg/dL (03-20-21 @ 21:07)  POCT Blood Glucose.: 336 mg/dL (03-20-21 @ 17:16)  POCT Blood Glucose.: 204 mg/dL (03-20-21 @ 11:57)  POCT Blood Glucose.: 190 mg/dL (03-20-21 @ 08:09)  POCT Blood Glucose.: 149 mg/dL (03-19-21 @ 21:19)  POCT Blood Glucose.: 117 mg/dL (03-19-21 @ 17:41)      03-20    137  |  105  |  32<H>  ----------------------------<  225<H>  4.7   |  18<L>  |  1.21    EGFR if : 75  EGFR if non : 65    Ca    9.3      03-20    Thyroid Function Tests:  03-15 @ 09:19 TSH 4.86

## 2021-03-22 NOTE — PROGRESS NOTE ADULT - ASSESSMENT
Echo 3/18/21: min MR, nl lv sys fx, small-moderate pericardial effusion, no evidence of pericardial tamponade     a/p  59 yo M with hx HTN, DM, HLD, COVID 2020 presents with LE pain and scrotal swelling    1. LE pain/weakness/scrotal swelling  -concern for malignancy  -s/p LN excisional biopsy to r/o lymphoma- frozen + diffuse B cell, follow up pathology   -MRI noted  -UA noted  -LE duplex neg for DVT  -urology eval noted - No  intervention during this admission  -neurosx eval noted -no interventions at this time, possibly benefit from L5/S1 decompression fusion after workup of lymph node  -s/p LP   -f/u heme/neuro     2. HTN  -bp mildly elevated  -c/w amlodipine and bb, inc toprol to 75mg daily     3. HLD  -c/w statin    4. GUANAKO  -cr improved  -renal f/u    5. Pericardial effusion  -echo noted with min MR, nl lv sys fx, small-moderate pericardial effusion, no evidence of pericardial tamponade   -cont to monitor     6. Atypical Chest pain  -no further cp  -hsT, ck, ckmb neg  -echo as above w nl lv sys fx, no segmental wall motion   -start 81 asa mg if no CI    dvt ppx     plan discussed with ACP  Echo 3/18/21: min MR, nl lv sys fx, small-moderate pericardial effusion, no evidence of pericardial tamponade     a/p  61 yo M with hx HTN, DM, HLD, COVID 2020 presents with LE pain and scrotal swelling    1. LE pain/weakness/scrotal swelling  -concern for malignancy  -s/p LN excisional biopsy to r/o lymphoma- frozen + diffuse B cell, follow up pathology   -MRI noted  -UA noted  -LE duplex neg for DVT  -urology eval noted - No  intervention during this admission  -neurosx eval noted -no interventions at this time, possibly benefit from L5/S1 decompression fusion after workup of lymph node  -s/p LP   -f/u heme/neuro     2. HTN  -bp mildly elevated  -c/w amlodipine and bb, inc toprol to 75mg daily     3. HLD  -c/w statin    4. GUANAKO  -cr improved  -renal f/u    5. Pericardial effusion  -echo noted with min MR, nl lv sys fx, small-moderate pericardial effusion, no evidence of pericardial tamponade   -cont to monitor     6. Atypical Chest pain  -no further cp  -hsT, ck, ckmb neg, EKG without ischemic changes   -echo as above w nl lv sys fx, no segmental wall motion   -start 81 asa mg if no CI    dvt ppx     plan discussed with ACP  Echo 3/18/21: min MR, nl lv sys fx, small-moderate pericardial effusion, no evidence of pericardial tamponade     a/p  59 yo M with hx HTN, DM, HLD, COVID 2020 presents with LE pain and scrotal swelling    1. LE pain/weakness/scrotal swelling  -concern for malignancy  -s/p LN excisional biopsy to r/o lymphoma- frozen + diffuse B cell, follow up pathology   -per ID: MRI suggestive of CIDP, per neuro less likely AIDP, sarcoid, leptomeningeal carcinomatosis, infection  -UA noted  -LE duplex neg for DVT  -urology eval noted - No  intervention during this admission  -neurosx eval noted -no interventions at this time, possibly benefit from L5/S1 decompression fusion after workup of lymph node  -s/p LP   -f/u heme/neuro     2. HTN  -bp mildly elevated  -c/w amlodipine and bb, inc toprol to 75mg daily     3. HLD  -c/w statin    4. GUANAKO  -cr improved  -renal f/u    5. Pericardial effusion  -echo noted with min MR, nl lv sys fx, small-moderate pericardial effusion, no evidence of pericardial tamponade   -cont to monitor     6. Atypical Chest pain  -no further cp  -hsT, ck, ckmb neg, EKG without ischemic changes   -echo as above w nl lv sys fx, no segmental wall motion     dvt ppx     plan discussed with ACP  Echo 3/18/21: min MR, nl lv sys fx, small-moderate pericardial effusion, no evidence of pericardial tamponade     a/p  61 yo M with hx HTN, DM, HLD, COVID 2020 presents with LE pain and scrotal swelling    1. LE pain/weakness/scrotal swelling  -concern for malignancy  -s/p LN excisional biopsy to r/o lymphoma- frozen + diffuse B cell, patho noted   -per ID: MRI suggestive of CIDP, per neuro less likely AIDP, sarcoid, leptomeningeal carcinomatosis, infection  -UA noted  -LE duplex neg for DVT  -urology eval noted - No  intervention during this admission  -neurosx eval noted -no interventions at this time, possibly benefit from L5/S1 decompression fusion after workup of lymph node  -s/p LP   -f/u heme/neuro     2. HTN  -bp mildly elevated  -c/w amlodipine and bb, inc toprol to 75mg daily     3. HLD  -c/w statin    4. GUANAKO  -cr improved  -renal f/u    5. Pericardial effusion  -echo noted with min MR, nl lv sys fx, small-moderate pericardial effusion, no evidence of pericardial tamponade   -cont to monitor     6. Atypical Chest pain  -no further cp  -hsT, ck, ckmb neg, EKG without ischemic changes   -echo as above w nl lv sys fx, no segmental wall motion     dvt ppx     plan discussed with ACP

## 2021-03-23 LAB
ANION GAP SERPL CALC-SCNC: 12 MMOL/L — SIGNIFICANT CHANGE UP (ref 5–17)
BUN SERPL-MCNC: 52 MG/DL — HIGH (ref 7–23)
CALCIUM SERPL-MCNC: 8.4 MG/DL — SIGNIFICANT CHANGE UP (ref 8.4–10.5)
CHLORIDE SERPL-SCNC: 104 MMOL/L — SIGNIFICANT CHANGE UP (ref 96–108)
CO2 SERPL-SCNC: 18 MMOL/L — LOW (ref 22–31)
CREAT SERPL-MCNC: 1.21 MG/DL — SIGNIFICANT CHANGE UP (ref 0.5–1.3)
CRP SERPL-MCNC: <0.3 MG/DL — SIGNIFICANT CHANGE UP (ref 0–0.4)
GLUCOSE BLDC GLUCOMTR-MCNC: 130 MG/DL — HIGH (ref 70–99)
GLUCOSE BLDC GLUCOMTR-MCNC: 152 MG/DL — HIGH (ref 70–99)
GLUCOSE BLDC GLUCOMTR-MCNC: 168 MG/DL — HIGH (ref 70–99)
GLUCOSE BLDC GLUCOMTR-MCNC: 350 MG/DL — HIGH (ref 70–99)
GLUCOSE SERPL-MCNC: 175 MG/DL — HIGH (ref 70–99)
HCT VFR BLD CALC: 53 % — HIGH (ref 39–50)
HGB BLD-MCNC: 17.8 G/DL — HIGH (ref 13–17)
MCHC RBC-ENTMCNC: 29.9 PG — SIGNIFICANT CHANGE UP (ref 27–34)
MCHC RBC-ENTMCNC: 33.6 GM/DL — SIGNIFICANT CHANGE UP (ref 32–36)
MCV RBC AUTO: 88.9 FL — SIGNIFICANT CHANGE UP (ref 80–100)
NRBC # BLD: 0 /100 WBCS — SIGNIFICANT CHANGE UP (ref 0–0)
PLATELET # BLD AUTO: 532 K/UL — HIGH (ref 150–400)
POTASSIUM SERPL-MCNC: 5.2 MMOL/L — SIGNIFICANT CHANGE UP (ref 3.5–5.3)
POTASSIUM SERPL-SCNC: 5.2 MMOL/L — SIGNIFICANT CHANGE UP (ref 3.5–5.3)
RBC # BLD: 5.96 M/UL — HIGH (ref 4.2–5.8)
RBC # FLD: 13.3 % — SIGNIFICANT CHANGE UP (ref 10.3–14.5)
SODIUM SERPL-SCNC: 134 MMOL/L — LOW (ref 135–145)
TM INTERPRETATION: SIGNIFICANT CHANGE UP
WBC # BLD: 8.82 K/UL — SIGNIFICANT CHANGE UP (ref 3.8–10.5)
WBC # FLD AUTO: 8.82 K/UL — SIGNIFICANT CHANGE UP (ref 3.8–10.5)

## 2021-03-23 PROCEDURE — 99232 SBSQ HOSP IP/OBS MODERATE 35: CPT

## 2021-03-23 RX ADMIN — Medication 8 UNIT(S): at 17:22

## 2021-03-23 RX ADMIN — ATORVASTATIN CALCIUM 80 MILLIGRAM(S): 80 TABLET, FILM COATED ORAL at 21:58

## 2021-03-23 RX ADMIN — TRAMADOL HYDROCHLORIDE 50 MILLIGRAM(S): 50 TABLET ORAL at 01:10

## 2021-03-23 RX ADMIN — Medication 8: at 17:22

## 2021-03-23 RX ADMIN — TRAMADOL HYDROCHLORIDE 50 MILLIGRAM(S): 50 TABLET ORAL at 23:20

## 2021-03-23 RX ADMIN — Medication 4 MILLIGRAM(S): at 17:24

## 2021-03-23 RX ADMIN — INSULIN GLARGINE 30 UNIT(S): 100 INJECTION, SOLUTION SUBCUTANEOUS at 21:58

## 2021-03-23 RX ADMIN — Medication 4 MILLIGRAM(S): at 05:33

## 2021-03-23 RX ADMIN — AMLODIPINE BESYLATE 10 MILLIGRAM(S): 2.5 TABLET ORAL at 05:33

## 2021-03-23 RX ADMIN — Medication 8 UNIT(S): at 08:48

## 2021-03-23 RX ADMIN — Medication 4 MILLIGRAM(S): at 12:51

## 2021-03-23 RX ADMIN — Medication 8 UNIT(S): at 12:51

## 2021-03-23 RX ADMIN — TRAMADOL HYDROCHLORIDE 50 MILLIGRAM(S): 50 TABLET ORAL at 05:32

## 2021-03-23 RX ADMIN — Medication 4 MILLIGRAM(S): at 00:09

## 2021-03-23 RX ADMIN — TRAMADOL HYDROCHLORIDE 50 MILLIGRAM(S): 50 TABLET ORAL at 06:50

## 2021-03-23 RX ADMIN — Medication 2: at 08:48

## 2021-03-23 RX ADMIN — HEPARIN SODIUM 5000 UNIT(S): 5000 INJECTION INTRAVENOUS; SUBCUTANEOUS at 05:33

## 2021-03-23 RX ADMIN — TRAMADOL HYDROCHLORIDE 50 MILLIGRAM(S): 50 TABLET ORAL at 00:10

## 2021-03-23 RX ADMIN — TRAMADOL HYDROCHLORIDE 50 MILLIGRAM(S): 50 TABLET ORAL at 21:58

## 2021-03-23 RX ADMIN — Medication 75 MILLIGRAM(S): at 05:33

## 2021-03-23 RX ADMIN — HEPARIN SODIUM 5000 UNIT(S): 5000 INJECTION INTRAVENOUS; SUBCUTANEOUS at 17:25

## 2021-03-23 RX ADMIN — Medication 4 MILLIGRAM(S): at 23:01

## 2021-03-23 NOTE — PROGRESS NOTE ADULT - SUBJECTIVE AND OBJECTIVE BOX
Diabetes Follow up note:    Chief complaint: T2DM     Interval Hx: BG values 100s today. Pt reports tolerating POs w/good appetite. Leg weakness improving on steroids. Awaiting an MRI    Review of Systems:  General: as above.   GI: Tolerating POs. Denies N/V/D/Abd pain  CV: Denies CP/SOB  ENDO: No S&Sx of hypoglycemia    MEDS:  atorvastatin 80 milliGRAM(s) Oral at bedtime  dexAMETHasone  Injectable 4 milliGRAM(s) IV Push every 6 hours  insulin glargine Injectable (LANTUS) 30 Unit(s) SubCutaneous at bedtime  insulin lispro (ADMELOG) corrective regimen sliding scale   SubCutaneous at bedtime  insulin lispro (ADMELOG) corrective regimen sliding scale   SubCutaneous three times a day before meals  insulin lispro Injectable (ADMELOG) 8 Unit(s) SubCutaneous three times a day before meals      Allergies    No Known Allergies        PE:  General: Male lying in bed. NAD.   Vital Signs Last 24 Hrs  T(C): 36.6 (23 Mar 2021 14:11), Max: 36.8 (22 Mar 2021 23:47)  T(F): 97.8 (23 Mar 2021 14:11), Max: 98.3 (23 Mar 2021 10:16)  HR: 64 (23 Mar 2021 14:11) (64 - 84)  BP: 151/80 (23 Mar 2021 14:11) (132/79 - 162/94)  BP(mean): --  RR: 18 (23 Mar 2021 14:11) (18 - 18)  SpO2: 97% (23 Mar 2021 14:11) (95% - 98%)  Abd: Soft, NT,ND,   Extremities: Warm  Neuro: A&O X3    LABS:  POCT Blood Glucose.: 130 mg/dL (03-23-21 @ 11:59)  POCT Blood Glucose.: 168 mg/dL (03-23-21 @ 08:23)  POCT Blood Glucose.: 201 mg/dL (03-22-21 @ 20:55)  POCT Blood Glucose.: 90 mg/dL (03-22-21 @ 17:12)  POCT Blood Glucose.: 167 mg/dL (03-22-21 @ 12:02)  POCT Blood Glucose.: 147 mg/dL (03-22-21 @ 08:23)  POCT Blood Glucose.: 199 mg/dL (03-21-21 @ 21:46)  POCT Blood Glucose.: 257 mg/dL (03-21-21 @ 16:57)  POCT Blood Glucose.: 159 mg/dL (03-21-21 @ 12:32)  POCT Blood Glucose.: 320 mg/dL (03-21-21 @ 08:22)  POCT Blood Glucose.: 229 mg/dL (03-20-21 @ 21:07)  POCT Blood Glucose.: 336 mg/dL (03-20-21 @ 17:16)                            17.8   8.82  )-----------( 532      ( 23 Mar 2021 07:06 )             53.0       03-23    134<L>  |  104  |  52<H>  ----------------------------<  175<H>  5.2   |  18<L>  |  1.21    Ca    8.4      23 Mar 2021 07:05        Thyroid Function Tests:  03-15 @ 09:19 TSH 4.86 FreeT4 -- T3 -- Anti TPO -- Anti Thyroglobulin Ab -- TSI --      A1C with Estimated Average Glucose Result: 10.6 % (03-15-21 @ 08:59)      C-Peptide, Serum: 2.2 ng/mL (03-16 @ 09:15)      Contact number: nino [phone] or [phone]

## 2021-03-23 NOTE — PROGRESS NOTE ADULT - SUBJECTIVE AND OBJECTIVE BOX
DATE OF SERVICE: 03-23-21 @ 13:16  CHIEF COMPLAINT:Patient is a 60y old  Male who presents with a chief complaint of leg weakness (23 Mar 2021 12:22)    	        PAST MEDICAL & SURGICAL HISTORY:  Diabetes    Hyperlipidemia    Hypertension, unspecified type    No significant past surgical history            REVIEW OF SYSTEMS:    NECK: No pain or stiffness  RESPIRATORY: No cough, wheezing, chills or hemoptysis; No Shortness of Breath  CARDIOVASCULAR: No chest pain, palpitations, passing out, dizziness,   GASTROINTESTINAL: No abdominal or epigastric pain. No nausea, vomiting, or hematemesis; No diarrhea   GENITOURINARY: No dysuria, frequency, hematuria, or incontinence  NEUROLOGICAL: No headaches,   leg pain improved  Medications:  MEDICATIONS  (STANDING):  amLODIPine   Tablet 10 milliGRAM(s) Oral daily  atorvastatin 80 milliGRAM(s) Oral at bedtime  dexAMETHasone  Injectable 4 milliGRAM(s) IV Push every 6 hours  dextrose 40% Gel 15 Gram(s) Oral once  dextrose 5%. 1000 milliLiter(s) (50 mL/Hr) IV Continuous <Continuous>  dextrose 5%. 1000 milliLiter(s) (100 mL/Hr) IV Continuous <Continuous>  dextrose 50% Injectable 12.5 Gram(s) IV Push once  dextrose 50% Injectable 25 Gram(s) IV Push once  dextrose 50% Injectable 25 Gram(s) IV Push once  glucagon  Injectable 1 milliGRAM(s) IntraMuscular once  heparin   Injectable 5000 Unit(s) SubCutaneous every 12 hours  insulin glargine Injectable (LANTUS) 30 Unit(s) SubCutaneous at bedtime  insulin lispro (ADMELOG) corrective regimen sliding scale   SubCutaneous at bedtime  insulin lispro (ADMELOG) corrective regimen sliding scale   SubCutaneous three times a day before meals  insulin lispro Injectable (ADMELOG) 8 Unit(s) SubCutaneous three times a day before meals  lactated ringers. 1000 milliLiter(s) (50 mL/Hr) IV Continuous <Continuous>  metoprolol succinate ER 75 milliGRAM(s) Oral daily  saline laxative (FLEET) Rectal Enema 1 Enema Rectal once  senna 2 Tablet(s) Oral at bedtime    MEDICATIONS  (PRN):  acetaminophen   Tablet .. 650 milliGRAM(s) Oral every 6 hours PRN Mild Pain (1 - 3)  magnesium hydroxide Suspension 30 milliLiter(s) Oral daily PRN Constipation  polyethylene glycol 3350 17 Gram(s) Oral daily PRN Constipation  traMADol 50 milliGRAM(s) Oral three times a day PRN Moderate Pain (4 - 6)    	    PHYSICAL EXAM:  T(C): 36.8 (03-23-21 @ 10:16), Max: 36.8 (03-22-21 @ 23:47)  HR: 64 (03-23-21 @ 10:16) (64 - 84)  BP: 139/72 (03-23-21 @ 10:16) (132/79 - 162/94)  RR: 18 (03-23-21 @ 10:16) (18 - 18)  SpO2: 98% (03-23-21 @ 10:16) (95% - 98%)  Wt(kg): --  I&O's Summary    22 Mar 2021 07:01  -  23 Mar 2021 07:00  --------------------------------------------------------  IN: 250 mL / OUT: 500 mL / NET: -250 mL    23 Mar 2021 07:01  -  23 Mar 2021 13:16  --------------------------------------------------------  IN: 320 mL / OUT: 300 mL / NET: 20 mL        Appearance: Normal	  HEENT:   Normal oral mucosa, PERRL, EOMI	  Lymphatic: No lymphadenopathy  Cardiovascular: Normal S1 S2, No JVD, No murmurs, No edema  Respiratory: Lungs clear to auscultation	  Psychiatry: A & O   Gastrointestinal:  Soft, Non-tender, + BS	  Skin: No rashes, No ecchymoses, No cyanosis	  Neurologic: Non-focal/motor equal   / sensory intact  Extremities: Normal range of motion, No clubbing, cyanosis or edema  Vascular: Peripheral pulses palpable     TELEMETRY: 	    ECG:  	  RADIOLOGY:  OTHER: 	  	  LABS:	 	    CARDIAC MARKERS:  CARDIAC MARKERS ( 21 Mar 2021 18:00 )  x     / x     / 40 U/L / x     / 1.8 ng/mL                                17.8   8.82  )-----------( 532      ( 23 Mar 2021 07:06 )             53.0     03-23    134<L>  |  104  |  52<H>  ----------------------------<  175<H>  5.2   |  18<L>  |  1.21    Ca    8.4      23 Mar 2021 07:05      proBNP:   Lipid Profile:   HgA1c:   TSH:

## 2021-03-23 NOTE — PROGRESS NOTE ADULT - ASSESSMENT
Echo 3/18/21: min MR, nl lv sys fx, small-moderate pericardial effusion, no evidence of pericardial tamponade     a/p  59 yo M with hx HTN, DM, HLD, COVID 2020 presents with LE pain and scrotal swelling    1. LE pain/weakness/scrotal swelling  -concern for malignancy  -s/p LN excisional biopsy to r/o lymphoma- frozen + diffuse B cell, patho noted   -per ID: MRI suggestive of CIDP, per neuro less likely AIDP, sarcoid, leptomeningeal carcinomatosis, infection  -UA noted  -LE duplex neg for DVT  -urology eval noted - No  intervention during this admission  -neurosx eval noted -no interventions at this time, possibly benefit from L5/S1 decompression fusion after workup of lymph node  -s/p LP   -pending MRI Cspine, Tspine, head - r/o mets  -Possible mediport before dc   -f/u heme/neuro     2. HTN  -bp acceptable   -c/w amlodipine and bb    3. HLD  -c/w statin    4. GUANAKO  -cr stable   -renal f/u    5. Pericardial effusion  -echo noted with min MR, nl lv sys fx, small-moderate pericardial effusion, no evidence of pericardial tamponade   -cont to monitor     6. Atypical Chest pain  -no further cp  -hsT, ck, ckmb neg, EKG without ischemic changes   -echo as above w nl lv sys fx, no segmental wall motion     dvt ppx

## 2021-03-23 NOTE — PROGRESS NOTE ADULT - SUBJECTIVE AND OBJECTIVE BOX
Patient is a 60y old  Male who presents with a chief complaint of 60 year old M man with PMH HTN, DM2, HLD, COVID 2020 presents with LE pain and scrotal swelling. Patient was hospitalized in [facility] in Feb 2021 where CT Ab/P found 5.8x2.5cm lesion on the right pelvis concerning for malignancy with sclerotic lesion at L5. Patient left AMA before additional malignancy workup was performed. In the last month, patient had 45lb unintentional weight loss and decreased appetite. In the last 2 weeks had some night sweats and subjective fevers. In addition, he noted that he started to have abdominal swelling, mostly on the left side with tenderness along with b/l scrotal swelling (22 Mar 2021 15:51)    Patient seen this morning. He is feeling much better neurologically since starting steroids. Leg pain and weakness improved.      MEDICATIONS  (STANDING):  amLODIPine   Tablet 10 milliGRAM(s) Oral daily  atorvastatin 80 milliGRAM(s) Oral at bedtime  dexAMETHasone  Injectable 4 milliGRAM(s) IV Push every 6 hours  dextrose 40% Gel 15 Gram(s) Oral once  dextrose 5%. 1000 milliLiter(s) (50 mL/Hr) IV Continuous <Continuous>  dextrose 5%. 1000 milliLiter(s) (100 mL/Hr) IV Continuous <Continuous>  dextrose 50% Injectable 12.5 Gram(s) IV Push once  dextrose 50% Injectable 25 Gram(s) IV Push once  dextrose 50% Injectable 25 Gram(s) IV Push once  glucagon  Injectable 1 milliGRAM(s) IntraMuscular once  heparin   Injectable 5000 Unit(s) SubCutaneous every 12 hours  insulin glargine Injectable (LANTUS) 30 Unit(s) SubCutaneous at bedtime  insulin lispro (ADMELOG) corrective regimen sliding scale   SubCutaneous at bedtime  insulin lispro (ADMELOG) corrective regimen sliding scale   SubCutaneous three times a day before meals  insulin lispro Injectable (ADMELOG) 8 Unit(s) SubCutaneous three times a day before meals  lactated ringers. 1000 milliLiter(s) (50 mL/Hr) IV Continuous <Continuous>  metoprolol succinate ER 75 milliGRAM(s) Oral daily  saline laxative (FLEET) Rectal Enema 1 Enema Rectal once  senna 2 Tablet(s) Oral at bedtime    MEDICATIONS  (PRN):  acetaminophen   Tablet .. 650 milliGRAM(s) Oral every 6 hours PRN Mild Pain (1 - 3)  magnesium hydroxide Suspension 30 milliLiter(s) Oral daily PRN Constipation  polyethylene glycol 3350 17 Gram(s) Oral daily PRN Constipation  traMADol 50 milliGRAM(s) Oral three times a day PRN Moderate Pain (4 - 6)      Vital Signs Last 24 Hrs  T(C): 36.8 (23 Mar 2021 10:16), Max: 36.8 (22 Mar 2021 23:47)  T(F): 98.3 (23 Mar 2021 10:16), Max: 98.3 (23 Mar 2021 10:16)  HR: 64 (23 Mar 2021 10:16) (64 - 84)  BP: 139/72 (23 Mar 2021 10:16) (132/79 - 162/94)  BP(mean): --  RR: 18 (23 Mar 2021 10:16) (18 - 18)  SpO2: 98% (23 Mar 2021 10:16) (95% - 98%)    PE  NAD  Awake, alert  Anicteric  No rash grossly                            17.8   8.82  )-----------( 532      ( 23 Mar 2021 07:06 )             53.0       03-23    134<L>  |  104  |  52<H>  ----------------------------<  175<H>  5.2   |  18<L>  |  1.21    Ca    8.4      23 Mar 2021 07:05

## 2021-03-23 NOTE — PROGRESS NOTE ADULT - SUBJECTIVE AND OBJECTIVE BOX
Admitting Diagnosis:  Abnormal weight loss [R63.4]  ABNORMAL WEIGHT LOSS      Background:  This is a 60y year old Male  who presents with the chief complaint of low back pain and leg weakness. 61yo M with hx HTN, DM, HLD, COVID 2020 presents with LE pain and scrotal swelling. Patient was hospitalized in [facility] in Feb 2021 where CT Ab/P found 5.8x2.5cm lesion on the right pelvis concerning for malignancy with sclerotic lesion at L5. Pt states since early 2021 inc pain and weakness in the right leg, at first he was limping but now cannot walk.  He has pain in the feet, describes as pinching and needles like sensation.  Starting to have sx in left leg as well. Patient left AMA before additional malignancy workup was performed.  In the last month, patient had 45lb unintentional weight loss and decreased appetite. Notes abdominal swelling, mostly on the left side with tenderness along with b/l scrotal swelling.  At one point given neurontin, not helpful.  Denies sx in arms, changes in speech, swallow, vision, bladder control.  Says issues with bowel movements.     MRI done showing diffuse nerve root enhancement involving the roots of the cauda equina with some mild root thickening though the dominant finding is the enhancement.  There is also a nonspecific lesion in the L5 vertebral body and a possible septic facet at L4-5.       Interval Hx:  s/p LP , high lymphocytes prelim, protein slightly elevated, no cx growth, awaiting cytology  getting steroids which seems to help  c/o constipation    ******    Past Medical History:  Diabetes [E11.9]    Hyperlipidemia [E78.5]    Hypertension, unspecified type [I10]        Past Surgical History:  No significant past surgical history [[id number]]        Social History:  No toxic habits    Family History:  FAMILY HISTORY:  No pertinent family history in first degree relatives        Allergies:  No Known Allergies      ROS:  Constitutional: Patient offers no complaints of fevers or significant weight loss  Ears, Nose, Mouth and Throat: The patient presents with no abnormalities of the head, ears, eyes, nose or throat  Skin: Patient offers no concerns of new rashes or lesions  Respiratory: The patient presents with no abnormalities of the respiratory tract  Cardiovascular: The patient presents with no cardiac abnormalities  Gastrointestinal: The patient presents with no abnormalities of the GI system  Genitourinary: The patient presents with no dysuria, hematuria or frequent urination  Neurological: See HPI  Endocrine: Patient offers no complaints of excessive thirst, urination, or heat/cold intolerance    Advanced care planning reviewed and noted in the chart.    Medications:  acetaminophen   Tablet .. 650 milliGRAM(s) Oral every 6 hours PRN  amLODIPine   Tablet 10 milliGRAM(s) Oral daily  atorvastatin 80 milliGRAM(s) Oral at bedtime  dexAMETHasone  Injectable 4 milliGRAM(s) IV Push every 6 hours  dextrose 40% Gel 15 Gram(s) Oral once  dextrose 5%. 1000 milliLiter(s) IV Continuous <Continuous>  dextrose 5%. 1000 milliLiter(s) IV Continuous <Continuous>  dextrose 50% Injectable 12.5 Gram(s) IV Push once  dextrose 50% Injectable 25 Gram(s) IV Push once  dextrose 50% Injectable 25 Gram(s) IV Push once  glucagon  Injectable 1 milliGRAM(s) IntraMuscular once  heparin   Injectable 5000 Unit(s) SubCutaneous every 12 hours  insulin glargine Injectable (LANTUS) 30 Unit(s) SubCutaneous at bedtime  insulin lispro (ADMELOG) corrective regimen sliding scale   SubCutaneous at bedtime  insulin lispro (ADMELOG) corrective regimen sliding scale   SubCutaneous three times a day before meals  insulin lispro Injectable (ADMELOG) 8 Unit(s) SubCutaneous three times a day before meals  lactated ringers. 1000 milliLiter(s) IV Continuous <Continuous>  magnesium hydroxide Suspension 30 milliLiter(s) Oral daily PRN  metoprolol succinate ER 75 milliGRAM(s) Oral daily  polyethylene glycol 3350 17 Gram(s) Oral daily PRN  saline laxative (FLEET) Rectal Enema 1 Enema Rectal once  senna 2 Tablet(s) Oral at bedtime  traMADol 50 milliGRAM(s) Oral three times a day PRN      Labs:  CBC Full  -  ( 23 Mar 2021 07:06 )  WBC Count : 8.82 K/uL  RBC Count : 5.96 M/uL  Hemoglobin : 17.8 g/dL  Hematocrit : 53.0 %  Platelet Count - Automated : 532 K/uL  Mean Cell Volume : 88.9 fl  Mean Cell Hemoglobin : 29.9 pg  Mean Cell Hemoglobin Concentration : 33.6 gm/dL  Auto Neutrophil # : x  Auto Lymphocyte # : x  Auto Monocyte # : x  Auto Eosinophil # : x  Auto Basophil # : x  Auto Neutrophil % : x  Auto Lymphocyte % : x  Auto Monocyte % : x  Auto Eosinophil % : x  Auto Basophil % : x    03-23    134<L>  |  104  |  52<H>  ----------------------------<  175<H>  5.2   |  18<L>  |  1.21    Ca    8.4      23 Mar 2021 07:05      CAPILLARY BLOOD GLUCOSE      POCT Blood Glucose.: 130 mg/dL (23 Mar 2021 11:59)  POCT Blood Glucose.: 168 mg/dL (23 Mar 2021 08:23)  POCT Blood Glucose.: 201 mg/dL (22 Mar 2021 20:55)  POCT Blood Glucose.: 90 mg/dL (22 Mar 2021 17:12)              Vitals:  Vital Signs Last 24 Hrs  T(C): 36.8 (23 Mar 2021 10:16), Max: 36.8 (22 Mar 2021 23:47)  T(F): 98.3 (23 Mar 2021 10:16), Max: 98.3 (23 Mar 2021 10:16)  HR: 64 (23 Mar 2021 10:16) (64 - 84)  BP: 139/72 (23 Mar 2021 10:16) (132/79 - 162/94)  BP(mean): --  RR: 18 (23 Mar 2021 10:16) (18 - 18)  SpO2: 98% (23 Mar 2021 10:16) (95% - 98%)    NEUROLOGICAL EXAM:    Mental status: Awake, alert, and in no apparent distress. Oriented to person, place and time. Language function is normal. speech clear and fluent. Recent memory, digit span and concentration were normal.     Cranial Nerves: Pupils were equal, round, reactive to light. Extraocular movements were intact. Visual field were full. Fundoscopic exam was deferred. Facial sensation was intact to light touch. There was no facial asymmetry. The palate was upgoing symmetrically and tongue was midline. Hearing acuity was intact to finger rub AU. Shoulder shrug was full bilaterally    Motor exam: Bulk and tone were normal. Strength was 5/5 in the arms.  Left leg  5/5 except dorsi 4+/5.  Right leg - low tone.  hip flexion/knee ext 2/5, dorsiflexion 4/5. Fine finger movements were symmetric and normal. There was no pronator drift    Reflexes: 2+ in the bilateral upper extremities. absent in legs. Toes were downgoing bilaterally.     Sensation: Intact to light touch, temperature, vibration and proprioception. says when touch right leg it is painful    Coordination: Finger-nose-finger was without dysmetria. cannot do heel shin    Gait: deferred    < from: CT Chest w/ IV Cont (03.14.21 @ 10:12) >    EXAM:  CT ABDOMEN AND PELVIS IC                          EXAM:  CT CHEST IC                          PROCEDURE DATE:  03/14/2021      IMPRESSION:  Enlarged right iliac lymph node measuring 5.9 x 2.4 cm. Additional smaller right iliac nodes are seen. There are also bilateral groin lymph nodes.      MOLINA PUGH M.D., RADIOLOGY RESIDENT  This documenthas been electronically signed.  GINA JENKINS M.D., ATTENDING RADIOLOGIST  This document has been electronically signed. Mar 14 2021 12:50PM    < end of copied text >          EXAM:  MR SPINE LUMBAR WAW IC                            PROCEDURE DATE:  03/15/2021            INTERPRETATION:  INDICATIONS:  Increased low back pain and right leg weakness for 4 months    TECHNIQUE:  Sagittal T1 weighted and T2 weighted images of the lumbosacral spine as well as axial T1 weighted and T2 weighted images were obtained.    COMPARISON EXAMINATION: None.    FINDINGS:  VERTEBRAL BODIES AND DISCS:  Nonspecific lesion in L1 low signal T1 hyperintense signal T2 with some enhancement.  ALIGNMENT:  No subluxations.  L1-L2 LEVEL:  Normal.  L2-L3 LEVEL:  Asymmetric bulge right with disc material in the region of the right neural foramen at this level. Clinical correlation for right L2 radiculopathy  L3-L4 LEVEL: Symmetric bulge with annular fissure in the 7:30 position. Bilateral facet arthropathy at this level.  L4-L5 LEVEL: Asymmetric bulge to the left with focal left-sided protrusion and some mass impression on the intracanal left L5 root.. Bilateral facet arthropathy.Some changes in the left paraspinal musculature extending from the left facet joint may represent septic facet as enhancement is seen in association with hyperintense T2 signal with associated muscular changes (9:19)  L5-S1 LEVEL:  Slight signal hyperintensity at the disc space with some enhancement ventrally without associated endplate abnormality favors degenerative change. Prominent asymmetric disc bulge with Bilateral foraminal disc material which is causing significant nerve root compression on the right greater than left. May be the cause of the patient's known right leg weakness.  SPINAL CANAL:  Evidence of diffuse nerve root enhancement appreciated involving the roots of the cauda equina with some mild thickening appreciated.  CONUS MEDULLARIS:  Normal.  MISCELLANEOUS:  None    IMPRESSION:  Diffuse nerve root enhancement involving the roots of the cauda equina with some mild root thickening though the dominant finding is the enhancement. Differential possibilities include acute inflammatory demyelinating polyneuropathy( [facility]) versus chronic inflammatory demyelinating polyneuropathy(CIDP). Inflammatory pathologies such as sarcoid or infectious etiologies should be considered. Included in the differential is leptomeningeal carcinomatosis. Correlation with CSF strongly recommended. Nonspecific lesion in the L5 vertebral body as described. Enhancement with associated T2 abnormality involving the left L4-5 facet with extension to the paraspinal musculature may indicate a septic facet at this level. Correlation with clinical parameters suggested. Degenerative changes with disc material in the neural foramina at the L2-3 and L5-S1 levels on the right as well as in the L5-S1 neural foramen on the left. Prominent bulge with more focal protrusion L4-5 left                MONTY TRAN MD; Attending Radiologist  This document has been electronically signed. Mar 15 2021  8:19PM

## 2021-03-23 NOTE — PROGRESS NOTE ADULT - PROBLEM SELECTOR PLAN 2
Diabetes/Glucose Control    • Glucose maintained within prescribed range Progressing        GASTROINTESTINAL - ADULT    • Minimal or absence of nausea and vomiting Progressing    • Maintains or returns to baseline bowel function Progressing        GENITOUR goal <130/80   Currently on amlodipine and metoprolol, ACEI held due to GUANAKO   -may need adjustment.

## 2021-03-23 NOTE — PROGRESS NOTE ADULT - NUTRITIONAL ASSESSMENT
This patient has been assessed with a concern for Malnutrition and has been determined to have a diagnosis/diagnoses of Severe protein-calorie malnutrition.    This patient is being managed with:   Diet Regular-  Consistent Carbohydrate {Evening Snack} (CSTCHOSN)  Entered: Mar 19 2021  8:34AM

## 2021-03-23 NOTE — PROGRESS NOTE ADULT - ASSESSMENT
60 year old male presents to ED with right groin and back pain, 35 lb weight loss, subjective fevers, sweats - found to have bilateral inguinal adenopathy and a sclerotic L5 lesion.    Inguinal Adenopathy and L1 Sclerotic Lesion  -- Concern for lymphoma based on fevers, weight loss and night sweats  -- Other malignancies also possible, as can be benign inflammatory processes  -- Tumor markers - PSA,, Ca 19,9, LDH and Uric Acid are normal  -- S/P excisional biopsy of inguinal lymph node  --Results pending but frozen section consistent with B cell lymphoma; awaiting subtype  --Flow cytometry from lymph node, however, negative for neoplasm  -- Have ordered Hepatitis profile, Quantiferon in anticipation for chemotherapy  --Quantiferon results are indeterminate - consider input from ID  --Chemotherapy (if indicated will be determined (as well as possible spinal RT) once we have final pathology results - will most likely be given as outpatient  --We request IR consultation for a medi port placement to be done prior to discharge but will wait until pathology results    Neuropathy  -- Presumed to be secondary to COVID infection per patient  -- Markedly abnormal MRI of spine per above  -- Neurology and Neurosurgery are following  -- Concern for leptomeningeal spread  -- S/P LP - increased lymphocytes on CSF cell count - awaiting cytology results  -- Startedexamethasone for cauda equina 10 mg loading dose then 4 mg IV q6H - clinically improved  -- Discussed with neurology, we will need MRI of cervical, thoracic spine as well as MRI head    We will continue to follow patient. Thank you for the opportunity to participate in Mr Fernandez's care.    Lobo Landeros PA-C  Hematology/Oncology   [phone] (cell)  [phone] (office)  After hours please call MD on call or office

## 2021-03-23 NOTE — PROGRESS NOTE ADULT - PROBLEM SELECTOR PLAN 1
test BG AC/HS  -Continue Lantus 30 units QHS  -Continue Admelog 8 units AC meals  -c/w Admelog moderate correction scale AC and mod HS scale  Carb consistent diet   C-peptide normal, not obtained with BMP, not suggestive of DM1  -notify endocrine team if steroid dose changed or discontinued  On Discharge recommend tresiba and NOvolog  Recommend follow up with Endo in [place], Patient to find out the name.

## 2021-03-23 NOTE — PROGRESS NOTE ADULT - ASSESSMENT
Impression:  This is a 60y year old Male  who presents with the chief complaint of low back pain and leg weakness. 59yo M with hx HTN, DM, HLD, COVID 2020 presents with LE pain and scrotal swelling. Patient was hospitalized in [facility] in Feb 2021 where CT Ab/P found 5.8x2.5cm lesion on the right pelvis concerning for malignancy with sclerotic lesion at L5. Pt states since early 2021 inc pain and weakness in the right leg, at first he was limping but now cannot walk.  He has pain in the feet, describes as pinching and needles like sensation.  Starting to have sx in left leg as well. Patient left AMA before additional malignancy workup was performed.  In the last month, patient had 45lb unintentional weight loss and decreased appetite. Notes abdominal swelling, mostly on the left side with tenderness along with b/l scrotal swelling.  At one point given neurontin, not helpful.  Denies sx in arms, changes in speech, swallow, vision, bladder control.  Says issues with bowel movements.     MRI done showing diffuse nerve root enhancement involving the roots of the cauda equina with some mild root thickening though the dominant finding is the enhancement.  There is also a nonspecific lesion in the L5 vertebral body and a possible septic facet at L4-5.       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  Pending CSF cytology.  flow cytology is not 100% sensitive  discussed with heme/onc PA  infectious etiology though perhaps less likely still possible given septic L4-5 facet and potential immunosuppression in setting of possible lymphoma.   Doubt demyelinating (AIDP or CIDP)    Plan:  LP for CSF: cell count, cell culture, gram stain, protein, glucose, fungal culture, AFB (not enough CSF sent to get AFB), cryptococcal, VDRL, HSV, PCR for infections, cytology, and flow cytometry.  Lymphocytes seen, mildly increased protein, flow apparently negative, rest still pending.      Heme/ onc suspected B Cell lymphoma and possible leptomeningeal spread, although apparently normal flow which would be atypical.  Awaiting cytology for further plan. steroids per heme, seem to be helping    ID input given new information from LP would be appreciated     CT chest not suspicious for sarcoid

## 2021-03-23 NOTE — PROGRESS NOTE ADULT - ASSESSMENT
pt w/ scrotal swellin g / pelvic lesion/ weight loss    onc f/u noted  started on steroids   taper as per neuro. onc / sx  excisional Ln bx by sx  + for Bcell  f/u final path   tx as per onc  neg  lext dopplers  dm/uncontrolled  fsg riss  c/w insulin  endo f/u  dvt proph  htn  c/w meds  mri noted  neuro f/u  lp  neg thus far for leptomeningeal disease  id f/u   neurosx f/u noted  no sx now as per neuro sx  walking difficulty/spinal stenosis   neuro eval noted  card  f/u  urology f/u noted  no sx now  b/l hydrocele  bowel regimen  discussed w/ heme onc/neuro

## 2021-03-23 NOTE — PROGRESS NOTE ADULT - ASSESSMENT
60M with DM (A1C=10.6), HTN, chol admitted 3/14/2021 c/o LE pain, scrotal swelling and weight loss found to have weakness RLE and CT that shows enlarging right iliac lymph node as well as an MRI that shows diffuse nerve root enhancement involving the roots of the cauda equina with some mild root thickening though the dominant finding is the enhancement. DOes not seem infectious.  Could this be lymphoma with B symptoms though the LDH is low.  MRI suggestive of CIDP, per neuro less likely AIDP, sarcoid, leptomeningeal carcinomatosis, infection.  s/p LN biopsy.  MRI reviewed with neuroradiology.  Though cannot r/o infection, could also be degenerative.     Lymphadenopathy  - f/u pathology  - negative HIV, ACE, DARRIN  - indeterminant quant gold due to dexamethasone  - all cultures negative    L2-3 facet abnl signal MRI  - blood cultures  - ESR/CRP  - no antibiotics for now  - suspect he is not infected (no fever, leukocytosis) and likely lymphoma

## 2021-03-23 NOTE — PROGRESS NOTE ADULT - ASSESSMENT
ASSESSMENT/PLAN  59yo M with hx HTN, DM, HLD, COVID 2020 presents with LE pain and scrotal swelling    - GUANAKO -resolving/ resolved;  Increase BUN may be from Steroids     High normal potassium     -CV- BP -stable     -Heme/onc- Eval noted for pending pathology/ cytology from lymph node and CSF pending;  Possible mediport before dc     - Renal dosing of meds to CrCL of 50ml/min--At present no evidence of tumor lysis syndrome .  Check uric acid and Phos in am          Sayed [facility]   [employer]  [phone]

## 2021-03-23 NOTE — PROGRESS NOTE ADULT - ASSESSMENT
59yo M with hx HTN, DM2, HLD, COVID 2020 presents with LE pain and scrotal swelling. Patient was hospitalized in [facility] in Feb 2021 where CT Ab/P found 5.8x2.5cm lesion on the right pelvis concerning for malignancy with sclerotic lesion at L5. Now with steroid exacerbated hyperglycemia due to being started on decadron for cauda equina.   Endocrine on board for management of uncontrolled DM2. A1c 10.6%. BG values at goal on present insulin regimen. BG goal (100-180mg/dl).

## 2021-03-23 NOTE — PROGRESS NOTE ADULT - SUBJECTIVE AND OBJECTIVE BOX
CARDIOLOGY FOLLOW UP - Dr. Trimble    CC: denies cp, sob, and palpitations       PHYSICAL EXAM:  T(C): 36.8 (03-23-21 @ 10:16), Max: 36.8 (03-22-21 @ 23:47)  HR: 64 (03-23-21 @ 10:16) (64 - 84)  BP: 139/72 (03-23-21 @ 10:16) (132/79 - 162/94)  RR: 18 (03-23-21 @ 10:16) (18 - 18)  SpO2: 98% (03-23-21 @ 10:16) (95% - 98%)  Wt(kg): --  I&O's Summary    22 Mar 2021 07:01  -  23 Mar 2021 07:00  --------------------------------------------------------  IN: 250 mL / OUT: 500 mL / NET: -250 mL    23 Mar 2021 07:01  -  23 Mar 2021 12:07  --------------------------------------------------------  IN: 320 mL / OUT: 300 mL / NET: 20 mL        Appearance: Normal	  Cardiovascular: Normal S1 S2,RRR, No JVD, No murmurs  Respiratory: Lungs clear to auscultation	  Gastrointestinal:  Soft, Non-tender, + BS	  Extremities: Normal range of motion, No clubbing, cyanosis or edema      Home Medications:  amlodipine-benazepril 10 mg-40 mg oral capsule: 1 cap(s) orally once a day (15 Mar 2021 11:15)  HumaLOG 100 units/mL injectable solution: 8 unit(s) injectable 3 times a day (before meals) (15 Mar 2021 11:15)  Lipitor 80 mg oral tablet: 1 tab(s) orally once a day (15 Mar 2021 11:15)  Toprol-XL 50 mg oral tablet, extended release: 1 tab(s) orally once a day (15 Mar 2021 11:15)  Tresiba 100 units/mL subcutaneous solution: 40 unit(s) subcutaneous once a day (at bedtime) (15 Mar 2021 11:15)      MEDICATIONS  (STANDING):  amLODIPine   Tablet 10 milliGRAM(s) Oral daily  atorvastatin 80 milliGRAM(s) Oral at bedtime  dexAMETHasone  Injectable 4 milliGRAM(s) IV Push every 6 hours  dextrose 40% Gel 15 Gram(s) Oral once  dextrose 5%. 1000 milliLiter(s) (50 mL/Hr) IV Continuous <Continuous>  dextrose 5%. 1000 milliLiter(s) (100 mL/Hr) IV Continuous <Continuous>  dextrose 50% Injectable 12.5 Gram(s) IV Push once  dextrose 50% Injectable 25 Gram(s) IV Push once  dextrose 50% Injectable 25 Gram(s) IV Push once  glucagon  Injectable 1 milliGRAM(s) IntraMuscular once  heparin   Injectable 5000 Unit(s) SubCutaneous every 12 hours  insulin glargine Injectable (LANTUS) 30 Unit(s) SubCutaneous at bedtime  insulin lispro (ADMELOG) corrective regimen sliding scale   SubCutaneous at bedtime  insulin lispro (ADMELOG) corrective regimen sliding scale   SubCutaneous three times a day before meals  insulin lispro Injectable (ADMELOG) 8 Unit(s) SubCutaneous three times a day before meals  lactated ringers. 1000 milliLiter(s) (50 mL/Hr) IV Continuous <Continuous>  metoprolol succinate ER 75 milliGRAM(s) Oral daily  saline laxative (FLEET) Rectal Enema 1 Enema Rectal once  senna 2 Tablet(s) Oral at bedtime      TELEMETRY: 	    ECG:  	  RADIOLOGY:   DIAGNOSTIC TESTING:  [ ] Echocardiogram:  [ ]  Catheterization:  [ ] Stress Test:    OTHER: 	    LABS:	 	    Creatine Kinase, Serum: 40 U/L [30 - 200] (03-21 @ 18:00)  CKMB Units: 1.8 ng/mL [0.0 - 6.7] (03-21 @ 18:00)  Troponin T, High Sensitivity Result: 17 ng/L [0 - 51] (03-21 @ 18:00)                          17.8   8.82  )-----------( 532      ( 23 Mar 2021 07:06 )             53.0     03-23    134<L>  |  104  |  52<H>  ----------------------------<  175<H>  5.2   |  18<L>  |  1.21    Ca    8.4      23 Mar 2021 07:05

## 2021-03-23 NOTE — PROGRESS NOTE ADULT - SUBJECTIVE AND OBJECTIVE BOX
NEPHROLOGY-[facility] [phone]        Patient seen and examined in bed.  He was in good spirits         MEDICATIONS  (STANDING):  amLODIPine   Tablet 10 milliGRAM(s) Oral daily  atorvastatin 80 milliGRAM(s) Oral at bedtime  dexAMETHasone  Injectable 4 milliGRAM(s) IV Push every 6 hours  dextrose 40% Gel 15 Gram(s) Oral once  dextrose 5%. 1000 milliLiter(s) (50 mL/Hr) IV Continuous <Continuous>  dextrose 5%. 1000 milliLiter(s) (100 mL/Hr) IV Continuous <Continuous>  dextrose 50% Injectable 12.5 Gram(s) IV Push once  dextrose 50% Injectable 25 Gram(s) IV Push once  dextrose 50% Injectable 25 Gram(s) IV Push once  glucagon  Injectable 1 milliGRAM(s) IntraMuscular once  heparin   Injectable 5000 Unit(s) SubCutaneous every 12 hours  insulin glargine Injectable (LANTUS) 30 Unit(s) SubCutaneous at bedtime  insulin lispro (ADMELOG) corrective regimen sliding scale   SubCutaneous at bedtime  insulin lispro (ADMELOG) corrective regimen sliding scale   SubCutaneous three times a day before meals  insulin lispro Injectable (ADMELOG) 8 Unit(s) SubCutaneous three times a day before meals  lactated ringers. 1000 milliLiter(s) (50 mL/Hr) IV Continuous <Continuous>  metoprolol succinate ER 75 milliGRAM(s) Oral daily  saline laxative (FLEET) Rectal Enema 1 Enema Rectal once  senna 2 Tablet(s) Oral at bedtime      VITAL:  T(C): , Max: 36.8 (03-22-21 @ 23:47)  T(F): , Max: 98.3 (03-23-21 @ 10:16)  HR: 64 (03-23-21 @ 10:16)  BP: 139/72 (03-23-21 @ 10:16)  BP(mean): --  RR: 18 (03-23-21 @ 10:16)  SpO2: 98% (03-23-21 @ 10:16)  Wt(kg): --    I and O's:    03-22 @ 07:01  -  03-23 @ 07:00  --------------------------------------------------------  IN: 250 mL / OUT: 500 mL / NET: -250 mL    03-23 @ 07:01  -  03-23 @ 10:52  --------------------------------------------------------  IN: 320 mL / OUT: 300 mL / NET: 20 mL          PHYSICAL EXAM:    Constitutional: NAD  Neck:  No JVD  Respiratory: CTAB/L  Cardiovascular: S1 and S2  Gastrointestinal: BS+, soft, NT/ND  Extremities: No peripheral edema  Neurological: A/O x 3, no focal deficits  Psychiatric: Normal mood, normal affect  : No Weir  Skin: No rashes  Access: Not applicable    LABS:                        17.8   8.82  )-----------( 532      ( 23 Mar 2021 07:06 )             53.0     03-23    134<L>  |  104  |  52<H>  ----------------------------<  175<H>  5.2   |  18<L>  |  1.21    Ca    8.4      23 Mar 2021 07:05            Urine Studies:          RADIOLOGY & ADDITIONAL STUDIES:

## 2021-03-23 NOTE — PROGRESS NOTE ADULT - SUBJECTIVE AND OBJECTIVE BOX
Patient is a 60y old  Male who presents with a chief complaint of Lymphadenopathy (16 Mar 2021 12:54)    f/u abnl MRI    Interval History/ROS:  asked to evaluate for infection.  CSF negative for cells.  Flow negative.  no fever.  no leukocytosis.  biopsy s/o lymphoma on prelim.  still with RLE weakness.  no fever.  Remainder of ROS otherwise negative.    PAST MEDICAL & SURGICAL HISTORY:  Diabetes  Hyperlipidemia  Hypertension, unspecified type    Allergies  No Known Allergies    ANTIMICROBIALS:  none    MEDICATIONS  (STANDING):  amLODIPine   Tablet 10 daily  atorvastatin 80 at bedtime  dexAMETHasone  Injectable 4 every 6 hours  glucagon  Injectable 1 once  heparin   Injectable 5000 every 12 hours  insulin glargine Injectable (LANTUS) 30 at bedtime  insulin lispro (ADMELOG) corrective regimen sliding scale  at bedtime  insulin lispro (ADMELOG) corrective regimen sliding scale  three times a day before meals  insulin lispro Injectable (ADMELOG) 8 three times a day before meals  metoprolol succinate ER 75 daily  saline laxative (FLEET) Rectal Enema 1 once  senna 2 at bedtime    Vital Signs Last 24 Hrs  T(F): 97.8 (03-23-21 @ 14:11), Max: 98.3 (03-23-21 @ 10:16)  HR: 64 (03-23-21 @ 14:11)  BP: 151/80 (03-23-21 @ 14:11)  RR: 18 (03-23-21 @ 14:11)  SpO2: 97% (03-23-21 @ 14:11) (95% - 98%)    PHYSICAL EXAM:  Constitutional: non-toxic, lying prone  HEAD/EYES: anicteric  ENT:  supple,  Cardiovascular:   normal S1, S2  Respiratory:  clear BS bilaterally  GI:  soft  :  no tran  Musculoskeletal:  no synovitis  Neurologic: awake and alert, significantly decreased strength R leg  Skin:  surgical site c/d/i  Psychiatric:  awake, alert, appropriate mood                        17.8   8.82  )-----------( 532      ( 23 Mar 2021 07:06 )             53.0 03-23    134  |  104  |  52  ----------------------------<  175  5.2   |  18  |  1.21  Ca    8.4      23 Mar 2021 07:05    Quant gold indeterminant but was on steroids    Lactate Dehydrogenase, Serum: 136 U/L (03-16-21 @ 07:22)    HIV-1/2 Combo Result: Nonreact:  Angiotensin Converting Enzyme, Serum: <5  Anti Nuclear Factor Titer: Negative    MICROBIOLOGY:  Culture - Fungal, CSF (collected 03-19-21 @ 16:20)  Source: .CSF CSF  Preliminary Report (03-22-21 @ 09:11):    Testing in progress    Culture - Acid Fast - CSF (collected 03-19-21 @ 16:20)  Source: .CSF CSF    Culture - CSF with Gram Stain (collected 03-19-21 @ 16:20)  Source: .CSF CSF  Gram Stain (03-19-21 @ 17:04):    No polymorphonuclear cells seen    No organisms seen    by cytocentrifuge  Final Report (03-22-21 @ 09:11):    No growth    COVID-19 IgG Antibody Interpretation: Negative (03-15-21 @ 08:26)  COVID-19 PCR: NotDetec (03-14-21 @ 11:17)    RADIOLOGY:  imaging below personally reviewed and agree with findings    MR Lumbar Spine w/wo IV Cont (03.15.21 @ 17:55) >  IMPRESSION:  Diffuse nerve root enhancement involving the roots of the cauda equina with some mild root thickening though the dominant finding is the enhancement. Differential possibilities include acute inflammatory demyelinating polyneuropathy( [place]) versus chronic inflammatory demyelinating polyneuropathy(CIDP). Inflammatory pathologies such as sarcoid or infectious etiologies should be considered. Included in the differential is leptomeningeal carcinomatosis. Correlation with CSF strongly recommended. Nonspecific lesion in the L5 vertebral body as described. Enhancement with associated T2 abnormality involving the left L4-5 facet with extension to the paraspinal musculature may indicate a septic facet at this level. Correlation with clinical parameters suggested. Degenerative changes with disc material in the neural foramina at the L2-3 and L5-S1 levels on the right as well as in the L5-S1 neural foramen on the left. Prominent bulge with more focal protrusion L4-5 left    A Duplex Lower Ext Vein Scan, Bilat (03.15.21 @ 12:03) >  IMPRESSION: There is a 2.3 cm nodule superficial to the vascular structures in the right inguinal area, likely a lymph node.  Noevidence of deep venous thrombosis in either lower extremity.    CT Chest w/ IV Cont (03.14.21 @ 10:12) >  IMPRESSION:  Enlarged right iliac lymph node measuring 5.9 x 2.4 cm. Additional smaller right iliac nodes are seen. There are also bilateral groin lymph nodes.    US Doppler Scrotum (03.14.21 @ 10:57) >  IMPRESSION:  Large complex bilateral hydroceles.  No testicular mass seen

## 2021-03-24 LAB
ERYTHROCYTE [SEDIMENTATION RATE] IN BLOOD: 2 MM/HR — SIGNIFICANT CHANGE UP (ref 0–20)
GLUCOSE BLDC GLUCOMTR-MCNC: 187 MG/DL — HIGH (ref 70–99)
GLUCOSE BLDC GLUCOMTR-MCNC: 195 MG/DL — HIGH (ref 70–99)
GLUCOSE BLDC GLUCOMTR-MCNC: 210 MG/DL — HIGH (ref 70–99)
GLUCOSE BLDC GLUCOMTR-MCNC: 295 MG/DL — HIGH (ref 70–99)
NON-GYNECOLOGICAL CYTOLOGY STUDY: SIGNIFICANT CHANGE UP
PHOSPHATE SERPL-MCNC: 4.7 MG/DL — HIGH (ref 2.5–4.5)
URATE SERPL-MCNC: 7.5 MG/DL — SIGNIFICANT CHANGE UP (ref 3.4–8.8)

## 2021-03-24 PROCEDURE — 70551 MRI BRAIN STEM W/O DYE: CPT | Mod: 26

## 2021-03-24 PROCEDURE — 99232 SBSQ HOSP IP/OBS MODERATE 35: CPT

## 2021-03-24 PROCEDURE — 72141 MRI NECK SPINE W/O DYE: CPT | Mod: 26

## 2021-03-24 RX ORDER — MORPHINE SULFATE 50 MG/1
2 CAPSULE, EXTENDED RELEASE ORAL ONCE
Refills: 0 | Status: DISCONTINUED | OUTPATIENT
Start: 2021-03-24 | End: 2021-03-25

## 2021-03-24 RX ORDER — ALLOPURINOL 300 MG
300 TABLET ORAL DAILY
Refills: 0 | Status: DISCONTINUED | OUTPATIENT
Start: 2021-03-24 | End: 2021-03-31

## 2021-03-24 RX ADMIN — AMLODIPINE BESYLATE 10 MILLIGRAM(S): 2.5 TABLET ORAL at 04:27

## 2021-03-24 RX ADMIN — Medication 4 MILLIGRAM(S): at 18:27

## 2021-03-24 RX ADMIN — INSULIN GLARGINE 30 UNIT(S): 100 INJECTION, SOLUTION SUBCUTANEOUS at 21:17

## 2021-03-24 RX ADMIN — TRAMADOL HYDROCHLORIDE 50 MILLIGRAM(S): 50 TABLET ORAL at 06:38

## 2021-03-24 RX ADMIN — Medication 8 UNIT(S): at 08:49

## 2021-03-24 RX ADMIN — Medication 2: at 12:41

## 2021-03-24 RX ADMIN — Medication 8 UNIT(S): at 12:41

## 2021-03-24 RX ADMIN — HEPARIN SODIUM 5000 UNIT(S): 5000 INJECTION INTRAVENOUS; SUBCUTANEOUS at 18:27

## 2021-03-24 RX ADMIN — Medication 2: at 18:27

## 2021-03-24 RX ADMIN — Medication 1 TABLET(S): at 18:28

## 2021-03-24 RX ADMIN — Medication 4 MILLIGRAM(S): at 12:41

## 2021-03-24 RX ADMIN — Medication 4: at 08:48

## 2021-03-24 RX ADMIN — ATORVASTATIN CALCIUM 80 MILLIGRAM(S): 80 TABLET, FILM COATED ORAL at 21:03

## 2021-03-24 RX ADMIN — Medication 4 MILLIGRAM(S): at 04:28

## 2021-03-24 RX ADMIN — TRAMADOL HYDROCHLORIDE 50 MILLIGRAM(S): 50 TABLET ORAL at 21:04

## 2021-03-24 RX ADMIN — Medication 75 MILLIGRAM(S): at 04:27

## 2021-03-24 RX ADMIN — Medication 1: at 21:17

## 2021-03-24 RX ADMIN — HEPARIN SODIUM 5000 UNIT(S): 5000 INJECTION INTRAVENOUS; SUBCUTANEOUS at 04:28

## 2021-03-24 RX ADMIN — Medication 300 MILLIGRAM(S): at 12:41

## 2021-03-24 RX ADMIN — TRAMADOL HYDROCHLORIDE 50 MILLIGRAM(S): 50 TABLET ORAL at 22:00

## 2021-03-24 RX ADMIN — Medication 8 UNIT(S): at 18:27

## 2021-03-24 NOTE — PROGRESS NOTE ADULT - SUBJECTIVE AND OBJECTIVE BOX
DATE OF SERVICE: 03-24-21 @ 14:13  CHIEF COMPLAINT:Patient is a 60y old  Male who presents with a chief complaint of leg pain (24 Mar 2021 12:03)    	        PAST MEDICAL & SURGICAL HISTORY:  Diabetes    Hyperlipidemia    Hypertension, unspecified type    No significant past surgical history              NECK: No pain or stiffness  RESPIRATORY: No cough, wheezing, chills or hemoptysis; No Shortness of Breath  CARDIOVASCULAR: No chest pain, palpitations, passing out, dizziness, or leg swelling  GASTROINTESTINAL: No abdominal or epigastric pain. No nausea, vomiting, or hematemesis; No diarrhea or constipation. No melena or hematochezia.  GENITOURINARY: No dysuria, frequency, hematuria, or incontinence  NEUROLOGICAL: No headaches,   pain better    Medications:  MEDICATIONS  (STANDING):  allopurinol 300 milliGRAM(s) Oral daily  amLODIPine   Tablet 10 milliGRAM(s) Oral daily  atorvastatin 80 milliGRAM(s) Oral at bedtime  dexAMETHasone  Injectable 4 milliGRAM(s) IV Push every 6 hours  dextrose 40% Gel 15 Gram(s) Oral once  dextrose 5%. 1000 milliLiter(s) (50 mL/Hr) IV Continuous <Continuous>  dextrose 5%. 1000 milliLiter(s) (100 mL/Hr) IV Continuous <Continuous>  dextrose 50% Injectable 12.5 Gram(s) IV Push once  dextrose 50% Injectable 25 Gram(s) IV Push once  dextrose 50% Injectable 25 Gram(s) IV Push once  glucagon  Injectable 1 milliGRAM(s) IntraMuscular once  heparin   Injectable 5000 Unit(s) SubCutaneous every 12 hours  insulin glargine Injectable (LANTUS) 30 Unit(s) SubCutaneous at bedtime  insulin lispro (ADMELOG) corrective regimen sliding scale   SubCutaneous at bedtime  insulin lispro (ADMELOG) corrective regimen sliding scale   SubCutaneous three times a day before meals  insulin lispro Injectable (ADMELOG) 8 Unit(s) SubCutaneous three times a day before meals  lactated ringers. 1000 milliLiter(s) (50 mL/Hr) IV Continuous <Continuous>  metoprolol succinate ER 75 milliGRAM(s) Oral daily  saline laxative (FLEET) Rectal Enema 1 Enema Rectal once  senna 2 Tablet(s) Oral at bedtime  trimethoprim   80 mG/sulfamethoxazole 400 mG 1 Tablet(s) Oral daily    MEDICATIONS  (PRN):  acetaminophen   Tablet .. 650 milliGRAM(s) Oral every 6 hours PRN Mild Pain (1 - 3)  magnesium hydroxide Suspension 30 milliLiter(s) Oral daily PRN Constipation  polyethylene glycol 3350 17 Gram(s) Oral daily PRN Constipation  traMADol 50 milliGRAM(s) Oral three times a day PRN Moderate Pain (4 - 6)    	    PHYSICAL EXAM:  T(C): 36.5 (03-24-21 @ 09:08), Max: 37 (03-23-21 @ 19:40)  HR: 61 (03-24-21 @ 09:08) (61 - 75)  BP: 144/83 (03-24-21 @ 09:08) (138/90 - 154/73)  RR: 17 (03-24-21 @ 09:08) (16 - 18)  SpO2: 97% (03-24-21 @ 09:08) (94% - 98%)  Wt(kg): --  I&O's Summary    23 Mar 2021 07:01  -  24 Mar 2021 07:00  --------------------------------------------------------  IN: 600 mL / OUT: 1500 mL / NET: -900 mL        Appearance: Normal	  HEENT:   Normal oral mucosa, PERRL, EOMI	  Lymphatic: No lymphadenopathy  Cardiovascular: Normal S1 S2, No JVD, No murmurs, No edema  Respiratory: Lungs clear to auscultation	  Psychiatry: A & O   Gastrointestinal:  Soft, Non-tender, + BS	    Neurologic: Non-focal  Extremities: dec rom    TELEMETRY: 	    ECG:  	  RADIOLOGY:  OTHER: 	  	  LABS:	 	    CARDIAC MARKERS:                                17.8   8.82  )-----------( 532      ( 23 Mar 2021 07:06 )             53.0     03-23    134<L>  |  104  |  52<H>  ----------------------------<  175<H>  5.2   |  18<L>  |  1.21    Ca    8.4      23 Mar 2021 07:05  Phos  4.7     03-24      proBNP:   Lipid Profile:   HgA1c:   TSH:

## 2021-03-24 NOTE — PROGRESS NOTE ADULT - SUBJECTIVE AND OBJECTIVE BOX
Diabetes Follow up note:    Chief complaint: Uncontrolled T2DM w/steroid induced hyperglycemia    Interval Hx: BG values 130s-mid 300s over past 24 hours. Pt reports ate a bunch of grapes and an orange yesterday prior to dinner FS testing. Said his diet at home is very carb heavy but he is motivated modify his diet to keep his sugars controlled. Remains on decadron course. Did not tolerate MRI overnight due to back pain and is tired of frequent blood draws.     Review of Systems:  General: + back pain  GI: Tolerating POs. Denies N/V/D/Abd pain  CV: Denies CP/SOB  ENDO: No S&Sx of hypoglycemia  MEDS:  atorvastatin 80 milliGRAM(s) Oral at bedtime  dexAMETHasone  Injectable 4 milliGRAM(s) IV Push every 6 hours    insulin glargine Injectable (LANTUS) 30 Unit(s) SubCutaneous at bedtime  insulin lispro (ADMELOG) corrective regimen sliding scale   SubCutaneous at bedtime  insulin lispro (ADMELOG) corrective regimen sliding scale   SubCutaneous three times a day before meals  insulin lispro Injectable (ADMELOG) 8 Unit(s) SubCutaneous three times a day before meals      Allergies    No Known Allergies        PE:  General: Male lying in bed. NAD>   Vital Signs Last 24 Hrs  T(C): 36.5 (24 Mar 2021 09:08), Max: 37 (23 Mar 2021 19:40)  T(F): 97.7 (24 Mar 2021 09:08), Max: 98.6 (23 Mar 2021 19:40)  HR: 61 (24 Mar 2021 09:08) (61 - 75)  BP: 144/83 (24 Mar 2021 09:08) (138/90 - 154/73)  BP(mean): --  RR: 17 (24 Mar 2021 09:08) (16 - 18)  SpO2: 97% (24 Mar 2021 09:08) (94% - 98%)  Abd: Soft, NT,ND,   Extremities: Warm. no edema x 4 ext.   Neuro: A&O X3    LABS:  POCT Blood Glucose.: 210 mg/dL (03-24-21 @ 08:19)  POCT Blood Glucose.: 152 mg/dL (03-23-21 @ 21:13)  POCT Blood Glucose.: 350 mg/dL (03-23-21 @ 17:07)  POCT Blood Glucose.: 130 mg/dL (03-23-21 @ 11:59)  POCT Blood Glucose.: 168 mg/dL (03-23-21 @ 08:23)  POCT Blood Glucose.: 201 mg/dL (03-22-21 @ 20:55)  POCT Blood Glucose.: 90 mg/dL (03-22-21 @ 17:12)  POCT Blood Glucose.: 167 mg/dL (03-22-21 @ 12:02)  POCT Blood Glucose.: 147 mg/dL (03-22-21 @ 08:23)  POCT Blood Glucose.: 199 mg/dL (03-21-21 @ 21:46)  POCT Blood Glucose.: 257 mg/dL (03-21-21 @ 16:57)  POCT Blood Glucose.: 159 mg/dL (03-21-21 @ 12:32)                            17.8   8.82  )-----------( 532      ( 23 Mar 2021 07:06 )             53.0       03-23    134<L>  |  104  |  52<H>  ----------------------------<  175<H>  5.2   |  18<L>  |  1.21    Ca    8.4      23 Mar 2021 07:05  Phos  4.7     03-24        Thyroid Function Tests:  03-15 @ 09:19 TSH 4.86 FreeT4 -- T3 -- Anti TPO -- Anti Thyroglobulin Ab -- TSI --      A1C with Estimated Average Glucose Result: 10.6 % (03-15-21 @ 08:59)      C-Peptide, Serum: 2.2 ng/mL (03-16 @ 09:15)      Contact number: [facility] [phone] or [phone]

## 2021-03-24 NOTE — PROGRESS NOTE ADULT - PROBLEM SELECTOR PLAN 2
goal <130/80   Currently on amlodipine and metoprolol, ACEI held due to GUANAKO   -may need adjustment.

## 2021-03-24 NOTE — PROGRESS NOTE ADULT - ASSESSMENT
61yo M with hx HTN, DM2, HLD, COVID 2020 presents with LE pain and scrotal swelling. Patient was hospitalized in [facility] in Feb 2021 where CT Ab/P found 5.8x2.5cm lesion on the right pelvis concerning for malignancy with sclerotic lesion at L5. Now with steroid exacerbated hyperglycemia due to being started on decadron for cauda equina.   Endocrine on board for management of uncontrolled DM2. A1c 10.6%. BG values variable on present insulin regimen due to inconsistent PO intake and snacking yesterday. BG goal (100-180mg/dl)

## 2021-03-24 NOTE — CHART NOTE - NSCHARTNOTEFT_GEN_A_CORE
Notified by RN pt with drainage from lymph node biopsy site of right inguinal lymph node.  Examined pt at bedside, who is resting comfortably. States he noticed drainage from biopsy site this evening. Denies pain, redness, and swelling of site. Denies fever, chills, body aches.      Vital Signs Last 24 Hrs  T(C): 36.7 (24 Mar 2021 04:25), Max: 37 (23 Mar 2021 19:40)  T(F): 98.1 (24 Mar 2021 04:25), Max: 98.6 (23 Mar 2021 19:40)  HR: 68 (24 Mar 2021 04:25) (64 - 84)  BP: 154/73 (24 Mar 2021 04:25) (138/90 - 162/94)  BP(mean): --  RR: 16 (24 Mar 2021 04:25) (16 - 18)  SpO2: 94% (24 Mar 2021 04:25) (94% - 98%)    Physical Exam:  General: WN/WD NAD  Neuro: A&Ox4  Skin: right side inguinal lymph node biopsy site: Wound is clean, with mild clear malodorous drainage. No redness, no tenderness to palpation, no pus. No significant swelling      Labs:                          17.8   8.82  )-----------( 532      ( 23 Mar 2021 07:06 )             53.0     03-23    134<L>  |  104  |  52<H>  ----------------------------<  175<H>  5.2   |  18<L>  |  1.21    Ca    8.4      23 Mar 2021 07:05        Assessment & Plan:  60 year old male with h/o HTN, DM, hyperlipidemia admitted to [facility] on 3/14/2021 with scrotal edema, pelvic lesion, weight loss, found to have bilateral inguinal adenopathy and a sclerotic L5 lesion, now c/o clear drainage from biopsy site of right inguinal lymph node (performed on 3/17).    > No suspicion for infection at this time, as site is without significant swelling, no redness, or tenderness. WBC wnl, pt afebrile  > Instructed RN to cover with bacitracin and pressure dressing / nonadhesive dressing  > Continue to monitor signs and symptoms  > Consider f/u with surgery in AM  > Will endorse to day team    Rhianna Mendoza PA-C (Medicine PA)  #[id number]

## 2021-03-24 NOTE — PROGRESS NOTE ADULT - SUBJECTIVE AND OBJECTIVE BOX
Admitting Diagnosis:  Abnormal weight loss [R63.4]  ABNORMAL WEIGHT LOSS      Background:  This is a 60y year old Male  who presents with the chief complaint of low back pain and leg weakness. 59yo M with hx HTN, DM, HLD, COVID 2020 presents with LE pain and scrotal swelling. Patient was hospitalized in [facility] in Feb 2021 where CT Ab/P found 5.8x2.5cm lesion on the right pelvis concerning for malignancy with sclerotic lesion at L5. Pt states since early 2021 inc pain and weakness in the right leg, at first he was limping but now cannot walk.  He has pain in the feet, describes as pinching and needles like sensation.  Starting to have sx in left leg as well. Patient left AMA before additional malignancy workup was performed.  In the last month, patient had 45lb unintentional weight loss and decreased appetite. Notes abdominal swelling, mostly on the left side with tenderness along with b/l scrotal swelling.  At one point given neurontin, not helpful.  Denies sx in arms, changes in speech, swallow, vision, bladder control.  Says issues with bowel movements.     MRI done showing diffuse nerve root enhancement involving the roots of the cauda equina with some mild root thickening though the dominant finding is the enhancement.  There is also a nonspecific lesion in the L5 vertebral body and a possible septic facet at L4-5.       Interval Hx:  s/p LP , high lymphocytes prelim, protein slightly elevated, no cx growth, awaiting cytology.  Flow with small lymphocytes.  Spoke to Dr. Carney at [phone] who states that prelim cytology is small lymphocytes without large lymphocytes to suggest B-cell lymphoma in the spine, however official read pending    getting steroids which seems to help minimally    c/o constipation    MRI brain and t-spine done, motion degraded but notable only for Multilevel spondylosis.  limited by back pain couldn't get t-spine    ******    Past Medical History:  Diabetes [E11.9]    Hyperlipidemia [E78.5]    Hypertension, unspecified type [I10]        Past Surgical History:  No significant past surgical history [[id number]]        Social History:  No toxic habits    Family History:  FAMILY HISTORY:  No pertinent family history in first degree relatives        Allergies:  No Known Allergies      ROS:  Constitutional: Patient offers no complaints of fevers or significant weight loss  Ears, Nose, Mouth and Throat: The patient presents with no abnormalities of the head, ears, eyes, nose or throat  Skin: Patient offers no concerns of new rashes or lesions  Respiratory: The patient presents with no abnormalities of the respiratory tract  Cardiovascular: The patient presents with no cardiac abnormalities  Gastrointestinal: The patient presents with no abnormalities of the GI system  Genitourinary: The patient presents with no dysuria, hematuria or frequent urination  Neurological: See HPI  Endocrine: Patient offers no complaints of excessive thirst, urination, or heat/cold intolerance    Advanced care planning reviewed and noted in the chart.    Medications:  acetaminophen   Tablet .. 650 milliGRAM(s) Oral every 6 hours PRN  amLODIPine   Tablet 10 milliGRAM(s) Oral daily  atorvastatin 80 milliGRAM(s) Oral at bedtime  dexAMETHasone  Injectable 4 milliGRAM(s) IV Push every 6 hours  dextrose 40% Gel 15 Gram(s) Oral once  dextrose 5%. 1000 milliLiter(s) IV Continuous <Continuous>  dextrose 5%. 1000 milliLiter(s) IV Continuous <Continuous>  dextrose 50% Injectable 12.5 Gram(s) IV Push once  dextrose 50% Injectable 25 Gram(s) IV Push once  dextrose 50% Injectable 25 Gram(s) IV Push once  glucagon  Injectable 1 milliGRAM(s) IntraMuscular once  heparin   Injectable 5000 Unit(s) SubCutaneous every 12 hours  insulin glargine Injectable (LANTUS) 30 Unit(s) SubCutaneous at bedtime  insulin lispro (ADMELOG) corrective regimen sliding scale   SubCutaneous at bedtime  insulin lispro (ADMELOG) corrective regimen sliding scale   SubCutaneous three times a day before meals  insulin lispro Injectable (ADMELOG) 8 Unit(s) SubCutaneous three times a day before meals  lactated ringers. 1000 milliLiter(s) IV Continuous <Continuous>  magnesium hydroxide Suspension 30 milliLiter(s) Oral daily PRN  metoprolol succinate ER 75 milliGRAM(s) Oral daily  polyethylene glycol 3350 17 Gram(s) Oral daily PRN  saline laxative (FLEET) Rectal Enema 1 Enema Rectal once  senna 2 Tablet(s) Oral at bedtime  traMADol 50 milliGRAM(s) Oral three times a day PRN      Labs:  CBC Full  -  ( 23 Mar 2021 07:06 )  WBC Count : 8.82 K/uL  RBC Count : 5.96 M/uL  Hemoglobin : 17.8 g/dL  Hematocrit : 53.0 %  Platelet Count - Automated : 532 K/uL  Mean Cell Volume : 88.9 fl  Mean Cell Hemoglobin : 29.9 pg  Mean Cell Hemoglobin Concentration : 33.6 gm/dL  Auto Neutrophil # : x  Auto Lymphocyte # : x  Auto Monocyte # : x  Auto Eosinophil # : x  Auto Basophil # : x  Auto Neutrophil % : x  Auto Lymphocyte % : x  Auto Monocyte % : x  Auto Eosinophil % : x  Auto Basophil % : x    03-23    134<L>  |  104  |  52<H>  ----------------------------<  175<H>  5.2   |  18<L>  |  1.21    Ca    8.4      23 Mar 2021 07:05  Phos  4.7     03-24      CAPILLARY BLOOD GLUCOSE      POCT Blood Glucose.: 210 mg/dL (24 Mar 2021 08:19)  POCT Blood Glucose.: 152 mg/dL (23 Mar 2021 21:13)  POCT Blood Glucose.: 350 mg/dL (23 Mar 2021 17:07)  POCT Blood Glucose.: 130 mg/dL (23 Mar 2021 11:59)              Vitals:  Vital Signs Last 24 Hrs  T(C): 36.5 (24 Mar 2021 09:08), Max: 37 (23 Mar 2021 19:40)  T(F): 97.7 (24 Mar 2021 09:08), Max: 98.6 (23 Mar 2021 19:40)  HR: 61 (24 Mar 2021 09:08) (61 - 75)  BP: 144/83 (24 Mar 2021 09:08) (138/90 - 154/73)  BP(mean): --  RR: 17 (24 Mar 2021 09:08) (16 - 18)  SpO2: 97% (24 Mar 2021 09:08) (94% - 98%)    NEUROLOGICAL EXAM:    Mental status: Awake, alert, and in no apparent distress. Oriented to person, place and time. Language function is normal. speech clear and fluent. Recent memory, digit span and concentration were normal.     Cranial Nerves: Pupils were equal, round, reactive to light. Extraocular movements were intact. Visual field were full. Fundoscopic exam was deferred. Facial sensation was intact to light touch. There was no facial asymmetry. The palate was upgoing symmetrically and tongue was midline. Hearing acuity was intact to finger rub AU. Shoulder shrug was full bilaterally    Motor exam: Bulk and tone were normal. Strength was 5/5 in the arms.  Left leg  5/5 except dorsi 4+/5.  Right leg - low tone.  hip flexion/knee ext 2/5, dorsiflexion 4/5. Fine finger movements were symmetric and normal. There was no pronator drift    Reflexes: 2+ in the bilateral upper extremities. absent in legs. Toes were downgoing bilaterally.     Sensation: Intact to light touch, temperature, vibration and proprioception. says when touch right leg it is painful    Coordination: Finger-nose-finger was without dysmetria. cannot do heel shin    Gait: deferred    < from: CT Chest w/ IV Cont (03.14.21 @ 10:12) >    EXAM:  CT ABDOMEN AND PELVIS IC                          EXAM:  CT CHEST IC                          PROCEDURE DATE:  03/14/2021      IMPRESSION:  Enlarged right iliac lymph node measuring 5.9 x 2.4 cm. Additional smaller right iliac nodes are seen. There are also bilateral groin lymph nodes.      MOLINA PUGH M.D., RADIOLOGY RESIDENT  This documenthas been electronically signed.  GINA JENKINS M.D., ATTENDING RADIOLOGIST  This document has been electronically signed. Mar 14 2021 12:50PM    < end of copied text >          EXAM:  MR SPINE LUMBAR WAW IC                            PROCEDURE DATE:  03/15/2021            INTERPRETATION:  INDICATIONS:  Increased low back pain and right leg weakness for 4 months    TECHNIQUE:  Sagittal T1 weighted and T2 weighted images of the lumbosacral spine as well as axial T1 weighted and T2 weighted images were obtained.    COMPARISON EXAMINATION: None.    FINDINGS:  VERTEBRAL BODIES AND DISCS:  Nonspecific lesion in L1 low signal T1 hyperintense signal T2 with some enhancement.  ALIGNMENT:  No subluxations.  L1-L2 LEVEL:  Normal.  L2-L3 LEVEL:  Asymmetric bulge right with disc material in the region of the right neural foramen at this level. Clinical correlation for right L2 radiculopathy  L3-L4 LEVEL: Symmetric bulge with annular fissure in the 7:30 position. Bilateral facet arthropathy at this level.  L4-L5 LEVEL: Asymmetric bulge to the left with focal left-sided protrusion and some mass impression on the intracanal left L5 root.. Bilateral facet arthropathy.Some changes in the left paraspinal musculature extending from the left facet joint may represent septic facet as enhancement is seen in association with hyperintense T2 signal with associated muscular changes (9:19)  L5-S1 LEVEL:  Slight signal hyperintensity at the disc space with some enhancement ventrally without associated endplate abnormality favors degenerative change. Prominent asymmetric disc bulge with Bilateral foraminal disc material which is causing significant nerve root compression on the right greater than left. May be the cause of the patient's known right leg weakness.  SPINAL CANAL:  Evidence of diffuse nerve root enhancement appreciated involving the roots of the cauda equina with some mild thickening appreciated.  CONUS MEDULLARIS:  Normal.  MISCELLANEOUS:  None    IMPRESSION:  Diffuse nerve root enhancement involving the roots of the cauda equina with some mild root thickening though the dominant finding is the enhancement. Differential possibilities include acute inflammatory demyelinating polyneuropathy( Guillan [place]) versus chronic inflammatory demyelinating polyneuropathy(CIDP). Inflammatory pathologies such as sarcoid or infectious etiologies should be considered. Included in the differential is leptomeningeal carcinomatosis. Correlation with CSF strongly recommended. Nonspecific lesion in the L5 vertebral body as described. Enhancement with associated T2 abnormality involving the left L4-5 facet with extension to the paraspinal musculature may indicate a septic facet at this level. Correlation with clinical parameters suggested. Degenerative changes with disc material in the neural foramina at the L2-3 and L5-S1 levels on the right as well as in the L5-S1 neural foramen on the left. Prominent bulge with more focal protrusion L4-5 left                MONTY TRAN MD; Attending Radiologist  This document has been electronically signed. Mar 15 2021  8:19PM      EXAM:  MR SPINE CERVICAL                          EXAM:  MR BRAIN                            PROCEDURE DATE:  03/24/2021            INTERPRETATION:  INDICATIONS:  Right leg weakness and bilateral leg pain. Rule out metastatic disease    Brain:    TECHNIQUE:  Multiplanar imaging was performed using T1 weighted, T2 weighted and FLAIR sequences.  Diffusion weighted and susceptibility sensitive images were also obtained.    Patient could not continue the exam. Intravenous contrast was not administered.    COMPARISON EXAMINATION:  None.    FINDINGS: The study is degraded by motion artifact.  VENTRICLES AND SULCI:  Normal.  INTRA-AXIAL:  No mass, abnormal signal or evidence of acute cerebral ischemia.  EXTRA-AXIAL:  No mass or collection.  VISUALIZED SINUSES:  Mild mucosal thickening  VISUALIZED MASTOIDS:  Clear.  CALVARIUM:  Normal.  CAROTID FLOW VOIDS:  Present.  MISCELLANEOUS:  Tornwaldt cyst within the midline nasopharynx.    Cervical spine:    TECHNIQUE:  Sagittal T1 weighted, T2 weighted and STIR images of the cervical spine as well as axial T1 weighted and T2 weighted images were obtained.    The patient could not continue the exam. The study is limited by motion artifact.    COMPARISON EXAMINATION: None.    FINDINGS:  VERTEBRAL BODIES AND DISCS: Normal in height. No abnormal signal is identified. Multilevel marginal osteophyte formation.  ALIGNMENT:  No subluxations. Straightening of the cervical lordosis  C2-C3 LEVEL:  Normal.  C3-C4 LEVEL:  Mild disc bulge/ridge  C4-C5 LEVEL:  Diffuse disc bulge/ridge  C5-C6 LEVEL:  Diffuse disc bulge/ridge with bilateral foraminal narrowing  C6-C7 LEVEL:  Disc bulge/ridge with left greater than right foraminal narrowing  C7-T1 LEVEL:   Normal.  SPINAL CORD: Normal.  SPINAL CANAL:  No other intradural or extradural defects are seen.  MISCELLANEOUS:  None.      IMPRESSION:  Brain:  Motion degraded exam.    No intracranial abnormality identified. Detection of metastatic disease is limited without intravenous contrast.    Cervical spine:  Motion limited exam.    Multilevel spondylosis.      FABIANO ALEXANDRA MD; Attending Radiologist  This document has been electronically signed. Mar 24 2021  8:44AM

## 2021-03-24 NOTE — PROGRESS NOTE ADULT - SUBJECTIVE AND OBJECTIVE BOX
[facility] [phone]        Patient seen and examined in bed.  He was about the same         MEDICATIONS  (STANDING):  amLODIPine   Tablet 10 milliGRAM(s) Oral daily  atorvastatin 80 milliGRAM(s) Oral at bedtime  dexAMETHasone  Injectable 4 milliGRAM(s) IV Push every 6 hours  dextrose 40% Gel 15 Gram(s) Oral once  dextrose 5%. 1000 milliLiter(s) (50 mL/Hr) IV Continuous <Continuous>  dextrose 5%. 1000 milliLiter(s) (100 mL/Hr) IV Continuous <Continuous>  dextrose 50% Injectable 12.5 Gram(s) IV Push once  dextrose 50% Injectable 25 Gram(s) IV Push once  dextrose 50% Injectable 25 Gram(s) IV Push once  glucagon  Injectable 1 milliGRAM(s) IntraMuscular once  heparin   Injectable 5000 Unit(s) SubCutaneous every 12 hours  insulin glargine Injectable (LANTUS) 30 Unit(s) SubCutaneous at bedtime  insulin lispro (ADMELOG) corrective regimen sliding scale   SubCutaneous at bedtime  insulin lispro (ADMELOG) corrective regimen sliding scale   SubCutaneous three times a day before meals  insulin lispro Injectable (ADMELOG) 8 Unit(s) SubCutaneous three times a day before meals  lactated ringers. 1000 milliLiter(s) (50 mL/Hr) IV Continuous <Continuous>  metoprolol succinate ER 75 milliGRAM(s) Oral daily  saline laxative (FLEET) Rectal Enema 1 Enema Rectal once  senna 2 Tablet(s) Oral at bedtime      VITAL:  T(C): , Max: 37 (03-23-21 @ 19:40)  T(F): , Max: 98.6 (03-23-21 @ 19:40)  HR: 61 (03-24-21 @ 09:08)  BP: 144/83 (03-24-21 @ 09:08)  BP(mean): --  RR: 17 (03-24-21 @ 09:08)  SpO2: 97% (03-24-21 @ 09:08)  Wt(kg): --    I and O's:    03-23 @ 07:01  -  03-24 @ 07:00  --------------------------------------------------------  IN: 600 mL / OUT: 1500 mL / NET: -900 mL          PHYSICAL EXAM:    Constitutional: NAD  Neck:  No JVD  Respiratory: CTAB/L  Cardiovascular: S1 and S2  Gastrointestinal: BS+, soft, NT/ND  Extremities: No peripheral edema  Neurological: A/O x 3, no focal deficits  Psychiatric: Normal mood, normal affect  : No Weir  Skin: No rashes  Access: Not applicable    LABS:                        17.8   8.82  )-----------( 532      ( 23 Mar 2021 07:06 )             53.0     03-23    134<L>  |  104  |  52<H>  ----------------------------<  175<H>  5.2   |  18<L>  |  1.21    Ca    8.4      23 Mar 2021 07:05  Phos  4.7     03-24            Urine Studies:          RADIOLOGY & ADDITIONAL STUDIES:

## 2021-03-24 NOTE — PROGRESS NOTE ADULT - ASSESSMENT
60M with DM (A1C=10.6), HTN, chol admitted 3/14/2021 c/o LE pain, scrotal swelling and weight loss found to have weakness RLE and CT that shows enlarging right iliac lymph node as well as an MRI that shows diffuse nerve root enhancement involving the roots of the cauda equina with some mild root thickening though the dominant finding is the enhancement. DOes not seem infectious.  Could this be lymphoma with B symptoms though the LDH is low.  MRI suggestive of CIDP, per neuro less likely AIDP, sarcoid, leptomeningeal carcinomatosis, infection.  s/p LN biopsy.  MRI reviewed with neuroradiology.  Though cannot r/o infection, could also be degenerative.     Lymphadenopathy  - f/u pathology  - negative HIV, ACE, DARRIN  - indeterminant quant gold due to dexamethasone  - all cultures negative    L2-3 facet abnl signal MRI  - blood cultures are pending  - ESR/CRP are normal  - no antibiotics for now  - suspect he is not infected (no fever, leukocytosis) and likely lymphoma    dexamethasone  - patient will require PJP prophylaxis if plan is to continue this dose of dexa for 30+ days  - bactrim SS daily please

## 2021-03-24 NOTE — PROGRESS NOTE ADULT - SUBJECTIVE AND OBJECTIVE BOX
CARDIOLOGY FOLLOW UP - Dr. Trimble    CC: denies cp, sob, and palpitations       PHYSICAL EXAM:  T(C): 36.5 (03-24-21 @ 09:08), Max: 37 (03-23-21 @ 19:40)  HR: 61 (03-24-21 @ 09:08) (61 - 75)  BP: 144/83 (03-24-21 @ 09:08) (138/90 - 154/73)  RR: 17 (03-24-21 @ 09:08) (16 - 18)  SpO2: 97% (03-24-21 @ 09:08) (94% - 98%)  Wt(kg): --  I&O's Summary    23 Mar 2021 07:01  -  24 Mar 2021 07:00  --------------------------------------------------------  IN: 600 mL / OUT: 1500 mL / NET: -900 mL        Appearance: Normal	  Cardiovascular: Normal S1 S2,RRR, No JVD, No murmurs  Respiratory: Lungs clear to auscultation	  Gastrointestinal:  Soft, Non-tender, + BS	  Extremities: Normal range of motion, No clubbing, cyanosis or edema      Home Medications:  amlodipine-benazepril 10 mg-40 mg oral capsule: 1 cap(s) orally once a day (15 Mar 2021 11:15)  HumaLOG 100 units/mL injectable solution: 8 unit(s) injectable 3 times a day (before meals) (15 Mar 2021 11:15)  Lipitor 80 mg oral tablet: 1 tab(s) orally once a day (15 Mar 2021 11:15)  Toprol-XL 50 mg oral tablet, extended release: 1 tab(s) orally once a day (15 Mar 2021 11:15)  Tresiba 100 units/mL subcutaneous solution: 40 unit(s) subcutaneous once a day (at bedtime) (15 Mar 2021 11:15)      MEDICATIONS  (STANDING):  allopurinol 300 milliGRAM(s) Oral daily  amLODIPine   Tablet 10 milliGRAM(s) Oral daily  atorvastatin 80 milliGRAM(s) Oral at bedtime  dexAMETHasone  Injectable 4 milliGRAM(s) IV Push every 6 hours  dextrose 40% Gel 15 Gram(s) Oral once  dextrose 5%. 1000 milliLiter(s) (50 mL/Hr) IV Continuous <Continuous>  dextrose 5%. 1000 milliLiter(s) (100 mL/Hr) IV Continuous <Continuous>  dextrose 50% Injectable 12.5 Gram(s) IV Push once  dextrose 50% Injectable 25 Gram(s) IV Push once  dextrose 50% Injectable 25 Gram(s) IV Push once  glucagon  Injectable 1 milliGRAM(s) IntraMuscular once  heparin   Injectable 5000 Unit(s) SubCutaneous every 12 hours  insulin glargine Injectable (LANTUS) 30 Unit(s) SubCutaneous at bedtime  insulin lispro (ADMELOG) corrective regimen sliding scale   SubCutaneous at bedtime  insulin lispro (ADMELOG) corrective regimen sliding scale   SubCutaneous three times a day before meals  insulin lispro Injectable (ADMELOG) 8 Unit(s) SubCutaneous three times a day before meals  lactated ringers. 1000 milliLiter(s) (50 mL/Hr) IV Continuous <Continuous>  metoprolol succinate ER 75 milliGRAM(s) Oral daily  saline laxative (FLEET) Rectal Enema 1 Enema Rectal once  senna 2 Tablet(s) Oral at bedtime      TELEMETRY: 	    ECG:  	  RADIOLOGY:   MR Cervical Spine No Cont (03.24.21 @ 07:34)   Brain:    TECHNIQUE:  Multiplanar imaging was performed using T1 weighted, T2 weighted and FLAIR sequences.  Diffusion weighted and susceptibility sensitive images were also obtained.    Patient could not continue the exam. Intravenous contrast was not administered.    COMPARISON EXAMINATION:  None.    FINDINGS: The study is degraded by motion artifact.  VENTRICLES AND SULCI:  Normal.  INTRA-AXIAL:  No mass, abnormal signal or evidence of acute cerebral ischemia.  EXTRA-AXIAL:  No mass or collection.  VISUALIZED SINUSES:  Mild mucosal thickening  VISUALIZED MASTOIDS:  Clear.  CALVARIUM:  Normal.  CAROTID FLOW VOIDS:  Present.  MISCELLANEOUS:  Tornwaldt cyst within the midline nasopharynx.    Cervical spine:    TECHNIQUE:  Sagittal T1 weighted, T2 weighted and STIR images of the cervical spine as well as axial T1 weighted and T2 weighted images were obtained.    The patient could not continue the exam. The study is limited by motion artifact.    COMPARISON EXAMINATION: None.    FINDINGS:  VERTEBRAL BODIES AND DISCS: Normal in height. No abnormal signal is identified. Multilevel marginal osteophyte formation.  ALIGNMENT:  No subluxations. Straightening of the cervical lordosis  C2-C3 LEVEL:  Normal.  C3-C4 LEVEL:  Mild disc bulge/ridge  C4-C5 LEVEL:  Diffuse disc bulge/ridge  C5-C6 LEVEL:  Diffuse disc bulge/ridge with bilateral foraminal narrowing  C6-C7 LEVEL:  Disc bulge/ridge with left greater than right foraminal narrowing  C7-T1 LEVEL:   Normal.  SPINAL CORD: Normal.  SPINAL CANAL:  No other intradural or extradural defects are seen.  MISCELLANEOUS:  None.      IMPRESSION:  Brain:  Motion degraded exam.    No intracranial abnormality identified. Detection of metastatic disease is limited without intravenous contrast.    Cervical spine:  Motion limited exam.    Multilevel spondylosis.      DIAGNOSTIC TESTING:  [ ] Echocardiogram:  [ ]  Catheterization:  [ ] Stress Test:    OTHER: 	    LABS:	 	    Creatine Kinase, Serum: 40 U/L [30 - 200] (03-21 @ 18:00)  CKMB Units: 1.8 ng/mL [0.0 - 6.7] (03-21 @ 18:00)  Troponin T, High Sensitivity Result: 17 ng/L [0 - 51] (03-21 @ 18:00)                          17.8   8.82  )-----------( 532      ( 23 Mar 2021 07:06 )             53.0     03-23    134<L>  |  104  |  52<H>  ----------------------------<  175<H>  5.2   |  18<L>  |  1.21    Ca    8.4      23 Mar 2021 07:05  Phos  4.7     03-24

## 2021-03-24 NOTE — PROGRESS NOTE ADULT - PROBLEM SELECTOR PLAN 1
test BG AC/HS  -Continue Lantus 30 units QHS  -Continue Admelog 8 units AC meals  -c/w Admelog moderate correction scale AC and mod HS scale  Carb consistent diet   C-peptide normal, not obtained with BMP, not suggestive of DM1  -notify endocrine team if steroid dose changed or discontinued  On Discharge recommend tresiba and NOvolog  Recommend follow up with Endo in [place], Patient to find out the name.

## 2021-03-24 NOTE — PROGRESS NOTE ADULT - SUBJECTIVE AND OBJECTIVE BOX
Patient is a 60y old  Male who presents with a chief complaint of Lymphadenopathy (16 Mar 2021 12:54)    f/u abnl MRI    Interval History/ROS:  patient went for repeat MRI but was not able to tolerate.  limited.  no contrast given.  still c/o back pain and inability to move the RLE.  no fever/chills.  CSF negative for cells.  Flow negative.  no fever.  no leukocytosis.  biopsy s/o lymphoma on prelim.  still with RLE weakness.  no fever.  Remainder of ROS otherwise negative.    PAST MEDICAL & SURGICAL HISTORY:  Diabetes  Hyperlipidemia  Hypertension, unspecified type    Allergies  No Known Allergies    ANTIMICROBIALS:  none    MEDICATIONS  (STANDING):  allopurinol 300 daily  amLODIPine   Tablet 10 daily  atorvastatin 80 at bedtime  dexAMETHasone  Injectable 4 every 6 hours  glucagon  Injectable 1 once  heparin   Injectable 5000 every 12 hours  insulin glargine Injectable (LANTUS) 30 at bedtime  insulin lispro (ADMELOG) corrective regimen sliding scale  three times a day before meals  insulin lispro (ADMELOG) corrective regimen sliding scale  at bedtime  insulin lispro Injectable (ADMELOG) 8 three times a day before meals  metoprolol succinate ER 75 daily  saline laxative (FLEET) Rectal Enema 1 once  senna 2 at bedtime    Vital Signs Last 24 Hrs  T(F): 97.7 (03-24-21 @ 09:08), Max: 98.6 (03-23-21 @ 19:40)  HR: 61 (03-24-21 @ 09:08)  BP: 144/83 (03-24-21 @ 09:08)  RR: 17 (03-24-21 @ 09:08)  SpO2: 97% (03-24-21 @ 09:08) (94% - 98%)    PHYSICAL EXAM:  Constitutional: non-toxic, lying prone  HEAD/EYES: anicteric  ENT:  supple,  Cardiovascular:   normal S1, S2  Respiratory:  clear BS bilaterally  GI:  soft  :  no tran  Musculoskeletal:  no synovitis  Neurologic: awake and alert, significantly decreased strength R leg  Skin:  surgical site c/d/i  Psychiatric:  awake, alert, appropriate mood                        17.8   8.82  )-----------( 532      ( 23 Mar 2021 07:06 )             53.0 03-23    134  |  104  |  52  ----------------------------<  175  5.2   |  18  |  1.21  Ca    8.4      23 Mar 2021 07:05Phos  4.7     03-24    Sedimentation Rate, Erythrocyte: 2 (03-24 @ 01:44)  C-Reactive Protein, Serum: <0.30 (03-23 @ 19:08)    Quant gold indeterminant but was on steroids    Lactate Dehydrogenase, Serum: 136 U/L (03-16-21 @ 07:22)    HIV-1/2 Combo Result: Nonreact:  Angiotensin Converting Enzyme, Serum: <5  Anti Nuclear Factor Titer: Negative    MICROBIOLOGY:  3/23 BC x2 pending    Culture - Fungal, CSF (collected 03-19-21 @ 16:20)  Source: .CSF CSF  Preliminary Report (03-22-21 @ 09:11):    Testing in progress    Culture - Acid Fast - CSF (collected 03-19-21 @ 16:20)  Source: .CSF CSF    Culture - CSF with Gram Stain (collected 03-19-21 @ 16:20)  Source: .CSF CSF  Gram Stain (03-19-21 @ 17:04):    No polymorphonuclear cells seen    No organisms seen    by cytocentrifuge  Final Report (03-22-21 @ 09:11):    No growth    COVID-19 IgG Antibody Interpretation: Negative (03-15-21 @ 08:26)  COVID-19 PCR: NotDetec (03-14-21 @ 11:17)    RADIOLOGY:  imaging below personally reviewed and agree with findings    MR Cervical Spine No Cont (03.24.21 @ 07:34) >  IMPRESSION:  Brain:  Motion degraded exam.  No intracranial abnormality identified. Detection of metastatic disease is limited without intravenous contrast.  Cervical spine:  Motion limited exam.  Multilevel spondylosis.    MR Lumbar Spine w/wo IV Cont (03.15.21 @ 17:55) >  IMPRESSION:  Diffuse nerve root enhancement involving the roots of the cauda equina with some mild root thickening though the dominant finding is the enhancement. Differential possibilities include acute inflammatory demyelinating polyneuropathy( Guillan Lapoint) versus chronic inflammatory demyelinating polyneuropathy(CIDP). Inflammatory pathologies such as sarcoid or infectious etiologies should be considered. Included in the differential is leptomeningeal carcinomatosis. Correlation with CSF strongly recommended. Nonspecific lesion in the L5 vertebral body as described. Enhancement with associated T2 abnormality involving the left L4-5 facet with extension to the paraspinal musculature may indicate a septic facet at this level. Correlation with clinical parameters suggested. Degenerative changes with disc material in the neural foramina at the L2-3 and L5-S1 levels on the right as well as in the L5-S1 neural foramen on the left. Prominent bulge with more focal protrusion L4-5 left    A Duplex Lower Ext Vein Scan, Bilat (03.15.21 @ 12:03) >  IMPRESSION: There is a 2.3 cm nodule superficial to the vascular structures in the right inguinal area, likely a lymph node.  Noevidence of deep venous thrombosis in either lower extremity.    CT Chest w/ IV Cont (03.14.21 @ 10:12) >  IMPRESSION:  Enlarged right iliac lymph node measuring 5.9 x 2.4 cm. Additional smaller right iliac nodes are seen. There are also bilateral groin lymph nodes.    US Doppler Scrotum (03.14.21 @ 10:57) >  IMPRESSION:  Large complex bilateral hydroceles.  No testicular mass seen

## 2021-03-24 NOTE — PROGRESS NOTE ADULT - ASSESSMENT
ASSESSMENT/PLAN  61yo M with hx HTN, DM, HLD, COVID 2020 presents with LE pain and scrotal swelling    - GUANAKO -resolving/ resolved;  Increase BUN may be from Steroids     Labs pending for today     -CV- BP -stable     -Heme/onc- Eval noted for pending pathology/ cytology from lymph node and CSF pending;  Possible mediport before dc     - Renal dosing of meds to CrCL of 50ml/min-- Start Allopurinol 300mg po qd   Phos and uric acid reviewed        Sayed Donna   [facility] [employer]  [phone]

## 2021-03-24 NOTE — PROGRESS NOTE ADULT - ASSESSMENT
pt w/ scrotal swellin g / pelvic lesion/ weight loss    onc f/u noted  started on steroids   add bactrin for proph as per id   taper as per neuro. onc / sx  excisional Ln bx by sx  + for Bcell  f/u final path   tx as per onc  neg  lext dopplers  dm/uncontrolled  fsg riss  c/w insulin  endo f/u  dvt proph  htn  c/w meds  neuro f/u  lp  neg thus far for leptomeningeal disease  id f/u   neurosx f/u noted  no sx now as per neuro sx  walking difficulty/spinal stenosis   neuro f/u noted  f/u mrs   card  f/u  urology f/u noted  no sx now  b/l hydrocele  bowel regimen  discussed w/ heme onc/neuro    dr hayes to cover me thursday and friday   dr berrios to cover me sat and sunday

## 2021-03-24 NOTE — CHART NOTE - NSCHARTNOTEFT_GEN_A_CORE
Surgery called to evaluate lymph node biopsy incision. Pt evaluated at bedside. No acute complaints. Pt notes recent drainage of clear fluid. Site intact with no erythema, induration, purulence, no continued drainage. Pt feels much better since fluid was expressed. Likely drainage of a benign seroma. No further surgical intervention indicated. Please re-consult if any surgical concerns arise.     [facility]  [id number]

## 2021-03-24 NOTE — PROGRESS NOTE ADULT - ASSESSMENT
Echo 3/18/21: min MR, nl lv sys fx, small-moderate pericardial effusion, no evidence of pericardial tamponade     a/p  61 yo M with hx HTN, DM, HLD, COVID 2020 presents with LE pain and scrotal swelling    1. LE pain/weakness/scrotal swelling  -concern for malignancy  -per ID: MRI suggestive of CIDP, per neuro less likely AIDP, sarcoid, leptomeningeal carcinomatosis, infection  -UA noted  -LE duplex neg for DVT  -urology eval noted - No  intervention during this admission  -neurosx eval noted -no interventions at this time, possibly benefit from L5/S1 decompression fusion after workup of lymph node  -s/p LN excisional biopsy to r/o lymphoma- frozen + diffuse B cell, patho noted   -s/p LP -high lymphocytes prelim, protein slightly elevated, no cx growth, awaiting official cytology   -MRI Cspine, Tspine, head - noted   -Possible mediport before dc   -off abx per ID  -f/u heme/neuro/ID    2. HTN  -bp acceptable   -c/w amlodipine and bb    3. HLD  -c/w statin    4. GUANAKO  -cr stable   -renal f/u    5. Pericardial effusion  -echo noted with min MR, nl lv sys fx, small-moderate pericardial effusion, no evidence of pericardial tamponade   -cont to monitor     6. Atypical Chest pain  -no further cp  -hsT, ck, ckmb neg, EKG without ischemic changes   -echo as above w nl lv sys fx, no segmental wall motion     dvt ppx

## 2021-03-24 NOTE — PROGRESS NOTE ADULT - ASSESSMENT
Impression:  This is a 60y year old Male  who presents with the chief complaint of low back pain and leg weakness. 61yo M with hx HTN, DM, HLD, COVID 2020 presents with LE pain and scrotal swelling. Patient was hospitalized in [facility] in Feb 2021 where CT Ab/P found 5.8x2.5cm lesion on the right pelvis concerning for malignancy with sclerotic lesion at L5. Pt states since early 2021 inc pain and weakness in the right leg, at first he was limping but now cannot walk.  He has pain in the feet, describes as pinching and needles like sensation.  Starting to have sx in left leg as well. Patient left AMA before additional malignancy workup was performed.  In the last month, patient had 45lb unintentional weight loss and decreased appetite. Notes abdominal swelling, mostly on the left side with tenderness along with b/l scrotal swelling.  At one point given neurontin, not helpful.  Denies sx in arms, changes in speech, swallow, vision, bladder control.  Says issues with bowel movements.     MRI done showing diffuse nerve root enhancement involving the roots of the cauda equina with some mild root thickening though the dominant finding is the enhancement.  There is also a nonspecific lesion in the L5 vertebral body and a possible septic facet at L4-5.  However ID is not suspicious for septic facet though cultures pending.    NSx consulted for bone abnormalities and does not feel candidate for inpatient surgery at this time and recommended outpatient follow-up    s/p LP , high lymphocytes prelim, protein slightly elevated, no cx growth, awaiting official cytology though per my conversation with pathologist Dr. Carney ([phone]) small lymphocytes not suggestive of lymphoma in CSF    getting steroids which seems to help    c/o constipation    Diagnosis and Recommendations  Clinical suspicion for B-cell lymphoma with leptomeningeal spread.  However prelim CSF cytology argues against lymphoma in the CSF.  Awaiting official read.  Also awaiting pathology on his lymph node; frozen was suggestive for neoplasm (B-cell lymphoma).      Will follow up rest of CSF studies     MRI of T-spine with and without pending.  Discussed with NP will need pain medication to get this     discussed with heme/onc PA and MD    infectious etiology though perhaps less likely still possible given septic L4-5 facet and potential immunosuppression in setting of possible lymphoma.  ID following and thinks this is less likely.      Doubt demyelinating (AIDP or CIDP).  However will need to think about other inflammatory etiologies after rest of workup back and will discuss this further with other providers     will follow

## 2021-03-25 LAB
GLUCOSE BLDC GLUCOMTR-MCNC: 185 MG/DL — HIGH (ref 70–99)
GLUCOSE BLDC GLUCOMTR-MCNC: 201 MG/DL — HIGH (ref 70–99)
GLUCOSE BLDC GLUCOMTR-MCNC: 203 MG/DL — HIGH (ref 70–99)
GLUCOSE BLDC GLUCOMTR-MCNC: 219 MG/DL — HIGH (ref 70–99)

## 2021-03-25 PROCEDURE — 72142 MRI NECK SPINE W/DYE: CPT | Mod: 26

## 2021-03-25 PROCEDURE — 72157 MRI CHEST SPINE W/O & W/DYE: CPT | Mod: 26

## 2021-03-25 PROCEDURE — 99232 SBSQ HOSP IP/OBS MODERATE 35: CPT

## 2021-03-25 PROCEDURE — 70552 MRI BRAIN STEM W/DYE: CPT | Mod: 26

## 2021-03-25 RX ORDER — INSULIN LISPRO 100/ML
10 VIAL (ML) SUBCUTANEOUS
Refills: 0 | Status: DISCONTINUED | OUTPATIENT
Start: 2021-03-25 | End: 2021-03-27

## 2021-03-25 RX ORDER — INSULIN GLARGINE 100 [IU]/ML
34 INJECTION, SOLUTION SUBCUTANEOUS AT BEDTIME
Refills: 0 | Status: DISCONTINUED | OUTPATIENT
Start: 2021-03-25 | End: 2021-03-27

## 2021-03-25 RX ORDER — TRAMADOL HYDROCHLORIDE 50 MG/1
50 TABLET ORAL THREE TIMES A DAY
Refills: 0 | Status: DISCONTINUED | OUTPATIENT
Start: 2021-03-25 | End: 2021-03-26

## 2021-03-25 RX ADMIN — TRAMADOL HYDROCHLORIDE 50 MILLIGRAM(S): 50 TABLET ORAL at 05:53

## 2021-03-25 RX ADMIN — HEPARIN SODIUM 5000 UNIT(S): 5000 INJECTION INTRAVENOUS; SUBCUTANEOUS at 17:22

## 2021-03-25 RX ADMIN — Medication 4 MILLIGRAM(S): at 05:28

## 2021-03-25 RX ADMIN — Medication 300 MILLIGRAM(S): at 12:52

## 2021-03-25 RX ADMIN — Medication 8 UNIT(S): at 12:57

## 2021-03-25 RX ADMIN — INSULIN GLARGINE 34 UNIT(S): 100 INJECTION, SOLUTION SUBCUTANEOUS at 22:12

## 2021-03-25 RX ADMIN — Medication 4: at 09:00

## 2021-03-25 RX ADMIN — Medication 4 MILLIGRAM(S): at 00:15

## 2021-03-25 RX ADMIN — Medication 1 TABLET(S): at 12:52

## 2021-03-25 RX ADMIN — Medication 8 UNIT(S): at 17:21

## 2021-03-25 RX ADMIN — MORPHINE SULFATE 2 MILLIGRAM(S): 50 CAPSULE, EXTENDED RELEASE ORAL at 21:45

## 2021-03-25 RX ADMIN — Medication 4 MILLIGRAM(S): at 12:52

## 2021-03-25 RX ADMIN — AMLODIPINE BESYLATE 10 MILLIGRAM(S): 2.5 TABLET ORAL at 05:27

## 2021-03-25 RX ADMIN — Medication 4: at 17:20

## 2021-03-25 RX ADMIN — HEPARIN SODIUM 5000 UNIT(S): 5000 INJECTION INTRAVENOUS; SUBCUTANEOUS at 05:27

## 2021-03-25 RX ADMIN — Medication 75 MILLIGRAM(S): at 05:27

## 2021-03-25 RX ADMIN — Medication 4: at 12:57

## 2021-03-25 RX ADMIN — ATORVASTATIN CALCIUM 80 MILLIGRAM(S): 80 TABLET, FILM COATED ORAL at 22:11

## 2021-03-25 RX ADMIN — Medication 4 MILLIGRAM(S): at 18:40

## 2021-03-25 NOTE — PROGRESS NOTE ADULT - ASSESSMENT
59yo M with hx HTN, DM2, HLD, COVID 2020 presents with LE pain and scrotal swelling. Patient was hospitalized in [facility] in Feb 2021 where CT Ab/P found 5.8x2.5cm lesion on the right pelvis concerning for malignancy with sclerotic lesion at L5. Now with steroid exacerbated hyperglycemia due to being started on decadron for cauda equina. On Decadron 4mg q6h.   Endocrine on board for management of uncontrolled DM2. A1c 10.6%. Tolerating POs w/BG values variable due to inconsistent PO intake. Elevated fasting glucose last 2 days so will increase basal insulin. BG goal (100-180mg/dl).

## 2021-03-25 NOTE — PROGRESS NOTE ADULT - ASSESSMENT
Echo 3/18/21: min MR, nl lv sys fx, small-moderate pericardial effusion, no evidence of pericardial tamponade     a/p  61 yo M with hx HTN, DM, HLD, COVID 2020 presents with LE pain and scrotal swelling    1. LE pain/weakness/scrotal swelling  -concern for malignancy  -s/p LN excisional biopsy to r/o lymphoma- frozen + diffuse B cell, patho noted   -s/p LP -high lymphocytes prelim, protein slightly elevated, no cx growth, awaiting official cytology   -per ID: MRI suggestive of CIDP, per neuro less likely AIDP, sarcoid, leptomeningeal carcinomatosis, infection  -per neuro: Clinical suspicion for B-cell lymphoma with leptomeningeal spread  -MRI Cspine, Tspine, head noted   -UA noted  -LE duplex neg for DVT  -urology eval noted - No  intervention during this admission  -neurosx eval noted -no interventions at this time, possibly benefit from L5/S1 decompression fusion after workup of lymph node  -off abx per ID  -Possible mediport before dc   -f/u heme/neuro/ID    2. HTN  -bp acceptable   -c/w amlodipine and bb    3. HLD  -c/w statin    4. GUANAKO  -cr stable   -renal f/u    5. Pericardial effusion  -echo noted with min MR, nl lv sys fx, small-moderate pericardial effusion, no evidence of pericardial tamponade   -cont to monitor     6. Atypical Chest pain  -no further cp  -hsT, ck, ckmb neg, EKG without ischemic changes   -echo as above w nl lv sys fx, no segmental wall motion     dvt ppx

## 2021-03-25 NOTE — PROGRESS NOTE ADULT - ASSESSMENT
pt w/ scrotal swellin g / pelvic lesion/ weight loss    onc f/u noted  Cont steroids   add bactrin for proph as per id   taper as per neuro. onc / sx  excisional Ln bx by sx  + for Bcell  f/u final path   tx as per onc  neg  lext dopplers  dm/uncontrolled  fsg riss  c/w insulin  endo f/u  dvt proph  htn  c/w meds  neuro f/u  lp  neg thus far for leptomeningeal disease  id f/u   neurosx f/u noted  no sx now as per neuro sx  walking difficulty/spinal stenosis   neuro f/u noted  f/u mrs   card  f/u  urology f/u noted  no sx now  b/l hydrocele  bowel regimen  discussed w/ heme onc/neuro    dr berrios to cover me sat and sunday

## 2021-03-25 NOTE — PROGRESS NOTE ADULT - SUBJECTIVE AND OBJECTIVE BOX
Patient is a 60y old  Male who presents with a chief complaint of leg pain (25 Mar 2021 11:35)    Patient seen this morning. No new complaints. Pain in legs well controlled but still weak.    MEDICATIONS  (STANDING):  allopurinol 300 milliGRAM(s) Oral daily  amLODIPine   Tablet 10 milliGRAM(s) Oral daily  atorvastatin 80 milliGRAM(s) Oral at bedtime  dexAMETHasone  Injectable 4 milliGRAM(s) IV Push every 6 hours  dextrose 40% Gel 15 Gram(s) Oral once  dextrose 5%. 1000 milliLiter(s) (50 mL/Hr) IV Continuous <Continuous>  dextrose 5%. 1000 milliLiter(s) (100 mL/Hr) IV Continuous <Continuous>  dextrose 50% Injectable 12.5 Gram(s) IV Push once  dextrose 50% Injectable 25 Gram(s) IV Push once  dextrose 50% Injectable 25 Gram(s) IV Push once  glucagon  Injectable 1 milliGRAM(s) IntraMuscular once  heparin   Injectable 5000 Unit(s) SubCutaneous every 12 hours  insulin glargine Injectable (LANTUS) 34 Unit(s) SubCutaneous at bedtime  insulin lispro (ADMELOG) corrective regimen sliding scale   SubCutaneous at bedtime  insulin lispro (ADMELOG) corrective regimen sliding scale   SubCutaneous three times a day before meals  insulin lispro Injectable (ADMELOG) 8 Unit(s) SubCutaneous three times a day before meals  lactated ringers. 1000 milliLiter(s) (50 mL/Hr) IV Continuous <Continuous>  metoprolol succinate ER 75 milliGRAM(s) Oral daily  morphine  - Injectable 2 milliGRAM(s) IV Push once  saline laxative (FLEET) Rectal Enema 1 Enema Rectal once  senna 2 Tablet(s) Oral at bedtime  trimethoprim   80 mG/sulfamethoxazole 400 mG 1 Tablet(s) Oral daily    MEDICATIONS  (PRN):  acetaminophen   Tablet .. 650 milliGRAM(s) Oral every 6 hours PRN Mild Pain (1 - 3)  magnesium hydroxide Suspension 30 milliLiter(s) Oral daily PRN Constipation  polyethylene glycol 3350 17 Gram(s) Oral daily PRN Constipation  traMADol 50 milliGRAM(s) Oral three times a day PRN Moderate Pain (4 - 6)      Vital Signs Last 24 Hrs  T(C): 36.7 (25 Mar 2021 15:48), Max: 36.8 (24 Mar 2021 23:45)  T(F): 98.1 (25 Mar 2021 15:48), Max: 98.3 (25 Mar 2021 04:40)  HR: 65 (25 Mar 2021 15:48) (60 - 73)  BP: 127/81 (25 Mar 2021 15:48) (127/81 - 167/89)  BP(mean): --  RR: 18 (25 Mar 2021 15:48) (18 - 18)  SpO2: 98% (25 Mar 2021 15:48) (95% - 98%)    PE  NAD  Awake, alert  Anicteric  No c/c/e  No rash grossly            Phos  4.7     03-24      IGR Final Report    Accession Number: [id number]  Date Collected: 03/17/2021 12:24  Date Received: 03/23/2021 18:42  Date Reported: 03/25/2021 15:23    Specimen: Right rnguinal lymph node [id number] ([id number])  Indication: R/O Lymphoproliferative Disorder    Test Performed: B Cell Gene Rearrangement  ________________________________________________________________    RESULTS:    IgH gene Status:  POSITIVE  IgK gene Status: POSITIVE    INTERPRETATION:    Discrete bands were seen in both IgH and IgK suggesting a monoclonal B-cell population    Results should always be interpreted with appropriate clinical and pathological data.  Methods:  Highly purified DNA was subjected to multiplex PCR using primers to conserved regions in the IGH  and IGK genes. The limit of detection is 2%.    COMMENTS:    Results of this DNA based analysis are highly accurate. However, rare diagnostic errors may occur  due to the presence of polymorphisms or due to other technical difficulties related to the  technology utilized for this analysis.

## 2021-03-25 NOTE — PROGRESS NOTE ADULT - SUBJECTIVE AND OBJECTIVE BOX
Admitting Diagnosis:  Abnormal weight loss [R63.4]  ABNORMAL WEIGHT LOSS      Background:  This is a 60y year old Male  who presents with the chief complaint of low back pain and leg weakness. 59yo M with hx HTN, DM, HLD, COVID 2020 presents with LE pain and scrotal swelling. Patient was hospitalized in [facility] in Feb 2021 where CT Ab/P found 5.8x2.5cm lesion on the right pelvis concerning for malignancy with sclerotic lesion at L5. Pt states since early 2021 inc pain and weakness in the right leg, at first he was limping but now cannot walk.  He has pain in the feet, describes as pinching and needles like sensation.  Starting to have sx in left leg as well. Patient left AMA before additional malignancy workup was performed.  In the last month, patient had 45lb unintentional weight loss and decreased appetite. Notes abdominal swelling, mostly on the left side with tenderness along with b/l scrotal swelling.  At one point given neurontin, not helpful.  Denies sx in arms, changes in speech, swallow, vision, bladder control.  Says issues with bowel movements.     MRI done showing diffuse nerve root enhancement involving the roots of the cauda equina with some mild root thickening though the dominant finding is the enhancement.  There is also a nonspecific lesion in the L5 vertebral body and a possible septic facet at L4-5.       Interval Hx:  s/p LP , high lymphocytes prelim, protein slightly elevated, no cx growth, awaiting cytology.  Flow with small lymphocytes.  Spoke to Dr. Carney at [phone] who states that prelim cytology is small lymphocytes without large lymphocytes to suggest B-cell lymphoma in the spine, however official read pending    getting steroids which seems to help with the pain  c/o constipation    MRI brain and t-spine done, motion degraded but notable only for Multilevel spondylosis.  limited by back pain couldn't get t-spine  reviewed films    ******    Past Medical History:  Diabetes [E11.9]    Hyperlipidemia [E78.5]    Hypertension, unspecified type [I10]        Past Surgical History:  No significant past surgical history [[id number]]        Social History:  No toxic habits    Family History:  FAMILY HISTORY:  No pertinent family history in first degree relatives        Allergies:  No Known Allergies      ROS:  Constitutional: Patient offers no complaints of fevers or significant weight loss  Ears, Nose, Mouth and Throat: The patient presents with no abnormalities of the head, ears, eyes, nose or throat  Skin: Patient offers no concerns of new rashes or lesions  Respiratory: The patient presents with no abnormalities of the respiratory tract  Cardiovascular: The patient presents with no cardiac abnormalities  Gastrointestinal: The patient presents with no abnormalities of the GI system  Genitourinary: The patient presents with no dysuria, hematuria or frequent urination  Neurological: See HPI  Endocrine: Patient offers no complaints of excessive thirst, urination, or heat/cold intolerance    Advanced care planning reviewed and noted in the chart.      Medications:  acetaminophen   Tablet .. 650 milliGRAM(s) Oral every 6 hours PRN  allopurinol 300 milliGRAM(s) Oral daily  amLODIPine   Tablet 10 milliGRAM(s) Oral daily  atorvastatin 80 milliGRAM(s) Oral at bedtime  dexAMETHasone  Injectable 4 milliGRAM(s) IV Push every 6 hours  dextrose 40% Gel 15 Gram(s) Oral once  dextrose 5%. 1000 milliLiter(s) IV Continuous <Continuous>  dextrose 5%. 1000 milliLiter(s) IV Continuous <Continuous>  dextrose 50% Injectable 12.5 Gram(s) IV Push once  dextrose 50% Injectable 25 Gram(s) IV Push once  dextrose 50% Injectable 25 Gram(s) IV Push once  glucagon  Injectable 1 milliGRAM(s) IntraMuscular once  heparin   Injectable 5000 Unit(s) SubCutaneous every 12 hours  insulin glargine Injectable (LANTUS) 30 Unit(s) SubCutaneous at bedtime  insulin lispro (ADMELOG) corrective regimen sliding scale   SubCutaneous at bedtime  insulin lispro (ADMELOG) corrective regimen sliding scale   SubCutaneous three times a day before meals  insulin lispro Injectable (ADMELOG) 8 Unit(s) SubCutaneous three times a day before meals  lactated ringers. 1000 milliLiter(s) IV Continuous <Continuous>  magnesium hydroxide Suspension 30 milliLiter(s) Oral daily PRN  metoprolol succinate ER 75 milliGRAM(s) Oral daily  morphine  - Injectable 2 milliGRAM(s) IV Push once  polyethylene glycol 3350 17 Gram(s) Oral daily PRN  saline laxative (FLEET) Rectal Enema 1 Enema Rectal once  senna 2 Tablet(s) Oral at bedtime  traMADol 50 milliGRAM(s) Oral three times a day PRN  trimethoprim   80 mG/sulfamethoxazole 400 mG 1 Tablet(s) Oral daily      Labs:      Phos  4.7     03-24      CAPILLARY BLOOD GLUCOSE      POCT Blood Glucose.: 203 mg/dL (25 Mar 2021 08:48)  POCT Blood Glucose.: 295 mg/dL (24 Mar 2021 21:11)  POCT Blood Glucose.: 187 mg/dL (24 Mar 2021 17:35)  POCT Blood Glucose.: 195 mg/dL (24 Mar 2021 12:06)              Vitals:  Vital Signs Last 24 Hrs  T(C): 36.3 (25 Mar 2021 08:21), Max: 36.8 (24 Mar 2021 23:45)  T(F): 97.4 (25 Mar 2021 08:21), Max: 98.3 (25 Mar 2021 04:40)  HR: 60 (25 Mar 2021 08:21) (60 - 73)  BP: 128/77 (25 Mar 2021 08:21) (128/77 - 167/89)  BP(mean): --  RR: 18 (25 Mar 2021 08:21) (18 - 18)  SpO2: 96% (25 Mar 2021 08:21) (95% - 97%)    NEUROLOGICAL EXAM:    Mental status: Awake, alert, and in no apparent distress. Oriented to person, place and time. Language function is normal. speech clear and fluent. Recent memory, digit span and concentration were normal.     Cranial Nerves: Pupils were equal, round, reactive to light. Extraocular movements were intact. Visual field were full. Fundoscopic exam was deferred. Facial sensation was intact to light touch. There was no facial asymmetry. The palate was upgoing symmetrically and tongue was midline. Hearing acuity was intact to finger rub AU. Shoulder shrug was full bilaterally    Motor exam: Bulk and tone were normal. Strength was 5/5 in the arms.  Left leg  5/5 except dorsi 5-/5.  Right leg - low tone.  hip flexion/knee ext 2/5, dorsiflexion 4/5. Fine finger movements were symmetric and normal. There was no pronator drift pain on palpation of the leg    Reflexes: 2+ in the bilateral upper extremities. absent in legs. Toes were downgoing bilaterally.     Sensation: Intact to light touch, temperature, vibration and proprioception. says when touch right leg it is painful    Coordination: Finger-nose-finger was without dysmetria. cannot do heel shin    Gait: deferred    < from: CT Chest w/ IV Cont (03.14.21 @ 10:12) >    EXAM:  CT ABDOMEN AND PELVIS IC                          EXAM:  CT CHEST IC                          PROCEDURE DATE:  03/14/2021      IMPRESSION:  Enlarged right iliac lymph node measuring 5.9 x 2.4 cm. Additional smaller right iliac nodes are seen. There are also bilateral groin lymph nodes.      MOLINA PUGH M.D., RADIOLOGY RESIDENT  This documenthas been electronically signed.  GINA JENKINS M.D., ATTENDING RADIOLOGIST  This document has been electronically signed. Mar 14 2021 12:50PM    < end of copied text >          EXAM:  MR SPINE LUMBAR WAW IC                            PROCEDURE DATE:  03/15/2021            INTERPRETATION:  INDICATIONS:  Increased low back pain and right leg weakness for 4 months    TECHNIQUE:  Sagittal T1 weighted and T2 weighted images of the lumbosacral spine as well as axial T1 weighted and T2 weighted images were obtained.    COMPARISON EXAMINATION: None.    FINDINGS:  VERTEBRAL BODIES AND DISCS:  Nonspecific lesion in L1 low signal T1 hyperintense signal T2 with some enhancement.  ALIGNMENT:  No subluxations.  L1-L2 LEVEL:  Normal.  L2-L3 LEVEL:  Asymmetric bulge right with disc material in the region of the right neural foramen at this level. Clinical correlation for right L2 radiculopathy  L3-L4 LEVEL: Symmetric bulge with annular fissure in the 7:30 position. Bilateral facet arthropathy at this level.  L4-L5 LEVEL: Asymmetric bulge to the left with focal left-sided protrusion and some mass impression on the intracanal left L5 root.. Bilateral facet arthropathy.Some changes in the left paraspinal musculature extending from the left facet joint may represent septic facet as enhancement is seen in association with hyperintense T2 signal with associated muscular changes (9:19)  L5-S1 LEVEL:  Slight signal hyperintensity at the disc space with some enhancement ventrally without associated endplate abnormality favors degenerative change. Prominent asymmetric disc bulge with Bilateral foraminal disc material which is causing significant nerve root compression on the right greater than left. May be the cause of the patient's known right leg weakness.  SPINAL CANAL:  Evidence of diffuse nerve root enhancement appreciated involving the roots of the cauda equina with some mild thickening appreciated.  CONUS MEDULLARIS:  Normal.  MISCELLANEOUS:  None    IMPRESSION:  Diffuse nerve root enhancement involving the roots of the cauda equina with some mild root thickening though the dominant finding is the enhancement. Differential possibilities include acute inflammatory demyelinating polyneuropathy( [place]) versus chronic inflammatory demyelinating polyneuropathy(CIDP). Inflammatory pathologies such as sarcoid or infectious etiologies should be considered. Included in the differential is leptomeningeal carcinomatosis. Correlation with CSF strongly recommended. Nonspecific lesion in the L5 vertebral body as described. Enhancement with associated T2 abnormality involving the left L4-5 facet with extension to the paraspinal musculature may indicate a septic facet at this level. Correlation with clinical parameters suggested. Degenerative changes with disc material in the neural foramina at the L2-3 and L5-S1 levels on the right as well as in the L5-S1 neural foramen on the left. Prominent bulge with more focal protrusion L4-5 left                MONTY TRAN MD; Attending Radiologist  This document has been electronically signed. Mar 15 2021  8:19PM      EXAM:  MR SPINE CERVICAL                          EXAM:  MR BRAIN                            PROCEDURE DATE:  03/24/2021            INTERPRETATION:  INDICATIONS:  Right leg weakness and bilateral leg pain. Rule out metastatic disease    Brain:    TECHNIQUE:  Multiplanar imaging was performed using T1 weighted, T2 weighted and FLAIR sequences.  Diffusion weighted and susceptibility sensitive images were also obtained.    Patient could not continue the exam. Intravenous contrast was not administered.    COMPARISON EXAMINATION:  None.    FINDINGS: The study is degraded by motion artifact.  VENTRICLES AND SULCI:  Normal.  INTRA-AXIAL:  No mass, abnormal signal or evidence of acute cerebral ischemia.  EXTRA-AXIAL:  No mass or collection.  VISUALIZED SINUSES:  Mild mucosal thickening  VISUALIZED MASTOIDS:  Clear.  CALVARIUM:  Normal.  CAROTID FLOW VOIDS:  Present.  MISCELLANEOUS:  Tornwaldt cyst within the midline nasopharynx.    Cervical spine:    TECHNIQUE:  Sagittal T1 weighted, T2 weighted and STIR images of the cervical spine as well as axial T1 weighted and T2 weighted images were obtained.    The patient could not continue the exam. The study is limited by motion artifact.    COMPARISON EXAMINATION: None.    FINDINGS:  VERTEBRAL BODIES AND DISCS: Normal in height. No abnormal signal is identified. Multilevel marginal osteophyte formation.  ALIGNMENT:  No subluxations. Straightening of the cervical lordosis  C2-C3 LEVEL:  Normal.  C3-C4 LEVEL:  Mild disc bulge/ridge  C4-C5 LEVEL:  Diffuse disc bulge/ridge  C5-C6 LEVEL:  Diffuse disc bulge/ridge with bilateral foraminal narrowing  C6-C7 LEVEL:  Disc bulge/ridge with left greater than right foraminal narrowing  C7-T1 LEVEL:   Normal.  SPINAL CORD: Normal.  SPINAL CANAL:  No other intradural or extradural defects are seen.  MISCELLANEOUS:  None.      IMPRESSION:  Brain:  Motion degraded exam.    No intracranial abnormality identified. Detection of metastatic disease is limited without intravenous contrast.    Cervical spine:  Motion limited exam.    Multilevel spondylosis.      FABIANO ALEXANDRA MD; Attending Radiologist  This document has been electronically signed. Mar 24 2021  8:44AM

## 2021-03-25 NOTE — PROGRESS NOTE ADULT - SUBJECTIVE AND OBJECTIVE BOX
NEPHROLOGY     Patient seen and examined. Pt reports feeling ok, no complaints of pain or sob, in no acute distress. No overnight events noted.     MEDICATIONS  (STANDING):  allopurinol 300 milliGRAM(s) Oral daily  amLODIPine   Tablet 10 milliGRAM(s) Oral daily  atorvastatin 80 milliGRAM(s) Oral at bedtime  dexAMETHasone  Injectable 4 milliGRAM(s) IV Push every 6 hours  dextrose 40% Gel 15 Gram(s) Oral once  dextrose 5%. 1000 milliLiter(s) (50 mL/Hr) IV Continuous <Continuous>  dextrose 5%. 1000 milliLiter(s) (100 mL/Hr) IV Continuous <Continuous>  dextrose 50% Injectable 12.5 Gram(s) IV Push once  dextrose 50% Injectable 25 Gram(s) IV Push once  dextrose 50% Injectable 25 Gram(s) IV Push once  glucagon  Injectable 1 milliGRAM(s) IntraMuscular once  heparin   Injectable 5000 Unit(s) SubCutaneous every 12 hours  insulin glargine Injectable (LANTUS) 34 Unit(s) SubCutaneous at bedtime  insulin lispro (ADMELOG) corrective regimen sliding scale   SubCutaneous three times a day before meals  insulin lispro (ADMELOG) corrective regimen sliding scale   SubCutaneous at bedtime  insulin lispro Injectable (ADMELOG) 8 Unit(s) SubCutaneous three times a day before meals  lactated ringers. 1000 milliLiter(s) (50 mL/Hr) IV Continuous <Continuous>  metoprolol succinate ER 75 milliGRAM(s) Oral daily  morphine  - Injectable 2 milliGRAM(s) IV Push once  saline laxative (FLEET) Rectal Enema 1 Enema Rectal once  senna 2 Tablet(s) Oral at bedtime  trimethoprim   80 mG/sulfamethoxazole 400 mG 1 Tablet(s) Oral daily    VITALS:  T(C): , Max: 36.8 (03-24-21 @ 23:45)  T(F): , Max: 98.3 (03-25-21 @ 04:40)  HR: 60 (03-25-21 @ 08:21)  BP: 128/77 (03-25-21 @ 08:21)  RR: 18 (03-25-21 @ 08:21)  SpO2: 96% (03-25-21 @ 08:21)    I and O's:    03-24 @ 07:01  -  03-25 @ 07:00  --------------------------------------------------------  IN: 0 mL / OUT: 1500 mL / NET: -1500 mL    03-25 @ 07:01  - 03-25 @ 12:19  --------------------------------------------------------  IN: 140 mL / OUT: 200 mL / NET: -60 mL    PHYSICAL EXAM:  Constitutional: NAD  Neck:  No JVD  Respiratory: CTAB/L  Cardiovascular: S1 and S2  Gastrointestinal: BS+, soft, NT/ND  Extremities: No peripheral edema  Neurological: A/O x 3, no focal deficits  Psychiatric: Normal mood, normal affect  : No Weir  Skin: No rashes    LABS:  pending collection    Phos  4.7     03-24    RADIOLOGY & ADDITIONAL STUDIES:  EXAM:  MR SPINE CERVICAL                          EXAM:  MR BRAIN                            PROCEDURE DATE:  03/24/2021            INTERPRETATION:  INDICATIONS:  Right leg weakness and bilateral leg pain. Rule out metastatic disease    Brain:    TECHNIQUE:  Multiplanar imaging was performed using T1 weighted, T2 weighted and FLAIR sequences.  Diffusion weighted and susceptibility sensitive images were also obtained.    Patient could not continue the exam. Intravenous contrast was not administered.    COMPARISON EXAMINATION:  None.    FINDINGS: The study is degraded by motion artifact.  VENTRICLES AND SULCI:  Normal.  INTRA-AXIAL:  No mass, abnormal signal or evidence of acute cerebral ischemia.  EXTRA-AXIAL:  No mass or collection.  VISUALIZED SINUSES:  Mild mucosal thickening  VISUALIZED MASTOIDS:  Clear.  CALVARIUM:  Normal.  CAROTID FLOW VOIDS:  Present.  MISCELLANEOUS:  Tornwaldt cyst within the midline nasopharynx.    Cervical spine:    TECHNIQUE:  Sagittal T1 weighted, T2 weighted and STIR images of the cervical spine as well as axial T1 weighted and T2 weighted images were obtained.    The patient could not continue the exam. The study is limited by motion artifact.    COMPARISON EXAMINATION: None.    FINDINGS:  VERTEBRAL BODIES AND DISCS: Normal in height. No abnormal signal is identified. Multilevel marginal osteophyte formation.  ALIGNMENT:  No subluxations. Straightening of the cervical lordosis  C2-C3 LEVEL:  Normal.  C3-C4 LEVEL:  Mild disc bulge/ridge  C4-C5 LEVEL:  Diffuse disc bulge/ridge  C5-C6 LEVEL:  Diffuse disc bulge/ridge with bilateral foraminal narrowing  C6-C7 LEVEL:  Disc bulge/ridge with left greater than right foraminal narrowing  C7-T1 LEVEL:   Normal.  SPINAL CORD: Normal.  SPINAL CANAL:  No other intradural or extradural defects are seen.  MISCELLANEOUS:  None.      IMPRESSION:  Brain:  Motion degraded exam.    No intracranial abnormality identified. Detection of metastatic disease is limited without intravenous contrast.    Cervical spine:  Motion limited exam.    Multilevel spondylosis    FABIANO ALEXANDRA MD; Attending Radiologist  This document has been electronically signed. Mar 24 2021  8:44AM

## 2021-03-25 NOTE — PROGRESS NOTE ADULT - ASSESSMENT
60 year old male presents to ED with right groin and back pain, 35 lb weight loss, subjective fevers, sweats - found to have bilateral inguinal adenopathy and a sclerotic L5 lesion.    Inguinal Adenopathy and L1 Sclerotic Lesion  -- Concern for lymphoma based on fevers, weight loss and night sweats  -- Other malignancies also possible, as can be benign inflammatory processes  -- Tumor markers - PSA,, Ca 19,9, LDH and Uric Acid are normal  -- S/P excisional biopsy of inguinal lymph node  --Genetic testing on lymph node consistent with monoclonal B-cell population  --Results pending but frozen section consistent with B cell lymphoma; awaiting subtype  --Flow cytometry from lymph node, however, negative for neoplasm  -- Have ordered Hepatitis profile, Quantiferon in anticipation for chemotherapy  --Quantiferon results are indeterminate - consider input from ID  --Chemotherapy (if indicated will be determined (as well as possible spinal RT) once we have final pathology results - will most likely be given as outpatient  --We request IR consultation for a medi port placement to be done prior to discharge but will wait until pathology results    Neuropathy  -- Presumed to be secondary to COVID infection per patient  -- Markedly abnormal MRI of spine  -- Neurology and Neurosurgery are following  -- Concern for leptomeningeal spread  -- CSF cytology negative for malignancy but CSF protein is elevated  -- Started dexamethasone for cauda equina 10 mg loading dose then 4 mg IV q6H - clinically improved  -- Discussed with neurology,MRI of cervical, thoracic spine as well as MRI head non-contributory.    We will continue to follow patient. Thank you for the opportunity to participate in Mr Fernandez's care.    Lobo Landeros PA-C  Hematology/Oncology   [phone] (cell)  [phone] (office)  After hours please call MD on call or office

## 2021-03-25 NOTE — PROGRESS NOTE ADULT - SUBJECTIVE AND OBJECTIVE BOX
CARDIOLOGY FOLLOW UP - Dr. Atilio RESENDIZ      PHYSICAL EXAM:  T(C): 36.3 (03-25-21 @ 08:21), Max: 36.8 (03-24-21 @ 23:45)  HR: 60 (03-25-21 @ 08:21) (60 - 73)  BP: 128/77 (03-25-21 @ 08:21) (128/77 - 167/89)  RR: 18 (03-25-21 @ 08:21) (18 - 18)  SpO2: 96% (03-25-21 @ 08:21) (95% - 97%)  Wt(kg): --  I&O's Summary    24 Mar 2021 07:01  -  25 Mar 2021 07:00  --------------------------------------------------------  IN: 0 mL / OUT: 1500 mL / NET: -1500 mL    25 Mar 2021 07:01  -  25 Mar 2021 10:55  --------------------------------------------------------  IN: 140 mL / OUT: 200 mL / NET: -60 mL        Appearance: Normal	  Cardiovascular: Normal S1 S2,RRR, No JVD, No murmurs  Respiratory: Lungs clear to auscultation	  Gastrointestinal:  Soft, Non-tender, + BS	  Extremities: Normal range of motion, No clubbing, cyanosis or edema      Home Medications:  amlodipine-benazepril 10 mg-40 mg oral capsule: 1 cap(s) orally once a day (15 Mar 2021 11:15)  HumaLOG 100 units/mL injectable solution: 8 unit(s) injectable 3 times a day (before meals) (15 Mar 2021 11:15)  Lipitor 80 mg oral tablet: 1 tab(s) orally once a day (15 Mar 2021 11:15)  Toprol-XL 50 mg oral tablet, extended release: 1 tab(s) orally once a day (15 Mar 2021 11:15)  Tresiba 100 units/mL subcutaneous solution: 40 unit(s) subcutaneous once a day (at bedtime) (15 Mar 2021 11:15)      MEDICATIONS  (STANDING):  allopurinol 300 milliGRAM(s) Oral daily  amLODIPine   Tablet 10 milliGRAM(s) Oral daily  atorvastatin 80 milliGRAM(s) Oral at bedtime  dexAMETHasone  Injectable 4 milliGRAM(s) IV Push every 6 hours  dextrose 40% Gel 15 Gram(s) Oral once  dextrose 5%. 1000 milliLiter(s) (50 mL/Hr) IV Continuous <Continuous>  dextrose 5%. 1000 milliLiter(s) (100 mL/Hr) IV Continuous <Continuous>  dextrose 50% Injectable 12.5 Gram(s) IV Push once  dextrose 50% Injectable 25 Gram(s) IV Push once  dextrose 50% Injectable 25 Gram(s) IV Push once  glucagon  Injectable 1 milliGRAM(s) IntraMuscular once  heparin   Injectable 5000 Unit(s) SubCutaneous every 12 hours  insulin glargine Injectable (LANTUS) 30 Unit(s) SubCutaneous at bedtime  insulin lispro (ADMELOG) corrective regimen sliding scale   SubCutaneous at bedtime  insulin lispro (ADMELOG) corrective regimen sliding scale   SubCutaneous three times a day before meals  insulin lispro Injectable (ADMELOG) 8 Unit(s) SubCutaneous three times a day before meals  lactated ringers. 1000 milliLiter(s) (50 mL/Hr) IV Continuous <Continuous>  metoprolol succinate ER 75 milliGRAM(s) Oral daily  morphine  - Injectable 2 milliGRAM(s) IV Push once  saline laxative (FLEET) Rectal Enema 1 Enema Rectal once  senna 2 Tablet(s) Oral at bedtime  trimethoprim   80 mG/sulfamethoxazole 400 mG 1 Tablet(s) Oral daily      TELEMETRY: 	    ECG:  	  RADIOLOGY:   DIAGNOSTIC TESTING:  [ ] Echocardiogram:  [ ]  Catheterization:  [ ] Stress Test:    OTHER: 	    LABS:	 	    Creatine Kinase, Serum: 40 U/L [30 - 200] (03-21 @ 18:00)  CKMB Units: 1.8 ng/mL [0.0 - 6.7] (03-21 @ 18:00)  Troponin T, High Sensitivity Result: 17 ng/L [0 - 51] (03-21 @ 18:00)        Phos  4.7     03-24               CARDIOLOGY FOLLOW UP - Dr. Trimble    CC  no new complaints    PHYSICAL EXAM:  T(C): 36.3 (03-25-21 @ 08:21), Max: 36.8 (03-24-21 @ 23:45)  HR: 60 (03-25-21 @ 08:21) (60 - 73)  BP: 128/77 (03-25-21 @ 08:21) (128/77 - 167/89)  RR: 18 (03-25-21 @ 08:21) (18 - 18)  SpO2: 96% (03-25-21 @ 08:21) (95% - 97%)  Wt(kg): --  I&O's Summary    24 Mar 2021 07:01  -  25 Mar 2021 07:00  --------------------------------------------------------  IN: 0 mL / OUT: 1500 mL / NET: -1500 mL    25 Mar 2021 07:01  -  25 Mar 2021 10:55  --------------------------------------------------------  IN: 140 mL / OUT: 200 mL / NET: -60 mL        Appearance: Normal	  Cardiovascular: Normal S1 S2,RRR, No JVD, No murmurs  Respiratory: Lungs clear to auscultation	  Gastrointestinal:  Soft, Non-tender, + BS	  Extremities: Normal range of motion, No clubbing, cyanosis or edema      Home Medications:  amlodipine-benazepril 10 mg-40 mg oral capsule: 1 cap(s) orally once a day (15 Mar 2021 11:15)  HumaLOG 100 units/mL injectable solution: 8 unit(s) injectable 3 times a day (before meals) (15 Mar 2021 11:15)  Lipitor 80 mg oral tablet: 1 tab(s) orally once a day (15 Mar 2021 11:15)  Toprol-XL 50 mg oral tablet, extended release: 1 tab(s) orally once a day (15 Mar 2021 11:15)  Tresiba 100 units/mL subcutaneous solution: 40 unit(s) subcutaneous once a day (at bedtime) (15 Mar 2021 11:15)      MEDICATIONS  (STANDING):  allopurinol 300 milliGRAM(s) Oral daily  amLODIPine   Tablet 10 milliGRAM(s) Oral daily  atorvastatin 80 milliGRAM(s) Oral at bedtime  dexAMETHasone  Injectable 4 milliGRAM(s) IV Push every 6 hours  dextrose 40% Gel 15 Gram(s) Oral once  dextrose 5%. 1000 milliLiter(s) (50 mL/Hr) IV Continuous <Continuous>  dextrose 5%. 1000 milliLiter(s) (100 mL/Hr) IV Continuous <Continuous>  dextrose 50% Injectable 12.5 Gram(s) IV Push once  dextrose 50% Injectable 25 Gram(s) IV Push once  dextrose 50% Injectable 25 Gram(s) IV Push once  glucagon  Injectable 1 milliGRAM(s) IntraMuscular once  heparin   Injectable 5000 Unit(s) SubCutaneous every 12 hours  insulin glargine Injectable (LANTUS) 30 Unit(s) SubCutaneous at bedtime  insulin lispro (ADMELOG) corrective regimen sliding scale   SubCutaneous at bedtime  insulin lispro (ADMELOG) corrective regimen sliding scale   SubCutaneous three times a day before meals  insulin lispro Injectable (ADMELOG) 8 Unit(s) SubCutaneous three times a day before meals  lactated ringers. 1000 milliLiter(s) (50 mL/Hr) IV Continuous <Continuous>  metoprolol succinate ER 75 milliGRAM(s) Oral daily  morphine  - Injectable 2 milliGRAM(s) IV Push once  saline laxative (FLEET) Rectal Enema 1 Enema Rectal once  senna 2 Tablet(s) Oral at bedtime  trimethoprim   80 mG/sulfamethoxazole 400 mG 1 Tablet(s) Oral daily      TELEMETRY: 	    ECG:  	  RADIOLOGY:   DIAGNOSTIC TESTING:  [ ] Echocardiogram:  [ ]  Catheterization:  [ ] Stress Test:    OTHER: 	    LABS:	 	    Creatine Kinase, Serum: 40 U/L [30 - 200] (03-21 @ 18:00)  CKMB Units: 1.8 ng/mL [0.0 - 6.7] (03-21 @ 18:00)  Troponin T, High Sensitivity Result: 17 ng/L [0 - 51] (03-21 @ 18:00)        Phos  4.7     03-24

## 2021-03-25 NOTE — PROGRESS NOTE ADULT - SUBJECTIVE AND OBJECTIVE BOX
Patient is a 60y old  Male who presents with a chief complaint of Lymphadenopathy (16 Mar 2021 12:54)    f/u abnl MRI    Interval History/ROS:  feels the same.  back pain.  not able to move RLE.  no fever.  Remainder of ROS otherwise negative.    PAST MEDICAL & SURGICAL HISTORY:  Diabetes  Hyperlipidemia  Hypertension, unspecified type    Allergies  No Known Allergies    ANTIMICROBIALS:  trimethoprim   80 mG/sulfamethoxazole 400 mG 1 daily    MEDICATIONS  (STANDING):  allopurinol 300 daily  amLODIPine   Tablet 10 daily  atorvastatin 80 at bedtime  dexAMETHasone  Injectable 4 every 6 hours  heparin   Injectable 5000 every 12 hours  insulin glargine Injectable (LANTUS) 30 at bedtime  insulin lispro (ADMELOG) corrective regimen sliding scale  at bedtime  insulin lispro (ADMELOG) corrective regimen sliding scale  three times a day before meals  insulin lispro Injectable (ADMELOG) 8 three times a day before meals  metoprolol succinate ER 75 daily  morphine  - Injectable 2 once  saline laxative (FLEET) Rectal Enema 1 once  senna 2 at bedtime    Vital Signs Last 24 Hrs  T(F): 97.4 (03-25-21 @ 08:21), Max: 98.3 (03-25-21 @ 04:40)  HR: 60 (03-25-21 @ 08:21)  BP: 128/77 (03-25-21 @ 08:21)  RR: 18 (03-25-21 @ 08:21)  SpO2: 96% (03-25-21 @ 08:21) (95% - 97%)  Wt(kg): --SpO2: 97% (03-24-21 @ 09:08) (94% - 98%)    PHYSICAL EXAM:  Constitutional: non-toxic, lying prone  HEAD/EYES: anicteric  ENT:  supple,  Cardiovascular:   normal S1, S2  Respiratory:  clear BS bilaterally  GI:  soft  :  no tran  Musculoskeletal:  no synovitis  Neurologic: awake and alert, significantly decreased strength R leg  Skin:  surgical site c/d/i  Psychiatric:  awake, alert, appropriate mood          Phos  4.7     03-24    Sedimentation Rate, Erythrocyte: 2 (03-24 @ 01:44)  C-Reactive Protein, Serum: <0.30 (03-23 @ 19:08)    Quant gold indeterminant but was on steroids    Lactate Dehydrogenase, Serum: 136 U/L (03-16-21 @ 07:22)    HIV-1/2 Combo Result: Nonreact:  Angiotensin Converting Enzyme, Serum: <5  Anti Nuclear Factor Titer: Negative    MICROBIOLOGY:  Culture - Blood (collected 03-23-21 @ 22:58)  Source: .Blood Blood-Venous  Preliminary Report (03-24-21 @ 23:01):    No growth to date.    Culture - Blood (collected 03-23-21 @ 22:57)  Source: .Blood Blood-Peripheral  Preliminary Report (03-24-21 @ 23:01):    No growth to date.    Culture - Fungal, CSF (collected 03-19-21 @ 16:20)  Source: .CSF CSF  Preliminary Report (03-22-21 @ 09:11):    Testing in progress    Culture - Acid Fast - CSF (collected 03-19-21 @ 16:20)  Source: .CSF CSF    Culture - CSF with Gram Stain (collected 03-19-21 @ 16:20)  Source: .CSF CSF  Gram Stain (03-19-21 @ 17:04):    No polymorphonuclear cells seen    No organisms seen    by cytocentrifuge  Final Report (03-22-21 @ 09:11):    No growth    COVID-19 IgG Antibody Interpretation: Negative (03-15-21 @ 08:26)  COVID-19 PCR: NotDetec (03-14-21 @ 11:17)    RADIOLOGY:  imaging below personally reviewed and agree with findings    MR Cervical Spine No Cont (03.24.21 @ 07:34) >  IMPRESSION:  Brain:  Motion degraded exam.  No intracranial abnormality identified. Detection of metastatic disease is limited without intravenous contrast.  Cervical spine:  Motion limited exam.  Multilevel spondylosis.    MR Lumbar Spine w/wo IV Cont (03.15.21 @ 17:55) >  IMPRESSION:  Diffuse nerve root enhancement involving the roots of the cauda equina with some mild root thickening though the dominant finding is the enhancement. Differential possibilities include acute inflammatory demyelinating polyneuropathy( Guillan Beryl) versus chronic inflammatory demyelinating polyneuropathy(CIDP). Inflammatory pathologies such as sarcoid or infectious etiologies should be considered. Included in the differential is leptomeningeal carcinomatosis. Correlation with CSF strongly recommended. Nonspecific lesion in the L5 vertebral body as described. Enhancement with associated T2 abnormality involving the left L4-5 facet with extension to the paraspinal musculature may indicate a septic facet at this level. Correlation with clinical parameters suggested. Degenerative changes with disc material in the neural foramina at the L2-3 and L5-S1 levels on the right as well as in the L5-S1 neural foramen on the left. Prominent bulge with more focal protrusion L4-5 left    A Duplex Lower Ext Vein Scan, Bilat (03.15.21 @ 12:03) >  IMPRESSION: There is a 2.3 cm nodule superficial to the vascular structures in the right inguinal area, likely a lymph node.  Noevidence of deep venous thrombosis in either lower extremity.    CT Chest w/ IV Cont (03.14.21 @ 10:12) >  IMPRESSION:  Enlarged right iliac lymph node measuring 5.9 x 2.4 cm. Additional smaller right iliac nodes are seen. There are also bilateral groin lymph nodes.    US Doppler Scrotum (03.14.21 @ 10:57) >  IMPRESSION:  Large complex bilateral hydroceles.  No testicular mass seen

## 2021-03-25 NOTE — PROGRESS NOTE ADULT - PROBLEM SELECTOR PLAN 1
test BG AC/HS  -Increase Lantus 34 units QHS  -Continue Admelog 8 units AC meals for now. Will increase once pattern present  -c/w Admelog moderate correction scale AC and mod HS scale  Carb consistent diet   C-peptide normal, not obtained with BMP, not suggestive of DM1  -notify endocrine team if steroid dose changed or discontinued  On Discharge recommend tresiba and Novolog  Recommend follow up with Endo in [place], Patient to find out the name.

## 2021-03-25 NOTE — PROGRESS NOTE ADULT - SUBJECTIVE AND OBJECTIVE BOX
Name of Patient : MARVIN PHAM  MRN: [id number]  DATE OF SERVICE: 03-25-21    Subjective: Patient seen and examined. No new events except as noted.     REVIEW OF SYSTEMS:    CONSTITUTIONAL: No weakness, fevers or chills  EYES/ENT: No visual changes;  No vertigo or throat pain   NECK: No pain or stiffness  RESPIRATORY: No cough, wheezing, hemoptysis; No shortness of breath  CARDIOVASCULAR: No chest pain or palpitations  GASTROINTESTINAL: No abdominal or epigastric pain. No nausea, vomiting, or hematemesis; No diarrhea or constipation. No melena or hematochezia.  GENITOURINARY: No dysuria, frequency or hematuria  NEUROLOGICAL: No numbness or weakness  SKIN: No itching, burning, rashes, or lesions   All other review of systems is negative unless indicated above.    MEDICATIONS:  MEDICATIONS  (STANDING):  allopurinol 300 milliGRAM(s) Oral daily  amLODIPine   Tablet 10 milliGRAM(s) Oral daily  atorvastatin 80 milliGRAM(s) Oral at bedtime  dexAMETHasone  Injectable 4 milliGRAM(s) IV Push every 6 hours  dextrose 40% Gel 15 Gram(s) Oral once  dextrose 5%. 1000 milliLiter(s) (50 mL/Hr) IV Continuous <Continuous>  dextrose 5%. 1000 milliLiter(s) (100 mL/Hr) IV Continuous <Continuous>  dextrose 50% Injectable 12.5 Gram(s) IV Push once  dextrose 50% Injectable 25 Gram(s) IV Push once  dextrose 50% Injectable 25 Gram(s) IV Push once  glucagon  Injectable 1 milliGRAM(s) IntraMuscular once  heparin   Injectable 5000 Unit(s) SubCutaneous every 12 hours  insulin glargine Injectable (LANTUS) 34 Unit(s) SubCutaneous at bedtime  insulin lispro (ADMELOG) corrective regimen sliding scale   SubCutaneous at bedtime  insulin lispro (ADMELOG) corrective regimen sliding scale   SubCutaneous three times a day before meals  insulin lispro Injectable (ADMELOG) 10 Unit(s) SubCutaneous three times a day before meals  lactated ringers. 1000 milliLiter(s) (50 mL/Hr) IV Continuous <Continuous>  metoprolol succinate ER 75 milliGRAM(s) Oral daily  morphine  - Injectable 2 milliGRAM(s) IV Push once  saline laxative (FLEET) Rectal Enema 1 Enema Rectal once  senna 2 Tablet(s) Oral at bedtime  trimethoprim   80 mG/sulfamethoxazole 400 mG 1 Tablet(s) Oral daily      PHYSICAL EXAM:  T(C): 36.7 (03-25-21 @ 15:48), Max: 36.8 (03-24-21 @ 23:45)  HR: 65 (03-25-21 @ 15:48) (60 - 73)  BP: 127/81 (03-25-21 @ 15:48) (127/81 - 167/89)  RR: 18 (03-25-21 @ 15:48) (18 - 18)  SpO2: 98% (03-25-21 @ 15:48) (95% - 98%)  Wt(kg): --  I&O's Summary    24 Mar 2021 07:01  -  25 Mar 2021 07:00  --------------------------------------------------------  IN: 0 mL / OUT: 1500 mL / NET: -1500 mL    25 Mar 2021 07:01  -  25 Mar 2021 17:48  --------------------------------------------------------  IN: 590 mL / OUT: 550 mL / NET: 40 mL          Appearance: Normal	  HEENT:  PERRLA   Lymphatic: No lymphadenopathy   Cardiovascular: Normal S1 S2, no JVD  Respiratory: normal effort , clear  Gastrointestinal:  Soft, Non-tender  Skin: No rashes,  warm to touch  Psychiatry:  Mood & affect appropriate  Musculuskeletal: No edema      All labs, Imaging and EKGs personally reviewed     03-24-21 @ 07:01  -  03-25-21 @ 07:00  --------------------------------------------------------  IN: 0 mL / OUT: 1500 mL / NET: -1500 mL    03-25-21 @ 07:01  -  03-25-21 @ 17:48  --------------------------------------------------------  IN: 590 mL / OUT: 550 mL / NET: 40 mL                  Phos  4.7     03-24      < from: MR Cervical Spine No Cont (03.24.21 @ 07:34) >  IMPRESSION:  Brain:  Motion degraded exam.    No intracranial abnormality identified. Detection of metastatic disease is limited without intravenous contrast.    Cervical spine:  Motion limited exam.    Multilevel spondylosis.

## 2021-03-25 NOTE — CHART NOTE - NSCHARTNOTEFT_GEN_A_CORE
Nutrition Follow Up Note  Patient seen for: Malnutrition Follow up on 8MON    Chart reviewed, events noted. Per chart, "59 y/o male with hx HTN, DM, HLD, COVID, presents with LE pain and scrotal swelling. Noted GUANAKO resolving, noted pt status post lymph node biopsy and CSF pending as pt with increased lymphocytes on CSF cell count, possible mediport before d/c.    Source: medical record    Diet: Regular:   Consistent Carbohydrate {Evening Snack} (CSTCHOSN) (03-19-21 @ 08:35) [Active]    Patient reports: Upon RD visit, pt is on the phone and asking RD to come back at another time. Per chart, last bowel movement 3/23 and pt with no complaints of nausea, vomiting, constipation or diarrhea at this time.  Per flow sheets, pt consuming ~% at meal times.     PO intake: % at meals per flow sheets    No new weights to assess; dosing weight of 68 kg; will continue to monitor as able    Pertinent Medications: MEDICATIONS  (STANDING):  allopurinol 300 milliGRAM(s) Oral daily  amLODIPine   Tablet 10 milliGRAM(s) Oral daily  atorvastatin 80 milliGRAM(s) Oral at bedtime  dexAMETHasone  Injectable 4 milliGRAM(s) IV Push every 6 hours  dextrose 40% Gel 15 Gram(s) Oral once  dextrose 5%. 1000 milliLiter(s) (50 mL/Hr) IV Continuous <Continuous>  dextrose 5%. 1000 milliLiter(s) (100 mL/Hr) IV Continuous <Continuous>  dextrose 50% Injectable 12.5 Gram(s) IV Push once  dextrose 50% Injectable 25 Gram(s) IV Push once  dextrose 50% Injectable 25 Gram(s) IV Push once  glucagon  Injectable 1 milliGRAM(s) IntraMuscular once  heparin   Injectable 5000 Unit(s) SubCutaneous every 12 hours  insulin glargine Injectable (LANTUS) 34 Unit(s) SubCutaneous at bedtime  insulin lispro (ADMELOG) corrective regimen sliding scale   SubCutaneous three times a day before meals  insulin lispro (ADMELOG) corrective regimen sliding scale   SubCutaneous at bedtime  insulin lispro Injectable (ADMELOG) 8 Unit(s) SubCutaneous three times a day before meals  lactated ringers. 1000 milliLiter(s) (50 mL/Hr) IV Continuous <Continuous>  metoprolol succinate ER 75 milliGRAM(s) Oral daily  morphine  - Injectable 2 milliGRAM(s) IV Push once  saline laxative (FLEET) Rectal Enema 1 Enema Rectal once  senna 2 Tablet(s) Oral at bedtime  trimethoprim   80 mG/sulfamethoxazole 400 mG 1 Tablet(s) Oral daily    MEDICATIONS  (PRN):  acetaminophen   Tablet .. 650 milliGRAM(s) Oral every 6 hours PRN Mild Pain (1 - 3)  magnesium hydroxide Suspension 30 milliLiter(s) Oral daily PRN Constipation  polyethylene glycol 3350 17 Gram(s) Oral daily PRN Constipation  traMADol 50 milliGRAM(s) Oral three times a day PRN Moderate Pain (4 - 6)    Pertinent Labs: Last labs from 3/23: Sodium: 134 L, BUN: 52 , Gluc: 175 H    Finger Sticks:  POCT Blood Glucose.: 203 mg/dL (03-25 @ 08:48)  POCT Blood Glucose.: 295 mg/dL (03-24 @ 21:11)  POCT Blood Glucose.: 187 mg/dL (03-24 @ 17:35)  --> Finger sticks still trending high, Endocrine following; noted pt remains on dexamethasone which could be contributing to hyperglycemia    Skin per nursing documentation: no pressure injuries per flow sheets  Edema: none noted per flow sheets    Estimated Needs:   [x] no change since previous assessment    Previous Nutrition Diagnosis: 1. Severe, acute malnutrition  Nutrition Diagnosis is: ongoing, being addressed with PO intake, pt previously declined oral nutrition supplements  2. Food and Nutrition Related Knowledge Deficit  Nutrition Diagnosis is: ongoing, being addressed with previous diabetes education. reinforcement as accepted     New Nutrition Diagnosis: none at this time    Interventions/ Recommend  1) Continue current diet as tolerated  2) Continue to reinforce diabetes education as appropriate/ accepted by patient    Monitoring and Evaluation:   Continue to monitor Nutritional intake, Tolerance to diet prescription, weights, labs, skin integrity    RD remains available upon request and will follow up per protocol  Adilia Hanley, MS, RD, CDN pgr #[phone] Nutrition Follow Up Note  Patient seen for: Malnutrition Follow up on 8MON    Chart reviewed, events noted. Per chart, "61 y/o male with hx HTN, DM, HLD, COVID, presents with LE pain and scrotal swelling. Noted GUANAKO resolving, noted pt status post lymph node biopsy and CSF pending as pt with increased lymphocytes on CSF cell count, possible mediport before d/c.    Source: medical record, patient    Diet: Regular:   Consistent Carbohydrate {Evening Snack} (CSTCHOSN) (03-19-21 @ 08:35) [Active]    Patient reports: Upon RD visit, pt is on the phone and asking RD to come back at another time. Per chart, last bowel movement 3/23 and pt with no complaints of nausea, vomiting, constipation or diarrhea at this time.  Per flow sheets, pt consuming ~% at meal times.  --> RD revisited pt who reports his appetite is intact but he has been having issues with not getting foods that he has ordered, RD escalated this matter. Pt still declines oral nutrition supplements at this time.     Education: RD observed diabetes education handout at bedside; pt denies need for additional diabetes education at this time.     PO intake: % at meals per flow sheets    No new weights to assess; dosing weight of 68 kg; will continue to monitor as able    Pertinent Medications: MEDICATIONS  (STANDING):  allopurinol 300 milliGRAM(s) Oral daily  amLODIPine   Tablet 10 milliGRAM(s) Oral daily  atorvastatin 80 milliGRAM(s) Oral at bedtime  dexAMETHasone  Injectable 4 milliGRAM(s) IV Push every 6 hours  dextrose 40% Gel 15 Gram(s) Oral once  dextrose 5%. 1000 milliLiter(s) (50 mL/Hr) IV Continuous <Continuous>  dextrose 5%. 1000 milliLiter(s) (100 mL/Hr) IV Continuous <Continuous>  dextrose 50% Injectable 12.5 Gram(s) IV Push once  dextrose 50% Injectable 25 Gram(s) IV Push once  dextrose 50% Injectable 25 Gram(s) IV Push once  glucagon  Injectable 1 milliGRAM(s) IntraMuscular once  heparin   Injectable 5000 Unit(s) SubCutaneous every 12 hours  insulin glargine Injectable (LANTUS) 34 Unit(s) SubCutaneous at bedtime  insulin lispro (ADMELOG) corrective regimen sliding scale   SubCutaneous three times a day before meals  insulin lispro (ADMELOG) corrective regimen sliding scale   SubCutaneous at bedtime  insulin lispro Injectable (ADMELOG) 8 Unit(s) SubCutaneous three times a day before meals  lactated ringers. 1000 milliLiter(s) (50 mL/Hr) IV Continuous <Continuous>  metoprolol succinate ER 75 milliGRAM(s) Oral daily  morphine  - Injectable 2 milliGRAM(s) IV Push once  saline laxative (FLEET) Rectal Enema 1 Enema Rectal once  senna 2 Tablet(s) Oral at bedtime  trimethoprim   80 mG/sulfamethoxazole 400 mG 1 Tablet(s) Oral daily    MEDICATIONS  (PRN):  acetaminophen   Tablet .. 650 milliGRAM(s) Oral every 6 hours PRN Mild Pain (1 - 3)  magnesium hydroxide Suspension 30 milliLiter(s) Oral daily PRN Constipation  polyethylene glycol 3350 17 Gram(s) Oral daily PRN Constipation  traMADol 50 milliGRAM(s) Oral three times a day PRN Moderate Pain (4 - 6)    Pertinent Labs: Last labs from 3/23: Sodium: 134 L, BUN: 52 , Gluc: 175 H    Finger Sticks:  POCT Blood Glucose.: 203 mg/dL (03-25 @ 08:48)  POCT Blood Glucose.: 295 mg/dL (03-24 @ 21:11)  POCT Blood Glucose.: 187 mg/dL (03-24 @ 17:35)  --> Finger sticks still trending high, Endocrine following; noted pt remains on dexamethasone which could be contributing to hyperglycemia    Skin per nursing documentation: no pressure injuries per flow sheets  Edema: none noted per flow sheets    Estimated Needs:   [x] no change since previous assessment    Previous Nutrition Diagnosis: 1. Severe, acute malnutrition  Nutrition Diagnosis is: ongoing, being addressed with PO intake, pt previously declined oral nutrition supplements  2. Food and Nutrition Related Knowledge Deficit  Nutrition Diagnosis is: ongoing, being addressed with previous diabetes education. reinforcement as accepted     New Nutrition Diagnosis: none at this time    Interventions/ Recommend  1) Continue current diet as tolerated  2) Continue to reinforce diabetes education as appropriate/ accepted by patient    Monitoring and Evaluation:   Continue to monitor Nutritional intake, Tolerance to diet prescription, weights, labs, skin integrity    RD remains available upon request and will follow up per protocol  Adilia Hanley, MS, RD, CDN pgr #[phone]

## 2021-03-25 NOTE — PROGRESS NOTE ADULT - NUTRITIONAL ASSESSMENT
This patient has been assessed with a concern for Malnutrition and has been determined to have a diagnosis/diagnoses of Severe protein-calorie malnutrition.    This patient is being managed with:   Diet Regular-  Consistent Carbohydrate {Evening Snack} (CSTCHOSN)  Entered: Mar 19 2021  8:34AM     no

## 2021-03-25 NOTE — PHYSICAL THERAPY INITIAL EVALUATION ADULT - PRECAUTIONS/LIMITATIONS, REHAB EVAL
As per neurosx, MRI shows possible septic L4/5 facet, L5/S1 disc lateral bulge, L2 3 right disc bulge. Nonspecific enhancement of nerve roots possible inflammatory. RLE possible due to far lateral right L5/S1 disc herniation. No interventions at this time, possibly benefit from L5/S1 decompression fusion after workup of lymph node. Pt s/p Excisional biopsy of right inguinal lymph node 3/17. 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 Pending CSF cytology. IMAGING: Duplex (-) for DVT CT that shows enlarging right iliac lymph node as well as an MRI that shows diffuse nerve root enhancement involving the roots of the cauda equina with some mild root thickening though the dominant finding is the enhancement. Does not seem infectious. Could be lymphoma with B symptoms though the LDH is low. MRI suggestive of CIDP, per neuro less likely AIDP, sarcoid, leptomeningeal carcinomatosis, infection. Pt is s/p LN biopsy. **FURTHER IMAGING: CT Chest: Enlarged right iliac lymph node measuring 5.9 x 2.4 cm. Additional smaller right iliac nodes are seen. There are also bilateral groin lymph nodes. US Doppler Scrotum:  Large complex bilateral hydroceles.  No testicular mass seen VA Duplex BLE: There is a 2.3 cm nodule superficial to the vascular structures in the right inguinal area, likely a lymph node.  Noevidence of deep venous thrombosis in either lower extremity. MR Cervical Spine: Brain:  Motion degraded exam.  No intracranial abnormality identified. Detection of metastatic disease is limited without intravenous contrast. C/S spine:  Motion limited exam. Multilevel spondylosis.**

## 2021-03-25 NOTE — PROGRESS NOTE ADULT - ASSESSMENT
ASSESSMENT/PLAN  61yo M with hx HTN, DM, HLD, COVID 2020 presents with LE pain and scrotal swelling    - GUANAKO -resolving/ resolved;  Increase BUN may be from Steroids     Labs pending for today, not drawn yesterday    -CV- BP elevated earlier in the morning - stable; on metoprolol and amlodipine    -Heme/onc- Eval noted for pending pathology/ cytology from lymph node and CSF pending;  Possible mediport before dc     -Renal dosing of meds to CrCL of 50ml/min-- On Allopurinol 300mg po qd   Phos and uric acid reviewed yesterday       Tracey Meredith NP-C  [facility]  [phone]

## 2021-03-25 NOTE — PROGRESS NOTE ADULT - ASSESSMENT
60M with DM (A1C=10.6), HTN, chol admitted 3/14/2021 c/o LE pain, scrotal swelling and weight loss found to have weakness RLE and CT that shows enlarging right iliac lymph node as well as an MRI that shows diffuse nerve root enhancement involving the roots of the cauda equina with some mild root thickening though the dominant finding is the enhancement. DOes not seem infectious.  Could this be lymphoma with B symptoms though the LDH is low.  MRI suggestive of CIDP, per neuro less likely AIDP, sarcoid, leptomeningeal carcinomatosis, infection.  s/p LN biopsy.  MRI reviewed with neuroradiology.  Though cannot r/o infection, could also be degenerative.     Lymphadenopathy  - f/u pathology  - negative HIV, ACE, DARRIN  - indeterminant quant gold due to dexamethasone  - all cultures negative    L2-3 facet abnl signal MRI  - blood cultures are negative  - ESR/CRP are normal  - no antibiotics (except for PJP ppx)  - suspect he is not infected (no fever, leukocytosis) and likely lymphoma    dexamethasone  - patient will require PJP prophylaxis if plan is to continue this dose of dexa for 30+ days  - bactrim SS daily     Please call Infectious Diseases if there is a change in status.  Thank you.  [phone].

## 2021-03-25 NOTE — PROGRESS NOTE ADULT - ASSESSMENT
Impression:  This is a 60y year old Male  who presents with the chief complaint of low back pain and leg weakness. 61yo M with hx HTN, DM, HLD, COVID 2020 presents with LE pain and scrotal swelling. Patient was hospitalized in [facility] in Feb 2021 where CT Ab/P found 5.8x2.5cm lesion on the right pelvis concerning for malignancy with sclerotic lesion at L5. Pt states since early 2021 inc pain and weakness in the right leg, at first he was limping but now cannot walk.  He has pain in the feet, describes as pinching and needles like sensation.  Starting to have sx in left leg as well. Patient left AMA before additional malignancy workup was performed.  In the last month, patient had 45lb unintentional weight loss and decreased appetite. Notes abdominal swelling, mostly on the left side with tenderness along with b/l scrotal swelling.  At one point given neurontin, not helpful.  Denies sx in arms, changes in speech, swallow, vision, bladder control.  Says issues with bowel movements.     MRI done showing diffuse nerve root enhancement involving the roots of the cauda equina with some mild root thickening though the dominant finding is the enhancement.  There is also a nonspecific lesion in the L5 vertebral body and a possible septic facet at L4-5.  However ID is not suspicious for septic facet though cultures pending.    NSx consulted for bone abnormalities and does not feel candidate for inpatient surgery at this time and recommended outpatient follow-up    s/p LP , high lymphocytes prelim, protein slightly elevated, no cx growth, awaiting official cytology, Dr. Garay spoke with pathologist Dr. Carney ([phone]) small lymphocytes not suggestive of lymphoma in CSF    getting steroids which seems to help    c/o constipation    Diagnosis and Recommendations  Clinical suspicion for B-cell lymphoma with leptomeningeal spread.    Will follow up rest of CSF studies     MRI of T-spine with and without pending.  Discussed with NP will need pain medication to get this       infectious etiology raised due to findings of  L4-5 facet and potential immunosuppression in setting of possible lymphoma.  ID following and this appears less likely     Doubt demyelinating (AIDP or CIDP).  This is inconsistent with time course and the asx of the findings    reviewed in detail with RSOALIE Landeros, that the high cell protein of the three options being demyelinating, infx or neoplastic, for the reasons outlined above, seem most consistent with neoplastic.  The lack of postiive cytology on the LP is not surprising as this is often the case, the classic teaching is that it requires up to three spinal taps to reasonably be assured that this is not a false negative.  Asked about the cauda equina findings, while the MRI indicated involved as there is diffuse root infiltration, this is not a "classic cauda equina syndrome" in which there is a large space occupying lesion affecting the nerves requiring immediate NS evacuation.  Fortunately he has improvement in pain with the steroids, though that may reduce future CSF cytology yields if LP done in the future.  Would await reuslts of frozen, as he is quite weak in the leg as a result of cauda equina findings that may improve with tx such as radiation, will defer tx to onc and rad onc .    will follow closely    spend over 35 minutes, over half the time reviewing films as well as in discussion with onc PA

## 2021-03-25 NOTE — PROGRESS NOTE ADULT - SUBJECTIVE AND OBJECTIVE BOX
Diabetes Follow up note:    Chief complaint: T2DM w/steroid induced hyperglycemia    Interval Hx: BG values variable 180s-high 200s over past 24 hours. Pt reports didn't take insulin this AM as only brought two hard boiled eggs for breakfast. Still endorsing back pain and R LE weakness. Said he is drinking ginger ale when his sugar is low but not documented cases of low glucose values over the past few days.     Review of Systems:  General: + back pain  GI: Tolerating POs. Denies N/V/D/Abd pain  CV: Denies CP/SOB  ENDO: No S&Sx of hypoglycemia    MEDS:  allopurinol 300 milliGRAM(s) Oral daily  atorvastatin 80 milliGRAM(s) Oral at bedtime  dexAMETHasone  Injectable 4 milliGRAM(s) IV Push every 6 hours    insulin glargine Injectable (LANTUS) 30 Unit(s) SubCutaneous at bedtime  insulin lispro (ADMELOG) corrective regimen sliding scale   SubCutaneous at bedtime  insulin lispro (ADMELOG) corrective regimen sliding scale   SubCutaneous three times a day before meals  insulin lispro Injectable (ADMELOG) 8 Unit(s) SubCutaneous three times a day before meals    trimethoprim   80 mG/sulfamethoxazole 400 mG 1 Tablet(s) Oral daily    Allergies    No Known Allergies        PE:  General: Male lying in bed. NAD.   Vital Signs Last 24 Hrs  T(C): 36.3 (25 Mar 2021 08:21), Max: 36.8 (24 Mar 2021 23:45)  T(F): 97.4 (25 Mar 2021 08:21), Max: 98.3 (25 Mar 2021 04:40)  HR: 60 (25 Mar 2021 08:21) (60 - 73)  BP: 128/77 (25 Mar 2021 08:21) (128/77 - 167/89)  BP(mean): --  RR: 18 (25 Mar 2021 08:21) (18 - 18)  SpO2: 96% (25 Mar 2021 08:21) (95% - 97%)  Abd: Soft, NT,ND,   Extremities: Warm. no edema x 4 ext.   Neuro: A&O X3    LABS:  POCT Blood Glucose.: 203 mg/dL (03-25-21 @ 08:48)  POCT Blood Glucose.: 295 mg/dL (03-24-21 @ 21:11)  POCT Blood Glucose.: 187 mg/dL (03-24-21 @ 17:35)  POCT Blood Glucose.: 195 mg/dL (03-24-21 @ 12:06)  POCT Blood Glucose.: 210 mg/dL (03-24-21 @ 08:19)  POCT Blood Glucose.: 152 mg/dL (03-23-21 @ 21:13)  POCT Blood Glucose.: 350 mg/dL (03-23-21 @ 17:07)  POCT Blood Glucose.: 130 mg/dL (03-23-21 @ 11:59)  POCT Blood Glucose.: 168 mg/dL (03-23-21 @ 08:23)  POCT Blood Glucose.: 201 mg/dL (03-22-21 @ 20:55)  POCT Blood Glucose.: 90 mg/dL (03-22-21 @ 17:12)  POCT Blood Glucose.: 167 mg/dL (03-22-21 @ 12:02)            Phos  4.7     03-24        Thyroid Function Tests:  03-15 @ 09:19 TSH 4.86 FreeT4 -- T3 -- Anti TPO -- Anti Thyroglobulin Ab -- TSI --      A1C with Estimated Average Glucose Result: 10.6 % (03-15-21 @ 08:59)      C-Peptide, Serum: 2.2 ng/mL (03-16 @ 09:15)      Contact number: nino [phone] or [phone]

## 2021-03-26 LAB
ALBUMIN SERPL ELPH-MCNC: 2.8 G/DL — LOW (ref 3.3–5)
ALP SERPL-CCNC: 120 U/L — SIGNIFICANT CHANGE UP (ref 40–120)
ALT FLD-CCNC: 134 U/L — HIGH (ref 10–45)
ANION GAP SERPL CALC-SCNC: 13 MMOL/L — SIGNIFICANT CHANGE UP (ref 5–17)
AST SERPL-CCNC: 37 U/L — SIGNIFICANT CHANGE UP (ref 10–40)
BILIRUB SERPL-MCNC: 0.2 MG/DL — SIGNIFICANT CHANGE UP (ref 0.2–1.2)
BUN SERPL-MCNC: 81 MG/DL — HIGH (ref 7–23)
CALCIUM SERPL-MCNC: 8.2 MG/DL — LOW (ref 8.4–10.5)
CHLORIDE SERPL-SCNC: 103 MMOL/L — SIGNIFICANT CHANGE UP (ref 96–108)
CO2 SERPL-SCNC: 16 MMOL/L — LOW (ref 22–31)
CREAT SERPL-MCNC: 1.43 MG/DL — HIGH (ref 0.5–1.3)
GLUCOSE BLDC GLUCOMTR-MCNC: 133 MG/DL — HIGH (ref 70–99)
GLUCOSE BLDC GLUCOMTR-MCNC: 146 MG/DL — HIGH (ref 70–99)
GLUCOSE BLDC GLUCOMTR-MCNC: 208 MG/DL — HIGH (ref 70–99)
GLUCOSE BLDC GLUCOMTR-MCNC: 244 MG/DL — HIGH (ref 70–99)
GLUCOSE SERPL-MCNC: 175 MG/DL — HIGH (ref 70–99)
HCT VFR BLD CALC: 56.2 % — HIGH (ref 39–50)
HGB BLD-MCNC: 18.4 G/DL — HIGH (ref 13–17)
MAGNESIUM SERPL-MCNC: 2.6 MG/DL — SIGNIFICANT CHANGE UP (ref 1.6–2.6)
MCHC RBC-ENTMCNC: 28.9 PG — SIGNIFICANT CHANGE UP (ref 27–34)
MCHC RBC-ENTMCNC: 32.7 GM/DL — SIGNIFICANT CHANGE UP (ref 32–36)
MCV RBC AUTO: 88.2 FL — SIGNIFICANT CHANGE UP (ref 80–100)
NRBC # BLD: 0 /100 WBCS — SIGNIFICANT CHANGE UP (ref 0–0)
PHOSPHATE SERPL-MCNC: 4.6 MG/DL — HIGH (ref 2.5–4.5)
PLATELET # BLD AUTO: 508 K/UL — HIGH (ref 150–400)
POTASSIUM SERPL-MCNC: 5.4 MMOL/L — HIGH (ref 3.5–5.3)
POTASSIUM SERPL-SCNC: 5.4 MMOL/L — HIGH (ref 3.5–5.3)
PROT SERPL-MCNC: 5.7 G/DL — LOW (ref 6–8.3)
RBC # BLD: 6.37 M/UL — HIGH (ref 4.2–5.8)
RBC # FLD: 13.4 % — SIGNIFICANT CHANGE UP (ref 10.3–14.5)
SODIUM SERPL-SCNC: 132 MMOL/L — LOW (ref 135–145)
WBC # BLD: 10.09 K/UL — SIGNIFICANT CHANGE UP (ref 3.8–10.5)
WBC # FLD AUTO: 10.09 K/UL — SIGNIFICANT CHANGE UP (ref 3.8–10.5)

## 2021-03-26 PROCEDURE — 99232 SBSQ HOSP IP/OBS MODERATE 35: CPT

## 2021-03-26 RX ORDER — HYDROMORPHONE HYDROCHLORIDE 2 MG/ML
2 INJECTION INTRAMUSCULAR; INTRAVENOUS; SUBCUTANEOUS ONCE
Refills: 0 | Status: DISCONTINUED | OUTPATIENT
Start: 2021-03-26 | End: 2021-03-26

## 2021-03-26 RX ORDER — HYDROMORPHONE HYDROCHLORIDE 2 MG/ML
2 INJECTION INTRAMUSCULAR; INTRAVENOUS; SUBCUTANEOUS EVERY 6 HOURS
Refills: 0 | Status: DISCONTINUED | OUTPATIENT
Start: 2021-03-26 | End: 2021-03-31

## 2021-03-26 RX ORDER — SODIUM BICARBONATE 1 MEQ/ML
650 SYRINGE (ML) INTRAVENOUS
Refills: 0 | Status: DISCONTINUED | OUTPATIENT
Start: 2021-03-26 | End: 2021-03-31

## 2021-03-26 RX ORDER — NORTRIPTYLINE HYDROCHLORIDE 10 MG/1
10 CAPSULE ORAL AT BEDTIME
Refills: 0 | Status: DISCONTINUED | OUTPATIENT
Start: 2021-03-26 | End: 2021-03-31

## 2021-03-26 RX ADMIN — HYDROMORPHONE HYDROCHLORIDE 2 MILLIGRAM(S): 2 INJECTION INTRAMUSCULAR; INTRAVENOUS; SUBCUTANEOUS at 11:56

## 2021-03-26 RX ADMIN — Medication 300 MILLIGRAM(S): at 11:56

## 2021-03-26 RX ADMIN — Medication 10 UNIT(S): at 08:48

## 2021-03-26 RX ADMIN — HEPARIN SODIUM 5000 UNIT(S): 5000 INJECTION INTRAVENOUS; SUBCUTANEOUS at 17:41

## 2021-03-26 RX ADMIN — Medication 4 MILLIGRAM(S): at 17:41

## 2021-03-26 RX ADMIN — NORTRIPTYLINE HYDROCHLORIDE 10 MILLIGRAM(S): 10 CAPSULE ORAL at 21:52

## 2021-03-26 RX ADMIN — Medication 10 UNIT(S): at 12:01

## 2021-03-26 RX ADMIN — Medication 4 MILLIGRAM(S): at 05:31

## 2021-03-26 RX ADMIN — Medication 4: at 17:42

## 2021-03-26 RX ADMIN — HEPARIN SODIUM 5000 UNIT(S): 5000 INJECTION INTRAVENOUS; SUBCUTANEOUS at 05:31

## 2021-03-26 RX ADMIN — INSULIN GLARGINE 34 UNIT(S): 100 INJECTION, SOLUTION SUBCUTANEOUS at 21:53

## 2021-03-26 RX ADMIN — Medication 4 MILLIGRAM(S): at 11:56

## 2021-03-26 RX ADMIN — Medication 650 MILLIGRAM(S): at 17:40

## 2021-03-26 RX ADMIN — Medication 75 MILLIGRAM(S): at 05:31

## 2021-03-26 RX ADMIN — Medication 10 UNIT(S): at 17:42

## 2021-03-26 RX ADMIN — TRAMADOL HYDROCHLORIDE 50 MILLIGRAM(S): 50 TABLET ORAL at 04:37

## 2021-03-26 RX ADMIN — Medication 4 MILLIGRAM(S): at 00:11

## 2021-03-26 RX ADMIN — HYDROMORPHONE HYDROCHLORIDE 2 MILLIGRAM(S): 2 INJECTION INTRAMUSCULAR; INTRAVENOUS; SUBCUTANEOUS at 12:27

## 2021-03-26 RX ADMIN — AMLODIPINE BESYLATE 10 MILLIGRAM(S): 2.5 TABLET ORAL at 05:31

## 2021-03-26 RX ADMIN — ATORVASTATIN CALCIUM 80 MILLIGRAM(S): 80 TABLET, FILM COATED ORAL at 21:52

## 2021-03-26 RX ADMIN — Medication 1 TABLET(S): at 11:56

## 2021-03-26 NOTE — PROGRESS NOTE ADULT - SUBJECTIVE AND OBJECTIVE BOX
Admitting Diagnosis:  Abnormal weight loss [R63.4]  ABNORMAL WEIGHT LOSS      Background:  This is a 60y year old Male  who presents with the chief complaint of low back pain and leg weakness. 59yo M with hx HTN, DM, HLD, COVID 2020 presents with LE pain and scrotal swelling. Patient was hospitalized in [facility] in Feb 2021 where CT Ab/P found 5.8x2.5cm lesion on the right pelvis concerning for malignancy with sclerotic lesion at L5. Pt states since early 2021 inc pain and weakness in the right leg, at first he was limping but now cannot walk.  He has pain in the feet, describes as pinching and needles like sensation.  Starting to have sx in left leg as well. Patient left AMA before additional malignancy workup was performed.  In the last month, patient had 45lb unintentional weight loss and decreased appetite. Notes abdominal swelling, mostly on the left side with tenderness along with b/l scrotal swelling.  At one point given neurontin, not helpful.  Denies sx in arms, changes in speech, swallow, vision, bladder control.  Says issues with bowel movements.     MRI done showing diffuse nerve root enhancement involving the roots of the cauda equina with some mild root thickening though the dominant finding is the enhancement.  There is also a nonspecific lesion in the L5 vertebral body and a possible septic facet at L4-5.       Interval Hx:  s/p LP , high lymphocytes prelim, protein slightly elevated, no cx growth, awaiting cytology.  Flow with small lymphocytes.  Spoke to Dr. Carney at [phone] who states that prelim cytology is small lymphocytes without large lymphocytes to suggest B-cell lymphoma in the spine, however official read pending    getting steroids which seems to help with the pain  c/o constipation    awaiting path     c/o toe pain     ******    Past Medical History:  Diabetes [E11.9]    Hyperlipidemia [E78.5]    Hypertension, unspecified type [I10]        Past Surgical History:  No significant past surgical history [[id number]]        Social History:  No toxic habits    Family History:  FAMILY HISTORY:  No pertinent family history in first degree relatives        Allergies:  No Known Allergies      ROS:  Constitutional: Patient offers no complaints of fevers or significant weight loss  Ears, Nose, Mouth and Throat: The patient presents with no abnormalities of the head, ears, eyes, nose or throat  Skin: Patient offers no concerns of new rashes or lesions  Respiratory: The patient presents with no abnormalities of the respiratory tract  Cardiovascular: The patient presents with no cardiac abnormalities  Gastrointestinal: The patient presents with no abnormalities of the GI system  Genitourinary: The patient presents with no dysuria, hematuria or frequent urination  Neurological: See HPI  Endocrine: Patient offers no complaints of excessive thirst, urination, or heat/cold intolerance    Advanced care planning reviewed and noted in the chart.    Medications:  acetaminophen   Tablet .. 650 milliGRAM(s) Oral every 6 hours PRN  allopurinol 300 milliGRAM(s) Oral daily  amLODIPine   Tablet 10 milliGRAM(s) Oral daily  atorvastatin 80 milliGRAM(s) Oral at bedtime  dexAMETHasone  Injectable 4 milliGRAM(s) IV Push every 6 hours  dextrose 40% Gel 15 Gram(s) Oral once  dextrose 5%. 1000 milliLiter(s) IV Continuous <Continuous>  dextrose 5%. 1000 milliLiter(s) IV Continuous <Continuous>  dextrose 50% Injectable 12.5 Gram(s) IV Push once  dextrose 50% Injectable 25 Gram(s) IV Push once  dextrose 50% Injectable 25 Gram(s) IV Push once  glucagon  Injectable 1 milliGRAM(s) IntraMuscular once  heparin   Injectable 5000 Unit(s) SubCutaneous every 12 hours  insulin glargine Injectable (LANTUS) 34 Unit(s) SubCutaneous at bedtime  insulin lispro (ADMELOG) corrective regimen sliding scale   SubCutaneous three times a day before meals  insulin lispro (ADMELOG) corrective regimen sliding scale   SubCutaneous at bedtime  insulin lispro Injectable (ADMELOG) 10 Unit(s) SubCutaneous three times a day before meals  lactated ringers. 1000 milliLiter(s) IV Continuous <Continuous>  magnesium hydroxide Suspension 30 milliLiter(s) Oral daily PRN  metoprolol succinate ER 75 milliGRAM(s) Oral daily  nortriptyline 10 milliGRAM(s) Oral at bedtime  polyethylene glycol 3350 17 Gram(s) Oral daily PRN  saline laxative (FLEET) Rectal Enema 1 Enema Rectal once  senna 2 Tablet(s) Oral at bedtime  traMADol 50 milliGRAM(s) Oral three times a day PRN  trimethoprim   80 mG/sulfamethoxazole 400 mG 1 Tablet(s) Oral daily      Labs:  CBC Full  -  ( 26 Mar 2021 07:06 )  WBC Count : 10.09 K/uL  RBC Count : 6.37 M/uL  Hemoglobin : 18.4 g/dL  Hematocrit : 56.2 %  Platelet Count - Automated : 508 K/uL  Mean Cell Volume : 88.2 fl  Mean Cell Hemoglobin : 28.9 pg  Mean Cell Hemoglobin Concentration : 32.7 gm/dL  Auto Neutrophil # : x  Auto Lymphocyte # : x  Auto Monocyte # : x  Auto Eosinophil # : x  Auto Basophil # : x  Auto Neutrophil % : x  Auto Lymphocyte % : x  Auto Monocyte % : x  Auto Eosinophil % : x  Auto Basophil % : x    03-26    132<L>  |  103  |  81<H>  ----------------------------<  175<H>  5.4<H>   |  16<L>  |  1.43<H>    Ca    8.2<L>      26 Mar 2021 06:53  Phos  4.6     03-26  Mg     2.6     03-26    TPro  5.7<L>  /  Alb  2.8<L>  /  TBili  0.2  /  DBili  x   /  AST  37  /  ALT  134<H>  /  AlkPhos  120  03-26    CAPILLARY BLOOD GLUCOSE      POCT Blood Glucose.: 133 mg/dL (26 Mar 2021 07:49)  POCT Blood Glucose.: 185 mg/dL (25 Mar 2021 21:08)  POCT Blood Glucose.: 201 mg/dL (25 Mar 2021 17:08)  POCT Blood Glucose.: 219 mg/dL (25 Mar 2021 12:44)    LIVER FUNCTIONS - ( 26 Mar 2021 06:53 )  Alb: 2.8 g/dL / Pro: 5.7 g/dL / ALK PHOS: 120 U/L / ALT: 134 U/L / AST: 37 U/L / GGT: x                   Vitals:  Vital Signs Last 24 Hrs  T(C): 36.4 (26 Mar 2021 08:09), Max: 36.7 (25 Mar 2021 15:48)  T(F): 97.5 (26 Mar 2021 08:09), Max: 98.1 (25 Mar 2021 15:48)  HR: 66 (26 Mar 2021 08:09) (60 - 73)  BP: 139/81 (26 Mar 2021 08:09) (127/81 - 164/94)  BP(mean): --  RR: 18 (26 Mar 2021 08:09) (18 - 18)  SpO2: 94% (26 Mar 2021 08:09) (94% - 98%)    NEUROLOGICAL EXAM:    Mental status: Awake, alert, and in no apparent distress. Oriented to person, place and time. Language function is normal. speech clear and fluent. Recent memory, digit span and concentration were normal.     Cranial Nerves: Pupils were equal, round, reactive to light. Extraocular movements were intact. Visual field were full. Fundoscopic exam was deferred. Facial sensation was intact to light touch. There was no facial asymmetry. The palate was upgoing symmetrically and tongue was midline. Hearing acuity was intact to finger rub AU. Shoulder shrug was full bilaterally    Motor exam: Bulk and tone were normal. Strength was 5/5 in the arms.  Left leg  5/5 except dorsi 5-/5.  Right leg - low tone.  hip flexion/knee ext 2/5, dorsiflexion 4/5. Fine finger movements were symmetric and normal. There was no pronator drift pain on palpation of the leg    Reflexes: 2+ in the bilateral upper extremities. absent in legs. Toes were downgoing bilaterally.     Sensation: Intact to light touch, temperature, vibration and proprioception. says when touch right leg it is painful    Coordination: Finger-nose-finger was without dysmetria. cannot do heel shin    Gait: deferred    < from: CT Chest w/ IV Cont (03.14.21 @ 10:12) >    EXAM:  CT ABDOMEN AND PELVIS IC                          EXAM:  CT CHEST IC                          PROCEDURE DATE:  03/14/2021      IMPRESSION:  Enlarged right iliac lymph node measuring 5.9 x 2.4 cm. Additional smaller right iliac nodes are seen. There are also bilateral groin lymph nodes.      MOLINA PUGH M.D., RADIOLOGY RESIDENT  This documenthas been electronically signed.  GINA JENKINS M.D., ATTENDING RADIOLOGIST  This document has been electronically signed. Mar 14 2021 12:50PM    < end of copied text >          EXAM:  MR SPINE LUMBAR WAW IC                            PROCEDURE DATE:  03/15/2021            INTERPRETATION:  INDICATIONS:  Increased low back pain and right leg weakness for 4 months    TECHNIQUE:  Sagittal T1 weighted and T2 weighted images of the lumbosacral spine as well as axial T1 weighted and T2 weighted images were obtained.    COMPARISON EXAMINATION: None.    FINDINGS:  VERTEBRAL BODIES AND DISCS:  Nonspecific lesion in L1 low signal T1 hyperintense signal T2 with some enhancement.  ALIGNMENT:  No subluxations.  L1-L2 LEVEL:  Normal.  L2-L3 LEVEL:  Asymmetric bulge right with disc material in the region of the right neural foramen at this level. Clinical correlation for right L2 radiculopathy  L3-L4 LEVEL: Symmetric bulge with annular fissure in the 7:30 position. Bilateral facet arthropathy at this level.  L4-L5 LEVEL: Asymmetric bulge to the left with focal left-sided protrusion and some mass impression on the intracanal left L5 root.. Bilateral facet arthropathy.Some changes in the left paraspinal musculature extending from the left facet joint may represent septic facet as enhancement is seen in association with hyperintense T2 signal with associated muscular changes (9:19)  L5-S1 LEVEL:  Slight signal hyperintensity at the disc space with some enhancement ventrally without associated endplate abnormality favors degenerative change. Prominent asymmetric disc bulge with Bilateral foraminal disc material which is causing significant nerve root compression on the right greater than left. May be the cause of the patient's known right leg weakness.  SPINAL CANAL:  Evidence of diffuse nerve root enhancement appreciated involving the roots of the cauda equina with some mild thickening appreciated.  CONUS MEDULLARIS:  Normal.  MISCELLANEOUS:  None    IMPRESSION:  Diffuse nerve root enhancement involving the roots of the cauda equina with some mild root thickening though the dominant finding is the enhancement. Differential possibilities include acute inflammatory demyelinating polyneuropathy( [place]) versus chronic inflammatory demyelinating polyneuropathy(CIDP). Inflammatory pathologies such as sarcoid or infectious etiologies should be considered. Included in the differential is leptomeningeal carcinomatosis. Correlation with CSF strongly recommended. Nonspecific lesion in the L5 vertebral body as described. Enhancement with associated T2 abnormality involving the left L4-5 facet with extension to the paraspinal musculature may indicate a septic facet at this level. Correlation with clinical parameters suggested. Degenerative changes with disc material in the neural foramina at the L2-3 and L5-S1 levels on the right as well as in the L5-S1 neural foramen on the left. Prominent bulge with more focal protrusion L4-5 left                MONTY TRAN MD; Attending Radiologist  This document has been electronically signed. Mar 15 2021  8:19PM      EXAM:  MR SPINE CERVICAL                          EXAM:  MR BRAIN                            PROCEDURE DATE:  03/24/2021            INTERPRETATION:  INDICATIONS:  Right leg weakness and bilateral leg pain. Rule out metastatic disease    Brain:    TECHNIQUE:  Multiplanar imaging was performed using T1 weighted, T2 weighted and FLAIR sequences.  Diffusion weighted and susceptibility sensitive images were also obtained.    Patient could not continue the exam. Intravenous contrast was not administered.    COMPARISON EXAMINATION:  None.    FINDINGS: The study is degraded by motion artifact.  VENTRICLES AND SULCI:  Normal.  INTRA-AXIAL:  No mass, abnormal signal or evidence of acute cerebral ischemia.  EXTRA-AXIAL:  No mass or collection.  VISUALIZED SINUSES:  Mild mucosal thickening  VISUALIZED MASTOIDS:  Clear.  CALVARIUM:  Normal.  CAROTID FLOW VOIDS:  Present.  MISCELLANEOUS:  Tornwaldt cyst within the midline nasopharynx.    Cervical spine:    TECHNIQUE:  Sagittal T1 weighted, T2 weighted and STIR images of the cervical spine as well as axial T1 weighted and T2 weighted images were obtained.    The patient could not continue the exam. The study is limited by motion artifact.    COMPARISON EXAMINATION: None.    FINDINGS:  VERTEBRAL BODIES AND DISCS: Normal in height. No abnormal signal is identified. Multilevel marginal osteophyte formation.  ALIGNMENT:  No subluxations. Straightening of the cervical lordosis  C2-C3 LEVEL:  Normal.  C3-C4 LEVEL:  Mild disc bulge/ridge  C4-C5 LEVEL:  Diffuse disc bulge/ridge  C5-C6 LEVEL:  Diffuse disc bulge/ridge with bilateral foraminal narrowing  C6-C7 LEVEL:  Disc bulge/ridge with left greater than right foraminal narrowing  C7-T1 LEVEL:   Normal.  SPINAL CORD: Normal.  SPINAL CANAL:  No other intradural or extradural defects are seen.  MISCELLANEOUS:  None.      IMPRESSION:  Brain:  Motion degraded exam.    No intracranial abnormality identified. Detection of metastatic disease is limited without intravenous contrast.    Cervical spine:  Motion limited exam.    Multilevel spondylosis.      FABIANO ALEXANDRA MD; Attending Radiologist  This document has been electronically signed. Mar 24 2021  8:44AM

## 2021-03-26 NOTE — PROGRESS NOTE ADULT - ASSESSMENT
Impression:  This is a 60y year old Male  who presents with the chief complaint of low back pain and leg weakness. 61yo M with hx HTN, DM, HLD, COVID 2020 presents with LE pain and scrotal swelling. Patient was hospitalized in [facility] in Feb 2021 where CT Ab/P found 5.8x2.5cm lesion on the right pelvis concerning for malignancy with sclerotic lesion at L5. Pt states since early 2021 inc pain and weakness in the right leg, at first he was limping but now cannot walk.  He has pain in the feet, describes as pinching and needles like sensation.  Starting to have sx in left leg as well. Patient left AMA before additional malignancy workup was performed.  In the last month, patient had 45lb unintentional weight loss and decreased appetite. Notes abdominal swelling, mostly on the left side with tenderness along with b/l scrotal swelling.  At one point given neurontin, not helpful.  Denies sx in arms, changes in speech, swallow, vision, bladder control.  Says issues with bowel movements.     MRI done showing diffuse nerve root enhancement involving the roots of the cauda equina with some mild root thickening though the dominant finding is the enhancement.  There is also a nonspecific lesion in the L5 vertebral body and a possible septic facet at L4-5.  However ID is not suspicious for septic facet though cultures pending.    NSx consulted for bone abnormalities and does not feel candidate for inpatient surgery at this time and recommended outpatient follow-up    s/p LP , high lymphocytes prelim, protein slightly elevated, no cx growth, awaiting official cytology, per pathologist Dr. Carney ([phone]) small lymphocytes not suggestive of lymphoma in CSF.      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    Will follow up rest of CSF studies     MRI of T-spine done in PACS but I cannot seem to access official read, looks unremarkable to me, follow-up official read.      infectious etiology raised due to findings of  L4-5 facet and potential immunosuppression in setting of possible lymphoma.  ID following and this appears less likely     Doubt demyelinating (AIDP or CIDP).  This is inconsistent with time course and the asx of the findings    Regarding the high cell protein of the three options being demyelinating, infx or neoplastic, for the reasons outlined above, seem most consistent with neoplastic.  The lack of postiive cytology on the LP is not surprising as this is often the case, the classic teaching is that it requires up to three spinal taps to reasonably be assured that this is not a false negative.  Regarding the cauda equina findings, while the MRI indicated involved as there is diffuse root infiltration, this is not a "classic cauda equina syndrome" in which there is a large space occupying lesion affecting the nerves requiring immediate NS evacuation.  Fortunately he has had some improvement in pain with the steroids, though that may reduce future CSF cytology yields if LP done in the future.  Would await reuslts of frozen, as he is quite weak in the leg as a result of cauda equina findings that may improve with tx such as radiation, will defer tx to onc and rad onc .    will follow

## 2021-03-26 NOTE — PROGRESS NOTE ADULT - SUBJECTIVE AND OBJECTIVE BOX
DIABETES FOLLOW UP NOTE: Saw pt earlier today  INTERVAL HX: 61yo M with hx HTN, DM2, HLD, COVID 2020 presents with LE pain and scrotal swelling. Patient was hospitalized in [facility] in Feb 2021 where CT Ab/P found 5.8x2.5cm lesion on the right pelvis concerning for malignancy with sclerotic lesion at L5. Now with steroid exacerbated hyperglycemia due to being started on decadron for cauda equina. On Decadron 4mg q6h. Patient states he is having pain in Right foot 4th and 5th toe this morning , states he feels poorly today & that current pain medication is not relieving his pain, but ate 80% of his dinner & breakfast, had peanuts as evening snack last night. Cheerios on bedside table pt states he is saving them to eat as a snack.  BG at goal today, no episodes of hypoglycemia overnight.      Review of Systems:  General: As above, Pain in  Right foot 4th /5th toes; +Recent weight loss  Cardiovascular: No chest pain, palpitations  Respiratory: No SOB, no cough  GI: No nausea, vomiting, abdominal pain, denies BM today  Endocrine: no polyuria, polydipsia or S&Sx of hypoglycemia  neuro: +Neuropathy RLE 2/2 lesion    Allergies    No Known Allergies    MEDICATIONS  (STANDING):  atorvastatin 80 milliGRAM(s) Oral at bedtime  dexAMETHasone  Injectable 4 milliGRAM(s) IV Push every 6 hours  insulin glargine Injectable (LANTUS) 34 Unit(s) SubCutaneous at bedtime  insulin lispro (ADMELOG) corrective regimen sliding scale   SubCutaneous three times a day before meals  insulin lispro (ADMELOG) corrective regimen sliding scale   SubCutaneous at bedtime  insulin lispro Injectable (ADMELOG) 10 Unit(s) SubCutaneous three times a day before meals      PHYSICAL EXAM:  VITALS: T(C): 36.3 (03-26-21 @ 12:37)  T(F): 97.3 (03-26-21 @ 12:37), Max: 98.1 (03-25-21 @ 15:48)  HR: 58 (03-26-21 @ 12:37) (58 - 73)  BP: 145/67 (03-26-21 @ 12:37) (127/81 - 164/94)  RR:  (18 - 18)  SpO2:  (94% - 98%)  Wt(kg): --  GENERAL:  male laying in bed restless,  Abdomen: Soft, nontender, non distended  Extremities: Warm, no edema in all 4 exts  NEURO: A&O X3 RLE weak    LABS:  POCT Blood Glucose.: 146 mg/dL (03-26-21 @ 11:59)  POCT Blood Glucose.: 133 mg/dL (03-26-21 @ 07:49)  POCT Blood Glucose.: 185 mg/dL (03-25-21 @ 21:08)  POCT Blood Glucose.: 201 mg/dL (03-25-21 @ 17:08)  POCT Blood Glucose.: 219 mg/dL (03-25-21 @ 12:44)  POCT Blood Glucose.: 203 mg/dL (03-25-21 @ 08:48)  POCT Blood Glucose.: 295 mg/dL (03-24-21 @ 21:11)  POCT Blood Glucose.: 187 mg/dL (03-24-21 @ 17:35)  POCT Blood Glucose.: 195 mg/dL (03-24-21 @ 12:06)  POCT Blood Glucose.: 210 mg/dL (03-24-21 @ 08:19)  POCT Blood Glucose.: 152 mg/dL (03-23-21 @ 21:13)  POCT Blood Glucose.: 350 mg/dL (03-23-21 @ 17:07)                            18.4   10.09 )-----------( 508      ( 26 Mar 2021 07:06 )             56.2       03-26    132<L>  |  103  |  81<H>  ----------------------------<  175<H>  5.4<H>   |  16<L>  |  1.43<H>    EGFR if non : 53<L>    Ca    8.2<L>      03-26  Mg     2.6     03-26  Phos  4.6     03-26    TPro  5.7<L>  /  Alb  2.8<L>  /  TBili  0.2  /  DBili  x   /  AST  37  /  ALT  134<H>  /  AlkPhos  120  03-26      Thyroid Function Tests:  03-15 @ 09:19 TSH 4.86       A1C with Estimated Average Glucose Result: 10.6 % (03-15-21 @ 08:59)      Estimated Average Glucose: 258 mg/dL (03-15-21 @ 08:59)                       DIABETES FOLLOW UP NOTE: Saw pt earlier today  INTERVAL HX: 61yo M with hx HTN, DM2, HLD, COVID 2020 presents with LE pain and scrotal swelling. Patient was hospitalized in [facility] in Feb 2021 where CT Ab/P found 5.8x2.5cm lesion on the right pelvis concerning for malignancy with sclerotic lesion at L5. Now with steroid exacerbated hyperglycemia due to being started on decadron for cauda equina. On Decadron 4mg q6h. Patient states he is having pain in Right foot 4th and 5th toe this morning , states he feels poorly today & that current pain medication is not relieving his pain, but ate 80% of his dinner & breakfast, had peanuts as evening snack last night. Cheerios cereal on bedside table pt states he is saving them to eat as a snack for later.  BG at goal today, no episodes of hypoglycemia overnight.      Review of Systems:  General: As above, Pain in  Right foot 4th /5th metatarsal; +Recent weight loss  Cardiovascular: No chest pain, palpitations  Respiratory: No SOB, no cough  GI: No nausea, vomiting, abdominal pain, denies BM today  Endocrine: no polyuria, polydipsia or S&Sx of hypoglycemia  neuro: +Neuropathy RLE 2/2 lesion    Allergies    No Known Allergies    MEDICATIONS  (STANDING):  atorvastatin 80 milliGRAM(s) Oral at bedtime  dexAMETHasone  Injectable 4 milliGRAM(s) IV Push every 6 hours  insulin glargine Injectable (LANTUS) 34 Unit(s) SubCutaneous at bedtime  insulin lispro (ADMELOG) corrective regimen sliding scale   SubCutaneous three times a day before meals  insulin lispro (ADMELOG) corrective regimen sliding scale   SubCutaneous at bedtime  insulin lispro Injectable (ADMELOG) 10 Unit(s) SubCutaneous three times a day before meals      PHYSICAL EXAM:  VITALS: T(C): 36.3 (03-26-21 @ 12:37)  T(F): 97.3 (03-26-21 @ 12:37), Max: 98.1 (03-25-21 @ 15:48)  HR: 58 (03-26-21 @ 12:37) (58 - 73)  BP: 145/67 (03-26-21 @ 12:37) (127/81 - 164/94)  RR:  (18 - 18)  SpO2:  (94% - 98%)  Wt(kg): --  GENERAL:  male laying in bed restless,  Abdomen: Soft, nontender, non distended  Extremities: Warm, no edema in all 4 exts, right foot 4th metatarsal discolored (medium brown); 2+DP Pulse bilaterally +Sensation bilateral lower extremities  NEURO: A&O X3 RLE weak    LABS:  POCT Blood Glucose.: 146 mg/dL (03-26-21 @ 11:59)  POCT Blood Glucose.: 133 mg/dL (03-26-21 @ 07:49)  POCT Blood Glucose.: 185 mg/dL (03-25-21 @ 21:08)  POCT Blood Glucose.: 201 mg/dL (03-25-21 @ 17:08)  POCT Blood Glucose.: 219 mg/dL (03-25-21 @ 12:44)  POCT Blood Glucose.: 203 mg/dL (03-25-21 @ 08:48)  POCT Blood Glucose.: 295 mg/dL (03-24-21 @ 21:11)  POCT Blood Glucose.: 187 mg/dL (03-24-21 @ 17:35)  POCT Blood Glucose.: 195 mg/dL (03-24-21 @ 12:06)  POCT Blood Glucose.: 210 mg/dL (03-24-21 @ 08:19)  POCT Blood Glucose.: 152 mg/dL (03-23-21 @ 21:13)  POCT Blood Glucose.: 350 mg/dL (03-23-21 @ 17:07)                            18.4   10.09 )-----------( 508      ( 26 Mar 2021 07:06 )             56.2       03-26    132<L>  |  103  |  81<H>  ----------------------------<  175<H>  5.4<H>   |  16<L>  |  1.43<H>    EGFR if non : 53<L>    Ca    8.2<L>      03-26  Mg     2.6     03-26  Phos  4.6     03-26    TPro  5.7<L>  /  Alb  2.8<L>  /  TBili  0.2  /  DBili  x   /  AST  37  /  ALT  134<H>  /  AlkPhos  120  03-26      Thyroid Function Tests:  03-15 @ 09:19 TSH 4.86       A1C with Estimated Average Glucose Result: 10.6 % (03-15-21 @ 08:59)      Estimated Average Glucose: 258 mg/dL (03-15-21 @ 08:59)                       DIABETES FOLLOW UP NOTE: Saw pt earlier today  INTERVAL HX: 59yo M with hx HTN, DM2, HLD, COVID 2020 presents with LE pain and scrotal swelling. Patient was hospitalized in [facility] in Feb 2021 where CT Ab/P found 5.8x2.5cm lesion on the right pelvis concerning for malignancy with sclerotic lesion at L5. Now with steroid exacerbated hyperglycemia due to being started on decadron for cauda equina. On Decadron 4mg q6h. Patient states he is having pain in Right foot 4th and 5th toe this morning , states he feels poorly today & that current pain medication is not relieving his pain, but ate 80% of his dinner & breakfast, had peanuts as evening snack last night. Cheerios cereal on bedside table pt states he is saving them to eat as a snack for later.  BG at goal today, no episodes of hypoglycemia overnight.      Review of Systems:  General: As above, Pain in  Right foot 4th /5th metatarsal; +Recent weight loss  Cardiovascular: No chest pain, palpitations  Respiratory: No SOB, no cough  GI: No nausea, vomiting, abdominal pain, denies BM today  Endocrine: no polyuria, polydipsia or S&Sx of hypoglycemia  neuro: +Neuropathy RLE 2/2 lesion    Allergies    No Known Allergies    MEDICATIONS  (STANDING):  atorvastatin 80 milliGRAM(s) Oral at bedtime  dexAMETHasone  Injectable 4 milliGRAM(s) IV Push every 6 hours  insulin glargine Injectable (LANTUS) 34 Unit(s) SubCutaneous at bedtime  insulin lispro (ADMELOG) corrective regimen sliding scale   SubCutaneous three times a day before meals  insulin lispro (ADMELOG) corrective regimen sliding scale   SubCutaneous at bedtime  insulin lispro Injectable (ADMELOG) 10 Unit(s) SubCutaneous three times a day before meals      PHYSICAL EXAM:  VITALS: T(C): 36.3 (03-26-21 @ 12:37)  T(F): 97.3 (03-26-21 @ 12:37), Max: 98.1 (03-25-21 @ 15:48)  HR: 58 (03-26-21 @ 12:37) (58 - 73)  BP: 145/67 (03-26-21 @ 12:37) (127/81 - 164/94)  RR:  (18 - 18)  SpO2:  (94% - 98%)  Wt(kg): --  GENERAL:  male laying in bed restless,  Abdomen: Soft, nontender, non distended  Extremities: Warm, no edema in all 4 exts, right foot 4th metatarsal discolored (medium brown); 2+DP Pulse bilaterally +Sensation bilateral lower extremities  NEURO: A&O X3 RLE weak    LABS:  POCT Blood Glucose.: 146 mg/dL (03-26-21 @ 11:59)  POCT Blood Glucose.: 133 mg/dL (03-26-21 @ 07:49)  POCT Blood Glucose.: 185 mg/dL (03-25-21 @ 21:08)  POCT Blood Glucose.: 201 mg/dL (03-25-21 @ 17:08)  POCT Blood Glucose.: 219 mg/dL (03-25-21 @ 12:44)  POCT Blood Glucose.: 203 mg/dL (03-25-21 @ 08:48)  POCT Blood Glucose.: 295 mg/dL (03-24-21 @ 21:11)  POCT Blood Glucose.: 187 mg/dL (03-24-21 @ 17:35)  POCT Blood Glucose.: 195 mg/dL (03-24-21 @ 12:06)  POCT Blood Glucose.: 210 mg/dL (03-24-21 @ 08:19)  POCT Blood Glucose.: 152 mg/dL (03-23-21 @ 21:13)  POCT Blood Glucose.: 350 mg/dL (03-23-21 @ 17:07)                            18.4   10.09 )-----------( 508      ( 26 Mar 2021 07:06 )             56.2       03-26    132<L>  |  103  |  81<H>  ----------------------------<  175<H>  5.4<H>   |  16<L>  |  1.43<H>    EGFR if non : 53<L>    Ca    8.2<L>      03-26  Mg     2.6     03-26  Phos  4.6     03-26    TPro  5.7<L>  /  Alb  2.8<L>  /  TBili  0.2  /  DBili  x   /  AST  37  /  ALT  134<H>  /  AlkPhos  120  03-26      Thyroid Function Tests:  03-15 @ 09:19 TSH 4.86       A1C with Estimated Average Glucose Result: 10.6 % (03-15-21 @ 08:59)      Estimated Average Glucose: 258 mg/dL (03-15-21 @ 08:59)    C-Peptide, Serum: 2.2 ng/mL (03-16-21 @ 09:15). Glucose 142mg/dL

## 2021-03-26 NOTE — PROGRESS NOTE ADULT - SUBJECTIVE AND OBJECTIVE BOX
NEPHROLOGY    Patient seen and examined. Pt reports not feeling well today, no specific complaints, denies any pain or sob, states appetite is ok, voiding without issues, in no acute distress. No overnight events noted.     MEDICATIONS  (STANDING):  allopurinol 300 milliGRAM(s) Oral daily  amLODIPine   Tablet 10 milliGRAM(s) Oral daily  atorvastatin 80 milliGRAM(s) Oral at bedtime  dexAMETHasone  Injectable 4 milliGRAM(s) IV Push every 6 hours  dextrose 40% Gel 15 Gram(s) Oral once  dextrose 5%. 1000 milliLiter(s) (50 mL/Hr) IV Continuous <Continuous>  dextrose 5%. 1000 milliLiter(s) (100 mL/Hr) IV Continuous <Continuous>  dextrose 50% Injectable 12.5 Gram(s) IV Push once  dextrose 50% Injectable 25 Gram(s) IV Push once  dextrose 50% Injectable 25 Gram(s) IV Push once  glucagon  Injectable 1 milliGRAM(s) IntraMuscular once  heparin   Injectable 5000 Unit(s) SubCutaneous every 12 hours  HYDROmorphone   Tablet 2 milliGRAM(s) Oral once  insulin glargine Injectable (LANTUS) 34 Unit(s) SubCutaneous at bedtime  insulin lispro (ADMELOG) corrective regimen sliding scale   SubCutaneous three times a day before meals  insulin lispro (ADMELOG) corrective regimen sliding scale   SubCutaneous at bedtime  insulin lispro Injectable (ADMELOG) 10 Unit(s) SubCutaneous three times a day before meals  lactated ringers. 1000 milliLiter(s) (50 mL/Hr) IV Continuous <Continuous>  metoprolol succinate ER 75 milliGRAM(s) Oral daily  nortriptyline 10 milliGRAM(s) Oral at bedtime  saline laxative (FLEET) Rectal Enema 1 Enema Rectal once  senna 2 Tablet(s) Oral at bedtime  trimethoprim   80 mG/sulfamethoxazole 400 mG 1 Tablet(s) Oral daily    VITALS:  T(C): , Max: 36.7 (03-25-21 @ 15:48)  T(F): , Max: 98.1 (03-25-21 @ 15:48)  HR: 66 (03-26-21 @ 08:09)  BP: 139/81 (03-26-21 @ 08:09)  RR: 18 (03-26-21 @ 08:09)  SpO2: 94% (03-26-21 @ 08:09)    I and O's:    03-25 @ 07:01 - 03-26 @ 07:00  --------------------------------------------------------  IN: 590 mL / OUT: 850 mL / NET: -260 mL    03-26 @ 07:01 - 03-26 @ 11:29  --------------------------------------------------------  IN: 0 mL / OUT: 700 mL / NET: -700 mL    PHYSICAL EXAM:  Constitutional: NAD  Neck:  No JVD  Respiratory: CTAB/L  Cardiovascular: S1 and S2  Gastrointestinal: BS+, soft, NT/ND  Extremities: No peripheral edema  Neurological: A/O x 3, no focal deficits  Psychiatric: Normal mood, normal affect  : No Weir  Skin: No rashes    LABS:                        18.4   10.09 )-----------( 508      ( 26 Mar 2021 07:06 )             56.2     03-26    132<L>  |  103  |  81<H>  ----------------------------<  175<H>  5.4<H>   |  16<L>  |  1.43<H>    Ca    8.2<L>      26 Mar 2021 06:53  Phos  4.6     03-26  Mg     2.6     03-26    TPro  5.7<L>  /  Alb  2.8<L>  /  TBili  0.2  /  DBili  x   /  AST  37  /  ALT  134<H>  /  AlkPhos  120  03-26    RADIOLOGY & ADDITIONAL STUDIES:    EXAM:  MR BRAIN IC                          PROCEDURE DATE:  03/25/2021      INTERPRETATION:  STUDY: MR BRAIN with contrast.    CLINICAL INDICATION: Right leg weakness. Metastatic disease.    TECHNIQUE: Contrast enhanced Multiplanar MR imaging of the brain was performed.    CONTRAST: 6 mL of Gadavist.    COMPARISON: Noncontrast MR brain 3/24/2021    FINDINGS:  There is diffuse smooth mild pachymeningeal enhancement    The ventricles and sulci are age appropriate .There is no evidence ofmass. There is no extra axial collection. No midline shift or other significant mass effect is noted.    There is normal flow-void within major cranial vessels suggestive of their patency.    The orbital contents are grossly unremarkable.    IMPRESSION:  Diffuse mild pachymeningeal enhancement which is nonspecific and may be idiopathic or related to intracranial hypotension, infection, metastasis, or granulomatous disease.    MIRIAM RAHMAN M.D., ATTENDING RADIOLOGIST  This documenthas been electronically signed. Mar 26 2021  9:54AM      EXAM:  MR SPINE THORACIC WAW IC                          EXAM:  MR SPINE CERVICAL IC                            PROCEDURE DATE:  03/25/2021        INTERPRETATION:  STUDY: MR CERVICAL SPINE WITH CONTRAST.  MR THORACIC SPINE WITH AND WITHOUT CONTRAST    CLINICAL INDICATION: Right leg weakness. Metastatic disease.    TECHNIQUE: Contrast-enhanced Multiplanar MR imaging of the cervical spine was performed. Multiplanar multisequence MR imaging of the thoracic spine was performed.    CONTRAST: 6 mL of Gadavist.    COMPARISON: MR cervical spine 3/24/2021.    FINDINGS:  Cervical spine:    There is slight kyphosis of usual cervical lordosis. There is no significant subluxation. Vertebral body height is maintained. There is no focal enhancing marrow replacing lesion. There is no significant loss of intervertebral disc height throughout the cervical spine.    There is no definite enhancement within the cervical cord. There is no epidural or paraspinal tumor.    Stable multilevel degenerative changes as described on the corresponding noncontrast MR of the cervical spine from 3/24/2021.    Thoracic spine:    There is maintenance of usual thoracic kyphosis. There is no listhesis or scoliosis. Vertebral body height is preserved throughout the thoracic spine. There is no focal STIR hyperintense or enhancing marrow replacing lesion. There is no significant loss of intervertebral disc height throughout the thoracic spine.    There is no high-grade central canal stenosis.    There is no definite signal abnormality or enhancement within the spinal cord. There is no epidural or paraspinal tumor.    There is a 1.3 cm right renal cyst.      IMPRESSION:  Cervical spine: No evidence of metastatic disease.  Thoracic spine: No evidence of metastatic disease.    MIRIAM RAHMAN M.D., ATTENDING RADIOLOGIST  This document has been electronically signed. Mar 26 2021 10:34AM

## 2021-03-26 NOTE — PROGRESS NOTE ADULT - ASSESSMENT
59yo M with hx HTN, DM2, HLD, COVID 2020 presents with LE pain and scrotal swelling. Patient was hospitalized in [facility] in Feb 2021 where CT Ab/P found 5.8x2.5cm lesion on the right pelvis concerning for malignancy with sclerotic lesion at L5. Now with steroid exacerbated hyperglycemia due to being started on decadron for cauda equina. On Decadron 4mg q6h.   Endocrine on board for management of uncontrolled DM2. A1c 10.6%.       Spent 25 minutes assessing pt/labs/meds and discussing plan of care with primary team  Moderate complexity encounter on patient with uncontrolled T2DM with multiple complications and comorbidities adjusting insulin  59yo M with hx HTN, DM2, HLD, COVID 2020 presents with LE pain and scrotal swelling. Patient was hospitalized in [facility] in Feb 2021 where CT Ab/P found 5.8x2.5cm lesion on the right pelvis concerning for malignancy with sclerotic lesion at L5. Endocrine following for DM2 (A1c 10.6%.) c/b steroid exacerbated hyperglycemia due to being started on decadron for cauda equina. On Decadron 4mg q6h. BG well controlled on current regimen will continue current basal/bolus rates to maintain glycemic control ; BG Goal 100-180 mg/dl.      Spent 25 minutes assessing pt/labs/meds and discussing plan of care with primary team  Moderate complexity encounter on patient with uncontrolled T2DM with multiple complications and comorbidities adjusting insulin  59yo M with hx HTN, DM2, HLD, COVID 2020 presents with LE pain and scrotal swelling. Patient was hospitalized in [facility] in Feb 2021 where CT Ab/P found 5.8x2.5cm lesion on the right pelvis concerning for malignancy with sclerotic lesion at L5. Endocrine following for DM2 (A1c 10.6%.) c/b steroid exacerbated hyperglycemia due to being started on decadron for cauda equina. On Decadron 4mg q6h. BG well controlled on current regimen will continue current basal/bolus rates to maintain glycemic control ; BG Goal 100-180 mg/dl.      Spent 25 minutes assessing pt/labs/meds and discussing plan of care with primary team  Moderate complexity encounter on patient with uncontrolled T2DM with multiple complications and comorbidities adjusting insulin as needed

## 2021-03-26 NOTE — PROGRESS NOTE ADULT - ASSESSMENT
Echo 3/18/21: min MR, nl lv sys fx, small-moderate pericardial effusion, no evidence of pericardial tamponade     a/p  61 yo M with hx HTN, DM, HLD, COVID 2020 presents with LE pain and scrotal swelling    1. LE pain/weakness/scrotal swelling  -concern for malignancy,  Concern for lymphoma  -s/p LN excisional biopsy to r/o lymphoma- frozen + diffuse B cell, patho noted   -s/p LP -high lymphocytes prelim, protein slightly elevated, no cx growth, awaiting official cytology   -per ID: MRI suggestive of CIDP, per neuro less likely AIDP, sarcoid, leptomeningeal carcinomatosis, infection  -per neuro: Clinical suspicion for B-cell lymphoma with leptomeningeal spread  -MRI Cspine, Tspine, head noted   -UA noted  -LE duplex neg for DVT  -urology eval noted - No  intervention during this admission  -neurosx eval noted -no interventions at this time, possibly benefit from L5/S1 decompression fusion after workup of lymph node  -off abx per ID  -c/w deca for cauda equina  -Possible mediport before dc   -f/u heme/neuro/ID    2. HTN  -bp acceptable   -c/w amlodipine and bb    3. HLD  -c/w statin    4. GUANAKO  -cr stable   -renal f/u    5. Pericardial effusion  -echo noted with min MR, nl lv sys fx, small-moderate pericardial effusion, no evidence of pericardial tamponade   -cont to monitor     6. Atypical Chest pain  -no further cp  -hsT, ck, ckmb neg, EKG without ischemic changes   -echo as above w nl lv sys fx, no segmental wall motion     dvt ppx      Echo 3/18/21: min MR, nl lv sys fx, small-moderate pericardial effusion, no evidence of pericardial tamponade     a/p  59 yo M with hx HTN, DM, HLD, COVID 2020 presents with LE pain and scrotal swelling    1. LE pain/weakness/scrotal swelling  -concern for malignancy,  Concern for lymphoma  -s/p LN excisional biopsy to r/o lymphoma- frozen + diffuse B cell, patho noted   -s/p LP -high lymphocytes prelim, protein slightly elevated, no cx growth, awaiting official cytology   -per ID: MRI suggestive of CIDP, per neuro less likely AIDP, sarcoid, leptomeningeal carcinomatosis, infection  -per neuro: Clinical suspicion for B-cell lymphoma with leptomeningeal spread  -MRI Cspine, Tspine, head noted   -UA noted  -LE duplex neg for DVT  -urology eval noted - No  intervention during this admission  -neurosx eval noted -no interventions at this time, possibly benefit from L5/S1 decompression fusion after workup of lymph node  -off abx per ID  -c/w deca for cauda equina  -Possible mediport before dc   -f/u heme/neuro/ID    2. HTN  -bp acceptable   -c/w amlodipine and bb    3. HLD  -c/w statin    4. GUANAKO  -cr elevated    -renal f/u    5. Pericardial effusion  -echo noted with min MR, nl lv sys fx, small-moderate pericardial effusion, no evidence of pericardial tamponade   -cont to monitor     6. Atypical Chest pain  -no further cp  -hsT, ck, ckmb neg, EKG without ischemic changes   -echo as above w nl lv sys fx, no segmental wall motion     dvt ppx

## 2021-03-26 NOTE — PROGRESS NOTE ADULT - NUTRITIONAL ASSESSMENT
This patient has been assessed with a concern for Malnutrition and has been determined to have a diagnosis/diagnoses of Severe protein-calorie malnutrition.    This patient is being managed with:   Diet Regular-  Consistent Carbohydrate {Evening Snack} (CSTCHOSN)  No Concentrated Potassium  Entered: Mar 26 2021 11:48AM

## 2021-03-26 NOTE — PROGRESS NOTE ADULT - PROBLEM SELECTOR PLAN 1
test BG AC/HS  -Increase Lantus 34 units QHS  -Continue Admelog 8 units AC meals for now. Will increase once pattern present  -c/w Admelog moderate correction scale AC and mod HS scale  Carb consistent diet   C-peptide normal, not obtained with BMP, not suggestive of DM1  -notify endocrine team if steroid dose changed or discontinued  On Discharge recommend tresiba and Novolog  Recommend follow up with Endo in [place], Patient to find out the name. - test BG AC/HS  - C/w Lantus 34 units QHS  - C/w Admelog 10 units AC meals for now  - c/w Admelog moderate correction scale AC and mod HS scale  - Carb consistent diet   - C-peptide normal, not obtained with BMP, not suggestive of DM1  - notify endocrine team if steroid dose changed or discontinued  - primary team made aware to follow up on toe discoloration/pain complaints  On Discharge recommend tresiba and Novolog  Recommend follow up with Endo in [place], Patient to find out the name.  -Plan discussed with pt/team ROSALIE Rosa  Contact info: [phone] (24/7). pager [phone] - test BG AC/HS  - C/w Lantus 34 units QHS  - C/w Admelog 10 units AC meals for now  - c/w Admelog moderate correction scale AC and mod HS scale  - notify endocrine team if steroid dose changed or discontinued  - primary team made aware to follow up on toe discoloration/pain complaints  On Discharge recommend tresiba and Novolog. Doses TBD  Recommend follow up with Endo in [place], Patient to find out the name.  -Plan discussed with pt/team ROSALIE Rosa  Contact info: [phone] (24/7). pager [phone]

## 2021-03-26 NOTE — PROGRESS NOTE ADULT - ASSESSMENT
60 year old male presents to ED with right groin and back pain, 35 lb weight loss, subjective fevers, sweats - found to have bilateral inguinal adenopathy and a sclerotic L5 lesion.    Inguinal Adenopathy and L1 Sclerotic Lesion  -- Concern for lymphoma based on fevers, weight loss and night sweats  -- Other malignancies also possible, as can be benign inflammatory processes  -- Tumor markers - PSA,, Ca 19,9, LDH and Uric Acid are normal  -- S/P excisional biopsy of inguinal lymph node  --Pathology results pending - frozen section consistent with B cell lymphoma; awaiting subtype  --Genetics done on lymph node - consistent with monoclonal b-cell population  --Flow cytometry from lymph node, however, negative for neoplasm  -- Have ordered Hepatitis profile, Quantiferon in anticipation for chemotherapy  --Quantiferon results are indeterminate - consider input from ID  --Chemotherapy (if indicated will be determined (as well as possible spinal RT) once we have final pathology results - will most likely be given as outpatient  --We request IR consultation for a medi port placement to be done prior to discharge but will wait until pathology results    Neuropathy  -- Originally resumed to be secondary to COVID infection per patient  -- Markedly abnormal MRI of spine  -- Neurology following closely with us  -- CSF cytology negative for malignancy (small lymphocytes only were seen) but CSF protein is elevated  -- Started dexamethasone for cauda equina 10 mg loading dose then 4 mg IV q6H - clinically improved  -- Discussed with neurology, MRI of cervical, thoracic spine as well as MRI head have been repeated. MRI head with non-specific findings.    We will continue to follow patient. Thank you for the opportunity to participate in Mr Fernandez's care.    Lobo Landeros PA-C  Hematology/Oncology   [phone] (cell)  [phone] (office)  After hours please call MD on call or office

## 2021-03-26 NOTE — PROGRESS NOTE ADULT - PROBLEM SELECTOR PLAN 2
goal <130/80   Currently on amlodipine and metoprolol, ACEI held due to GUANAKO   -may need adjustment.      INCOMPLETE NOTE goal <130/80   Currently on amlodipine and metoprolol, ACEI held due to GUANAKO   -may need adjustment.

## 2021-03-26 NOTE — PROGRESS NOTE ADULT - ASSESSMENT
ASSESSMENT/PLAN  61yo M with hx HTN, DM, HLD, COVID 2020 presents with LE pain and scrotal swelling    - GUANAKO - SCr higher compared to labs last resulted on 3/23. Increase in BUN may be from Steroids   Metabolic acidosis-start sodium bicab 650 mg tabs bid     -CV- BP elevated earlier in the morning - stable; on metoprolol and amlodipine    -Heme/onc- Eval noted for pending pathology/ cytology from lymph node and CSF pending;  Possible mediport before dc     -Renal dosing of meds to CrCL ~ 50ml/min-- On Allopurinol 300mg po qd   Phos and uric acid reviewed     D/w Dr. Torres Meredith, NP-C  [facility]  [phone]   ASSESSMENT/PLAN  59yo M with hx HTN, DM, HLD, COVID 2020 presents with LE pain and scrotal swelling    - GUANAKO - SCr higher compared to labs last resulted on 3/23. Increase in BUN may be from Steroids   Metabolic acidosis-start sodium bicab 650 mg tabs bid   Hyperkalemia-will initiate low K+ diet, if K+> 5.5, would give Lokelma 10g x 1     -CV- BP elevated earlier in the morning - stable; on metoprolol and amlodipine    -Heme/onc- Eval noted for pending pathology/ cytology from lymph node and CSF pending;  Possible mediport before dc     -Renal dosing of meds to CrCL ~ 50ml/min-- On Allopurinol 300mg po qd   Phos and uric acid reviewed     D/w Dr. Torres Meredith, NP-C  [facility]  [phone]   ASSESSMENT/PLAN  59yo M with hx HTN, DM, HLD, COVID 2020 presents with LE pain and scrotal swelling    - GUANAKO - SCr higher compared to labs last resulted on 3/23. Increase in BUN may be from Steroids   Metabolic acidosis-start sodium bicab 650 mg tabs bid   Hyperkalemia-will initiate low K+ diet, if K+> 5.5, would give Lokelma 10g x 1     -CV- BP elevated earlier in the morning - stable; on metoprolol and amlodipine    -Heme/onc- Eval noted for pending pathology/ cytology from lymph node and CSF pending;  Possible mediport before dc     -Renal dosing of meds to CrCL ~ 50ml/min-- On Allopurinol 300mg po qd   Phos and uric acid reviewed     D/w Dr. Torres Meredith NP-ADEBAYO  [facility]  [phone]        RENAL ATTENDING NOTE  Patient seen and examined with NP. Agree with assessment and plan as above.    seen with NP - agree with above - mild bump in creatinine - likely prerenally mediated - hopefully the numbers level off, with addition of NaHCO3 tabs    Víctor Macdonald MD  [facility]  [phone]

## 2021-03-26 NOTE — PROGRESS NOTE ADULT - ASSESSMENT
pt w/ scrotal swellin g / pelvic lesion/ weight loss    onc f/u noted  Cont steroids   add bactrin for proph as per id   taper as per neuro. onc / sx  excisional Ln bx by sx  + for Bcell  f/u final path   tx as per onc  neg  lext dopplers  dm/uncontrolled  fsg riss  c/w insulin  endo f/u  dvt proph  htn  c/w meds  neuro f/u  lp  neg thus far for leptomeningeal disease  id f/u   neurosx f/u noted  no sx now as per neuro sx  walking difficulty/spinal stenosis   neuro f/u noted  f/u mrs , noted   card  f/u  urology f/u noted  no sx now  b/l hydrocele  bowel regimen  discussed w/ heme onc/neuro    dr berrios to cover me sat and sunday

## 2021-03-26 NOTE — PROGRESS NOTE ADULT - SUBJECTIVE AND OBJECTIVE BOX
Name of Patient : MARVIN PHAM  MRN: [id number]  DATE OF SERVICE: 03-26-21 @ 15:42    Subjective: Patient seen and examined. No new events except as noted.   doing okay     REVIEW OF SYSTEMS:    CONSTITUTIONAL: No weakness, fevers or chills  EYES/ENT: No visual changes;  No vertigo or throat pain   NECK: No pain or stiffness  RESPIRATORY: No cough, wheezing, hemoptysis; No shortness of breath  CARDIOVASCULAR: No chest pain or palpitations  GASTROINTESTINAL: No abdominal or epigastric pain.   GENITOURINARY: No dysuria, frequency or hematuria  NEUROLOGICAL: No numbness or weakness  SKIN: No itching, burning, rashes, or lesions   All other review of systems is negative unless indicated above.    MEDICATIONS:  MEDICATIONS  (STANDING):  allopurinol 300 milliGRAM(s) Oral daily  amLODIPine   Tablet 10 milliGRAM(s) Oral daily  atorvastatin 80 milliGRAM(s) Oral at bedtime  dexAMETHasone  Injectable 4 milliGRAM(s) IV Push every 6 hours  dextrose 40% Gel 15 Gram(s) Oral once  dextrose 5%. 1000 milliLiter(s) (50 mL/Hr) IV Continuous <Continuous>  dextrose 5%. 1000 milliLiter(s) (100 mL/Hr) IV Continuous <Continuous>  dextrose 50% Injectable 12.5 Gram(s) IV Push once  dextrose 50% Injectable 25 Gram(s) IV Push once  dextrose 50% Injectable 25 Gram(s) IV Push once  glucagon  Injectable 1 milliGRAM(s) IntraMuscular once  heparin   Injectable 5000 Unit(s) SubCutaneous every 12 hours  insulin glargine Injectable (LANTUS) 34 Unit(s) SubCutaneous at bedtime  insulin lispro (ADMELOG) corrective regimen sliding scale   SubCutaneous three times a day before meals  insulin lispro (ADMELOG) corrective regimen sliding scale   SubCutaneous at bedtime  insulin lispro Injectable (ADMELOG) 10 Unit(s) SubCutaneous three times a day before meals  lactated ringers. 1000 milliLiter(s) (50 mL/Hr) IV Continuous <Continuous>  metoprolol succinate ER 75 milliGRAM(s) Oral daily  nortriptyline 10 milliGRAM(s) Oral at bedtime  saline laxative (FLEET) Rectal Enema 1 Enema Rectal once  senna 2 Tablet(s) Oral at bedtime  sodium bicarbonate 650 milliGRAM(s) Oral two times a day  trimethoprim   80 mG/sulfamethoxazole 400 mG 1 Tablet(s) Oral daily      PHYSICAL EXAM:  T(C): 36.3 (03-26-21 @ 12:37), Max: 36.7 (03-25-21 @ 15:48)  HR: 58 (03-26-21 @ 12:37) (58 - 73)  BP: 145/67 (03-26-21 @ 12:37) (127/81 - 164/94)  RR: 18 (03-26-21 @ 12:37) (18 - 18)  SpO2: 96% (03-26-21 @ 12:37) (94% - 98%)  Wt(kg): --  I&O's Summary    25 Mar 2021 07:01  -  26 Mar 2021 07:00  --------------------------------------------------------  IN: 590 mL / OUT: 850 mL / NET: -260 mL    26 Mar 2021 07:01  -  26 Mar 2021 15:42  --------------------------------------------------------  IN: 0 mL / OUT: 1300 mL / NET: -1300 mL          Appearance: Normal	  HEENT:  PERRLA   Lymphatic: No lymphadenopathy   Cardiovascular: Normal S1 S2, no JVD  Respiratory: normal effort , clear  Gastrointestinal:  Soft, Non-tender  Skin: No rashes,  warm to touch  Psychiatry:  Mood & affect appropriate  Musculuskeletal: No edema      All labs, Imaging and EKGs personally reviewed       03-25-21 @ 07:01  -  03-26-21 @ 07:00  --------------------------------------------------------  IN: 590 mL / OUT: 850 mL / NET: -260 mL    03-26-21 @ 07:01  -  03-26-21 @ 15:42  --------------------------------------------------------  IN: 0 mL / OUT: 1300 mL / NET: -1300 mL                          18.4   10.09 )-----------( 508      ( 26 Mar 2021 07:06 )             56.2               03-26    132<L>  |  103  |  81<H>  ----------------------------<  175<H>  5.4<H>   |  16<L>  |  1.43<H>    Ca    8.2<L>      26 Mar 2021 06:53  Phos  4.6     03-26  Mg     2.6     03-26    TPro  5.7<L>  /  Alb  2.8<L>  /  TBili  0.2  /  DBili  x   /  AST  37  /  ALT  134<H>  /  AlkPhos  120  03-26             < from: MR Thoracic Spine w/wo IV Cont (03.25.21 @ 23:23) >    IMPRESSION:  Cervical spine: No evidence of metastatic disease.  Thoracic spine: No evidence of metastatic disease.

## 2021-03-26 NOTE — PROGRESS NOTE ADULT - NUTRITIONAL ASSESSMENT
Diet, Regular:   Consistent Carbohydrate {Evening Snack} (CSTCHOSN)  No Concentrated Potassium (03-26-21 @ 11:48) [Active]      RD consult done 3/25 Diet, Regular:   Consistent Carbohydrate {Evening Snack} (CSTCHOSN)  No Concentrated Potassium (03-26-21 @ 11:48) [Active]      RD consult completed 3/25

## 2021-03-26 NOTE — PROGRESS NOTE ADULT - SUBJECTIVE AND OBJECTIVE BOX
Patient is a 60y old  Male who presents with a chief complaint of leg weakness (26 Mar 2021 09:57)    Patient seen this morning. Reporting severe pain in his foot - seen also by Neurology - Nortriptyline being ordered.    MEDICATIONS  (STANDING):  allopurinol 300 milliGRAM(s) Oral daily  amLODIPine   Tablet 10 milliGRAM(s) Oral daily  atorvastatin 80 milliGRAM(s) Oral at bedtime  dexAMETHasone  Injectable 4 milliGRAM(s) IV Push every 6 hours  dextrose 40% Gel 15 Gram(s) Oral once  dextrose 5%. 1000 milliLiter(s) (50 mL/Hr) IV Continuous <Continuous>  dextrose 5%. 1000 milliLiter(s) (100 mL/Hr) IV Continuous <Continuous>  dextrose 50% Injectable 12.5 Gram(s) IV Push once  dextrose 50% Injectable 25 Gram(s) IV Push once  dextrose 50% Injectable 25 Gram(s) IV Push once  glucagon  Injectable 1 milliGRAM(s) IntraMuscular once  heparin   Injectable 5000 Unit(s) SubCutaneous every 12 hours  HYDROmorphone   Tablet 2 milliGRAM(s) Oral once  insulin glargine Injectable (LANTUS) 34 Unit(s) SubCutaneous at bedtime  insulin lispro (ADMELOG) corrective regimen sliding scale   SubCutaneous three times a day before meals  insulin lispro (ADMELOG) corrective regimen sliding scale   SubCutaneous at bedtime  insulin lispro Injectable (ADMELOG) 10 Unit(s) SubCutaneous three times a day before meals  lactated ringers. 1000 milliLiter(s) (50 mL/Hr) IV Continuous <Continuous>  metoprolol succinate ER 75 milliGRAM(s) Oral daily  nortriptyline 10 milliGRAM(s) Oral at bedtime  saline laxative (FLEET) Rectal Enema 1 Enema Rectal once  senna 2 Tablet(s) Oral at bedtime  trimethoprim   80 mG/sulfamethoxazole 400 mG 1 Tablet(s) Oral daily    MEDICATIONS  (PRN):  acetaminophen   Tablet .. 650 milliGRAM(s) Oral every 6 hours PRN Mild Pain (1 - 3)  HYDROmorphone   Tablet 2 milliGRAM(s) Oral every 6 hours PRN Moderate Pain (4 - 6)  magnesium hydroxide Suspension 30 milliLiter(s) Oral daily PRN Constipation  polyethylene glycol 3350 17 Gram(s) Oral daily PRN Constipation    Vital Signs Last 24 Hrs  T(C): 36.4 (26 Mar 2021 08:09), Max: 36.7 (25 Mar 2021 15:48)  T(F): 97.5 (26 Mar 2021 08:09), Max: 98.1 (25 Mar 2021 15:48)  HR: 66 (26 Mar 2021 08:09) (60 - 73)  BP: 139/81 (26 Mar 2021 08:09) (127/81 - 164/94)  BP(mean): --  RR: 18 (26 Mar 2021 08:09) (18 - 18)  SpO2: 94% (26 Mar 2021 08:09) (94% - 98%)    PE  NAD but complaining of severe foot pain  Awake, alert  Anicteric  No rash grossly                            18.4   10.09 )-----------( 508      ( 26 Mar 2021 07:06 )             56.2       03-26    132<L>  |  103  |  81<H>  ----------------------------<  175<H>  5.4<H>   |  16<L>  |  1.43<H>    Ca    8.2<L>      26 Mar 2021 06:53  Phos  4.6     03-26  Mg     2.6     03-26    TPro  5.7<L>  /  Alb  2.8<L>  /  TBili  0.2  /  DBili  x   /  AST  37  /  ALT  134<H>  /  AlkPhos  120  03-26      EXAM:  MR SPINE THORACIC WAW IC                          EXAM:  MR SPINE CERVICAL IC                            PROCEDURE DATE:  03/25/2021            INTERPRETATION:  STUDY: MR CERVICAL SPINE WITH CONTRAST.  MR THORACIC SPINE WITH AND WITHOUT CONTRAST    CLINICAL INDICATION: Right leg weakness. Metastatic disease.    TECHNIQUE: Contrast-enhanced Multiplanar MR imaging of the cervical spine was performed. Multiplanar multisequence MR imaging of the thoracic spine was performed.    CONTRAST: 6 mL of Gadavist.    COMPARISON: MR cervical spine 3/24/2021.    FINDINGS:  Cervical spine:    There is slight kyphosis of usual cervical lordosis. There is no significant subluxation. Vertebral body height is maintained. There is no focal enhancing marrow replacing lesion. There is no significant loss of intervertebral disc height throughout the cervical spine.    There is no definite enhancement within the cervical cord. There is no epidural or paraspinal tumor.    Stable multilevel degenerative changes as described on the corresponding noncontrast MR of the cervical spine from 3/24/2021.    Thoracic spine:    There is maintenance of usual thoracic kyphosis. There is no listhesis or scoliosis. Vertebral body height is preserved throughout the thoracic spine. There is no focal STIR hyperintense or enhancing marrow replacing lesion. There is no significant loss of intervertebral disc height throughout the thoracic spine.    There is no high-grade central canal stenosis.    There is no definite signal abnormality or enhancement within the spinal cord. There is no epidural or paraspinal tumor.    There is a 1.3 cm right renal cyst.      IMPRESSION:  Cervical spine: No evidence of metastatic disease.  Thoracic spine: No evidence of metastatic disease.      EXAM:  MR BRAIN IC                            PROCEDURE DATE:  03/25/2021            INTERPRETATION:  STUDY: MR BRAIN with contrast.    CLINICAL INDICATION: Right leg weakness. Metastatic disease.    TECHNIQUE: Contrast enhanced Multiplanar MR imaging of the brain was performed.    CONTRAST: 6 mL of Gadavist.    COMPARISON: Noncontrast MR brain 3/24/2021    FINDINGS:  There is diffuse smooth mild pachymeningeal enhancement    The ventricles and sulci are age appropriate .There is no evidence of mass. There is no extra axial collection. No midline shift or other significant mass effect is noted.    There is normal flow-void within major cranial vessels suggestive of their patency.    The orbital contents are grossly unremarkable.    IMPRESSION:  Diffuse mild pachymeningeal enhancement which is nonspecific and may be idiopathic or related to intracranial hypotension, infection, metastasis, or granulomatous disease.      IGR Final Report    Accession Number: [id number]  Date Collected: 03/17/2021 12:24  Date Received: 03/23/2021 18:42  Date Reported: 03/25/2021 15:23    Specimen: Right rnguinal lymph node [id number] ([id number])  Indication: R/O Lymphoproliferative Disorder    Test Performed: B Cell Gene Rearrangement  ________________________________________________________________    RESULTS:    IgH gene Status:  POSITIVE  IgK gene Status: POSITIVE    INTERPRETATION:    Discrete bands were seen in both IgH and IgK suggesting a monoclonal B-cell population    Results should always be interpreted with appropriate clinical and pathological data.  Methods:  Highly purified DNA was subjected to multiplex PCR using primers to conserved regions in the IGH  and IGK genes. The limit of detection is 2%.

## 2021-03-26 NOTE — PROGRESS NOTE ADULT - SUBJECTIVE AND OBJECTIVE BOX
CARDIOLOGY FOLLOW UP - Dr. Trimble    CC: denies cp, sob, and palpitations       PHYSICAL EXAM:  T(C): 36.3 (03-26-21 @ 12:37), Max: 36.7 (03-25-21 @ 15:48)  HR: 58 (03-26-21 @ 12:37) (58 - 73)  BP: 145/67 (03-26-21 @ 12:37) (127/81 - 164/94)  RR: 18 (03-26-21 @ 12:37) (18 - 18)  SpO2: 96% (03-26-21 @ 12:37) (94% - 98%)  Wt(kg): --  I&O's Summary    25 Mar 2021 07:01  -  26 Mar 2021 07:00  --------------------------------------------------------  IN: 590 mL / OUT: 850 mL / NET: -260 mL    26 Mar 2021 07:01  -  26 Mar 2021 13:42  --------------------------------------------------------  IN: 0 mL / OUT: 1300 mL / NET: -1300 mL        Appearance: Normal	  Cardiovascular: Normal S1 S2,RRR, No JVD, No murmurs  Respiratory: Lungs clear to auscultation	  Gastrointestinal:  Soft, Non-tender, + BS	  Extremities: Normal range of motion, No clubbing, cyanosis or edema      Home Medications:  amlodipine-benazepril 10 mg-40 mg oral capsule: 1 cap(s) orally once a day (15 Mar 2021 11:15)  HumaLOG 100 units/mL injectable solution: 8 unit(s) injectable 3 times a day (before meals) (15 Mar 2021 11:15)  Lipitor 80 mg oral tablet: 1 tab(s) orally once a day (15 Mar 2021 11:15)  Toprol-XL 50 mg oral tablet, extended release: 1 tab(s) orally once a day (15 Mar 2021 11:15)  Tresiba 100 units/mL subcutaneous solution: 40 unit(s) subcutaneous once a day (at bedtime) (15 Mar 2021 11:15)      MEDICATIONS  (STANDING):  allopurinol 300 milliGRAM(s) Oral daily  amLODIPine   Tablet 10 milliGRAM(s) Oral daily  atorvastatin 80 milliGRAM(s) Oral at bedtime  dexAMETHasone  Injectable 4 milliGRAM(s) IV Push every 6 hours  dextrose 40% Gel 15 Gram(s) Oral once  dextrose 5%. 1000 milliLiter(s) (50 mL/Hr) IV Continuous <Continuous>  dextrose 5%. 1000 milliLiter(s) (100 mL/Hr) IV Continuous <Continuous>  dextrose 50% Injectable 12.5 Gram(s) IV Push once  dextrose 50% Injectable 25 Gram(s) IV Push once  dextrose 50% Injectable 25 Gram(s) IV Push once  glucagon  Injectable 1 milliGRAM(s) IntraMuscular once  heparin   Injectable 5000 Unit(s) SubCutaneous every 12 hours  insulin glargine Injectable (LANTUS) 34 Unit(s) SubCutaneous at bedtime  insulin lispro (ADMELOG) corrective regimen sliding scale   SubCutaneous three times a day before meals  insulin lispro (ADMELOG) corrective regimen sliding scale   SubCutaneous at bedtime  insulin lispro Injectable (ADMELOG) 10 Unit(s) SubCutaneous three times a day before meals  lactated ringers. 1000 milliLiter(s) (50 mL/Hr) IV Continuous <Continuous>  metoprolol succinate ER 75 milliGRAM(s) Oral daily  nortriptyline 10 milliGRAM(s) Oral at bedtime  saline laxative (FLEET) Rectal Enema 1 Enema Rectal once  senna 2 Tablet(s) Oral at bedtime  sodium bicarbonate 650 milliGRAM(s) Oral two times a day  trimethoprim   80 mG/sulfamethoxazole 400 mG 1 Tablet(s) Oral daily      TELEMETRY: 	    ECG:  	  RADIOLOGY:   DIAGNOSTIC TESTING:  [ ] Echocardiogram:  [ ]  Catheterization:  [ ] Stress Test:    OTHER: 	    LABS:	 	    Creatine Kinase, Serum: 40 U/L [30 - 200] (03-21 @ 18:00)  CKMB Units: 1.8 ng/mL [0.0 - 6.7] (03-21 @ 18:00)  Troponin T, High Sensitivity Result: 17 ng/L [0 - 51] (03-21 @ 18:00)                          18.4   10.09 )-----------( 508      ( 26 Mar 2021 07:06 )             56.2     03-26    132<L>  |  103  |  81<H>  ----------------------------<  175<H>  5.4<H>   |  16<L>  |  1.43<H>    Ca    8.2<L>      26 Mar 2021 06:53  Phos  4.6     03-26  Mg     2.6     03-26    TPro  5.7<L>  /  Alb  2.8<L>  /  TBili  0.2  /  DBili  x   /  AST  37  /  ALT  134<H>  /  AlkPhos  120  03-26

## 2021-03-27 LAB
ANION GAP SERPL CALC-SCNC: 12 MMOL/L — SIGNIFICANT CHANGE UP (ref 5–17)
ANION GAP SERPL CALC-SCNC: 12 MMOL/L — SIGNIFICANT CHANGE UP (ref 5–17)
ANION GAP SERPL CALC-SCNC: 14 MMOL/L — SIGNIFICANT CHANGE UP (ref 5–17)
BUN SERPL-MCNC: 80 MG/DL — HIGH (ref 7–23)
BUN SERPL-MCNC: 82 MG/DL — HIGH (ref 7–23)
BUN SERPL-MCNC: 85 MG/DL — HIGH (ref 7–23)
CALCIUM SERPL-MCNC: 8.2 MG/DL — LOW (ref 8.4–10.5)
CALCIUM SERPL-MCNC: 8.3 MG/DL — LOW (ref 8.4–10.5)
CALCIUM SERPL-MCNC: 8.5 MG/DL — SIGNIFICANT CHANGE UP (ref 8.4–10.5)
CHLORIDE SERPL-SCNC: 100 MMOL/L — SIGNIFICANT CHANGE UP (ref 96–108)
CHLORIDE SERPL-SCNC: 103 MMOL/L — SIGNIFICANT CHANGE UP (ref 96–108)
CHLORIDE SERPL-SCNC: 96 MMOL/L — SIGNIFICANT CHANGE UP (ref 96–108)
CO2 SERPL-SCNC: 17 MMOL/L — LOW (ref 22–31)
CO2 SERPL-SCNC: 18 MMOL/L — LOW (ref 22–31)
CO2 SERPL-SCNC: 18 MMOL/L — LOW (ref 22–31)
CREAT SERPL-MCNC: 1.53 MG/DL — HIGH (ref 0.5–1.3)
CREAT SERPL-MCNC: 1.53 MG/DL — HIGH (ref 0.5–1.3)
CREAT SERPL-MCNC: 1.65 MG/DL — HIGH (ref 0.5–1.3)
GLUCOSE BLDC GLUCOMTR-MCNC: 188 MG/DL — HIGH (ref 70–99)
GLUCOSE BLDC GLUCOMTR-MCNC: 254 MG/DL — HIGH (ref 70–99)
GLUCOSE BLDC GLUCOMTR-MCNC: 274 MG/DL — HIGH (ref 70–99)
GLUCOSE BLDC GLUCOMTR-MCNC: 359 MG/DL — HIGH (ref 70–99)
GLUCOSE SERPL-MCNC: 262 MG/DL — HIGH (ref 70–99)
GLUCOSE SERPL-MCNC: 386 MG/DL — HIGH (ref 70–99)
GLUCOSE SERPL-MCNC: 402 MG/DL — HIGH (ref 70–99)
MAGNESIUM SERPL-MCNC: 2.4 MG/DL — SIGNIFICANT CHANGE UP (ref 1.6–2.6)
MAGNESIUM SERPL-MCNC: 2.6 MG/DL — SIGNIFICANT CHANGE UP (ref 1.6–2.6)
OSMOLALITY SERPL: 321 MOSMOL/KG — HIGH (ref 275–300)
PHOSPHATE SERPL-MCNC: 4.4 MG/DL — SIGNIFICANT CHANGE UP (ref 2.5–4.5)
POTASSIUM SERPL-MCNC: 5.2 MMOL/L — SIGNIFICANT CHANGE UP (ref 3.5–5.3)
POTASSIUM SERPL-MCNC: 5.5 MMOL/L — HIGH (ref 3.5–5.3)
POTASSIUM SERPL-MCNC: 5.5 MMOL/L — HIGH (ref 3.5–5.3)
POTASSIUM SERPL-SCNC: 5.2 MMOL/L — SIGNIFICANT CHANGE UP (ref 3.5–5.3)
POTASSIUM SERPL-SCNC: 5.5 MMOL/L — HIGH (ref 3.5–5.3)
POTASSIUM SERPL-SCNC: 5.5 MMOL/L — HIGH (ref 3.5–5.3)
SODIUM SERPL-SCNC: 128 MMOL/L — LOW (ref 135–145)
SODIUM SERPL-SCNC: 130 MMOL/L — LOW (ref 135–145)
SODIUM SERPL-SCNC: 132 MMOL/L — LOW (ref 135–145)

## 2021-03-27 PROCEDURE — 99232 SBSQ HOSP IP/OBS MODERATE 35: CPT

## 2021-03-27 RX ORDER — INSULIN GLARGINE 100 [IU]/ML
40 INJECTION, SOLUTION SUBCUTANEOUS AT BEDTIME
Refills: 0 | Status: DISCONTINUED | OUTPATIENT
Start: 2021-03-27 | End: 2021-03-29

## 2021-03-27 RX ORDER — INSULIN LISPRO 100/ML
12 VIAL (ML) SUBCUTANEOUS
Refills: 0 | Status: DISCONTINUED | OUTPATIENT
Start: 2021-03-27 | End: 2021-03-28

## 2021-03-27 RX ORDER — SODIUM ZIRCONIUM CYCLOSILICATE 10 G/10G
10 POWDER, FOR SUSPENSION ORAL ONCE
Refills: 0 | Status: COMPLETED | OUTPATIENT
Start: 2021-03-27 | End: 2021-03-27

## 2021-03-27 RX ADMIN — Medication 300 MILLIGRAM(S): at 12:01

## 2021-03-27 RX ADMIN — SODIUM ZIRCONIUM CYCLOSILICATE 10 GRAM(S): 10 POWDER, FOR SUSPENSION ORAL at 14:15

## 2021-03-27 RX ADMIN — Medication 10 UNIT(S): at 08:30

## 2021-03-27 RX ADMIN — Medication 10: at 17:16

## 2021-03-27 RX ADMIN — Medication 4 MILLIGRAM(S): at 12:01

## 2021-03-27 RX ADMIN — Medication 1 TABLET(S): at 12:01

## 2021-03-27 RX ADMIN — Medication 650 MILLIGRAM(S): at 05:40

## 2021-03-27 RX ADMIN — HYDROMORPHONE HYDROCHLORIDE 2 MILLIGRAM(S): 2 INJECTION INTRAMUSCULAR; INTRAVENOUS; SUBCUTANEOUS at 21:37

## 2021-03-27 RX ADMIN — Medication 12 UNIT(S): at 12:01

## 2021-03-27 RX ADMIN — Medication 4 MILLIGRAM(S): at 23:22

## 2021-03-27 RX ADMIN — HEPARIN SODIUM 5000 UNIT(S): 5000 INJECTION INTRAVENOUS; SUBCUTANEOUS at 05:40

## 2021-03-27 RX ADMIN — Medication 4 MILLIGRAM(S): at 17:17

## 2021-03-27 RX ADMIN — ATORVASTATIN CALCIUM 80 MILLIGRAM(S): 80 TABLET, FILM COATED ORAL at 21:33

## 2021-03-27 RX ADMIN — Medication 6: at 12:01

## 2021-03-27 RX ADMIN — Medication 4 MILLIGRAM(S): at 05:41

## 2021-03-27 RX ADMIN — SENNA PLUS 2 TABLET(S): 8.6 TABLET ORAL at 03:10

## 2021-03-27 RX ADMIN — Medication 12 UNIT(S): at 17:17

## 2021-03-27 RX ADMIN — Medication 6: at 08:30

## 2021-03-27 RX ADMIN — Medication 75 MILLIGRAM(S): at 05:40

## 2021-03-27 RX ADMIN — Medication 4 MILLIGRAM(S): at 00:42

## 2021-03-27 RX ADMIN — HYDROMORPHONE HYDROCHLORIDE 2 MILLIGRAM(S): 2 INJECTION INTRAMUSCULAR; INTRAVENOUS; SUBCUTANEOUS at 21:31

## 2021-03-27 RX ADMIN — HYDROMORPHONE HYDROCHLORIDE 2 MILLIGRAM(S): 2 INJECTION INTRAMUSCULAR; INTRAVENOUS; SUBCUTANEOUS at 05:40

## 2021-03-27 RX ADMIN — HYDROMORPHONE HYDROCHLORIDE 2 MILLIGRAM(S): 2 INJECTION INTRAMUSCULAR; INTRAVENOUS; SUBCUTANEOUS at 06:50

## 2021-03-27 RX ADMIN — INSULIN GLARGINE 40 UNIT(S): 100 INJECTION, SOLUTION SUBCUTANEOUS at 21:31

## 2021-03-27 RX ADMIN — AMLODIPINE BESYLATE 10 MILLIGRAM(S): 2.5 TABLET ORAL at 05:40

## 2021-03-27 RX ADMIN — Medication 650 MILLIGRAM(S): at 17:17

## 2021-03-27 RX ADMIN — NORTRIPTYLINE HYDROCHLORIDE 10 MILLIGRAM(S): 10 CAPSULE ORAL at 21:33

## 2021-03-27 RX ADMIN — HEPARIN SODIUM 5000 UNIT(S): 5000 INJECTION INTRAVENOUS; SUBCUTANEOUS at 17:17

## 2021-03-27 NOTE — PROGRESS NOTE ADULT - NUTRITIONAL ASSESSMENT
This patient has been assessed with a concern for Malnutrition and has been determined to have a diagnosis/diagnoses of Severe protein-calorie malnutrition.

## 2021-03-27 NOTE — PROGRESS NOTE ADULT - ASSESSMENT
61yo M with hx HTN, DM, HLD, COVID 2020 presents with LE pain and scrotal swelling    - GUANAKO - SCr trend rising as of yesterday since once improved on 3/20- 3/23.     :  Increase in BUN may be from Steroids      : Metabolic acidosis-on sodium bicab 650 mg tabs bid - improving     : Hyperkalemia-will initiate low K+ diet, if K+> 5.5, would give Lokelma 10g x 1.  no BM for last 4 days per patient.       : Hyponatremia with hyperglycemia ~ 133.  Tight BS control.  can fluid restriction 1.5L daily.  Check Urine lytes, Urine Cr, Urine Osmo, and serum Osmo for mild hyponatremia as of 3/23/21    -CV- BP elevated earlier in the morning - stable; on metoprolol and amlodipine    -Heme/onc- Eval noted for pending pathology/ cytology from lymph node and CSF pending;  Possible mediport before dc     -Renal dosing of meds to CrCL ~ 50ml/min-- On Allopurinol 300mg po qd       discussed with team Heike WIGGINS

## 2021-03-27 NOTE — PROGRESS NOTE ADULT - ASSESSMENT
pt w/ scrotal swellin g / pelvic lesion/ weight loss    Cont steroids   add bactrin for proph as per id   taper as per neuro. onc / sx  excisional Ln bx by sx  + for Bcell  f/u final path   tx as per onc  neg  lext dopplers  dm/uncontrolled  fsg riss  c/w insulin  endo f/u  dvt proph  htn  c/w meds  neuro f/u  lp  neg thus far for leptomeningeal disease  id f/u   neurosx f/u  no sx now as per neuro sx  walking difficulty/spinal stenosis   neuro f/u noted  f/u mrs , noted   card  f/u  urology f/u noted  no sx now  b/l hydrocele  bowel regimen    Stanislav Vo MD pager [phone]

## 2021-03-27 NOTE — PROGRESS NOTE ADULT - SUBJECTIVE AND OBJECTIVE BOX
CARDIOLOGY FOLLOW UP NOTE - DR. GAMINO    Patient Name: MARVIN PHAM  Date of Service: 03-27-21 @ 13:13    Subjective:    denies chest pain, dyspnea, palpitations, dizziness  ROS: otherwise negative   overnight events:      PHYSICAL EXAM:  T(C): 36.5 (03-27-21 @ 07:59), Max: 36.7 (03-26-21 @ 16:14)  HR: 57 (03-27-21 @ 07:59) (57 - 81)  BP: 153/84 (03-27-21 @ 07:59) (129/73 - 153/84)  RR: 18 (03-27-21 @ 07:59) (18 - 18)  SpO2: 97% (03-27-21 @ 07:59) (94% - 98%)  Wt(kg): --  I&O's Summary    26 Mar 2021 07:01  -  27 Mar 2021 07:00  --------------------------------------------------------  IN: 0 mL / OUT: 1700 mL / NET: -1700 mL    27 Mar 2021 07:01  -  27 Mar 2021 13:13  --------------------------------------------------------  IN: 0 mL / OUT: 800 mL / NET: -800 mL      Daily     Daily     Appearance: Normal	  Cardiovascular: Normal S1 S2,RRR, No JVD, No murmurs  Respiratory: Lungs clear to auscultation	  Gastrointestinal:  Soft, Non-tender, + BS	  Extremities: Normal range of motion, No clubbing, cyanosis or edema      Home Medications:  amlodipine-benazepril 10 mg-40 mg oral capsule: 1 cap(s) orally once a day (15 Mar 2021 11:15)  HumaLOG 100 units/mL injectable solution: 8 unit(s) injectable 3 times a day (before meals) (15 Mar 2021 11:15)  Lipitor 80 mg oral tablet: 1 tab(s) orally once a day (15 Mar 2021 11:15)  Toprol-XL 50 mg oral tablet, extended release: 1 tab(s) orally once a day (15 Mar 2021 11:15)  Tresiba 100 units/mL subcutaneous solution: 40 unit(s) subcutaneous once a day (at bedtime) (15 Mar 2021 11:15)      MEDICATIONS  (STANDING):  allopurinol 300 milliGRAM(s) Oral daily  amLODIPine   Tablet 10 milliGRAM(s) Oral daily  atorvastatin 80 milliGRAM(s) Oral at bedtime  dexAMETHasone  Injectable 4 milliGRAM(s) IV Push every 6 hours  dextrose 40% Gel 15 Gram(s) Oral once  dextrose 5%. 1000 milliLiter(s) (50 mL/Hr) IV Continuous <Continuous>  dextrose 5%. 1000 milliLiter(s) (100 mL/Hr) IV Continuous <Continuous>  dextrose 50% Injectable 12.5 Gram(s) IV Push once  dextrose 50% Injectable 25 Gram(s) IV Push once  dextrose 50% Injectable 25 Gram(s) IV Push once  glucagon  Injectable 1 milliGRAM(s) IntraMuscular once  heparin   Injectable 5000 Unit(s) SubCutaneous every 12 hours  insulin glargine Injectable (LANTUS) 40 Unit(s) SubCutaneous at bedtime  insulin lispro (ADMELOG) corrective regimen sliding scale   SubCutaneous three times a day before meals  insulin lispro (ADMELOG) corrective regimen sliding scale   SubCutaneous at bedtime  insulin lispro Injectable (ADMELOG) 12 Unit(s) SubCutaneous three times a day before meals  lactated ringers. 1000 milliLiter(s) (50 mL/Hr) IV Continuous <Continuous>  metoprolol succinate ER 75 milliGRAM(s) Oral daily  nortriptyline 10 milliGRAM(s) Oral at bedtime  saline laxative (FLEET) Rectal Enema 1 Enema Rectal once  senna 2 Tablet(s) Oral at bedtime  sodium bicarbonate 650 milliGRAM(s) Oral two times a day  sodium zirconium cyclosilicate 10 Gram(s) Oral once  trimethoprim   80 mG/sulfamethoxazole 400 mG 1 Tablet(s) Oral daily      TELEMETRY: 	    ECG:  	  RADIOLOGY:   DIAGNOSTIC TESTING:  [ ] Echocardiogram:  [ ] Catheterization:  [ ] Stress Test:    OTHER: 	    LABS:	 	    CARDIAC MARKERS:                                18.4   10.09 )-----------( 508      ( 26 Mar 2021 07:06 )             56.2     03-27    130<L>  |  100  |  85<H>  ----------------------------<  402<H>  5.5<H>   |  18<L>  |  1.53<H>    Ca    8.2<L>      27 Mar 2021 07:36  Phos  4.4     03-27  Mg     2.6     03-27    TPro  5.7<L>  /  Alb  2.8<L>  /  TBili  0.2  /  DBili  x   /  AST  37  /  ALT  134<H>  /  AlkPhos  120  03-26    proBNP:     Lipid Profile:   HgA1c:     Creatinine, Serum: 1.53 mg/dL (03-27-21 @ 07:36)  Creatinine, Serum: 1.53 mg/dL (03-27-21 @ 07:36)  Creatinine, Serum: 1.43 mg/dL (03-26-21 @ 06:53)

## 2021-03-27 NOTE — PROGRESS NOTE ADULT - SUBJECTIVE AND OBJECTIVE BOX
Chief Complaint/Follow-up on: DM    Subjective:  POC blood glucose and insulin use reviewed.  with hyperglycemia noted this am on BMP  am glucose 200s and BMP glucose was 400s  tolerating PO   no n/v      MEDICATIONS  (STANDING):  allopurinol 300 milliGRAM(s) Oral daily  amLODIPine   Tablet 10 milliGRAM(s) Oral daily  atorvastatin 80 milliGRAM(s) Oral at bedtime  dexAMETHasone  Injectable 4 milliGRAM(s) IV Push every 6 hours  dextrose 40% Gel 15 Gram(s) Oral once  dextrose 5%. 1000 milliLiter(s) (50 mL/Hr) IV Continuous <Continuous>  dextrose 5%. 1000 milliLiter(s) (100 mL/Hr) IV Continuous <Continuous>  dextrose 50% Injectable 12.5 Gram(s) IV Push once  dextrose 50% Injectable 25 Gram(s) IV Push once  dextrose 50% Injectable 25 Gram(s) IV Push once  glucagon  Injectable 1 milliGRAM(s) IntraMuscular once  heparin   Injectable 5000 Unit(s) SubCutaneous every 12 hours  insulin glargine Injectable (LANTUS) 34 Unit(s) SubCutaneous at bedtime  insulin lispro (ADMELOG) corrective regimen sliding scale   SubCutaneous three times a day before meals  insulin lispro (ADMELOG) corrective regimen sliding scale   SubCutaneous at bedtime  insulin lispro Injectable (ADMELOG) 10 Unit(s) SubCutaneous three times a day before meals  lactated ringers. 1000 milliLiter(s) (50 mL/Hr) IV Continuous <Continuous>  metoprolol succinate ER 75 milliGRAM(s) Oral daily  nortriptyline 10 milliGRAM(s) Oral at bedtime  saline laxative (FLEET) Rectal Enema 1 Enema Rectal once  senna 2 Tablet(s) Oral at bedtime  sodium bicarbonate 650 milliGRAM(s) Oral two times a day  trimethoprim   80 mG/sulfamethoxazole 400 mG 1 Tablet(s) Oral daily    MEDICATIONS  (PRN):  acetaminophen   Tablet .. 650 milliGRAM(s) Oral every 6 hours PRN Mild Pain (1 - 3)  HYDROmorphone   Tablet 2 milliGRAM(s) Oral every 6 hours PRN Moderate Pain (4 - 6)  magnesium hydroxide Suspension 30 milliLiter(s) Oral daily PRN Constipation  polyethylene glycol 3350 17 Gram(s) Oral daily PRN Constipation      PHYSICAL EXAM:  VITALS: T(C): 36.5 (03-27-21 @ 07:59)  T(F): 97.7 (03-27-21 @ 07:59), Max: 98.1 (03-26-21 @ 16:14)  HR: 57 (03-27-21 @ 07:59) (57 - 81)  BP: 153/84 (03-27-21 @ 07:59) (129/73 - 153/84)  RR:  (18 - 18)  SpO2:  (94% - 98%)  Wt(kg): --  GENERAL: NAD  EYES: No proptosis, no injection  RESPIRATORY: Clear to auscultation bilaterally; No rales, rhonchi, wheezing, or rubs  CARDIOVASCULAR: Regular rate and rhythm; No murmurs; no peripheral edema  GI: Soft, nontender, non distended, normal bowel sounds      POCT Blood Glucose.: 274 mg/dL (03-27-21 @ 08:26)  POCT Blood Glucose.: 244 mg/dL (03-26-21 @ 21:25)  POCT Blood Glucose.: 208 mg/dL (03-26-21 @ 17:00)  POCT Blood Glucose.: 146 mg/dL (03-26-21 @ 11:59)  POCT Blood Glucose.: 133 mg/dL (03-26-21 @ 07:49)  POCT Blood Glucose.: 185 mg/dL (03-25-21 @ 21:08)  POCT Blood Glucose.: 201 mg/dL (03-25-21 @ 17:08)  POCT Blood Glucose.: 219 mg/dL (03-25-21 @ 12:44)  POCT Blood Glucose.: 203 mg/dL (03-25-21 @ 08:48)  POCT Blood Glucose.: 295 mg/dL (03-24-21 @ 21:11)  POCT Blood Glucose.: 187 mg/dL (03-24-21 @ 17:35)  POCT Blood Glucose.: 195 mg/dL (03-24-21 @ 12:06)    03-27    130<L>  |  100  |  85<H>  ----------------------------<  402<H>  5.5<H>   |  18<L>  |  1.53<H>    EGFR if : 57<L>  EGFR if non : 49<L>    Ca    8.2<L>      03-27  Mg     2.6     03-27  Phos  4.4     03-27    TPro  5.7<L>  /  Alb  2.8<L>  /  TBili  0.2  /  DBili  x   /  AST  37  /  ALT  134<H>  /  AlkPhos  120  03-26          Thyroid Function Tests:  03-15 @ 09:19 TSH 4.86 FreeT4 -- T3 -- Anti TPO -- Anti Thyroglobulin Ab -- TSI --

## 2021-03-27 NOTE — PROGRESS NOTE ADULT - ASSESSMENT
Impression:  This is a 60y year old Male  who presents with the chief complaint of low back pain and leg weakness. 61yo M with hx HTN, DM, HLD, COVID 2020 presents with LE pain and scrotal swelling. Patient was hospitalized in [facility] in Feb 2021 where CT Ab/P found 5.8x2.5cm lesion on the right pelvis concerning for malignancy with sclerotic lesion at L5. Pt states since early 2021 inc pain and weakness in the right leg, at first he was limping but now cannot walk.  He has pain in the feet, describes as pinching and needles like sensation.  Starting to have sx in left leg as well. Patient left AMA before additional malignancy workup was performed.  In the last month, patient had 45lb unintentional weight loss and decreased appetite. Notes abdominal swelling, mostly on the left side with tenderness along with b/l scrotal swelling.  At one point given neurontin, not helpful.  Denies sx in arms, changes in speech, swallow, vision, bladder control.  Says issues with bowel movements.     MRI done showing diffuse nerve root enhancement involving the roots of the cauda equina with some mild root thickening though the dominant finding is the enhancement.  There is also a nonspecific lesion in the L5 vertebral body and a possible septic facet at L4-5.  However ID is not suspicious for septic facet though cultures pending.    NSx consulted for bone abnormalities and does not feel candidate for inpatient surgery at this time and recommended outpatient follow-up    s/p LP , high lymphocytes prelim, protein slightly elevated, no cx growth, awaiting official cytology, per pathologist Dr. Carney ([phone]) small lymphocytes not suggestive of lymphoma in CSF.      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    Will follow up rest of CSF studies     MRI of T-spine as well as C spine without new pathology    infectious etiology raised due to findings of  L4-5 facet and potential immunosuppression in setting of possible lymphoma.  ID following and this appears less likely     Doubt demyelinating (AIDP or CIDP).  This is inconsistent with time course and the asx of the findings    Regarding the high cell protein of the three options being demyelinating, infx or neoplastic, for the reasons outlined above, seem most consistent with neoplastic.  The lack of postiive cytology on the LP is not surprising as this is often the case, the classic teaching is that it requires up to three spinal taps to reasonably be assured that this is not a false negative.  Regarding the cauda equina findings, while the MRI indicated involved as there is diffuse root infiltration, this is not a "classic cauda equina syndrome" in which there is a large space occupying lesion affecting the nerves requiring immediate NS evacuation.  Fortunately he has had some improvement in pain with the steroids, though that may reduce future CSF cytology yields if LP done in the future.  Would await reuslts of frozen, as he is quite weak in the leg as a result of cauda equina findings that may improve with tx such as radiation, will defer tx to onc and rad onc .    neuro stable  will follow

## 2021-03-27 NOTE — PROGRESS NOTE ADULT - NUTRITIONAL ASSESSMENT
Diet, Regular:   Consistent Carbohydrate {Evening Snack} (CSTCHOSN)  No Concentrated Potassium (03-26-21 @ 11:48) [Active]      RD consult completed 3/25

## 2021-03-27 NOTE — PROGRESS NOTE ADULT - SUBJECTIVE AND OBJECTIVE BOX
Patient seen and examined.  resting in bed. complains of having no BM for last 4 days.    REVIEW OF SYSTEMS:  As per HPI, otherwise 8 full 10 ROS were unremarkable    MEDICATIONS  (STANDING):  allopurinol 300 milliGRAM(s) Oral daily  amLODIPine   Tablet 10 milliGRAM(s) Oral daily  atorvastatin 80 milliGRAM(s) Oral at bedtime  dexAMETHasone  Injectable 4 milliGRAM(s) IV Push every 6 hours  dextrose 40% Gel 15 Gram(s) Oral once  dextrose 5%. 1000 milliLiter(s) (50 mL/Hr) IV Continuous <Continuous>  dextrose 5%. 1000 milliLiter(s) (100 mL/Hr) IV Continuous <Continuous>  dextrose 50% Injectable 12.5 Gram(s) IV Push once  dextrose 50% Injectable 25 Gram(s) IV Push once  dextrose 50% Injectable 25 Gram(s) IV Push once  glucagon  Injectable 1 milliGRAM(s) IntraMuscular once  heparin   Injectable 5000 Unit(s) SubCutaneous every 12 hours  insulin glargine Injectable (LANTUS) 40 Unit(s) SubCutaneous at bedtime  insulin lispro (ADMELOG) corrective regimen sliding scale   SubCutaneous three times a day before meals  insulin lispro (ADMELOG) corrective regimen sliding scale   SubCutaneous at bedtime  insulin lispro Injectable (ADMELOG) 12 Unit(s) SubCutaneous three times a day before meals  lactated ringers. 1000 milliLiter(s) (50 mL/Hr) IV Continuous <Continuous>  metoprolol succinate ER 75 milliGRAM(s) Oral daily  nortriptyline 10 milliGRAM(s) Oral at bedtime  saline laxative (FLEET) Rectal Enema 1 Enema Rectal once  senna 2 Tablet(s) Oral at bedtime  sodium bicarbonate 650 milliGRAM(s) Oral two times a day  trimethoprim   80 mG/sulfamethoxazole 400 mG 1 Tablet(s) Oral daily      VITAL:  T(C): , Max: 36.7 (03-26-21 @ 16:14)  T(F): , Max: 98.1 (03-26-21 @ 16:14)  HR: 57 (03-27-21 @ 07:59)  BP: 153/84 (03-27-21 @ 07:59)  BP(mean): --  RR: 18 (03-27-21 @ 07:59)  SpO2: 97% (03-27-21 @ 07:59)  Wt(kg): --    I and O's:    03-26 @ 07:01 - 03-27 @ 07:00  --------------------------------------------------------  IN: 0 mL / OUT: 1700 mL / NET: -1700 mL    03-27 @ 07:01 - 03-27 @ 15:52  --------------------------------------------------------  IN: 0 mL / OUT: 800 mL / NET: -800 mL          PHYSICAL EXAM:    Constitutional: NAD  Neck:  No JVD  Respiratory: CTAB/L  Cardiovascular: S1 and S2  Gastrointestinal: BS+, soft, NT/ND  Extremities: No peripheral edema  Neurological: A/O x 3, no focal deficits  Psychiatric: Normal mood, normal affect  : No Weir    LABS:                        18.4   10.09 )-----------( 508      ( 26 Mar 2021 07:06 )             56.2     03-27    130<L>  |  100  |  85<H>  ----------------------------<  402<H>  5.5<H>   |  18<L>  |  1.53<H>    Ca    8.2<L>      27 Mar 2021 07:36  Phos  4.4     03-27  Mg     2.6     03-27    TPro  5.7<L>  /  Alb  2.8<L>  /  TBili  0.2  /  DBili  x   /  AST  37  /  ALT  134<H>  /  AlkPhos  120  03-26

## 2021-03-27 NOTE — PROGRESS NOTE ADULT - SUBJECTIVE AND OBJECTIVE BOX
Admitting Diagnosis:  Abnormal weight loss [R63.4]  ABNORMAL WEIGHT LOSS      Background:  This is a 60y year old Male  who presents with the chief complaint of low back pain and leg weakness. 59yo M with hx HTN, DM, HLD, COVID 2020 presents with LE pain and scrotal swelling. Patient was hospitalized in [facility] in Feb 2021 where CT Ab/P found 5.8x2.5cm lesion on the right pelvis concerning for malignancy with sclerotic lesion at L5. Pt states since early 2021 inc pain and weakness in the right leg, at first he was limping but now cannot walk.  He has pain in the feet, describes as pinching and needles like sensation.  Starting to have sx in left leg as well. Patient left AMA before additional malignancy workup was performed.  In the last month, patient had 45lb unintentional weight loss and decreased appetite. Notes abdominal swelling, mostly on the left side with tenderness along with b/l scrotal swelling.  At one point given neurontin, not helpful.  Denies sx in arms, changes in speech, swallow, vision, bladder control.  Says issues with bowel movements.     MRI done showing diffuse nerve root enhancement involving the roots of the cauda equina with some mild root thickening though the dominant finding is the enhancement.  There is also a nonspecific lesion in the L5 vertebral body and a possible septic facet at L4-5.       Interval Hx:  s/p LP , high lymphocytes prelim, protein slightly elevated, no cx growth, awaiting cytology.  Flow with small lymphocytes. our team has  Dr. Carney at [phone] who states that prelim cytology is small lymphocytes without large lymphocytes to suggest B-cell lymphoma in the spine, however official read pending    getting steroids which seems to help with the pain      awaiting path     no new complaints    ******    Past Medical History:  Diabetes [E11.9]    Hyperlipidemia [E78.5]    Hypertension, unspecified type [I10]        Past Surgical History:  No significant past surgical history [[id number]]        Social History:  No toxic habits    Family History:  FAMILY HISTORY:  No pertinent family history in first degree relatives        Allergies:  No Known Allergies      ROS:  Constitutional: Patient offers no complaints of fevers or significant weight loss  Ears, Nose, Mouth and Throat: The patient presents with no abnormalities of the head, ears, eyes, nose or throat  Skin: Patient offers no concerns of new rashes or lesions  Respiratory: The patient presents with no abnormalities of the respiratory tract  Cardiovascular: The patient presents with no cardiac abnormalities  Gastrointestinal: The patient presents with no abnormalities of the GI system  Genitourinary: The patient presents with no dysuria, hematuria or frequent urination  Neurological: See HPI  Endocrine: Patient offers no complaints of excessive thirst, urination, or heat/cold intolerance    Advanced care planning reviewed and noted in the chart.      Medications:  acetaminophen   Tablet .. 650 milliGRAM(s) Oral every 6 hours PRN  allopurinol 300 milliGRAM(s) Oral daily  amLODIPine   Tablet 10 milliGRAM(s) Oral daily  atorvastatin 80 milliGRAM(s) Oral at bedtime  dexAMETHasone  Injectable 4 milliGRAM(s) IV Push every 6 hours  dextrose 40% Gel 15 Gram(s) Oral once  dextrose 5%. 1000 milliLiter(s) IV Continuous <Continuous>  dextrose 5%. 1000 milliLiter(s) IV Continuous <Continuous>  dextrose 50% Injectable 12.5 Gram(s) IV Push once  dextrose 50% Injectable 25 Gram(s) IV Push once  dextrose 50% Injectable 25 Gram(s) IV Push once  glucagon  Injectable 1 milliGRAM(s) IntraMuscular once  heparin   Injectable 5000 Unit(s) SubCutaneous every 12 hours  HYDROmorphone   Tablet 2 milliGRAM(s) Oral every 6 hours PRN  insulin glargine Injectable (LANTUS) 34 Unit(s) SubCutaneous at bedtime  insulin lispro (ADMELOG) corrective regimen sliding scale   SubCutaneous three times a day before meals  insulin lispro (ADMELOG) corrective regimen sliding scale   SubCutaneous at bedtime  insulin lispro Injectable (ADMELOG) 10 Unit(s) SubCutaneous three times a day before meals  lactated ringers. 1000 milliLiter(s) IV Continuous <Continuous>  magnesium hydroxide Suspension 30 milliLiter(s) Oral daily PRN  metoprolol succinate ER 75 milliGRAM(s) Oral daily  nortriptyline 10 milliGRAM(s) Oral at bedtime  polyethylene glycol 3350 17 Gram(s) Oral daily PRN  saline laxative (FLEET) Rectal Enema 1 Enema Rectal once  senna 2 Tablet(s) Oral at bedtime  sodium bicarbonate 650 milliGRAM(s) Oral two times a day  trimethoprim   80 mG/sulfamethoxazole 400 mG 1 Tablet(s) Oral daily      Labs:  CBC Full  -  ( 26 Mar 2021 07:06 )  WBC Count : 10.09 K/uL  RBC Count : 6.37 M/uL  Hemoglobin : 18.4 g/dL  Hematocrit : 56.2 %  Platelet Count - Automated : 508 K/uL  Mean Cell Volume : 88.2 fl  Mean Cell Hemoglobin : 28.9 pg  Mean Cell Hemoglobin Concentration : 32.7 gm/dL  Auto Neutrophil # : x  Auto Lymphocyte # : x  Auto Monocyte # : x  Auto Eosinophil # : x  Auto Basophil # : x  Auto Neutrophil % : x  Auto Lymphocyte % : x  Auto Monocyte % : x  Auto Eosinophil % : x  Auto Basophil % : x    03-27    130<L>  |  100  |  85<H>  ----------------------------<  402<H>  5.5<H>   |  18<L>  |  1.53<H>    Ca    8.2<L>      27 Mar 2021 07:36  Phos  4.4     03-27  Mg     2.6     03-27    TPro  5.7<L>  /  Alb  2.8<L>  /  TBili  0.2  /  DBili  x   /  AST  37  /  ALT  134<H>  /  AlkPhos  120  03-26    CAPILLARY BLOOD GLUCOSE      POCT Blood Glucose.: 274 mg/dL (27 Mar 2021 08:26)  POCT Blood Glucose.: 244 mg/dL (26 Mar 2021 21:25)  POCT Blood Glucose.: 208 mg/dL (26 Mar 2021 17:00)  POCT Blood Glucose.: 146 mg/dL (26 Mar 2021 11:59)    LIVER FUNCTIONS - ( 26 Mar 2021 06:53 )  Alb: 2.8 g/dL / Pro: 5.7 g/dL / ALK PHOS: 120 U/L / ALT: 134 U/L / AST: 37 U/L / GGT: x                   Vitals:  Vital Signs Last 24 Hrs  T(C): 36.5 (27 Mar 2021 07:59), Max: 36.7 (26 Mar 2021 16:14)  T(F): 97.7 (27 Mar 2021 07:59), Max: 98.1 (26 Mar 2021 16:14)  HR: 57 (27 Mar 2021 07:59) (57 - 81)  BP: 153/84 (27 Mar 2021 07:59) (129/73 - 153/84)  BP(mean): --  RR: 18 (27 Mar 2021 07:59) (18 - 18)  SpO2: 97% (27 Mar 2021 07:59) (94% - 98%)    NEUROLOGICAL EXAM:    Mental status: Awake, alert, and in no apparent distress. Oriented to person, place and time. Language function is normal. speech clear and fluent. Recent memory, digit span and concentration were normal.     Cranial Nerves: Pupils were equal, round, reactive to light. Extraocular movements were intact. Visual field were full. Facial sensation was intact to light touch. There was no facial asymmetry. The palate was upgoing symmetrically and tongue was midline. Hearing acuity was intact to finger rub AU. Shoulder shrug was full bilaterally    Motor exam: Bulk and tone were normal. Strength was 5/5 in the arms.  Left leg  5/5 except dorsi 5-/5.  Right leg - low tone.  hip flexion/knee ext 2/5, dorsiflexion 4/5. Fine finger movements were symmetric and normal. There was no pronator drift pain on palpation of the leg    Reflexes: 2+ in the bilateral upper extremities. absent in legs. Toes were downgoing bilaterally.     Sensation: Intact to light touch, temperature, vibration and proprioception. says when touch right leg it is painful    Coordination: Finger-nose-finger was without dysmetria. cannot do heel shin    Gait: deferred    < from: CT Chest w/ IV Cont (03.14.21 @ 10:12) >    EXAM:  CT ABDOMEN AND PELVIS IC                          EXAM:  CT CHEST IC                          PROCEDURE DATE:  03/14/2021      IMPRESSION:  Enlarged right iliac lymph node measuring 5.9 x 2.4 cm. Additional smaller right iliac nodes are seen. There are also bilateral groin lymph nodes.      MOLINA PUGH M.D., RADIOLOGY RESIDENT  This documenthas been electronically signed.  GINA JENKINS M.D., ATTENDING RADIOLOGIST  This document has been electronically signed. Mar 14 2021 12:50PM    < end of copied text >          EXAM:  MR SPINE LUMBAR WAW IC                            PROCEDURE DATE:  03/15/2021            INTERPRETATION:  INDICATIONS:  Increased low back pain and right leg weakness for 4 months    TECHNIQUE:  Sagittal T1 weighted and T2 weighted images of the lumbosacral spine as well as axial T1 weighted and T2 weighted images were obtained.    COMPARISON EXAMINATION: None.    FINDINGS:  VERTEBRAL BODIES AND DISCS:  Nonspecific lesion in L1 low signal T1 hyperintense signal T2 with some enhancement.  ALIGNMENT:  No subluxations.  L1-L2 LEVEL:  Normal.  L2-L3 LEVEL:  Asymmetric bulge right with disc material in the region of the right neural foramen at this level. Clinical correlation for right L2 radiculopathy  L3-L4 LEVEL: Symmetric bulge with annular fissure in the 7:30 position. Bilateral facet arthropathy at this level.  L4-L5 LEVEL: Asymmetric bulge to the left with focal left-sided protrusion and some mass impression on the intracanal left L5 root.. Bilateral facet arthropathy.Some changes in the left paraspinal musculature extending from the left facet joint may represent septic facet as enhancement is seen in association with hyperintense T2 signal with associated muscular changes (9:19)  L5-S1 LEVEL:  Slight signal hyperintensity at the disc space with some enhancement ventrally without associated endplate abnormality favors degenerative change. Prominent asymmetric disc bulge with Bilateral foraminal disc material which is causing significant nerve root compression on the right greater than left. May be the cause of the patient's known right leg weakness.  SPINAL CANAL:  Evidence of diffuse nerve root enhancement appreciated involving the roots of the cauda equina with some mild thickening appreciated.  CONUS MEDULLARIS:  Normal.  MISCELLANEOUS:  None    IMPRESSION:  Diffuse nerve root enhancement involving the roots of the cauda equina with some mild root thickening though the dominant finding is the enhancement. Differential possibilities include acute inflammatory demyelinating polyneuropathy( [place]) versus chronic inflammatory demyelinating polyneuropathy(CIDP). Inflammatory pathologies such as sarcoid or infectious etiologies should be considered. Included in the differential is leptomeningeal carcinomatosis. Correlation with CSF strongly recommended. Nonspecific lesion in the L5 vertebral body as described. Enhancement with associated T2 abnormality involving the left L4-5 facet with extension to the paraspinal musculature may indicate a septic facet at this level. Correlation with clinical parameters suggested. Degenerative changes with disc material in the neural foramina at the L2-3 and L5-S1 levels on the right as well as in the L5-S1 neural foramen on the left. Prominent bulge with more focal protrusion L4-5 left                MONTY TRAN MD; Attending Radiologist  This document has been electronically signed. Mar 15 2021  8:19PM      EXAM:  MR SPINE CERVICAL                          EXAM:  MR BRAIN                            PROCEDURE DATE:  03/24/2021            INTERPRETATION:  INDICATIONS:  Right leg weakness and bilateral leg pain. Rule out metastatic disease    Brain:    TECHNIQUE:  Multiplanar imaging was performed using T1 weighted, T2 weighted and FLAIR sequences.  Diffusion weighted and susceptibility sensitive images were also obtained.    Patient could not continue the exam. Intravenous contrast was not administered.    COMPARISON EXAMINATION:  None.    FINDINGS: The study is degraded by motion artifact.  VENTRICLES AND SULCI:  Normal.  INTRA-AXIAL:  No mass, abnormal signal or evidence of acute cerebral ischemia.  EXTRA-AXIAL:  No mass or collection.  VISUALIZED SINUSES:  Mild mucosal thickening  VISUALIZED MASTOIDS:  Clear.  CALVARIUM:  Normal.  CAROTID FLOW VOIDS:  Present.  MISCELLANEOUS:  Tornwaldt cyst within the midline nasopharynx.    Cervical spine:    TECHNIQUE:  Sagittal T1 weighted, T2 weighted and STIR images of the cervical spine as well as axial T1 weighted and T2 weighted images were obtained.    The patient could not continue the exam. The study is limited by motion artifact.    COMPARISON EXAMINATION: None.    FINDINGS:  VERTEBRAL BODIES AND DISCS: Normal in height. No abnormal signal is identified. Multilevel marginal osteophyte formation.  ALIGNMENT:  No subluxations. Straightening of the cervical lordosis  C2-C3 LEVEL:  Normal.  C3-C4 LEVEL:  Mild disc bulge/ridge  C4-C5 LEVEL:  Diffuse disc bulge/ridge  C5-C6 LEVEL:  Diffuse disc bulge/ridge with bilateral foraminal narrowing  C6-C7 LEVEL:  Disc bulge/ridge with left greater than right foraminal narrowing  C7-T1 LEVEL:   Normal.  SPINAL CORD: Normal.  SPINAL CANAL:  No other intradural or extradural defects are seen.  MISCELLANEOUS:  None.      IMPRESSION:  Brain:  Motion degraded exam.    No intracranial abnormality identified. Detection of metastatic disease is limited without intravenous contrast.    Cervical spine:  Motion limited exam.    Multilevel spondylosis.      FABIANO ALEXANDRA MD; Attending Radiologist  This document has been electronically signed. Mar 24 2021  8:44AM      < from: MR Thoracic Spine w/wo IV Cont (03.25.21 @ 23:23) >    EXAM:  MR SPINE THORACIC WAW IC                          EXAM:  MR SPINE CERVICAL IC                            PROCEDURE DATE:  03/25/2021            INTERPRETATION:  STUDY: MR CERVICAL SPINE WITH CONTRAST.  MR THORACIC SPINE WITH AND WITHOUT CONTRAST    CLINICAL INDICATION: Right leg weakness. Metastatic disease.    TECHNIQUE: Contrast-enhanced Multiplanar MR imaging of the cervical spine was performed. Multiplanar multisequence MR imaging of the thoracic spine was performed.    CONTRAST: 6 mL of Gadavist.    COMPARISON: MR cervical spine 3/24/2021.    FINDINGS:  Cervical spine:    There is slight kyphosis of usual cervical lordosis. There is no significant subluxation. Vertebral body height is maintained. There is no focal enhancing marrow replacing lesion. There is no significant loss of intervertebral disc height throughout the cervical spine.    There is no definite enhancement within the cervical cord. There is no epidural or paraspinal tumor.    Stable multilevel degenerative changes as described on the corresponding noncontrast MR of the cervical spine from 3/24/2021.    Thoracic spine:    There is maintenance of usual thoracic kyphosis. There is no listhesis or scoliosis. Vertebral body height is preserved throughout the thoracic spine. There is no focal STIR hyperintense or enhancing marrow replacing lesion. There is no significant loss of intervertebral disc height throughout the thoracic spine.    There is no high-grade central canal stenosis.    There is no definite signal abnormality or enhancement within the spinal cord. There is no epidural or paraspinal tumor.    There is a 1.3 cm right renal cyst.      IMPRESSION:  Cervical spine: No evidence of metastatic disease.  Thoracic spine: No evidence of metastatic disease.                MIRIAM RAHMAN M.D., ATTENDING RADIOLOGIST  This document has been electronically signed. Mar 26 2021 10:34AM    < end of copied text >

## 2021-03-27 NOTE — PROGRESS NOTE ADULT - ASSESSMENT
59yo M with hx HTN, DM2, HLD, COVID 2020 presents with LE pain and scrotal swelling. Patient was hospitalized in [facility] in Feb 2021 where CT Ab/P found 5.8x2.5cm lesion on the right pelvis concerning for malignancy with sclerotic lesion at L5. Endocrine following for DM2 (A1c 10.6%.) c/b steroid exacerbated hyperglycemia due to being started on decadron for cauda equina.   On Decadron 4mg q6h. BG well controlled on current regimen will continue current basal/bolus rates to maintain glycemic control ; BG Goal 100-180 mg/dl.      1. DM   - test BG AC/HS  3/27  - increase Lantus 40 units QHS  - increase Admelog 12 units AC meals for now--> if hyperglycemia noted with this, than increase to 14 units premeals   - c/w Admelog moderate correction scale AC and mod HS scale  - notify endocrine team if steroid dose changed or discontinued and or hypo/hyperglycemia         On Discharge recommend tresiba and Novolog. Doses TBD  Recommend follow up with Endo in [place], Patient to find out the name.      2. HTN  goal <130/80   Currently on amlodipine and metoprolol, ACEI held due to GUANAKO   -adjustment as per primary team

## 2021-03-27 NOTE — PROGRESS NOTE ADULT - ASSESSMENT
Echo 3/18/21: min MR, nl lv sys fx, small-moderate pericardial effusion, no evidence of pericardial tamponade     a/p  59 yo M with hx HTN, DM, HLD, COVID 2020 presents with LE pain and scrotal swelling    1. LE pain/weakness/scrotal swelling  -s/p LN excisional biopsy to r/o lymphoma- frozen + diffuse B cell, patho noted   -s/p LP -high lymphocytes prelim, protein slightly elevated, no cx growth, awaiting official cytology   -per ID: MRI suggestive of CIDP, per neuro less likely AIDP, sarcoid, leptomeningeal carcinomatosis, infection  -per neuro: Clinical suspicion for B-cell lymphoma with leptomeningeal spread  -MRI Cspine, Tspine, head noted   -UA noted  -LE duplex neg for DVT  -urology eval noted - No  intervention during this admission  -neurosx eval noted -no interventions at this time, possibly benefit from L5/S1 decompression fusion after workup of lymph node  -off abx per ID  -c/w deca for cauda equina  -Possible mediport before dc   -f/u heme/neuro/ID    2. HTN  -bp acceptable   -c/w amlodipine and bb    3. HLD  -c/w statin    4. GUANAKO  -cr elevated    -renal f/u    5. Pericardial effusion  -echo noted with min MR, nl lv sys fx, small-moderate pericardial effusion, no evidence of pericardial tamponade   -cont to monitor     6. Atypical Chest pain  -no further cp  -hsT, ck, ckmb neg, EKG without ischemic changes   -echo as above w nl lv sys fx, no segmental wall motion     dvt ppx       35 minutes spent on total encounter; more than 50% of the visit was spent counseling and/or coordinating care by the attending physician.

## 2021-03-27 NOTE — PROGRESS NOTE ADULT - SUBJECTIVE AND OBJECTIVE BOX
Patient is a 60y old  Male who presents with a chief complaint of leg weakness (27 Mar 2021 10:47)    Coverage for Dr. Caden Ennis   SUBJECTIVE / OVERNIGHT EVENTS: Comfortable   Review of Systems  chest pain no  palpitations no  sob no  nausea no  headache no    MEDICATIONS  (STANDING):  allopurinol 300 milliGRAM(s) Oral daily  amLODIPine   Tablet 10 milliGRAM(s) Oral daily  atorvastatin 80 milliGRAM(s) Oral at bedtime  dexAMETHasone  Injectable 4 milliGRAM(s) IV Push every 6 hours  dextrose 40% Gel 15 Gram(s) Oral once  dextrose 5%. 1000 milliLiter(s) (50 mL/Hr) IV Continuous <Continuous>  dextrose 5%. 1000 milliLiter(s) (100 mL/Hr) IV Continuous <Continuous>  dextrose 50% Injectable 12.5 Gram(s) IV Push once  dextrose 50% Injectable 25 Gram(s) IV Push once  dextrose 50% Injectable 25 Gram(s) IV Push once  glucagon  Injectable 1 milliGRAM(s) IntraMuscular once  heparin   Injectable 5000 Unit(s) SubCutaneous every 12 hours  insulin glargine Injectable (LANTUS) 40 Unit(s) SubCutaneous at bedtime  insulin lispro (ADMELOG) corrective regimen sliding scale   SubCutaneous three times a day before meals  insulin lispro (ADMELOG) corrective regimen sliding scale   SubCutaneous at bedtime  insulin lispro Injectable (ADMELOG) 12 Unit(s) SubCutaneous three times a day before meals  lactated ringers. 1000 milliLiter(s) (50 mL/Hr) IV Continuous <Continuous>  metoprolol succinate ER 75 milliGRAM(s) Oral daily  nortriptyline 10 milliGRAM(s) Oral at bedtime  saline laxative (FLEET) Rectal Enema 1 Enema Rectal once  senna 2 Tablet(s) Oral at bedtime  sodium bicarbonate 650 milliGRAM(s) Oral two times a day  trimethoprim   80 mG/sulfamethoxazole 400 mG 1 Tablet(s) Oral daily    MEDICATIONS  (PRN):  acetaminophen   Tablet .. 650 milliGRAM(s) Oral every 6 hours PRN Mild Pain (1 - 3)  HYDROmorphone   Tablet 2 milliGRAM(s) Oral every 6 hours PRN Moderate Pain (4 - 6)  magnesium hydroxide Suspension 30 milliLiter(s) Oral daily PRN Constipation  polyethylene glycol 3350 17 Gram(s) Oral daily PRN Constipation      Vital Signs Last 24 Hrs  T(C): 36.3 (27 Mar 2021 16:41), Max: 36.7 (27 Mar 2021 00:05)  T(F): 97.4 (27 Mar 2021 16:41), Max: 98 (27 Mar 2021 00:05)  HR: 60 (27 Mar 2021 16:41) (57 - 81)  BP: 145/77 (27 Mar 2021 16:41) (143/78 - 153/84)  BP(mean): --  RR: 18 (27 Mar 2021 16:41) (18 - 18)  SpO2: 98% (27 Mar 2021 16:41) (97% - 98%)    PHYSICAL EXAM:  GENERAL: NAD  HEAD:  Atraumatic, Normocephalic  EYES: EOMI, PERRLA, conjunctiva and sclera clear  NECK: Supple, No JVD  CHEST/LUNG: Clear to auscultation bilaterally; No wheeze  HEART: Regular rate and rhythm; No murmurs, rubs, or gallops  ABDOMEN: Soft, Nontender, Nondistended; Bowel sounds present  EXTREMITIES:  2+ Peripheral Pulses, No clubbing, cyanosis, or edema  PSYCH: AAOx3  NEUROLOGY: non-focal  SKIN: No rashes or lesions    LABS:                        18.4   10.09 )-----------( 508      ( 26 Mar 2021 07:06 )             56.2     03-27    130<L>  |  100  |  85<H>  ----------------------------<  402<H>  5.5<H>   |  18<L>  |  1.53<H>    Ca    8.2<L>      27 Mar 2021 07:36  Phos  4.4     03-27  Mg     2.6     03-27    TPro  5.7<L>  /  Alb  2.8<L>  /  TBili  0.2  /  DBili  x   /  AST  37  /  ALT  134<H>  /  AlkPhos  120  03-26                RADIOLOGY & ADDITIONAL TESTS:    Imaging Personally Reviewed:    Consultant(s) Notes Reviewed:      Care Discussed with Consultants/Other Providers:

## 2021-03-28 LAB
ANION GAP SERPL CALC-SCNC: 13 MMOL/L — SIGNIFICANT CHANGE UP (ref 5–17)
BUN SERPL-MCNC: 82 MG/DL — HIGH (ref 7–23)
CALCIUM SERPL-MCNC: 8.5 MG/DL — SIGNIFICANT CHANGE UP (ref 8.4–10.5)
CHLORIDE SERPL-SCNC: 101 MMOL/L — SIGNIFICANT CHANGE UP (ref 96–108)
CO2 SERPL-SCNC: 17 MMOL/L — LOW (ref 22–31)
CREAT ?TM UR-MCNC: 43 MG/DL — SIGNIFICANT CHANGE UP
CREAT SERPL-MCNC: 1.54 MG/DL — HIGH (ref 0.5–1.3)
CULTURE RESULTS: SIGNIFICANT CHANGE UP
CULTURE RESULTS: SIGNIFICANT CHANGE UP
GLUCOSE BLDC GLUCOMTR-MCNC: 217 MG/DL — HIGH (ref 70–99)
GLUCOSE BLDC GLUCOMTR-MCNC: 239 MG/DL — HIGH (ref 70–99)
GLUCOSE BLDC GLUCOMTR-MCNC: 243 MG/DL — HIGH (ref 70–99)
GLUCOSE BLDC GLUCOMTR-MCNC: 258 MG/DL — HIGH (ref 70–99)
GLUCOSE BLDC GLUCOMTR-MCNC: 274 MG/DL — HIGH (ref 70–99)
GLUCOSE BLDC GLUCOMTR-MCNC: 321 MG/DL — HIGH (ref 70–99)
GLUCOSE SERPL-MCNC: 273 MG/DL — HIGH (ref 70–99)
HCT VFR BLD CALC: 56.9 % — HIGH (ref 39–50)
HGB BLD-MCNC: 18.9 G/DL — HIGH (ref 13–17)
MCHC RBC-ENTMCNC: 29.1 PG — SIGNIFICANT CHANGE UP (ref 27–34)
MCHC RBC-ENTMCNC: 33.2 GM/DL — SIGNIFICANT CHANGE UP (ref 32–36)
MCV RBC AUTO: 87.7 FL — SIGNIFICANT CHANGE UP (ref 80–100)
NRBC # BLD: 0 /100 WBCS — SIGNIFICANT CHANGE UP (ref 0–0)
OSMOLALITY UR: 464 MOS/KG — SIGNIFICANT CHANGE UP (ref 300–900)
PLATELET # BLD AUTO: 450 K/UL — HIGH (ref 150–400)
POTASSIUM SERPL-MCNC: 5.1 MMOL/L — SIGNIFICANT CHANGE UP (ref 3.5–5.3)
POTASSIUM SERPL-SCNC: 5.1 MMOL/L — SIGNIFICANT CHANGE UP (ref 3.5–5.3)
POTASSIUM UR-SCNC: 22 MMOL/L — SIGNIFICANT CHANGE UP
RBC # BLD: 6.49 M/UL — HIGH (ref 4.2–5.8)
RBC # FLD: 13.6 % — SIGNIFICANT CHANGE UP (ref 10.3–14.5)
SARS-COV-2 RNA SPEC QL NAA+PROBE: SIGNIFICANT CHANGE UP
SODIUM SERPL-SCNC: 131 MMOL/L — LOW (ref 135–145)
SODIUM UR-SCNC: 48 MMOL/L — SIGNIFICANT CHANGE UP
SPECIMEN SOURCE: SIGNIFICANT CHANGE UP
SPECIMEN SOURCE: SIGNIFICANT CHANGE UP
WBC # BLD: 9.73 K/UL — SIGNIFICANT CHANGE UP (ref 3.8–10.5)
WBC # FLD AUTO: 9.73 K/UL — SIGNIFICANT CHANGE UP (ref 3.8–10.5)

## 2021-03-28 RX ORDER — INSULIN LISPRO 100/ML
14 VIAL (ML) SUBCUTANEOUS
Refills: 0 | Status: DISCONTINUED | OUTPATIENT
Start: 2021-03-28 | End: 2021-03-29

## 2021-03-28 RX ADMIN — Medication 8: at 17:01

## 2021-03-28 RX ADMIN — Medication 75 MILLIGRAM(S): at 05:05

## 2021-03-28 RX ADMIN — Medication 4 MILLIGRAM(S): at 05:05

## 2021-03-28 RX ADMIN — AMLODIPINE BESYLATE 10 MILLIGRAM(S): 2.5 TABLET ORAL at 05:06

## 2021-03-28 RX ADMIN — INSULIN GLARGINE 40 UNIT(S): 100 INJECTION, SOLUTION SUBCUTANEOUS at 22:38

## 2021-03-28 RX ADMIN — ATORVASTATIN CALCIUM 80 MILLIGRAM(S): 80 TABLET, FILM COATED ORAL at 21:01

## 2021-03-28 RX ADMIN — HYDROMORPHONE HYDROCHLORIDE 2 MILLIGRAM(S): 2 INJECTION INTRAMUSCULAR; INTRAVENOUS; SUBCUTANEOUS at 02:30

## 2021-03-28 RX ADMIN — SENNA PLUS 2 TABLET(S): 8.6 TABLET ORAL at 21:02

## 2021-03-28 RX ADMIN — HEPARIN SODIUM 5000 UNIT(S): 5000 INJECTION INTRAVENOUS; SUBCUTANEOUS at 05:05

## 2021-03-28 RX ADMIN — Medication 4 MILLIGRAM(S): at 18:13

## 2021-03-28 RX ADMIN — Medication 1 TABLET(S): at 12:09

## 2021-03-28 RX ADMIN — HYDROMORPHONE HYDROCHLORIDE 2 MILLIGRAM(S): 2 INJECTION INTRAMUSCULAR; INTRAVENOUS; SUBCUTANEOUS at 12:35

## 2021-03-28 RX ADMIN — Medication 14 UNIT(S): at 16:59

## 2021-03-28 RX ADMIN — HYDROMORPHONE HYDROCHLORIDE 2 MILLIGRAM(S): 2 INJECTION INTRAMUSCULAR; INTRAVENOUS; SUBCUTANEOUS at 18:44

## 2021-03-28 RX ADMIN — Medication 300 MILLIGRAM(S): at 12:08

## 2021-03-28 RX ADMIN — Medication 650 MILLIGRAM(S): at 05:06

## 2021-03-28 RX ADMIN — HYDROMORPHONE HYDROCHLORIDE 2 MILLIGRAM(S): 2 INJECTION INTRAMUSCULAR; INTRAVENOUS; SUBCUTANEOUS at 01:58

## 2021-03-28 RX ADMIN — Medication 4 MILLIGRAM(S): at 12:09

## 2021-03-28 RX ADMIN — HEPARIN SODIUM 5000 UNIT(S): 5000 INJECTION INTRAVENOUS; SUBCUTANEOUS at 18:14

## 2021-03-28 RX ADMIN — HYDROMORPHONE HYDROCHLORIDE 2 MILLIGRAM(S): 2 INJECTION INTRAMUSCULAR; INTRAVENOUS; SUBCUTANEOUS at 12:08

## 2021-03-28 RX ADMIN — NORTRIPTYLINE HYDROCHLORIDE 10 MILLIGRAM(S): 10 CAPSULE ORAL at 21:01

## 2021-03-28 RX ADMIN — Medication 650 MILLIGRAM(S): at 18:14

## 2021-03-28 RX ADMIN — Medication 4: at 08:03

## 2021-03-28 RX ADMIN — Medication 4: at 12:07

## 2021-03-28 RX ADMIN — HYDROMORPHONE HYDROCHLORIDE 2 MILLIGRAM(S): 2 INJECTION INTRAMUSCULAR; INTRAVENOUS; SUBCUTANEOUS at 18:14

## 2021-03-28 RX ADMIN — Medication 12 UNIT(S): at 12:07

## 2021-03-28 RX ADMIN — Medication 12 UNIT(S): at 08:04

## 2021-03-28 NOTE — PROGRESS NOTE ADULT - SUBJECTIVE AND OBJECTIVE BOX
Patient is a 60y old  Male who presents with a chief complaint of leg weakness (28 Mar 2021 10:39)    Coverage for Dr. Caden Ennis   SUBJECTIVE / OVERNIGHT EVENTS: Comfortable   Review of Systems  chest pain no  palpitations no  sob no  nausea no  headache no    MEDICATIONS  (STANDING):  allopurinol 300 milliGRAM(s) Oral daily  amLODIPine   Tablet 10 milliGRAM(s) Oral daily  atorvastatin 80 milliGRAM(s) Oral at bedtime  dexAMETHasone  Injectable 4 milliGRAM(s) IV Push every 6 hours  dextrose 40% Gel 15 Gram(s) Oral once  dextrose 5%. 1000 milliLiter(s) (50 mL/Hr) IV Continuous <Continuous>  dextrose 5%. 1000 milliLiter(s) (100 mL/Hr) IV Continuous <Continuous>  dextrose 50% Injectable 12.5 Gram(s) IV Push once  dextrose 50% Injectable 25 Gram(s) IV Push once  dextrose 50% Injectable 25 Gram(s) IV Push once  glucagon  Injectable 1 milliGRAM(s) IntraMuscular once  heparin   Injectable 5000 Unit(s) SubCutaneous every 12 hours  insulin glargine Injectable (LANTUS) 40 Unit(s) SubCutaneous at bedtime  insulin lispro (ADMELOG) corrective regimen sliding scale   SubCutaneous at bedtime  insulin lispro (ADMELOG) corrective regimen sliding scale   SubCutaneous three times a day before meals  insulin lispro Injectable (ADMELOG) 14 Unit(s) SubCutaneous three times a day before meals  lactated ringers. 1000 milliLiter(s) (50 mL/Hr) IV Continuous <Continuous>  metoprolol succinate ER 75 milliGRAM(s) Oral daily  nortriptyline 10 milliGRAM(s) Oral at bedtime  saline laxative (FLEET) Rectal Enema 1 Enema Rectal once  senna 2 Tablet(s) Oral at bedtime  sodium bicarbonate 650 milliGRAM(s) Oral two times a day  trimethoprim   80 mG/sulfamethoxazole 400 mG 1 Tablet(s) Oral daily    MEDICATIONS  (PRN):  acetaminophen   Tablet .. 650 milliGRAM(s) Oral every 6 hours PRN Mild Pain (1 - 3)  HYDROmorphone   Tablet 2 milliGRAM(s) Oral every 6 hours PRN Moderate Pain (4 - 6)  magnesium hydroxide Suspension 30 milliLiter(s) Oral daily PRN Constipation  polyethylene glycol 3350 17 Gram(s) Oral daily PRN Constipation      Vital Signs Last 24 Hrs  T(C): 36.8 (28 Mar 2021 16:45), Max: 36.8 (28 Mar 2021 00:05)  T(F): 98.3 (28 Mar 2021 16:45), Max: 98.3 (28 Mar 2021 04:58)  HR: 77 (28 Mar 2021 16:45) (61 - 77)  BP: 135/81 (28 Mar 2021 16:45) (118/82 - 145/76)  BP(mean): --  RR: 18 (28 Mar 2021 16:45) (18 - 18)  SpO2: 95% (28 Mar 2021 16:45) (95% - 97%)    PHYSICAL EXAM:  GENERAL: NAD, well-developed  HEAD:  Atraumatic, Normocephalic  EYES: EOMI, PERRLA, conjunctiva and sclera clear  NECK: Supple, No JVD  CHEST/LUNG: Clear to auscultation bilaterally; No wheeze  HEART: Regular rate and rhythm; No murmurs, rubs, or gallops  ABDOMEN: Soft, Nontender, Nondistended; Bowel sounds present  EXTREMITIES:  2+ Peripheral Pulses, No clubbing, cyanosis, or edema  PSYCH: AAOx3  NEUROLOGY: non-focal  SKIN: No rashes or lesions    LABS:                        18.9   9.73  )-----------( 450      ( 28 Mar 2021 10:46 )             56.9     03-28    131<L>  |  101  |  82<H>  ----------------------------<  273<H>  5.1   |  17<L>  |  1.54<H>    Ca    8.5      28 Mar 2021 10:43  Phos  4.4     03-27  Mg     2.4     03-27                  RADIOLOGY & ADDITIONAL TESTS:    Imaging Personally Reviewed:    Consultant(s) Notes Reviewed:      Care Discussed with Consultants/Other Providers:

## 2021-03-28 NOTE — PROGRESS NOTE ADULT - ASSESSMENT
pt w/ scrotal swellin g / pelvic lesion/ weight loss    Cont steroids   add bactrin for proph as per id   taper as per neuro. onc / sx  excisional Ln bx by sx  + for Bcell  f/u final path   tx as per onc  neg  lext dopplers  dm/uncontrolled  fsg riss  c/w insulin  endo f/u  dvt proph  htn  c/w meds  neuro f/u  lp  neg thus far for leptomeningeal disease  id f/u   neurosx f/u  no sx now as per neuro sx  walking difficulty/spinal stenosis   neuro f/u  card  f/u  urology f/u   no sx now  b/l hydrocele  bowel regimen    Stanislav Vo MD pager [phone]

## 2021-03-28 NOTE — PROGRESS NOTE ADULT - ASSESSMENT
Impression:  This is a 60y year old Male  who presents with the chief complaint of low back pain and leg weakness. 59yo M with hx HTN, DM, HLD, COVID 2020 presents with LE pain and scrotal swelling. Patient was hospitalized in Health system in Feb 2021 where CT Ab/P found 5.8x2.5cm lesion on the right pelvis concerning for malignancy with sclerotic lesion at L5. Pt states since early 2021 inc pain and weakness in the right leg, at first he was limping but now cannot walk.  He has pain in the feet, describes as pinching and needles like sensation.  Starting to have sx in left leg as well. Patient left AMA before additional malignancy workup was performed.  In the last month, patient had 45lb unintentional weight loss and decreased appetite. Notes abdominal swelling, mostly on the left side with tenderness along with b/l scrotal swelling.  At one point given neurontin, not helpful.  Denies sx in arms, changes in speech, swallow, vision, bladder control.  Says issues with bowel movements.     MRI done showing diffuse nerve root enhancement involving the roots of the cauda equina with some mild root thickening though the dominant finding is the enhancement.  There is also a nonspecific lesion in the L5 vertebral body and a possible septic facet at L4-5.  However ID is not suspicious for septic facet though cultures pending.    NSx consulted for bone abnormalities and does not feel candidate for inpatient surgery at this time and recommended outpatient follow-up    s/p LP , high lymphocytes prelim, protein slightly elevated, no cx growth, awaiting official cytology, per pathologist Dr. Carney ([phone]) small lymphocytes not suggestive of lymphoma in CSF.      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    Will follow up rest of CSF studies     MRI of T-spine as well as C spine without new pathology    infectious etiology raised due to findings of  L4-5 facet and potential immunosuppression in setting of possible lymphoma.  ID following and this appears less likely     Doubt demyelinating (AIDP or CIDP).  This is inconsistent with time course and the asx of the findings    Regarding the high cell protein of the three options being demyelinating, infx or neoplastic, for the reasons outlined above, seem most consistent with neoplastic.  The lack of postiive cytology on the LP is not surprising as this is often the case, the classic teaching is that it requires up to three spinal taps to reasonably be assured that this is not a false negative.  Regarding the cauda equina findings, while the MRI indicated involved as there is diffuse root infiltration, this is not a "classic cauda equina syndrome" in which there is a large space occupying lesion affecting the nerves requiring immediate NS evacuation.  Fortunately he has had some improvement in pain with the steroids, though that may reduce future CSF cytology yields if LP done in the future.  Would await results of frozen  As he is quite weak in the leg as a result of cauda equina findings that may improve with tx such as radiation, will defer tx to onc and rad onc .    neuro stable  will follow

## 2021-03-28 NOTE — PROGRESS NOTE ADULT - SUBJECTIVE AND OBJECTIVE BOX
Admitting Diagnosis:  Abnormal weight loss [R63.4]  ABNORMAL WEIGHT LOSS      Background:  This is a 60y year old Male  who presents with the chief complaint of low back pain and leg weakness. 61yo M with hx HTN, DM, HLD, COVID 2020 presents with LE pain and scrotal swelling. Patient was hospitalized in [facility] in Feb 2021 where CT Ab/P found 5.8x2.5cm lesion on the right pelvis concerning for malignancy with sclerotic lesion at L5. Pt states since early 2021 inc pain and weakness in the right leg, at first he was limping but now cannot walk.  He has pain in the feet, describes as pinching and needles like sensation.  Starting to have sx in left leg as well. Patient left AMA before additional malignancy workup was performed.  In the last month, patient had 45lb unintentional weight loss and decreased appetite. Notes abdominal swelling, mostly on the left side with tenderness along with b/l scrotal swelling.  At one point given neurontin, not helpful.  Denies sx in arms, changes in speech, swallow, vision, bladder control.  Says issues with bowel movements.     MRI done showing diffuse nerve root enhancement involving the roots of the cauda equina with some mild root thickening though the dominant finding is the enhancement.  There is also a nonspecific lesion in the L5 vertebral body and a possible septic facet at L4-5.       Interval Hx:  s/p LP , high lymphocytes prelim, protein slightly elevated, no cx growth, awaiting cytology.  Flow with small lymphocytes. our team has  Dr. Carney at [phone] who states that prelim cytology is small lymphocytes without large lymphocytes to suggest B-cell lymphoma in the spine, however official read pending    getting steroids, improved pain      awaiting path     no new complaints    ******    Past Medical History:  Diabetes [E11.9]    Hyperlipidemia [E78.5]    Hypertension, unspecified type [I10]        Past Surgical History:  No significant past surgical history [[id number]]        Social History:  No toxic habits    Family History:  FAMILY HISTORY:  No pertinent family history in first degree relatives        Allergies:  No Known Allergies      ROS:  Constitutional: Patient offers no complaints of fevers or significant weight loss  Ears, Nose, Mouth and Throat: The patient presents with no abnormalities of the head, ears, eyes, nose or throat  Skin: Patient offers no concerns of new rashes or lesions  Respiratory: The patient presents with no abnormalities of the respiratory tract  Cardiovascular: The patient presents with no cardiac abnormalities  Gastrointestinal: The patient presents with no abnormalities of the GI system  Genitourinary: The patient presents with no dysuria, hematuria or frequent urination  Neurological: See HPI  Endocrine: Patient offers no complaints of excessive thirst, urination, or heat/cold intolerance    Advanced care planning reviewed and noted in the chart.      Medications:  acetaminophen   Tablet .. 650 milliGRAM(s) Oral every 6 hours PRN  allopurinol 300 milliGRAM(s) Oral daily  amLODIPine   Tablet 10 milliGRAM(s) Oral daily  atorvastatin 80 milliGRAM(s) Oral at bedtime  dexAMETHasone  Injectable 4 milliGRAM(s) IV Push every 6 hours  dextrose 40% Gel 15 Gram(s) Oral once  dextrose 5%. 1000 milliLiter(s) IV Continuous <Continuous>  dextrose 5%. 1000 milliLiter(s) IV Continuous <Continuous>  dextrose 50% Injectable 12.5 Gram(s) IV Push once  dextrose 50% Injectable 25 Gram(s) IV Push once  dextrose 50% Injectable 25 Gram(s) IV Push once  glucagon  Injectable 1 milliGRAM(s) IntraMuscular once  heparin   Injectable 5000 Unit(s) SubCutaneous every 12 hours  HYDROmorphone   Tablet 2 milliGRAM(s) Oral every 6 hours PRN  insulin glargine Injectable (LANTUS) 40 Unit(s) SubCutaneous at bedtime  insulin lispro (ADMELOG) corrective regimen sliding scale   SubCutaneous at bedtime  insulin lispro (ADMELOG) corrective regimen sliding scale   SubCutaneous three times a day before meals  insulin lispro Injectable (ADMELOG) 12 Unit(s) SubCutaneous three times a day before meals  lactated ringers. 1000 milliLiter(s) IV Continuous <Continuous>  magnesium hydroxide Suspension 30 milliLiter(s) Oral daily PRN  metoprolol succinate ER 75 milliGRAM(s) Oral daily  nortriptyline 10 milliGRAM(s) Oral at bedtime  polyethylene glycol 3350 17 Gram(s) Oral daily PRN  saline laxative (FLEET) Rectal Enema 1 Enema Rectal once  senna 2 Tablet(s) Oral at bedtime  sodium bicarbonate 650 milliGRAM(s) Oral two times a day  trimethoprim   80 mG/sulfamethoxazole 400 mG 1 Tablet(s) Oral daily      Labs:    03-27    132<L>  |  103  |  80<H>  ----------------------------<  262<H>  5.2   |  17<L>  |  1.65<H>    Ca    8.3<L>      27 Mar 2021 20:16  Phos  4.4     03-27  Mg     2.4     03-27      CAPILLARY BLOOD GLUCOSE      POCT Blood Glucose.: 239 mg/dL (28 Mar 2021 08:00)  POCT Blood Glucose.: 258 mg/dL (28 Mar 2021 03:06)  POCT Blood Glucose.: 188 mg/dL (27 Mar 2021 21:30)  POCT Blood Glucose.: 359 mg/dL (27 Mar 2021 17:10)  POCT Blood Glucose.: 254 mg/dL (27 Mar 2021 11:48)              Vitals:  Vital Signs Last 24 Hrs  T(C): 36.3 (28 Mar 2021 10:17), Max: 36.8 (28 Mar 2021 00:05)  T(F): 97.4 (28 Mar 2021 10:17), Max: 98.3 (28 Mar 2021 04:58)  HR: 75 (28 Mar 2021 10:17) (60 - 75)  BP: 118/82 (28 Mar 2021 10:17) (118/82 - 145/77)  BP(mean): --  RR: 18 (28 Mar 2021 10:17) (18 - 18)  SpO2: 97% (28 Mar 2021 10:17) (96% - 98%)  NEUROLOGICAL EXAM:    Mental status: Awake, alert, and in no apparent distress. Oriented to person, place and time. Language function is normal. speech clear and fluent. Recent memory, digit span and concentration were normal.     Cranial Nerves: Pupils were equal, round, reactive to light. Extraocular movements were intact. Visual field were full. Facial sensation was intact to light touch. There was no facial asymmetry. The palate was upgoing symmetrically and tongue was midline. Hearing acuity was intact to finger rub AU. Shoulder shrug was full bilaterally    Motor exam: Bulk and tone were normal. Strength was 5/5 - arms.  Left leg  5/5 except dorsi 5-/5.  Right leg - low tone.  hip flexion/knee ext 2/5, dorsiflexion 4/5. Fine finger movements were symmetric and normal. There was no pronator drift   pain on palpation of the leg    Reflexes: 2+ in the bilateral upper extremities. absent in legs. Toes were downgoing bilaterally.     Sensation: Intact to light touch, temperature, vibration and proprioception. says when touch right leg it is painful    Coordination: Finger-nose-finger was without dysmetria. cannot perform  heel to shin    Gait: deferred    < from: CT Chest w/ IV Cont (03.14.21 @ 10:12) >    EXAM:  CT ABDOMEN AND PELVIS IC                          EXAM:  CT CHEST IC                          PROCEDURE DATE:  03/14/2021      IMPRESSION:  Enlarged right iliac lymph node measuring 5.9 x 2.4 cm. Additional smaller right iliac nodes are seen. There are also bilateral groin lymph nodes.      MOLINA PUGH M.D., RADIOLOGY RESIDENT  This documenthas been electronically signed.  GINA JENKINS M.D., ATTENDING RADIOLOGIST  This document has been electronically signed. Mar 14 2021 12:50PM    < end of copied text >          EXAM:  MR SPINE LUMBAR WAW IC                            PROCEDURE DATE:  03/15/2021            INTERPRETATION:  INDICATIONS:  Increased low back pain and right leg weakness for 4 months    TECHNIQUE:  Sagittal T1 weighted and T2 weighted images of the lumbosacral spine as well as axial T1 weighted and T2 weighted images were obtained.    COMPARISON EXAMINATION: None.    FINDINGS:  VERTEBRAL BODIES AND DISCS:  Nonspecific lesion in L1 low signal T1 hyperintense signal T2 with some enhancement.  ALIGNMENT:  No subluxations.  L1-L2 LEVEL:  Normal.  L2-L3 LEVEL:  Asymmetric bulge right with disc material in the region of the right neural foramen at this level. Clinical correlation for right L2 radiculopathy  L3-L4 LEVEL: Symmetric bulge with annular fissure in the 7:30 position. Bilateral facet arthropathy at this level.  L4-L5 LEVEL: Asymmetric bulge to the left with focal left-sided protrusion and some mass impression on the intracanal left L5 root.. Bilateral facet arthropathy.Some changes in the left paraspinal musculature extending from the left facet joint may represent septic facet as enhancement is seen in association with hyperintense T2 signal with associated muscular changes (9:19)  L5-S1 LEVEL:  Slight signal hyperintensity at the disc space with some enhancement ventrally without associated endplate abnormality favors degenerative change. Prominent asymmetric disc bulge with Bilateral foraminal disc material which is causing significant nerve root compression on the right greater than left. May be the cause of the patient's known right leg weakness.  SPINAL CANAL:  Evidence of diffuse nerve root enhancement appreciated involving the roots of the cauda equina with some mild thickening appreciated.  CONUS MEDULLARIS:  Normal.  MISCELLANEOUS:  None    IMPRESSION:  Diffuse nerve root enhancement involving the roots of the cauda equina with some mild root thickening though the dominant finding is the enhancement. Differential possibilities include acute inflammatory demyelinating polyneuropathy( Guillan McKittrick) versus chronic inflammatory demyelinating polyneuropathy(CIDP). Inflammatory pathologies such as sarcoid or infectious etiologies should be considered. Included in the differential is leptomeningeal carcinomatosis. Correlation with CSF strongly recommended. Nonspecific lesion in the L5 vertebral body as described. Enhancement with associated T2 abnormality involving the left L4-5 facet with extension to the paraspinal musculature may indicate a septic facet at this level. Correlation with clinical parameters suggested. Degenerative changes with disc material in the neural foramina at the L2-3 and L5-S1 levels on the right as well as in the L5-S1 neural foramen on the left. Prominent bulge with more focal protrusion L4-5 left                MONTY TRAN MD; Attending Radiologist  This document has been electronically signed. Mar 15 2021  8:19PM      EXAM:  MR SPINE CERVICAL                          EXAM:  MR BRAIN                            PROCEDURE DATE:  03/24/2021            INTERPRETATION:  INDICATIONS:  Right leg weakness and bilateral leg pain. Rule out metastatic disease    Brain:    TECHNIQUE:  Multiplanar imaging was performed using T1 weighted, T2 weighted and FLAIR sequences.  Diffusion weighted and susceptibility sensitive images were also obtained.    Patient could not continue the exam. Intravenous contrast was not administered.    COMPARISON EXAMINATION:  None.    FINDINGS: The study is degraded by motion artifact.  VENTRICLES AND SULCI:  Normal.  INTRA-AXIAL:  No mass, abnormal signal or evidence of acute cerebral ischemia.  EXTRA-AXIAL:  No mass or collection.  VISUALIZED SINUSES:  Mild mucosal thickening  VISUALIZED MASTOIDS:  Clear.  CALVARIUM:  Normal.  CAROTID FLOW VOIDS:  Present.  MISCELLANEOUS:  Tornwaldt cyst within the midline nasopharynx.    Cervical spine:    TECHNIQUE:  Sagittal T1 weighted, T2 weighted and STIR images of the cervical spine as well as axial T1 weighted and T2 weighted images were obtained.    The patient could not continue the exam. The study is limited by motion artifact.    COMPARISON EXAMINATION: None.    FINDINGS:  VERTEBRAL BODIES AND DISCS: Normal in height. No abnormal signal is identified. Multilevel marginal osteophyte formation.  ALIGNMENT:  No subluxations. Straightening of the cervical lordosis  C2-C3 LEVEL:  Normal.  C3-C4 LEVEL:  Mild disc bulge/ridge  C4-C5 LEVEL:  Diffuse disc bulge/ridge  C5-C6 LEVEL:  Diffuse disc bulge/ridge with bilateral foraminal narrowing  C6-C7 LEVEL:  Disc bulge/ridge with left greater than right foraminal narrowing  C7-T1 LEVEL:   Normal.  SPINAL CORD: Normal.  SPINAL CANAL:  No other intradural or extradural defects are seen.  MISCELLANEOUS:  None.      IMPRESSION:  Brain:  Motion degraded exam.    No intracranial abnormality identified. Detection of metastatic disease is limited without intravenous contrast.    Cervical spine:  Motion limited exam.    Multilevel spondylosis.      FABIANO ALEXANDRA MD; Attending Radiologist  This document has been electronically signed. Mar 24 2021  8:44AM      < from: MR Thoracic Spine w/wo IV Cont (03.25.21 @ 23:23) >    EXAM:  MR SPINE THORACIC WAW IC                          EXAM:  MR SPINE CERVICAL IC                            PROCEDURE DATE:  03/25/2021            INTERPRETATION:  STUDY: MR CERVICAL SPINE WITH CONTRAST.  MR THORACIC SPINE WITH AND WITHOUT CONTRAST    CLINICAL INDICATION: Right leg weakness. Metastatic disease.    TECHNIQUE: Contrast-enhanced Multiplanar MR imaging of the cervical spine was performed. Multiplanar multisequence MR imaging of the thoracic spine was performed.    CONTRAST: 6 mL of Gadavist.    COMPARISON: MR cervical spine 3/24/2021.    FINDINGS:  Cervical spine:    There is slight kyphosis of usual cervical lordosis. There is no significant subluxation. Vertebral body height is maintained. There is no focal enhancing marrow replacing lesion. There is no significant loss of intervertebral disc height throughout the cervical spine.    There is no definite enhancement within the cervical cord. There is no epidural or paraspinal tumor.    Stable multilevel degenerative changes as described on the corresponding noncontrast MR of the cervical spine from 3/24/2021.    Thoracic spine:    There is maintenance of usual thoracic kyphosis. There is no listhesis or scoliosis. Vertebral body height is preserved throughout the thoracic spine. There is no focal STIR hyperintense or enhancing marrow replacing lesion. There is no significant loss of intervertebral disc height throughout the thoracic spine.    There is no high-grade central canal stenosis.    There is no definite signal abnormality or enhancement within the spinal cord. There is no epidural or paraspinal tumor.    There is a 1.3 cm right renal cyst.      IMPRESSION:  Cervical spine: No evidence of metastatic disease.  Thoracic spine: No evidence of metastatic disease.                MIRIAM RAHMAN M.D., ATTENDING RADIOLOGIST  This document has been electronically signed. Mar 26 2021 10:34AM    < end of copied text >

## 2021-03-28 NOTE — PROGRESS NOTE ADULT - ASSESSMENT
Echo 3/18/21: min MR, nl lv sys fx, small-moderate pericardial effusion, no evidence of pericardial tamponade     a/p  61 yo M with hx HTN, DM, HLD, COVID 2020 presents with LE pain and scrotal swelling    1. 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 HTN  -bp acceptable   -c/w amlodipine and bb    3. HLD  -c/w statin    4. GUANAKO  -cr elevated    -renal f/u    5. Pericardial effusion  -echo noted with min MR, nl lv sys fx, small-moderate pericardial effusion, no evidence of pericardial tamponade   -cont to monitor     6. Atypical Chest pain  -no further cp  -hsT, ck, ckmb neg, EKG without ischemic changes   -echo as above w nl lv sys fx, no segmental wall motion     dvt ppx       35 minutes spent on total encounter; more than 50% of the visit was spent counseling and/or coordinating care by the attending physician.

## 2021-03-28 NOTE — PROGRESS NOTE ADULT - SUBJECTIVE AND OBJECTIVE BOX
CARDIOLOGY FOLLOW UP NOTE - DR. GAMINO    Patient Name: MARVIN PHAM  Date of Service: 03-28-21       Subjective:  no new events    denies chest pain, dyspnea, palpitations, dizziness  ROS: otherwise negative   overnight events:      PHYSICAL EXAM:  T(C): 36.8 (03-28-21 @ 04:58), Max: 36.8 (03-28-21 @ 00:05)  HR: 71 (03-28-21 @ 04:58) (60 - 71)  BP: 131/77 (03-28-21 @ 04:58) (131/77 - 145/77)  RR: 18 (03-28-21 @ 04:58) (18 - 18)  SpO2: 96% (03-28-21 @ 04:58) (96% - 98%)  Wt(kg): --  I&O's Summary    27 Mar 2021 07:01  -  28 Mar 2021 07:00  --------------------------------------------------------  IN: 340 mL / OUT: 2000 mL / NET: -1660 mL      Daily     Daily     Appearance: Normal	  Cardiovascular: Normal S1 S2,RRR, No JVD, No murmurs  Respiratory: Lungs clear to auscultation	  Gastrointestinal:  Soft, Non-tender, + BS	  Extremities: Normal range of motion, No clubbing, cyanosis or edema      Home Medications:  amlodipine-benazepril 10 mg-40 mg oral capsule: 1 cap(s) orally once a day (15 Mar 2021 11:15)  HumaLOG 100 units/mL injectable solution: 8 unit(s) injectable 3 times a day (before meals) (15 Mar 2021 11:15)  Lipitor 80 mg oral tablet: 1 tab(s) orally once a day (15 Mar 2021 11:15)  Toprol-XL 50 mg oral tablet, extended release: 1 tab(s) orally once a day (15 Mar 2021 11:15)  Tresiba 100 units/mL subcutaneous solution: 40 unit(s) subcutaneous once a day (at bedtime) (15 Mar 2021 11:15)      MEDICATIONS  (STANDING):  allopurinol 300 milliGRAM(s) Oral daily  amLODIPine   Tablet 10 milliGRAM(s) Oral daily  atorvastatin 80 milliGRAM(s) Oral at bedtime  dexAMETHasone  Injectable 4 milliGRAM(s) IV Push every 6 hours  dextrose 40% Gel 15 Gram(s) Oral once  dextrose 5%. 1000 milliLiter(s) (50 mL/Hr) IV Continuous <Continuous>  dextrose 5%. 1000 milliLiter(s) (100 mL/Hr) IV Continuous <Continuous>  dextrose 50% Injectable 12.5 Gram(s) IV Push once  dextrose 50% Injectable 25 Gram(s) IV Push once  dextrose 50% Injectable 25 Gram(s) IV Push once  glucagon  Injectable 1 milliGRAM(s) IntraMuscular once  heparin   Injectable 5000 Unit(s) SubCutaneous every 12 hours  insulin glargine Injectable (LANTUS) 40 Unit(s) SubCutaneous at bedtime  insulin lispro (ADMELOG) corrective regimen sliding scale   SubCutaneous at bedtime  insulin lispro (ADMELOG) corrective regimen sliding scale   SubCutaneous three times a day before meals  insulin lispro Injectable (ADMELOG) 12 Unit(s) SubCutaneous three times a day before meals  lactated ringers. 1000 milliLiter(s) (50 mL/Hr) IV Continuous <Continuous>  metoprolol succinate ER 75 milliGRAM(s) Oral daily  nortriptyline 10 milliGRAM(s) Oral at bedtime  saline laxative (FLEET) Rectal Enema 1 Enema Rectal once  senna 2 Tablet(s) Oral at bedtime  sodium bicarbonate 650 milliGRAM(s) Oral two times a day  trimethoprim   80 mG/sulfamethoxazole 400 mG 1 Tablet(s) Oral daily      TELEMETRY: 	    ECG:  	  RADIOLOGY:   DIAGNOSTIC TESTING:  [ ] Echocardiogram:  [ ] Catheterization:  [ ] Stress Test:    OTHER: 	    LABS:	 	    CARDIAC MARKERS:            03-27    132<L>  |  103  |  80<H>  ----------------------------<  262<H>  5.2   |  17<L>  |  1.65<H>    Ca    8.3<L>      27 Mar 2021 20:16  Phos  4.4     03-27  Mg     2.4     03-27      proBNP:     Lipid Profile:   HgA1c:     Creatinine, Serum: 1.65 mg/dL (03-27-21 @ 20:16)  Creatinine, Serum: 1.53 mg/dL (03-27-21 @ 07:36)  Creatinine, Serum: 1.53 mg/dL (03-27-21 @ 07:36)  Creatinine, Serum: 1.43 mg/dL (03-26-21 @ 06:53)

## 2021-03-29 LAB
ANION GAP SERPL CALC-SCNC: 10 MMOL/L — SIGNIFICANT CHANGE UP (ref 5–17)
BASE EXCESS BLDV CALC-SCNC: -2.6 MMOL/L — LOW (ref -2–2)
BUN SERPL-MCNC: 85 MG/DL — HIGH (ref 7–23)
CA-I SERPL-SCNC: 1.16 MMOL/L — SIGNIFICANT CHANGE UP (ref 1.12–1.3)
CALCIUM SERPL-MCNC: 8.1 MG/DL — LOW (ref 8.4–10.5)
CHLORIDE BLDV-SCNC: 109 MMOL/L — HIGH (ref 96–108)
CHLORIDE SERPL-SCNC: 102 MMOL/L — SIGNIFICANT CHANGE UP (ref 96–108)
CO2 BLDV-SCNC: 22 MMOL/L — SIGNIFICANT CHANGE UP (ref 22–30)
CO2 SERPL-SCNC: 18 MMOL/L — LOW (ref 22–31)
CREAT SERPL-MCNC: 1.46 MG/DL — HIGH (ref 0.5–1.3)
GAS PNL BLDV: 130 MMOL/L — LOW (ref 135–145)
GAS PNL BLDV: SIGNIFICANT CHANGE UP
GAS PNL BLDV: SIGNIFICANT CHANGE UP
GLUCOSE BLDC GLUCOMTR-MCNC: 115 MG/DL — HIGH (ref 70–99)
GLUCOSE BLDC GLUCOMTR-MCNC: 118 MG/DL — HIGH (ref 70–99)
GLUCOSE BLDC GLUCOMTR-MCNC: 167 MG/DL — HIGH (ref 70–99)
GLUCOSE BLDC GLUCOMTR-MCNC: 167 MG/DL — HIGH (ref 70–99)
GLUCOSE BLDC GLUCOMTR-MCNC: 314 MG/DL — HIGH (ref 70–99)
GLUCOSE BLDV-MCNC: 135 MG/DL — HIGH (ref 70–99)
GLUCOSE SERPL-MCNC: 283 MG/DL — HIGH (ref 70–99)
HCO3 BLDV-SCNC: 21 MMOL/L — SIGNIFICANT CHANGE UP (ref 21–29)
HCT VFR BLDA CALC: 60 % — HIGH (ref 39–50)
HGB BLD CALC-MCNC: 19.6 G/DL — CRITICAL HIGH (ref 13–17)
LACTATE BLDV-MCNC: 2.3 MMOL/L — HIGH (ref 0.7–2)
OTHER CELLS CSF MANUAL: 27 ML/DL — HIGH (ref 18–22)
PCO2 BLDV: 37 MMHG — LOW (ref 42–55)
PH BLDV: 7.38 — SIGNIFICANT CHANGE UP (ref 7.35–7.45)
PO2 BLDV: 101 MMHG — HIGH (ref 25–45)
POTASSIUM BLDV-SCNC: 4.7 MMOL/L — SIGNIFICANT CHANGE UP (ref 3.5–5.3)
POTASSIUM SERPL-MCNC: 5.1 MMOL/L — SIGNIFICANT CHANGE UP (ref 3.5–5.3)
POTASSIUM SERPL-SCNC: 5.1 MMOL/L — SIGNIFICANT CHANGE UP (ref 3.5–5.3)
SAO2 % BLDV: 98 % — HIGH (ref 67–88)
SODIUM SERPL-SCNC: 130 MMOL/L — LOW (ref 135–145)

## 2021-03-29 PROCEDURE — 93010 ELECTROCARDIOGRAM REPORT: CPT

## 2021-03-29 PROCEDURE — 99232 SBSQ HOSP IP/OBS MODERATE 35: CPT

## 2021-03-29 RX ORDER — INSULIN LISPRO 100/ML
18 VIAL (ML) SUBCUTANEOUS
Refills: 0 | Status: DISCONTINUED | OUTPATIENT
Start: 2021-03-29 | End: 2021-03-29

## 2021-03-29 RX ORDER — ACETAMINOPHEN 500 MG
1000 TABLET ORAL ONCE
Refills: 0 | Status: COMPLETED | OUTPATIENT
Start: 2021-03-29 | End: 2021-03-29

## 2021-03-29 RX ORDER — DEXAMETHASONE 0.5 MG/5ML
4 ELIXIR ORAL EVERY 8 HOURS
Refills: 0 | Status: DISCONTINUED | OUTPATIENT
Start: 2021-03-29 | End: 2021-03-31

## 2021-03-29 RX ORDER — INSULIN GLARGINE 100 [IU]/ML
45 INJECTION, SOLUTION SUBCUTANEOUS AT BEDTIME
Refills: 0 | Status: DISCONTINUED | OUTPATIENT
Start: 2021-03-29 | End: 2021-03-30

## 2021-03-29 RX ORDER — INSULIN LISPRO 100/ML
9 VIAL (ML) SUBCUTANEOUS ONCE
Refills: 0 | Status: DISCONTINUED | OUTPATIENT
Start: 2021-03-29 | End: 2021-03-30

## 2021-03-29 RX ORDER — DEXAMETHASONE 0.5 MG/5ML
4 ELIXIR ORAL EVERY 6 HOURS
Refills: 0 | Status: DISCONTINUED | OUTPATIENT
Start: 2021-03-29 | End: 2021-03-29

## 2021-03-29 RX ORDER — INSULIN LISPRO 100/ML
18 VIAL (ML) SUBCUTANEOUS
Refills: 0 | Status: DISCONTINUED | OUTPATIENT
Start: 2021-03-29 | End: 2021-03-30

## 2021-03-29 RX ORDER — INSULIN LISPRO 100/ML
VIAL (ML) SUBCUTANEOUS
Refills: 0 | Status: DISCONTINUED | OUTPATIENT
Start: 2021-03-29 | End: 2021-03-31

## 2021-03-29 RX ORDER — INSULIN GLARGINE 100 [IU]/ML
40 INJECTION, SOLUTION SUBCUTANEOUS ONCE
Refills: 0 | Status: COMPLETED | OUTPATIENT
Start: 2021-03-29 | End: 2021-03-29

## 2021-03-29 RX ADMIN — AMLODIPINE BESYLATE 10 MILLIGRAM(S): 2.5 TABLET ORAL at 05:05

## 2021-03-29 RX ADMIN — Medication 2: at 17:14

## 2021-03-29 RX ADMIN — Medication 4 MILLIGRAM(S): at 21:05

## 2021-03-29 RX ADMIN — HYDROMORPHONE HYDROCHLORIDE 2 MILLIGRAM(S): 2 INJECTION INTRAMUSCULAR; INTRAVENOUS; SUBCUTANEOUS at 11:58

## 2021-03-29 RX ADMIN — Medication 1 TABLET(S): at 11:58

## 2021-03-29 RX ADMIN — HYDROMORPHONE HYDROCHLORIDE 2 MILLIGRAM(S): 2 INJECTION INTRAMUSCULAR; INTRAVENOUS; SUBCUTANEOUS at 05:06

## 2021-03-29 RX ADMIN — HEPARIN SODIUM 5000 UNIT(S): 5000 INJECTION INTRAVENOUS; SUBCUTANEOUS at 17:13

## 2021-03-29 RX ADMIN — Medication 75 MILLIGRAM(S): at 05:05

## 2021-03-29 RX ADMIN — Medication 650 MILLIGRAM(S): at 17:20

## 2021-03-29 RX ADMIN — Medication 650 MILLIGRAM(S): at 05:05

## 2021-03-29 RX ADMIN — HYDROMORPHONE HYDROCHLORIDE 2 MILLIGRAM(S): 2 INJECTION INTRAMUSCULAR; INTRAVENOUS; SUBCUTANEOUS at 06:10

## 2021-03-29 RX ADMIN — Medication 18 UNIT(S): at 17:14

## 2021-03-29 RX ADMIN — Medication 14 UNIT(S): at 07:46

## 2021-03-29 RX ADMIN — Medication 8: at 07:46

## 2021-03-29 RX ADMIN — INSULIN GLARGINE 40 UNIT(S): 100 INJECTION, SOLUTION SUBCUTANEOUS at 23:33

## 2021-03-29 RX ADMIN — HYDROMORPHONE HYDROCHLORIDE 2 MILLIGRAM(S): 2 INJECTION INTRAMUSCULAR; INTRAVENOUS; SUBCUTANEOUS at 12:28

## 2021-03-29 RX ADMIN — Medication 300 MILLIGRAM(S): at 11:58

## 2021-03-29 RX ADMIN — NORTRIPTYLINE HYDROCHLORIDE 10 MILLIGRAM(S): 10 CAPSULE ORAL at 21:05

## 2021-03-29 RX ADMIN — HEPARIN SODIUM 5000 UNIT(S): 5000 INJECTION INTRAVENOUS; SUBCUTANEOUS at 05:05

## 2021-03-29 RX ADMIN — ATORVASTATIN CALCIUM 80 MILLIGRAM(S): 80 TABLET, FILM COATED ORAL at 21:05

## 2021-03-29 RX ADMIN — Medication 4 MILLIGRAM(S): at 05:05

## 2021-03-29 RX ADMIN — SENNA PLUS 2 TABLET(S): 8.6 TABLET ORAL at 21:05

## 2021-03-29 NOTE — PROGRESS NOTE ADULT - ASSESSMENT
61yo M with hx HTN, DM, HLD, COVID 2020 presents with LE pain and scrotal swelling    - GUANAKO on top of underlying CKD stage 3?     :  Increase in BUN may be from Steroids      : Rule out Metabolic acidosis--Check VBG to ascertain acid base status(on sodium bicab 650 mg tabs bid)     : High normal potassium-May be due to bactrim .        : Hyponatremia with hyperglycemia ~ 132.       -CV- BP elevated earlier in the morning - stable; on metoprolol and amlodipine    -Heme/onc- B cell lymphoma;   Possible mediport before dc     -Renal dosing of meds to CrCL ~ 50ml/min-- On Allopurinol 300mg po qd   Off IVF     Sayed Eponym [facility]   [phone]

## 2021-03-29 NOTE — PROGRESS NOTE ADULT - SUBJECTIVE AND OBJECTIVE BOX
NEPHROLOGY[facility] [phone]        Patient seen and examined in bed.  He does not like the way he feels on the steroids         MEDICATIONS  (STANDING):  allopurinol 300 milliGRAM(s) Oral daily  amLODIPine   Tablet 10 milliGRAM(s) Oral daily  atorvastatin 80 milliGRAM(s) Oral at bedtime  dexAMETHasone  Injectable 4 milliGRAM(s) IV Push every 6 hours  dextrose 40% Gel 15 Gram(s) Oral once  dextrose 5%. 1000 milliLiter(s) (50 mL/Hr) IV Continuous <Continuous>  dextrose 5%. 1000 milliLiter(s) (100 mL/Hr) IV Continuous <Continuous>  dextrose 50% Injectable 12.5 Gram(s) IV Push once  dextrose 50% Injectable 25 Gram(s) IV Push once  dextrose 50% Injectable 25 Gram(s) IV Push once  glucagon  Injectable 1 milliGRAM(s) IntraMuscular once  heparin   Injectable 5000 Unit(s) SubCutaneous every 12 hours  insulin glargine Injectable (LANTUS) 45 Unit(s) SubCutaneous at bedtime  insulin lispro (ADMELOG) corrective regimen sliding scale   SubCutaneous three times a day before meals  insulin lispro (ADMELOG) corrective regimen sliding scale   SubCutaneous <User Schedule>  insulin lispro Injectable (ADMELOG) 18 Unit(s) SubCutaneous three times a day before meals  lactated ringers. 1000 milliLiter(s) (50 mL/Hr) IV Continuous <Continuous>  metoprolol succinate ER 75 milliGRAM(s) Oral daily  nortriptyline 10 milliGRAM(s) Oral at bedtime  saline laxative (FLEET) Rectal Enema 1 Enema Rectal once  senna 2 Tablet(s) Oral at bedtime  sodium bicarbonate 650 milliGRAM(s) Oral two times a day  trimethoprim   80 mG/sulfamethoxazole 400 mG 1 Tablet(s) Oral daily      VITAL:  T(C): , Max: 36.8 (03-28-21 @ 16:45)  T(F): , Max: 98.3 (03-28-21 @ 16:45)  HR: 64 (03-29-21 @ 07:34)  BP: 134/76 (03-29-21 @ 07:34)  BP(mean): 76 (03-29-21 @ 07:34)  RR: 18 (03-29-21 @ 07:34)  SpO2: 95% (03-29-21 @ 07:34)  Wt(kg): --    I and O's:    03-28 @ 07:01  -  03-29 @ 07:00  --------------------------------------------------------  IN: 0 mL / OUT: 2275 mL / NET: -2275 mL    03-29 @ 07:01  -  03-29 @ 12:41  --------------------------------------------------------  IN: 320 mL / OUT: 0 mL / NET: 320 mL          PHYSICAL EXAM:    Constitutional: NAD  Neck:  No JVD  Respiratory: CTAB/L  Cardiovascular: S1 and S2  Gastrointestinal: BS+, soft, NT/ND  Extremities: No peripheral edema  Neurological: A/O x 3, no focal deficits  Psychiatric: Normal mood, normal affect  : No Weir  Skin: No rashes  Access: Not applicable    LABS:                        18.9   9.73  )-----------( 450      ( 28 Mar 2021 10:46 )             56.9     03-29    130<L>  |  102  |  85<H>  ----------------------------<  283<H>  5.1   |  18<L>  |  1.46<H>    Ca    8.1<L>      29 Mar 2021 09:03  Mg     2.4     03-27            Urine Studies:    Creatinine, Random Urine: 43 mg/dL (03-28 @ 07:17)  Osmolality, Random Urine: 464 mos/kg (03-28 @ 07:17)  Potassium, Random Urine: 22 mmol/L (03-28 @ 07:17)  Sodium, Random Urine: 48 mmol/L (03-28 @ 07:17)        RADIOLOGY & ADDITIONAL STUDIES:

## 2021-03-29 NOTE — PROGRESS NOTE ADULT - NUTRITIONAL ASSESSMENT
This patient has been assessed with a concern for Malnutrition and has been determined to have a diagnosis/diagnoses of Severe protein-calorie malnutrition.    This patient is being managed with:   Diet Regular-  Consistent Carbohydrate {Evening Snack} (CSTCHOSN)  1500mL Fluid Restriction ([id number])  No Concentrated Potassium  Entered: Mar 27 2021  4:29PM

## 2021-03-29 NOTE — CONSULT NOTE ADULT - ASSESSMENT
52 yo M with HTN, HLD, T2DM and extensive smoking history >40 pack year p/w leg pain, >40 lb wt loss, fever and sweats found to have b/l inguinal adenopathy and L5 sclerotic lesion concerning for SUREKHA. Since admission, pt has been having intermittent left sided chest pain, lasting 3-4 hours, no radiation and no pleuritic or positional component. Cardiac markers (hsTN, CK and CKMB) was initially negative and unremarkable TTE on 3/19. EKG on 3/30 with inferior KENNETH without significant reciprocal changes. The chest pain has not changed in characteristics or intensity (described as stuttering 5-7/10).  VSS and at baseline.     With the ongoing workup for B-cell lymphoma and possible Cauda-Equina Syndrome, pt is not candidate for cath lab activation at this time. Would recommend medical management for ACS. If its ok with Neurosurgery/Neurology, would recommend  mg and Plavix 600 mg load and be continued on heparin gtt. Nitroglycerin 0.4 SL as needed and adequate pain control per primary team.     Plan discussed with Dr. Kahn (IC attending on call)     Thank you,   Hali Arriola MD  Cardiology Fellow, [facility]  All Cardiology service information can be found 24/7 on [email or web address], password: [email or web address]   52 yo M with HTN, HLD, T2DM and extensive smoking history >40 pack year p/w leg pain, >40 lb wt loss, fever and sweats found to have b/l inguinal adenopathy and L5 sclerotic lesion concerning for SUREKHA. Since admission, pt has been having intermittent left sided chest pain, lasting 3-4 hours, no radiation and no pleuritic or positional component. Cardiac markers (hsTN, CK and CKMB) was initially negative and unremarkable TTE on 3/19. EKG on 3/30 with inferior KENNETH without significant reciprocal changes. The chest pain has not changed in characteristics or intensity (described as stuttering 5-7/10).  VSS and at baseline.     With the ongoing workup for B-cell lymphoma and possible Cauda-Equina Syndrome, pt is not candidate for cath lab activation at this time. Would recommend medical management for ACS. If its ok with Neurosurgery/Neurology, would recommend  mg and Plavix 600 mg load and be continued on heparin gtt. Nitroglycerin 0.4 SL as needed and adequate pain control per primary team. Repeat TTE in AM.     Plan discussed with Dr. Kahn (IC attending on call)     Thank you,   Hali Arriola MD  Cardiology Fellow, [facility]  All Cardiology service information can be found 24/7 on [email or web address], password: [email or web address]   54 yo M with HTN, HLD, T2DM and extensive smoking history >40 pack year p/w leg pain, >40 lb wt loss, fever and sweats found to have b/l inguinal adenopathy and L5 sclerotic lesion concerning for CYRUS. Since admission, pt has been having intermittent left sided chest pain, lasting 3-4 hours, no radiation and no pleuritic or positional component. Cardiac markers (hsTN, CK and CKMB) was initially negative and unremarkable TTE on 3/19. EKG on 3/30 with inferior KENNETH without significant reciprocal changes. The chest pain has not changed in characteristics or intensity (described as stuttering 5-7/10).  VSS and at baseline.     With the ongoing workup for B-cell lymphoma, prior history of AMA at OSH, high risk of bleeding with possible Cauda-Equina Syndrome vs leptomeningeal spread of B-cell lymphoma, pt is not candidate for cath lab activation at this time. Would recommend medical management for ACS. If its ok with Neurosurgery/Neurology, would recommend  mg and Plavix 600 mg load and be continued on heparin gtt. Nitroglycerin 0.4 SL as needed and adequate pain control per primary team. Repeat TTE in AM.     Pt was re-evaluated at 230AM. Repeat EKG with stable ST changes in the inferior leads w/o significant reciprocal changes. Cyrus with + hsTN/CK/CKMB. Chest pain stable 5-6/10. NSGY cleared pt for DAPT and heparin gtt and pt received loading dose around 2AM and so far tolerated well. Had risk/benefit discussion with pt regarding invasive approach and conservative medical management at bedside. Pt is refused LHC/potential PCI at this time. Will continue medical treatment for ACS for now. Plan was discussed with Dr. Kahn (IC attending on call)  and medicine ROSALIE Dempsey.     Thank you,   Hali Arriola MD  Cardiology Fellow, [facility]  All Cardiology service information can be found 24/7 on [email or web address], password: [email or web address]

## 2021-03-29 NOTE — CONSULT NOTE ADULT - PROVIDER SPECIALTY LIST ADULT
Endocrinology
Neurology
Heme/Onc
Intervent Cardiology
Cardiology
Nephrology
Neurosurgery
Urology
Infectious Disease
Surgery

## 2021-03-29 NOTE — PROGRESS NOTE ADULT - SUBJECTIVE AND OBJECTIVE BOX
Patient is a 60y old  Male who presents with a chief complaint of leg weakness (28 Mar 2021 10:39)    Patient seen this morning. Feeling well. Weakness is stable.    MEDICATIONS  (STANDING):  allopurinol 300 milliGRAM(s) Oral daily  amLODIPine   Tablet 10 milliGRAM(s) Oral daily  atorvastatin 80 milliGRAM(s) Oral at bedtime  dexAMETHasone     Tablet 4 milliGRAM(s) Oral every 8 hours  dextrose 40% Gel 15 Gram(s) Oral once  dextrose 5%. 1000 milliLiter(s) (50 mL/Hr) IV Continuous <Continuous>  dextrose 5%. 1000 milliLiter(s) (100 mL/Hr) IV Continuous <Continuous>  dextrose 50% Injectable 12.5 Gram(s) IV Push once  dextrose 50% Injectable 25 Gram(s) IV Push once  dextrose 50% Injectable 25 Gram(s) IV Push once  glucagon  Injectable 1 milliGRAM(s) IntraMuscular once  heparin   Injectable 5000 Unit(s) SubCutaneous every 12 hours  insulin glargine Injectable (LANTUS) 45 Unit(s) SubCutaneous at bedtime  insulin lispro (ADMELOG) corrective regimen sliding scale   SubCutaneous three times a day before meals  insulin lispro (ADMELOG) corrective regimen sliding scale   SubCutaneous <User Schedule>  insulin lispro Injectable (ADMELOG) 18 Unit(s) SubCutaneous three times a day before meals  lactated ringers. 1000 milliLiter(s) (50 mL/Hr) IV Continuous <Continuous>  metoprolol succinate ER 75 milliGRAM(s) Oral daily  nortriptyline 10 milliGRAM(s) Oral at bedtime  saline laxative (FLEET) Rectal Enema 1 Enema Rectal once  senna 2 Tablet(s) Oral at bedtime  sodium bicarbonate 650 milliGRAM(s) Oral two times a day  trimethoprim   80 mG/sulfamethoxazole 400 mG 1 Tablet(s) Oral daily    MEDICATIONS  (PRN):  acetaminophen   Tablet .. 650 milliGRAM(s) Oral every 6 hours PRN Mild Pain (1 - 3)  HYDROmorphone   Tablet 2 milliGRAM(s) Oral every 6 hours PRN Moderate Pain (4 - 6)  magnesium hydroxide Suspension 30 milliLiter(s) Oral daily PRN Constipation  polyethylene glycol 3350 17 Gram(s) Oral daily PRN Constipation      Vital Signs Last 24 Hrs  T(C): 36.2 (29 Mar 2021 12:18), Max: 36.8 (28 Mar 2021 16:45)  T(F): 97.2 (29 Mar 2021 12:18), Max: 98.3 (28 Mar 2021 16:45)  HR: 70 (29 Mar 2021 12:18) (61 - 77)  BP: 133/79 (29 Mar 2021 12:18) (131/80 - 136/76)  BP(mean): 76 (29 Mar 2021 07:34) (76 - 76)  RR: 18 (29 Mar 2021 12:18) (18 - 18)  SpO2: 95% (29 Mar 2021 12:18) (94% - 96%)    PE  NAD  Awake, alert  Anicteric  No rash grossly                            18.9   9.73  )-----------( 450      ( 28 Mar 2021 10:46 )             56.9       03-29    130<L>  |  102  |  85<H>  ----------------------------<  283<H>  5.1   |  18<L>  |  1.46<H>    Ca    8.1<L>      29 Mar 2021 09:03  Mg     2.4     03-27

## 2021-03-29 NOTE — PROGRESS NOTE ADULT - ASSESSMENT
pt w/ scrotal swellin g / pelvic lesion/ weight loss    Cont steroids /change to po   added  bactrin for proph as per id   taper as per neuro. onc / sx  excisional Ln bx by sx  + for Bcell  f/u final path still pending  tx as per onc  neg  lext dopplers  dm/uncontrolled  fsg riss  c/w insulin  endo f/u  dvt proph  htn  c/w meds  neuro f/u  lp  neg thus far for leptomeningeal disease  id f/u   neurosx f/u  no sx now as per neuro sx  walking difficulty/spinal stenosis   neuro f/u  card  f/u  urology f/u   no sx now  b/l hydrocele  bowel regimen  pt

## 2021-03-29 NOTE — CHART NOTE - NSCHARTNOTEFT_GEN_A_CORE
Internal Medicine PA Note    Notified by RN that patient w/FS of 118 tonight after dinner. Patient being followed by endocrine for elevated FS 2/2 to DM and steroids for cauda equina. As discussed with endocrine fellow, Dr. Centeno if patient's FS remains in less than 100 to order Lantus 40 units. Repeat FS ordered for 2AM.   Patient is asymptomatic, and feeling well.   Plan d/w RN.     Vital Signs Last 24 Hrs  T(C): 36.5 (29 Mar 2021 16:29), Max: 36.8 (29 Mar 2021 05:00)  T(F): 97.7 (29 Mar 2021 16:29), Max: 98.3 (29 Mar 2021 05:00)  HR: 69 (29 Mar 2021 16:29) (61 - 70)  BP: 131/71 (29 Mar 2021 16:29) (131/71 - 136/76)  BP(mean): 76 (29 Mar 2021 07:34) (76 - 76)  RR: 18 (29 Mar 2021 16:29) (18 - 18)  SpO2: 97% (29 Mar 2021 16:29) (94% - 97%)    Duyen WIGGINS   Dept of Medicine   [id number] Internal Medicine PA Note    Notified by RN that patient w/FS of 118 tonight after dinner at about 10:00 PM. Patient being followed by endocrine for elevated FS 2/2 to DM and steroids for cauda equina. As discussed with endocrine fellow, Dr. Centeno if patient's FS remains in less than 100 to order Lantus 40 units. Repeat FS ordered for 2AM.   Per RN, patient is asymptomatic, and feeling well.   Plan d/w RN.     Vital Signs Last 24 Hrs  T(C): 36.5 (29 Mar 2021 16:29), Max: 36.8 (29 Mar 2021 05:00)  T(F): 97.7 (29 Mar 2021 16:29), Max: 98.3 (29 Mar 2021 05:00)  HR: 69 (29 Mar 2021 16:29) (61 - 70)  BP: 131/71 (29 Mar 2021 16:29) (131/71 - 136/76)  BP(mean): 76 (29 Mar 2021 07:34) (76 - 76)  RR: 18 (29 Mar 2021 16:29) (18 - 18)  SpO2: 97% (29 Mar 2021 16:29) (94% - 97%)    Duyen WIGGINS   Dept of Medicine   [id number]

## 2021-03-29 NOTE — CONSULT NOTE ADULT - CONSULT REQUESTED DATE/TIME
14-Mar-2021 17:21
29-Mar-2021 23:59
15-Mar-2021 13:42
15-Mar-2021 15:47
16-Mar-2021 21:29
17-Mar-2021 09:07
15-Mar-2021 12:12
15-Mar-2021 14:48
16-Mar-2021 14:44
15-Mar-2021 09:06

## 2021-03-29 NOTE — PROGRESS NOTE ADULT - NUTRITIONAL ASSESSMENT
This patient has been assessed with a concern for Malnutrition and has been determined to have a diagnosis/diagnoses of Severe protein-calorie malnutrition.    This patient is being managed with:   Diet Regular-  Consistent Carbohydrate {Evening Snack} (CSTCHOSN)  1500mL Fluid Restriction ([id number])  No Concentrated Potassium  Entered: Mar 27 2021  4:29PM

## 2021-03-29 NOTE — PROGRESS NOTE ADULT - PROBLEM SELECTOR PLAN 1
- test BG AC/HS  - increased Lantus dose to 45 units QHS  - increased Admelog to 18 units AC meals for now- If pt refusing Dexa please give 50% of dose (9units).   - c/w Admelog moderate correction scale AC and mod HS scale  - notify endocrine team if steroid dose changed or discontinued and or hypo/hyperglycemia   :Discharge: Tresiba and Novolog. Doses TBD  Recommend follow up with Endo in [place], Patient to find out the name.  Please make sure pt has insulin pens and DM supplies at home.    -Plan discussed with pt/team.  Contact info: [phone] (24/7). pager [phone] - test BG AC/HS/2am  - increased Lantus dose to 45 units QHS  - increased Admelog to 18 units AC meals for now- If pt refusing Dexa please give 50% of dose (9units).   - c/w Admelog moderate correction scale AC and mod HS/2am scale  - notify endocrine team if steroid dose changed or discontinued and or hypo/hyperglycemia   :Discharge: Tresiba and Novolog. Doses TBD  Recommend follow up with Endo in [place], Patient to find out the name.  Please make sure pt has insulin pens and DM supplies at home.    -Plan discussed with pt/team.  Contact info: [phone] (24/7). pager [phone]

## 2021-03-29 NOTE — CONSULT NOTE ADULT - CONSULT REASON
Management of DM2
Adenopathy
GUANAKO
KENNETH on EKG
RLE radiculopathy
cardiac clearance
lymph node biopsy
scrotal swelling hydroceles
Leg weakness
r/o lymphoma

## 2021-03-29 NOTE — CONSULT NOTE ADULT - SUBJECTIVE AND OBJECTIVE BOX
Patient seen and evaluated at bedside    Chief Complaint: cp     HPI:  52 yo M with HTN, HLD, T2DM and extensive smoking history >40 pack year p/w leg pain, >40 lb wt loss, fever and sweats found to have b/l inguinal adenopathy and L5 sclerotic lesion concerning for SUREKHA. Since admission, pt has been having intermittent left sided chest pain, lasting 3-4 hours, no radiation and no pleuritic or positional component. Cardiac markers (hsTN, CK and CKMB) was initially negative and unremarkable TTE on 3/19. EKG on 3/30 with inferior KENNETH without significant reciprocal changes. The chest pain has not changed in characteristics or intensity (described as stuttering 5-7/10).  VSS and at baseline.     PMHx:   Diabetes    Hyperlipidemia    Hypertension, unspecified type        PSHx:   No significant past surgical history        Allergies:  No Known Allergies      Home Meds:    Current Medications:   acetaminophen   Tablet .. 650 milliGRAM(s) Oral every 6 hours PRN  allopurinol 300 milliGRAM(s) Oral daily  amLODIPine   Tablet 10 milliGRAM(s) Oral daily  atorvastatin 80 milliGRAM(s) Oral at bedtime  dexAMETHasone     Tablet 4 milliGRAM(s) Oral every 8 hours  dextrose 40% Gel 15 Gram(s) Oral once  dextrose 5%. 1000 milliLiter(s) IV Continuous <Continuous>  dextrose 5%. 1000 milliLiter(s) IV Continuous <Continuous>  dextrose 50% Injectable 12.5 Gram(s) IV Push once  dextrose 50% Injectable 25 Gram(s) IV Push once  dextrose 50% Injectable 25 Gram(s) IV Push once  glucagon  Injectable 1 milliGRAM(s) IntraMuscular once  heparin   Injectable 5000 Unit(s) SubCutaneous every 12 hours  HYDROmorphone   Tablet 2 milliGRAM(s) Oral every 6 hours PRN  insulin glargine Injectable (LANTUS) 45 Unit(s) SubCutaneous at bedtime  insulin lispro (ADMELOG) corrective regimen sliding scale   SubCutaneous <User Schedule>  insulin lispro (ADMELOG) corrective regimen sliding scale   SubCutaneous three times a day before meals  insulin lispro Injectable (ADMELOG) 9 Unit(s) SubCutaneous once  insulin lispro Injectable (ADMELOG) 18 Unit(s) SubCutaneous three times a day before meals  lactated ringers. 1000 milliLiter(s) IV Continuous <Continuous>  magnesium hydroxide Suspension 30 milliLiter(s) Oral daily PRN  metoprolol succinate ER 75 milliGRAM(s) Oral daily  nortriptyline 10 milliGRAM(s) Oral at bedtime  polyethylene glycol 3350 17 Gram(s) Oral daily PRN  saline laxative (FLEET) Rectal Enema 1 Enema Rectal once  senna 2 Tablet(s) Oral at bedtime  sodium bicarbonate 650 milliGRAM(s) Oral two times a day  trimethoprim   80 mG/sulfamethoxazole 400 mG 1 Tablet(s) Oral daily      FAMILY HISTORY:  No pertinent family history in first degree relatives        Social History: Personally reviewed   No tobacco, EtOH or IVDU     REVIEW OF SYSTEMS:  Constitutional:     [x ] negative [ ] fevers [ ] chills [ ] weight loss [ ] weight gain  HEENT:                  [x ] negative [ ] dry eyes [ ] eye irritation [ ] postnasal drip [ ] nasal congestion  CV:                         [ ] negative  [x ] chest pain [ ] orthopnea [ ] palpitations [ ] murmur  Resp:                     [x ] negative [ ] cough [ ] shortness of breath [ ] dyspnea [ ] wheezing [ ] sputum [ ]hemoptysis  GI:                          [ x] negative [ ] nausea [ ] vomiting [ ] diarrhea [ ] constipation [ ] abd pain [ ] dysphagia   :                        [ x] negative [ ] dysuria [ ] nocturia [ ] hematuria [ ] increased urinary frequency  Musculoskeletal: [x ] negative [ ] back pain [ ] myalgias [ ] arthralgias [ ] fracture  Skin:                       [ x] negative [ ] rash [ ] itch  Neurological:        [ x] negative [ ] headache [ ] dizziness [ ] syncope [ ] weakness [ ] numbness  Psychiatric:           [ x] negative [ ] anxiety [ ] depression  Endocrine:            [ x] negative [ ] diabetes [ ] thyroid problem  Heme/Lymph:      [ x] negative [ ] anemia [ ] bleeding problem  Allergic/Immune: [ x] negative [ ] itchy eyes [ ] nasal discharge [ ] hives [ ] angioedema    [ x] All other systems negative  [ ] Unable to assess ROS due to      Physical Exam:  T(F): 97.8 (03-29), Max: 98.3 (03-29)  HR: 78 (03-29) (61 - 78)  BP: 128/91 (03-29) (128/91 - 134/76)  RR: 18 (03-29)  SpO2: 100% (03-29)     NAD on RA   No JVD   RRR no MRG   CTABL   Neuro exam deferred, please refer to primary team PE today                         18.9   9.73  )-----------( 450      ( 28 Mar 2021 10:46 )             56.9     03-29    130<L>  |  102  |  85<H>  ----------------------------<  283<H>  5.1   |  18<L>  |  1.46<H>    Ca    8.1<L>      29 Mar 2021 09:03

## 2021-03-29 NOTE — PROGRESS NOTE ADULT - ASSESSMENT
Echo 3/18/21: min MR, nl lv sys fx, small-moderate pericardial effusion, no evidence of pericardial tamponade     a/p  61 yo M with hx HTN, DM, HLD, COVID 2020 presents with LE pain and scrotal swelling    1. LE pain/weakness/scrotal swelling  -s/p LN excisional biopsy, + diffuse B cell, patho noted   -s/p LP -high lymphocytes prelim, protein slightly elevated, no cx growth   -per ID: MRI suggestive of CIDP, per neuro less likely AIDP, sarcoid, leptomeningeal carcinomatosis, infection  -per neuro: Clinical suspicion for B-cell lymphoma with leptomeningeal spread  -MRI Cspine, Tspine, head noted   -UA noted  -LE duplex neg 3/15 for DVT  -urology eval noted - No  intervention during this admission  -neurosx eval noted -no interventions at this time, possibly benefit from L5/S1 decompression fusion after workup of lymph node  -off abx per ID  -c/w deca for cauda equina  -f/u heme/neuro/ID    2. HTN  -bp acceptable   -c/w amlodipine and bb    3. HLD  -c/w statin    4. GUANAKO  -cr noted   -renal f/u    5. Pericardial effusion  -echo noted with min MR, nl lv sys fx, small-moderate pericardial effusion, no evidence of pericardial tamponade   -cont to monitor     6. Atypical Chest pain  -no further cp  -hsT, ck, ckmb neg, EKG without ischemic changes   -echo as above w nl lv sys fx, no segmental wall motion     dvt ppx

## 2021-03-29 NOTE — PROGRESS NOTE ADULT - ASSESSMENT
60 year old male presents to ED with right groin and back pain, 35 lb weight loss, subjective fevers, sweats - found to have bilateral inguinal adenopathy and a sclerotic L5 lesion.    Inguinal Adenopathy and L1 Sclerotic Lesion  -- Concern for lymphoma based on fevers, weight loss and night sweats  -- Other malignancies also possible, as can be benign inflammatory processes  -- Tumor markers - PSA,, Ca 19,9, LDH and Uric Acid are normal  -- S/P excisional biopsy of inguinal lymph node  --Pathology results pending - frozen section consistent with B cell lymphoma; awaiting subtype  --Genetics done on lymph node - consistent with monoclonal b-cell population  --Flow cytometry from lymph node, however, negative for neoplasm  -- Have ordered Hepatitis profile, Quantiferon in anticipation for chemotherapy  --Quantiferon results are indeterminate - consider input from ID  --Chemotherapy (if indicated will be determined (as well as possible spinal RT) once we have final pathology results - will most likely be given as outpatient  --We request IR consultation for a medi port placement to be done prior to discharge but will wait until pathology results    Neuropathy  -- Originally resumed to be secondary to COVID infection per patient  -- Markedly abnormal MRI of spine  -- Neurology following closely with us  -- CSF cytology negative for malignancy (small lymphocytes only were seen) but CSF protein is elevated  -- Started dexamethasone for cauda equina 10 mg loading dose then 4 mg IV q6H - clinically improved  -- Have begun tapering Dexamethasone to 4 mg PO q8H  -- Discussed with neurology, MRI of cervical, thoracic spine as well as MRI head have been repeated. MRI head with non-specific findings.    We will continue to follow patient. Thank you for the opportunity to participate in Mr Fernandez's care.    Lobo Landeros PA-C  Hematology/Oncology  [facility]   [phone] (cell)  [phone] (office)  Evenings and weekends please call MD on call or office

## 2021-03-29 NOTE — PROGRESS NOTE ADULT - SUBJECTIVE AND OBJECTIVE BOX
DATE OF SERVICE: 03-29-21 @ 14:03  CHIEF COMPLAINT:Patient is a 60y old  Male who presents with a chief complaint of leg weakness (28 Mar 2021 10:39)    	        PAST MEDICAL & SURGICAL HISTORY:  Diabetes    Hyperlipidemia    Hypertension, unspecified type    No significant past surgical history            no new complaints   NECK: No pain or stiffness  RESPIRATORY: No cough, wheezing, chills or hemoptysis; No Shortness of Breath  CARDIOVASCULAR: No chest pain, palpitations, passing out, dizziness, or leg swelling  GASTROINTESTINAL: No abdominal or epigastric pain. No nausea, vomiting, or hematemesis; No diarrhea or constipation.  GENITOURINARY: No dysuria, frequency, hematuria, or incontinence  NEUROLOGICAL: No headaches,     Medications:  MEDICATIONS  (STANDING):  allopurinol 300 milliGRAM(s) Oral daily  amLODIPine   Tablet 10 milliGRAM(s) Oral daily  atorvastatin 80 milliGRAM(s) Oral at bedtime  dexAMETHasone     Tablet 4 milliGRAM(s) Oral every 6 hours  dextrose 40% Gel 15 Gram(s) Oral once  dextrose 5%. 1000 milliLiter(s) (50 mL/Hr) IV Continuous <Continuous>  dextrose 5%. 1000 milliLiter(s) (100 mL/Hr) IV Continuous <Continuous>  dextrose 50% Injectable 12.5 Gram(s) IV Push once  dextrose 50% Injectable 25 Gram(s) IV Push once  dextrose 50% Injectable 25 Gram(s) IV Push once  glucagon  Injectable 1 milliGRAM(s) IntraMuscular once  heparin   Injectable 5000 Unit(s) SubCutaneous every 12 hours  insulin glargine Injectable (LANTUS) 45 Unit(s) SubCutaneous at bedtime  insulin lispro (ADMELOG) corrective regimen sliding scale   SubCutaneous three times a day before meals  insulin lispro (ADMELOG) corrective regimen sliding scale   SubCutaneous <User Schedule>  insulin lispro Injectable (ADMELOG) 18 Unit(s) SubCutaneous three times a day before meals  lactated ringers. 1000 milliLiter(s) (50 mL/Hr) IV Continuous <Continuous>  metoprolol succinate ER 75 milliGRAM(s) Oral daily  nortriptyline 10 milliGRAM(s) Oral at bedtime  saline laxative (FLEET) Rectal Enema 1 Enema Rectal once  senna 2 Tablet(s) Oral at bedtime  sodium bicarbonate 650 milliGRAM(s) Oral two times a day  trimethoprim   80 mG/sulfamethoxazole 400 mG 1 Tablet(s) Oral daily    MEDICATIONS  (PRN):  acetaminophen   Tablet .. 650 milliGRAM(s) Oral every 6 hours PRN Mild Pain (1 - 3)  HYDROmorphone   Tablet 2 milliGRAM(s) Oral every 6 hours PRN Moderate Pain (4 - 6)  magnesium hydroxide Suspension 30 milliLiter(s) Oral daily PRN Constipation  polyethylene glycol 3350 17 Gram(s) Oral daily PRN Constipation    	    PHYSICAL EXAM:  T(C): 36.4 (03-29-21 @ 07:34), Max: 36.8 (03-28-21 @ 16:45)  HR: 64 (03-29-21 @ 07:34) (61 - 77)  BP: 134/76 (03-29-21 @ 07:34) (131/80 - 136/76)  RR: 18 (03-29-21 @ 07:34) (18 - 18)  SpO2: 95% (03-29-21 @ 07:34) (94% - 96%)  Wt(kg): --  I&O's Summary    28 Mar 2021 07:01  -  29 Mar 2021 07:00  --------------------------------------------------------  IN: 0 mL / OUT: 2275 mL / NET: -2275 mL    29 Mar 2021 07:01  -  29 Mar 2021 14:03  --------------------------------------------------------  IN: 660 mL / OUT: 0 mL / NET: 660 mL        Appearance: Normal	  HEENT:   Normal oral mucosa, PERRL, EOMI	  Lymphatic: No lymphadenopathy  Cardiovascular: Normal S1 S2, No JVD, No murmurs, No edema  Respiratory: Lungs clear to auscultation	  Psychiatry: A & O x 3,  Gastrointestinal:  Soft, Non-tender, + BS	  Skin: No rashes, No ecchymoses, No cyanosis	  Neurologic: Non-focal/motor equal sensory intact  Extremities: dec rom    TELEMETRY: 	    ECG:  	  RADIOLOGY:  OTHER: 	  	  LABS:	 	    CARDIAC MARKERS:                                18.9   9.73  )-----------( 450      ( 28 Mar 2021 10:46 )             56.9     03-29    130<L>  |  102  |  85<H>  ----------------------------<  283<H>  5.1   |  18<L>  |  1.46<H>    Ca    8.1<L>      29 Mar 2021 09:03  Mg     2.4     03-27      proBNP:   Lipid Profile:   HgA1c:   TSH:

## 2021-03-29 NOTE — PROGRESS NOTE ADULT - SUBJECTIVE AND OBJECTIVE BOX
DIABETES FOLLOW UP NOTE: Saw pt earlier today  INTERVAL HX:      Review of Systems:  General: As above  Cardiovascular: No chest pain, palpitations  Respiratory: No SOB, no cough  GI: No nausea, vomiting, abdominal pain  Endocrine: no polyuria, polydipsia or S&Sx of hypoglycemia    Allergies    No Known Allergies    Intolerances      MEDICATIONS:  allopurinol 300 milliGRAM(s) Oral daily  atorvastatin 80 milliGRAM(s) Oral at bedtime  dexAMETHasone     Tablet 4 milliGRAM(s) Oral every 8 hours  insulin glargine Injectable (LANTUS) 45 Unit(s) SubCutaneous at bedtime  insulin lispro (ADMELOG) corrective regimen sliding scale   SubCutaneous three times a day before meals  insulin lispro (ADMELOG) corrective regimen sliding scale   SubCutaneous <User Schedule>  insulin lispro Injectable (ADMELOG) 18 Unit(s) SubCutaneous three times a day before meals  trimethoprim   80 mG/sulfamethoxazole 400 mG 1 Tablet(s) Oral daily      PHYSICAL EXAM:  VITALS: T(C): 36.2 (03-29-21 @ 12:18)  T(F): 97.2 (03-29-21 @ 12:18), Max: 98.3 (03-28-21 @ 16:45)  HR: 70 (03-29-21 @ 12:18) (61 - 77)  BP: 133/79 (03-29-21 @ 12:18) (131/80 - 136/76)  RR:  (18 - 18)  SpO2:  (94% - 96%)  Wt(kg): --  GENERAL: Male laying in bed in NAD  Abdomen: Soft, nontender, non distended  Extremities: Warm, no edema in all 4 exts  NEURO: A&O X3    LABS:  POCT Blood Glucose.: 115 mg/dL (03-29-21 @ 11:39)  POCT Blood Glucose.: 314 mg/dL (03-29-21 @ 07:43)  POCT Blood Glucose.: 243 mg/dL (03-28-21 @ 22:36)  POCT Blood Glucose.: 274 mg/dL (03-28-21 @ 21:17)  POCT Blood Glucose.: 321 mg/dL (03-28-21 @ 16:38)  POCT Blood Glucose.: 217 mg/dL (03-28-21 @ 11:48)  POCT Blood Glucose.: 239 mg/dL (03-28-21 @ 08:00)  POCT Blood Glucose.: 258 mg/dL (03-28-21 @ 03:06)  POCT Blood Glucose.: 188 mg/dL (03-27-21 @ 21:30)  POCT Blood Glucose.: 359 mg/dL (03-27-21 @ 17:10)  POCT Blood Glucose.: 254 mg/dL (03-27-21 @ 11:48)  POCT Blood Glucose.: 274 mg/dL (03-27-21 @ 08:26)  POCT Blood Glucose.: 244 mg/dL (03-26-21 @ 21:25)  POCT Blood Glucose.: 208 mg/dL (03-26-21 @ 17:00)                            18.9   9.73  )-----------( 450      ( 28 Mar 2021 10:46 )             56.9       03-29    130<L>  |  102  |  85<H>  ----------------------------<  283<H>  5.1   |  18<L>  |  1.46<H>    EGFR if : 60      Ca    8.1<L>      03-29  Mg     2.4     03-27  Phos  4.4     03-27        Thyroid Function Tests:  03-15 @ 09:19 TSH 4.86 FreeT4 -- T3 -- Anti TPO -- Anti Thyroglobulin Ab -- TSI --      A1C with Estimated Average Glucose Result: 10.6 % (03-15-21 @ 08:59)      Estimated Average Glucose: 258 mg/dL (03-15-21 @ 08:59)

## 2021-03-29 NOTE — PROGRESS NOTE ADULT - SUBJECTIVE AND OBJECTIVE BOX
CARDIOLOGY FOLLOW UP - Dr. Trimble    CC no cp or sob       PHYSICAL EXAM:  T(C): 36.4 (03-29-21 @ 07:34), Max: 36.8 (03-28-21 @ 16:45)  HR: 64 (03-29-21 @ 07:34) (61 - 77)  BP: 134/76 (03-29-21 @ 07:34) (131/80 - 136/76)  RR: 18 (03-29-21 @ 07:34) (18 - 18)  SpO2: 95% (03-29-21 @ 07:34) (94% - 96%)  Wt(kg): --  I&O's Summary    28 Mar 2021 07:01  -  29 Mar 2021 07:00  --------------------------------------------------------  IN: 0 mL / OUT: 2275 mL / NET: -2275 mL    29 Mar 2021 07:01  -  29 Mar 2021 14:44  --------------------------------------------------------  IN: 660 mL / OUT: 1200 mL / NET: -540 mL        Appearance: Normal	  Cardiovascular: Normal S1 S2,RRR, No JVD, No murmurs  Respiratory: Lungs clear to auscultation	  Gastrointestinal:  Soft, Non-tender, + BS	  Extremities: Normal range of motion, No clubbing, cyanosis or edema      Home Medications:  amlodipine-benazepril 10 mg-40 mg oral capsule: 1 cap(s) orally once a day (15 Mar 2021 11:15)  HumaLOG 100 units/mL injectable solution: 8 unit(s) injectable 3 times a day (before meals) (15 Mar 2021 11:15)  Lipitor 80 mg oral tablet: 1 tab(s) orally once a day (15 Mar 2021 11:15)  Toprol-XL 50 mg oral tablet, extended release: 1 tab(s) orally once a day (15 Mar 2021 11:15)  Tresiba 100 units/mL subcutaneous solution: 40 unit(s) subcutaneous once a day (at bedtime) (15 Mar 2021 11:15)      MEDICATIONS  (STANDING):  allopurinol 300 milliGRAM(s) Oral daily  amLODIPine   Tablet 10 milliGRAM(s) Oral daily  atorvastatin 80 milliGRAM(s) Oral at bedtime  dexAMETHasone     Tablet 4 milliGRAM(s) Oral every 6 hours  dextrose 40% Gel 15 Gram(s) Oral once  dextrose 5%. 1000 milliLiter(s) (50 mL/Hr) IV Continuous <Continuous>  dextrose 5%. 1000 milliLiter(s) (100 mL/Hr) IV Continuous <Continuous>  dextrose 50% Injectable 12.5 Gram(s) IV Push once  dextrose 50% Injectable 25 Gram(s) IV Push once  dextrose 50% Injectable 25 Gram(s) IV Push once  glucagon  Injectable 1 milliGRAM(s) IntraMuscular once  heparin   Injectable 5000 Unit(s) SubCutaneous every 12 hours  insulin glargine Injectable (LANTUS) 45 Unit(s) SubCutaneous at bedtime  insulin lispro (ADMELOG) corrective regimen sliding scale   SubCutaneous three times a day before meals  insulin lispro (ADMELOG) corrective regimen sliding scale   SubCutaneous <User Schedule>  insulin lispro Injectable (ADMELOG) 18 Unit(s) SubCutaneous three times a day before meals  lactated ringers. 1000 milliLiter(s) (50 mL/Hr) IV Continuous <Continuous>  metoprolol succinate ER 75 milliGRAM(s) Oral daily  nortriptyline 10 milliGRAM(s) Oral at bedtime  saline laxative (FLEET) Rectal Enema 1 Enema Rectal once  senna 2 Tablet(s) Oral at bedtime  sodium bicarbonate 650 milliGRAM(s) Oral two times a day  trimethoprim   80 mG/sulfamethoxazole 400 mG 1 Tablet(s) Oral daily      TELEMETRY: 	    ECG:  	  RADIOLOGY:   DIAGNOSTIC TESTING:  [ ] Echocardiogram:  [ ]  Catheterization:  [ ] Stress Test:    OTHER: 	    LABS:	 	                            18.9   9.73  )-----------( 450      ( 28 Mar 2021 10:46 )             56.9     03-29    130<L>  |  102  |  85<H>  ----------------------------<  283<H>  5.1   |  18<L>  |  1.46<H>    Ca    8.1<L>      29 Mar 2021 09:03  Mg     2.4     03-27

## 2021-03-29 NOTE — PROGRESS NOTE ADULT - PROBLEM SELECTOR PLAN 2
goal <130/80  BP close to goal   Currently on amlodipine and metoprolol, ACEI held due to GUANAKO   -adjustment as per primary team

## 2021-03-29 NOTE — PROGRESS NOTE ADULT - ASSESSMENT
59yo M w/h/o uncontrolled T2DM (A1C 10.6%) on Tresiba and Humalog PTA. Also h/o HTN, HLD, COVID 2020. Here with LE pain and scrotal swelling. Patient was hospitalized in [facility] in Feb 2021 where CT Ab/P found 5.8x2.5cm lesion on the right pelvis concerning for malignancy with sclerotic lesion at L5, s/p excisional biopsy of inguinal lymph node with pending pathology results but frozen section consistent with B cell lymphoma. Endocrine following for steroid induced hyperglycemia. On Dexa 4mg q8h for cauda equina with BG 200s to 300s in the last 24 hours requiring high premeal insulin doses (16 to 22 units). Pt reports eating well but still feeling weak. States steroids are making more sick because his BGs are going up. Requesting premeal insulin to be given 30 minutes after BG testing and not right before a meal.  BG tested at time of visit > result 115 after getting 22 units of Admelog with breakfast. Pt refusing Dexa so spoke to team NP who stated will speak to pt.     Noted pt didn't get any premeal insulin ac lunch as he refused dexa dose. Had increased Premeal dosing to 18 units.   Spoke to pt about how Humalog works and the need to take it ac meals since it starts working within 15 minutes after injection and that is what is needed for better control.   Also spoke about the need to increase premeal doses to maintain BG goal 100 to 200 while on steroids. Pt verbalized understanding but insisted that insulin is given at the wrong time and he still refused Dexa.    Spent 25 minutes providing face to face education as well as assessing pt/labs/meds and discussing plan with primary team. Moderate complexity encounter with uncontrolled DM now exacerbated by steroid therapy requiring high insulin doses. Adjusting insulin.

## 2021-03-30 DIAGNOSIS — F05 DELIRIUM DUE TO KNOWN PHYSIOLOGICAL CONDITION: ICD-10-CM

## 2021-03-30 LAB
APTT BLD: 118.1 SEC — HIGH (ref 27.5–35.5)
APTT BLD: >200 SEC — CRITICAL HIGH (ref 27.5–35.5)
CK MB BLD-MCNC: 18.9 % — HIGH (ref 0–3.5)
CK MB CFR SERPL CALC: 78.4 NG/ML — HIGH (ref 0–6.7)
CK SERPL-CCNC: 414 U/L — HIGH (ref 30–200)
GLUCOSE BLDC GLUCOMTR-MCNC: 118 MG/DL — HIGH (ref 70–99)
GLUCOSE BLDC GLUCOMTR-MCNC: 219 MG/DL — HIGH (ref 70–99)
GLUCOSE BLDC GLUCOMTR-MCNC: 92 MG/DL — SIGNIFICANT CHANGE UP (ref 70–99)
HCT VFR BLD CALC: 54.2 % — HIGH (ref 39–50)
HCT VFR BLD CALC: 55.4 % — HIGH (ref 39–50)
HGB BLD-MCNC: 18 G/DL — HIGH (ref 13–17)
HGB BLD-MCNC: 18.2 G/DL — HIGH (ref 13–17)
INR BLD: 0.9 RATIO — SIGNIFICANT CHANGE UP (ref 0.88–1.16)
INR BLD: 0.96 RATIO — SIGNIFICANT CHANGE UP (ref 0.88–1.16)
MCHC RBC-ENTMCNC: 29.3 PG — SIGNIFICANT CHANGE UP (ref 27–34)
MCHC RBC-ENTMCNC: 29.6 PG — SIGNIFICANT CHANGE UP (ref 27–34)
MCHC RBC-ENTMCNC: 32.9 GM/DL — SIGNIFICANT CHANGE UP (ref 32–36)
MCHC RBC-ENTMCNC: 33.2 GM/DL — SIGNIFICANT CHANGE UP (ref 32–36)
MCV RBC AUTO: 89.1 FL — SIGNIFICANT CHANGE UP (ref 80–100)
MCV RBC AUTO: 89.1 FL — SIGNIFICANT CHANGE UP (ref 80–100)
NRBC # BLD: 0 /100 WBCS — SIGNIFICANT CHANGE UP (ref 0–0)
NRBC # BLD: 0 /100 WBCS — SIGNIFICANT CHANGE UP (ref 0–0)
PLATELET # BLD AUTO: 408 K/UL — HIGH (ref 150–400)
PLATELET # BLD AUTO: 438 K/UL — HIGH (ref 150–400)
PROTHROM AB SERPL-ACNC: 10.8 SEC — SIGNIFICANT CHANGE UP (ref 10.6–13.6)
PROTHROM AB SERPL-ACNC: 11.5 SEC — SIGNIFICANT CHANGE UP (ref 10.6–13.6)
RBC # BLD: 6.08 M/UL — HIGH (ref 4.2–5.8)
RBC # BLD: 6.22 M/UL — HIGH (ref 4.2–5.8)
RBC # FLD: 13.6 % — SIGNIFICANT CHANGE UP (ref 10.3–14.5)
RBC # FLD: 13.8 % — SIGNIFICANT CHANGE UP (ref 10.3–14.5)
TROPONIN T, HIGH SENSITIVITY RESULT: 1068 NG/L — HIGH (ref 0–51)
TROPONIN T, HIGH SENSITIVITY RESULT: 1371 NG/L — HIGH (ref 0–51)
TROPONIN T, HIGH SENSITIVITY RESULT: 1566 NG/L — HIGH (ref 0–51)
TROPONIN T, HIGH SENSITIVITY RESULT: 1673 NG/L — HIGH (ref 0–51)
TROPONIN T, HIGH SENSITIVITY RESULT: 1703 NG/L — HIGH (ref 0–51)
WBC # BLD: 14.08 K/UL — HIGH (ref 3.8–10.5)
WBC # BLD: 15.2 K/UL — HIGH (ref 3.8–10.5)
WBC # FLD AUTO: 14.08 K/UL — HIGH (ref 3.8–10.5)
WBC # FLD AUTO: 15.2 K/UL — HIGH (ref 3.8–10.5)

## 2021-03-30 PROCEDURE — 93010 ELECTROCARDIOGRAM REPORT: CPT | Mod: 76,77

## 2021-03-30 PROCEDURE — 99232 SBSQ HOSP IP/OBS MODERATE 35: CPT

## 2021-03-30 PROCEDURE — 99222 1ST HOSP IP/OBS MODERATE 55: CPT

## 2021-03-30 RX ORDER — INSULIN GLARGINE 100 [IU]/ML
34 INJECTION, SOLUTION SUBCUTANEOUS AT BEDTIME
Refills: 0 | Status: DISCONTINUED | OUTPATIENT
Start: 2021-03-30 | End: 2021-03-31

## 2021-03-30 RX ORDER — HEPARIN SODIUM 5000 [USP'U]/ML
4000 INJECTION INTRAVENOUS; SUBCUTANEOUS ONCE
Refills: 0 | Status: COMPLETED | OUTPATIENT
Start: 2021-03-30 | End: 2021-03-30

## 2021-03-30 RX ORDER — ACETAMINOPHEN 500 MG
325 TABLET ORAL ONCE
Refills: 0 | Status: COMPLETED | OUTPATIENT
Start: 2021-03-30 | End: 2021-03-30

## 2021-03-30 RX ORDER — CLOPIDOGREL BISULFATE 75 MG/1
75 TABLET, FILM COATED ORAL DAILY
Refills: 0 | Status: DISCONTINUED | OUTPATIENT
Start: 2021-03-31 | End: 2021-03-31

## 2021-03-30 RX ORDER — ASPIRIN/CALCIUM CARB/MAGNESIUM 324 MG
324 TABLET ORAL ONCE
Refills: 0 | Status: COMPLETED | OUTPATIENT
Start: 2021-03-30 | End: 2021-03-30

## 2021-03-30 RX ORDER — HEPARIN SODIUM 5000 [USP'U]/ML
INJECTION INTRAVENOUS; SUBCUTANEOUS
Qty: 25000 | Refills: 0 | Status: DISCONTINUED | OUTPATIENT
Start: 2021-03-30 | End: 2021-03-30

## 2021-03-30 RX ORDER — INSULIN LISPRO 100/ML
8 VIAL (ML) SUBCUTANEOUS
Refills: 0 | Status: DISCONTINUED | OUTPATIENT
Start: 2021-03-30 | End: 2021-03-31

## 2021-03-30 RX ORDER — HEPARIN SODIUM 5000 [USP'U]/ML
4000 INJECTION INTRAVENOUS; SUBCUTANEOUS EVERY 6 HOURS
Refills: 0 | Status: DISCONTINUED | OUTPATIENT
Start: 2021-03-30 | End: 2021-03-30

## 2021-03-30 RX ORDER — INSULIN LISPRO 100/ML
8 VIAL (ML) SUBCUTANEOUS
Refills: 0 | Status: DISCONTINUED | OUTPATIENT
Start: 2021-03-30 | End: 2021-03-30

## 2021-03-30 RX ORDER — ENOXAPARIN SODIUM 100 MG/ML
100 INJECTION SUBCUTANEOUS DAILY
Refills: 0 | Status: DISCONTINUED | OUTPATIENT
Start: 2021-03-30 | End: 2021-03-31

## 2021-03-30 RX ORDER — ASPIRIN/CALCIUM CARB/MAGNESIUM 324 MG
81 TABLET ORAL DAILY
Refills: 0 | Status: DISCONTINUED | OUTPATIENT
Start: 2021-03-31 | End: 2021-03-31

## 2021-03-30 RX ORDER — CLOPIDOGREL BISULFATE 75 MG/1
600 TABLET, FILM COATED ORAL ONCE
Refills: 0 | Status: COMPLETED | OUTPATIENT
Start: 2021-03-30 | End: 2021-03-30

## 2021-03-30 RX ADMIN — HEPARIN SODIUM 600 UNIT(S)/HR: 5000 INJECTION INTRAVENOUS; SUBCUTANEOUS at 06:18

## 2021-03-30 RX ADMIN — Medication 1 TABLET(S): at 11:55

## 2021-03-30 RX ADMIN — Medication 650 MILLIGRAM(S): at 00:28

## 2021-03-30 RX ADMIN — Medication 4: at 11:57

## 2021-03-30 RX ADMIN — Medication 75 MILLIGRAM(S): at 05:15

## 2021-03-30 RX ADMIN — CLOPIDOGREL BISULFATE 600 MILLIGRAM(S): 75 TABLET, FILM COATED ORAL at 02:11

## 2021-03-30 RX ADMIN — HEPARIN SODIUM 4000 UNIT(S): 5000 INJECTION INTRAVENOUS; SUBCUTANEOUS at 02:12

## 2021-03-30 RX ADMIN — Medication 300 MILLIGRAM(S): at 11:55

## 2021-03-30 RX ADMIN — HEPARIN SODIUM 800 UNIT(S)/HR: 5000 INJECTION INTRAVENOUS; SUBCUTANEOUS at 02:24

## 2021-03-30 RX ADMIN — Medication 324 MILLIGRAM(S): at 02:11

## 2021-03-30 RX ADMIN — Medication 650 MILLIGRAM(S): at 05:16

## 2021-03-30 RX ADMIN — HEPARIN SODIUM 0 UNIT(S)/HR: 5000 INJECTION INTRAVENOUS; SUBCUTANEOUS at 05:16

## 2021-03-30 RX ADMIN — AMLODIPINE BESYLATE 10 MILLIGRAM(S): 2.5 TABLET ORAL at 05:15

## 2021-03-30 RX ADMIN — Medication 325 MILLIGRAM(S): at 00:28

## 2021-03-30 NOTE — PROGRESS NOTE ADULT - ASSESSMENT
Echo 3/18/21: min MR, nl lv sys fx, small-moderate pericardial effusion, no evidence of pericardial tamponade     a/p  59 yo M with hx HTN, DM, HLD, COVID 2020 presents with LE pain and scrotal swelling    1. Acute MI  -EKG noted with inferior KENNETH without significant reciprocal changes  -hsT elevated, ck, ckmb, cpk elevated   -NSGY cleared pt for DAPT and heparin gtt  -s/p asa and plavix load, started on hep gtt   -recent echo with min MR, nl lv sys fx, small-moderate pericardial effusion, no evidence of pericardial tamponade   -appreciate interventional cardio   -pt not candidate for cath at this time given ongoing workup for B-cell lymphoma, high risk of bleeding with possible Cauda-Equina Syndrome vs leptomeningeal spread of B-cell lymphoma  -cont medical management   -c/w hep gtt, asa, plavix, bb    2. LE pain/weakness/scrotal swelling  -s/p LN excisional biopsy, + diffuse B cell, patho noted   -s/p LP -high lymphocytes prelim, protein slightly elevated, no cx growth   -per ID: MRI suggestive of CIDP, per neuro less likely AIDP, sarcoid, leptomeningeal carcinomatosis, infection  -per neuro: Clinical suspicion for B-cell lymphoma with leptomeningeal spread  -MRI Cspine, Tspine, head noted   -UA noted  -LE duplex neg 3/15 for DVT  -urology eval noted - No  intervention during this admission  -neurosx eval noted -no interventions at this time, possibly benefit from L5/S1 decompression fusion after workup of lymph node  -off abx per ID  -c/w deca for cauda equina  -f/u heme/neuro/ID    3. HTN  -bp acceptable   -c/w amlodipine and bb    4. HLD  -c/w statin    5. GUANAKO  -cr noted   -renal f/u    6. Pericardial effusion  -echo noted with min MR, nl lv sys fx, small-moderate pericardial effusion, no evidence of pericardial tamponade   -cont to monitor       dvt ppx     plan discussed with ACP    Echo 3/18/21: min MR, nl lv sys fx, small-moderate pericardial effusion, no evidence of pericardial tamponade     a/p  59 yo M with hx HTN, DM, HLD, COVID 2020 presents with LE pain and scrotal swelling    1. Acute MI  -EKG noted with inferior KENNETH without significant reciprocal changes  -hsT elevated, ck, ckmb, cpk elevated   -NSGY cleared pt for DAPT and heparin gtt  -s/p asa and plavix load, started on hep gtt   -recent echo with min MR, nl lv sys fx, small-moderate pericardial effusion, no evidence of pericardial tamponade   -appreciate interventional cardio   -pt not candidate for cath at this time given ongoing workup for B-cell lymphoma, high risk of bleeding with possible Cauda-Equina Syndrome vs leptomeningeal spread of B-cell lymphoma  -cont medical management   -c/w hep gtt, asa, plavix, bb  -pending repeat echo     2. LE pain/weakness/scrotal swelling  -s/p LN excisional biopsy, + diffuse B cell, patho noted   -s/p LP -high lymphocytes prelim, protein slightly elevated, no cx growth   -per ID: MRI suggestive of CIDP, per neuro less likely AIDP, sarcoid, leptomeningeal carcinomatosis, infection  -per neuro: Clinical suspicion for B-cell lymphoma with leptomeningeal spread  -MRI Cspine, Tspine, head noted   -UA noted  -LE duplex neg 3/15 for DVT  -urology eval noted - No  intervention during this admission  -neurosx eval noted -no interventions at this time, possibly benefit from L5/S1 decompression fusion after workup of lymph node  -off abx per ID  -c/w deca for cauda equina  -f/u heme/neuro/ID    3. HTN  -bp acceptable   -c/w amlodipine and bb    4. HLD  -c/w statin    5. GUANAKO  -cr noted   -renal f/u    6. Pericardial effusion  -echo noted with min MR, nl lv sys fx, small-moderate pericardial effusion, no evidence of pericardial tamponade   -cont to monitor       dvt ppx     plan discussed with ACP

## 2021-03-30 NOTE — CHART NOTE - NSCHARTNOTEFT_GEN_A_CORE
Neurosurgery called for clearance for antiplatelet agents in setting of acute MI. Recent imaging including MR brain 3/25 and cervical thoracic and lumbar spine MR 3/15 shows leptomeningeal enhancement, and a L5/S1 disc herniation but no hemorrhage.  Based off these imaging studies there is no absolute neurosurgical contraindication to starting urgent antiplatelet agents for an acute MI.    While there is no absolute neurosurgical contraindication to systemic antiplatelets, there does exist an increased risk of intracranial hemorrhage which can result in significant morbidity including but not limited to headache, seizure, stroke, paralysis, and in severe cases even death.

## 2021-03-30 NOTE — PROVIDER CONTACT NOTE (OTHER) - SITUATION
Pt is refusing to have blood drawn for Q4hr troponin, refusing finger sticks, refusing tele monitoring and refusing all medications.
Pt had lymph node biopsy on 3/17 on R groin. Pt is stating "it feels like something popped" at the incision site. The incision site is intact w/ small amount of clear drainage. Lymph node is swollen.
Pt went down for MRI of head, cervical, and thoracic spine. Exams not completed, MRI stated pt could not tolerate and that MRI of head and cervical spine w/ contrast has to be re-ordered.
Pt had Quantiferon test completed, resulted "indeterminate"
Pt pm glucose 72, asymptomatic
pt c/o chest pain

## 2021-03-30 NOTE — PROGRESS NOTE ADULT - NUTRITIONAL ASSESSMENT
This patient has been assessed with a concern for Malnutrition and has been determined to have a diagnosis/diagnoses of Severe protein-calorie malnutrition.    This patient is being managed with:   Diet Regular-  Consistent Carbohydrate {Evening Snack} (CSTCHOSN)  1500mL Fluid Restriction ([id number])  No Concentrated Potassium  Entered: Mar 27 2021  4:29PM

## 2021-03-30 NOTE — BEHAVIORAL HEALTH ASSESSMENT NOTE - RISK ASSESSMENT
Risk factors: acute/chronic medical issues, acute/chronic cognitive impairments, irritability and agitation, chronic pain, recent loss, noncompliant with treatment, not receiving treatment    Protective factors: no current SIIP/HIIP, no h/o SA/SIB, no h/o psych admissions, no active substance abuse, good physical health, no psychosis, engaged in work or school, with adult children, domiciled,  social supports    Overall, pt is a low risk of harm to self/others and does not require psychiatric admission for safety and stabilization. Low Acute Suicide Risk

## 2021-03-30 NOTE — BEHAVIORAL HEALTH ASSESSMENT NOTE - SUMMARY
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 Patient was hospitalized in [facility] in Feb 2021 where CT Ab/P found 5.8x2.5cm lesion on the right pelvis concerning for malignancy with sclerotic lesion at L5, admitted w/ scrotal swelling / pelvic lesion/ weight loss, hospital course complicated by acute MI.  Psychiatry consulted to assess for capacity to refuse cardiac catheretization.  Patient seen and evaluated, awake and alert, able to recall recent events.  He denies significant mood sx, denies SI/HI.  States had a heart attack yesterday but reports that it was caused by the steroids that he is taking in the hospital.  He does endorse understanding that his medical team wants to perform a cardiac catheterization and put "a china in my heart" but adamantly refusing stating that if they stop his steroids that he wouldn't have any cardiac problems.  When attempting to explain primary teams concerns and indications for cardiac cath, patient becomes angry, yelling, cursing, states he wants to leave AMA and "sign myself into rehab."  At present time, this patient does not have the capacity to refuse cardiac catheterization. The patient is able to communicate a consistent choice, however is unable to understand the relevant information, he is unable to appreciate his medical situation, and manipulate the information in a rational manner.  Would defer decision making to his HCP, next of kin.

## 2021-03-30 NOTE — PROGRESS NOTE ADULT - PROBLEM SELECTOR PLAN 1
-test BG AC/HS  -Decrease Lantus 34 units QHS  -Decrease Admelog 8 units AC meals for now w/refusing decadron  -c/w Admelog moderate correction scale AC and Mod HS/2AM scale  - notify endocrine team if steroid dose changed or discontinued and or hypo/hyperglycemia   :Discharge: Tresiba and Novolog. Doses TBD  Recommend follow up with Endo in [place], Patient to find out the name.  Please make sure pt has insulin pens and DM supplies at home.

## 2021-03-30 NOTE — PROGRESS NOTE ADULT - NUTRITIONAL ASSESSMENT
This patient has been assessed with a concern for Malnutrition and has been determined to have a diagnosis/diagnoses of Severe protein-calorie malnutrition.    This patient is being managed with:   Diet Regular-  Consistent Carbohydrate {Evening Snack} (CSTCHOSN)  1500mL Fluid Restriction ([id number])  No Concentrated Potassium  Entered: Mar 27 2021  4:29PM

## 2021-03-30 NOTE — BEHAVIORAL HEALTH ASSESSMENT NOTE - SUICIDE PROTECTIVE FACTORS
Responsibility to family and others/Identifies reasons for living/Supportive social network of family or friends/Ability to cope with stress Responsibility to family and others/Identifies reasons for living/Has future plans/Supportive social network of family or friends/Ability to cope with stress

## 2021-03-30 NOTE — PROGRESS NOTE ADULT - SUBJECTIVE AND OBJECTIVE BOX
NEPHROLOGY-NSN [phone]        Patient seen and examined in bed.  Events of last night noted.  Chest pain free at present   On heparin gtt          MEDICATIONS  (STANDING):  allopurinol 300 milliGRAM(s) Oral daily  amLODIPine   Tablet 10 milliGRAM(s) Oral daily  atorvastatin 80 milliGRAM(s) Oral at bedtime  dexAMETHasone     Tablet 4 milliGRAM(s) Oral every 8 hours  dextrose 40% Gel 15 Gram(s) Oral once  dextrose 5%. 1000 milliLiter(s) (50 mL/Hr) IV Continuous <Continuous>  dextrose 5%. 1000 milliLiter(s) (100 mL/Hr) IV Continuous <Continuous>  dextrose 50% Injectable 12.5 Gram(s) IV Push once  dextrose 50% Injectable 25 Gram(s) IV Push once  dextrose 50% Injectable 25 Gram(s) IV Push once  glucagon  Injectable 1 milliGRAM(s) IntraMuscular once  heparin  Infusion.  Unit(s)/Hr (8 mL/Hr) IV Continuous <Continuous>  insulin glargine Injectable (LANTUS) 34 Unit(s) SubCutaneous at bedtime  insulin lispro (ADMELOG) corrective regimen sliding scale   SubCutaneous three times a day before meals  insulin lispro (ADMELOG) corrective regimen sliding scale   SubCutaneous <User Schedule>  insulin lispro Injectable (ADMELOG) 8 Unit(s) SubCutaneous three times a day before meals  lactated ringers. 1000 milliLiter(s) (50 mL/Hr) IV Continuous <Continuous>  metoprolol succinate ER 75 milliGRAM(s) Oral daily  nortriptyline 10 milliGRAM(s) Oral at bedtime  saline laxative (FLEET) Rectal Enema 1 Enema Rectal once  senna 2 Tablet(s) Oral at bedtime  sodium bicarbonate 650 milliGRAM(s) Oral two times a day  trimethoprim   80 mG/sulfamethoxazole 400 mG 1 Tablet(s) Oral daily      VITAL:  T(C): , Max: 37 (03-30-21 @ 07:43)  T(F): , Max: 98.6 (03-30-21 @ 07:43)  HR: 59 (03-30-21 @ 07:43)  BP: 131/81 (03-30-21 @ 07:43)  BP(mean): --  RR: 18 (03-30-21 @ 07:43)  SpO2: 96% (03-30-21 @ 07:43)  Wt(kg): --    I and O's:    03-29 @ 07:01  -  03-30 @ 07:00  --------------------------------------------------------  IN: 980 mL / OUT: 2300 mL / NET: -1320 mL          PHYSICAL EXAM:    Constitutional: NAD  Neck:  No JVD  Respiratory: CTAB/L  Cardiovascular: S1 and S2  Gastrointestinal: BS+, soft, NT/ND  Extremities: No peripheral edema  Neurological: A/O x 3, no focal deficits  Psychiatric: Normal mood, normal affect  : No Weir  Skin: No rashes  Access: Not applicable    LABS:                        18.0   14.08 )-----------( 408      ( 30 Mar 2021 08:34 )             54.2     03-29    130<L>  |  102  |  85<H>  ----------------------------<  283<H>  5.1   |  18<L>  |  1.46<H>    Ca    8.1<L>      29 Mar 2021 09:03            Urine Studies:          RADIOLOGY & ADDITIONAL STUDIES:

## 2021-03-30 NOTE — PROVIDER CONTACT NOTE (OTHER) - DATE AND TIME:
24-Mar-2021 06:28
21-Mar-2021 17:20
22-Mar-2021 03:19
30-Mar-2021 17:05
30-Mar-2021 22:00
23-Mar-2021 19:38
15-Mar-2021 18:30

## 2021-03-30 NOTE — PROGRESS NOTE ADULT - SUBJECTIVE AND OBJECTIVE BOX
Diabetes Follow up note:    Chief complaint: T2DM/Steroid induced hyperglycemia    Interval Hx: Pt refusing decadron as he endorsed it was causing him to have chest pounding. Now w/positive troponin and EKG changes on heparin gtt. Refused insulin this AM at breakfast time. Ate breakfast this AM    Review of Systems:  General: denies pain  GI: Tolerating POs. Denies N/V/D/Abd pain  CV: Denies CP/SOB  ENDO: No S&Sx of hypoglycemia  MEDS:  allopurinol 300 milliGRAM(s) Oral daily  atorvastatin 80 milliGRAM(s) Oral at bedtime  dexAMETHasone     Tablet 4 milliGRAM(s) Oral every 8 hours    insulin glargine Injectable (LANTUS) 34 Unit(s) SubCutaneous at bedtime  insulin lispro (ADMELOG) corrective regimen sliding scale   SubCutaneous <User Schedule>  insulin lispro (ADMELOG) corrective regimen sliding scale   SubCutaneous three times a day before meals  insulin lispro Injectable (ADMELOG) 8 Unit(s) SubCutaneous three times a day before meals    trimethoprim   80 mG/sulfamethoxazole 400 mG 1 Tablet(s) Oral daily    Allergies    No Known Allergies      PE:  General: Male lying in bed. NAD.   Vital Signs Last 24 Hrs  T(C): 37 (30 Mar 2021 07:43), Max: 37 (30 Mar 2021 07:43)  T(F): 98.6 (30 Mar 2021 07:43), Max: 98.6 (30 Mar 2021 07:43)  HR: 59 (30 Mar 2021 07:43) (59 - 78)  BP: 131/81 (30 Mar 2021 07:43) (128/91 - 147/88)  BP(mean): --  RR: 18 (30 Mar 2021 07:43) (18 - 18)  SpO2: 96% (30 Mar 2021 07:43) (96% - 100%)  Abd: Soft, NT,ND,   Extremities: Warm  Neuro: Alert.     LABS:  POCT Blood Glucose.: 219 mg/dL (03-30-21 @ 11:56)  POCT Blood Glucose.: 92 mg/dL (03-30-21 @ 08:03)  POCT Blood Glucose.: 118 mg/dL (03-30-21 @ 02:03)  POCT Blood Glucose.: 167 mg/dL (03-29-21 @ 22:54)  POCT Blood Glucose.: 118 mg/dL (03-29-21 @ 21:20)  POCT Blood Glucose.: 167 mg/dL (03-29-21 @ 17:05)  POCT Blood Glucose.: 115 mg/dL (03-29-21 @ 11:39)  POCT Blood Glucose.: 314 mg/dL (03-29-21 @ 07:43)  POCT Blood Glucose.: 243 mg/dL (03-28-21 @ 22:36)  POCT Blood Glucose.: 274 mg/dL (03-28-21 @ 21:17)  POCT Blood Glucose.: 321 mg/dL (03-28-21 @ 16:38)  POCT Blood Glucose.: 217 mg/dL (03-28-21 @ 11:48)  POCT Blood Glucose.: 239 mg/dL (03-28-21 @ 08:00)  POCT Blood Glucose.: 258 mg/dL (03-28-21 @ 03:06)  POCT Blood Glucose.: 188 mg/dL (03-27-21 @ 21:30)  POCT Blood Glucose.: 359 mg/dL (03-27-21 @ 17:10)                            18.0   14.08 )-----------( 408      ( 30 Mar 2021 08:34 )             54.2       03-29    130<L>  |  102  |  85<H>  ----------------------------<  283<H>  5.1   |  18<L>  |  1.46<H>    Ca    8.1<L>      29 Mar 2021 09:03        Thyroid Function Tests:  03-15 @ 09:19 TSH 4.86 FreeT4 -- T3 -- Anti TPO -- Anti Thyroglobulin Ab -- TSI --      A1C with Estimated Average Glucose Result: 10.6 % (03-15-21 @ 08:59)      C-Peptide, Serum: 2.2 ng/mL (03-16 @ 09:15)      Contact number: nino [phone] or [phone]

## 2021-03-30 NOTE — PROVIDER CONTACT NOTE (OTHER) - REASON
Pt had Quantiferon test completed, resulted "indeterminate"
MRI of head and cervical spine w/ contrast has to be re-ordered
S/P lymph node biopsy on right groin 3/17
lab requested APTT redraw
Pt dinner insulin held, glucose 72
Pt refusing blood draws, finger sticks, tele, and all meds

## 2021-03-30 NOTE — CHART NOTE - NSCHARTNOTEFT_GEN_A_CORE
Medicine PA Episodic Note    Patient is a 60y old  Male who presents with a chief complaint of LE pain (29 Mar 2021 16:18)      Vital Signs Last 24 Hrs  T(C): 36.6 (30 Mar 2021 01:05), Max: 36.8 (29 Mar 2021 05:00)  T(F): 97.9 (30 Mar 2021 01:05), Max: 98.3 (29 Mar 2021 05:00)  HR: 69 (30 Mar 2021 01:05) (61 - 78)  BP: 147/88 (30 Mar 2021 01:05) (128/91 - 147/88)  BP(mean): 76 (29 Mar 2021 07:34) (76 - 76)  RR: 18 (30 Mar 2021 01:05) (18 - 18)  SpO2: 97% (30 Mar 2021 01:05) (94% - 100%)      Labs:                          18.9   9.73  )-----------( 450      ( 28 Mar 2021 10:46 )             56.9     03-29    130<L>  |  102  |  85<H>  ----------------------------<  283<H>  5.1   |  18<L>  |  1.46<H>    Ca    8.1<L>      29 Mar 2021 09:03          Radiology:    Physical Exam:  General: WN/WD NAD  Neurology: A&Ox3, nonfocal, GUARDADO x 4  Head:  Normocephalic, atraumatic  Respiratory: CTA B/L  CV: RRR, S1S2, no murmur  Abdominal: Soft, NT, ND no palpable mass  MSK: No edema, + peripheral pulses, FROM all 4 extremity    Assessment & Plan:  HPI:  : 61yo M with hx HTN, DM, HLD, COVID 2020 presents with LE pain and scrotal swelling. Patient was hospitalized in [facility] in Feb 2021 where CT Ab/P found 5.8x2.5cm lesion on the right pelvis concerning for malignancy with sclerotic lesion at L5.   Patient left AMA before additional malignancy workup was performed.   In the last month, patient had 45lb unintentional weight loss and decreased appetite.   In the last 2 weeks had some  night sweats and subjective fevers.   In addition, he noted that he started to have abdominal swelling, mostly on the left side with tenderness along with b/l scrotal swelling    	Today, he came to the ED for 8/10 b/l LE pain, pins and needles. At baseline, he has neuropathy and has been unable to walk since COVID last year.   He uses a cane but has frequent falls. Last fall 4 days ago. Tried neurotonin in the past with no relief.     left ama from [facility] (14 Mar 2021 15:37)      Plan:  Chest Pain 2/2 Acute Inferior Wall MI   -    Endorse to AM team.   Follow up with Attending in AM.  Duyen WIGGINS  Dept of Medicine Medicine PA Episodic Note    Patient is a 60y old  Male who presents with a chief complaint of LE pain (29 Mar 2021 16:18)      Vital Signs Last 24 Hrs  T(C): 36.6 (30 Mar 2021 01:05), Max: 36.8 (29 Mar 2021 05:00)  T(F): 97.9 (30 Mar 2021 01:05), Max: 98.3 (29 Mar 2021 05:00)  HR: 69 (30 Mar 2021 01:05) (61 - 78)  BP: 147/88 (30 Mar 2021 01:05) (128/91 - 147/88)  BP(mean): 76 (29 Mar 2021 07:34) (76 - 76)  RR: 18 (30 Mar 2021 01:05) (18 - 18)  SpO2: 97% (30 Mar 2021 01:05) (94% - 100%)      Labs:                          18.9   9.73  )-----------( 450      ( 28 Mar 2021 10:46 )             56.9     03-29    130<L>  |  102  |  85<H>  ----------------------------<  283<H>  5.1   |  18<L>  |  1.46<H>    Ca    8.1<L>      29 Mar 2021 09:03          Radiology:    Physical Exam:  General: WN/WD NAD  Neurology: A&Ox3, nonfocal, GUARDADO x 4  Head:  Normocephalic, atraumatic  Respiratory: CTA B/L  CV: RRR, S1S2, no murmur  Abdominal: Soft, NT, ND no palpable mass  MSK: No edema, + peripheral pulses, FROM all 4 extremity    Assessment & Plan:  HPI:  : 61yo M with hx HTN, DM, HLD, COVID 2020 presents with LE pain and scrotal swelling. Patient was hospitalized in [facility] in Feb 2021 where CT Ab/P found 5.8x2.5cm lesion on the right pelvis concerning for malignancy with sclerotic lesion at L5.   Patient left AMA before additional malignancy workup was performed.   In the last month, patient had 45lb unintentional weight loss and decreased appetite.   In the last 2 weeks had some  night sweats and subjective fevers.   In addition, he noted that he started to have abdominal swelling, mostly on the left side with tenderness along with b/l scrotal swelling    	Today, he came to the ED for 8/10 b/l LE pain, pins and needles. At baseline, he has neuropathy and has been unable to walk since COVID last year.   He uses a cane but has frequent falls. Last fall 4 days ago. Tried neurotonin in the past with no relief.     left ama from [facility] (14 Mar 2021 15:37)      Plan:  Chest Pain 2/2 Acute Inferior Wall MI   - EKG noted to have ST elevations leads II, III, AVF.   - Dr. Trimble notified. Cardiology Fellow called.   - F/u per cardiology's recommendations; start hep gtt, ASA load, plavix load. Obtain Neurosurgery clearance for antiplt and anticoagulation.   - Patient placed on telemetry. Cardiac Enzymes STAT.  - As per discussion with NSGx MD Shepherd, patient is okay to start antiplt and and anticoagulation tx.  - Patient's understands risks and benefits.  - Dr. Ennis notified.    Endorse to AM team.   Follow up with Attending in AM.  Duyen WIGGINS  Dept of Medicine  [id number] Medicine PA Episodic Note    Patient is a 60y old  Male c/o of LEFT sided chest pounding. Patient endorses that it has been for the past 2- 3 hours but is relaying it now. Patient states that it is a 10/10 on a pain scale. Patient denies sharpness/tightness. States that the pain does not radiate to the arms/jaw. Endorses that he has numbness/tingling in the arms/legs 2/2 that is known.   Endorses that when he has been taking decadron the past few days, the chest pounding becomes worse.  Patient seen and assessed by me.       Vital Signs Last 24 Hrs  T(C): 36.6 (30 Mar 2021 01:05), Max: 36.8 (29 Mar 2021 05:00)  T(F): 97.9 (30 Mar 2021 01:05), Max: 98.3 (29 Mar 2021 05:00)  HR: 69 (30 Mar 2021 01:05) (61 - 78)  BP: 147/88 (30 Mar 2021 01:05) (128/91 - 147/88)  BP(mean): 76 (29 Mar 2021 07:34) (76 - 76)  RR: 18 (30 Mar 2021 01:05) (18 - 18)  SpO2: 97% (30 Mar 2021 01:05) (94% - 100%)      Labs:                          18.9   9.73  )-----------( 450      ( 28 Mar 2021 10:46 )             56.9     03-29    130<L>  |  102  |  85<H>  ----------------------------<  283<H>  5.1   |  18<L>  |  1.46<H>    Ca    8.1<L>      29 Mar 2021 09:03      Physical Exam:  General: WN/WD NAD  Neurology: A&Ox3, nonfocal, GUARDADO x 4  Head:  Normocephalic, atraumatic  Respiratory: CTA B/L  CV: RRR, S1S2, no murmur  Abdominal: Soft, NT, ND no palpable mass  MSK: No edema, + peripheral pulses, FROM all 4 extremity    Assessment & Plan:  HPI:  : 61yo M with hx HTN, DM, HLD, COVID 2020 presents with LE pain and scrotal swelling. Patient was hospitalized in [facility] in Feb 2021 where CT Ab/P found 5.8x2.5cm lesion on the right pelvis concerning for malignancy with sclerotic lesion at L5.    C/o of left sided chest pounding.     Plan:  Chest Pain 2/2 Acute Inferior Wall MI   - EKG noted to have ST elevations leads II, III, AVF.   - Dr. Trimble notified. Cardiology Fellow called.   - F/u per cardiology's recommendations; start hep gtt, ASA load, plavix load. Obtain Neurosurgery clearance for antiplt and anticoagulation.   - Patient placed on telemetry. Cardiac Enzymes STAT.  - As per discussion with NSGx MD Shepherd, patient is okay to start antiplt and and anticoagulation tx.  - Patient's understands risks and benefits of AC and antiplatelet therapy.   - Dr. Ennis notified.    Endorse to AM team.   Follow up with Attending in AM.  Duyen WIGGINS  Dept of Medicine  [id number]    ----------- ADDENDUM 4:30    #Acute Inferior Wall STEMI  - Patient seen and assessed by me. Patient endorses that chest pain feels improved.  Per patient 'he does not feel anymore'.   - Cardiac Enzymes noted, w/increase in troponing; 1068 -->1371. F/u troponins q4hr.    - Repeat EKG (placed in chart), shows no acute changes compared to previous EKG. Cardiology fellow aware.   - Patient on hep gtt. PTT > 200; however PTT drawn after Heparin bolus given.   Per RN patient maintained on hep gtt for about 2 hours. however, STAT ptt drawn to correlate results and indicates . D/w cardiology fellow c/w heparin nomogram for ACS protocol.   - Will c/w heparin nomogram and monitor for any signs of acute bleeding or signs and symptoms of hypotension.  - Patient hemodynamically stable.   -C/w telemetry.  - Plan d/w Cardiology fellow.     --------- ADDENDUM     Per RN, John Paul Rivas called for patient agitated and combative. Patient w/acute MI attempting to go to the bathroom.  Patient endorses that he needed to have a bowel movement, however would only go to the bathroom, rather than commode. At the end of code gray, patient taken to bathroom by staff member to prevent any acute incidents.         Duyen WIGGINS  Dept of Medicine   [id number] Medicine PA Episodic Note    Patient is a 60y old  Male c/o of LEFT sided chest pounding. Patient endorses that it has been for the past 2- 3 hours but is relaying it now. Patient states that it is a 10/10 on a pain scale. Patient denies sharpness/tightness. States that the pain does not radiate to the arms/jaw. Endorses that he has numbness/tingling in the arms/legs 2/2 that is known.   Endorses that when he has been taking decadron the past few days, the chest pounding becomes worse.  Patient seen and assessed by me.       Vital Signs Last 24 Hrs  T(C): 36.6 (30 Mar 2021 01:05), Max: 36.8 (29 Mar 2021 05:00)  T(F): 97.9 (30 Mar 2021 01:05), Max: 98.3 (29 Mar 2021 05:00)  HR: 69 (30 Mar 2021 01:05) (61 - 78)  BP: 147/88 (30 Mar 2021 01:05) (128/91 - 147/88)  BP(mean): 76 (29 Mar 2021 07:34) (76 - 76)  RR: 18 (30 Mar 2021 01:05) (18 - 18)  SpO2: 97% (30 Mar 2021 01:05) (94% - 100%)      Labs:                          18.9   9.73  )-----------( 450      ( 28 Mar 2021 10:46 )             56.9     03-29    130<L>  |  102  |  85<H>  ----------------------------<  283<H>  5.1   |  18<L>  |  1.46<H>    Ca    8.1<L>      29 Mar 2021 09:03      Physical Exam:  General: WN/WD NAD  Neurology: A&Ox3, nonfocal, GUARDADO x 4  Head:  Normocephalic, atraumatic  Respiratory: CTA B/L  CV: RRR, S1S2, no murmur  Abdominal: Soft, NT, ND no palpable mass  MSK: No edema, + peripheral pulses, FROM all 4 extremity    Assessment & Plan:  HPI:  : 61yo M with hx HTN, DM, HLD, COVID 2020 presents with LE pain and scrotal swelling. Patient was hospitalized in [facility] in Feb 2021 where CT Ab/P found 5.8x2.5cm lesion on the right pelvis concerning for malignancy with sclerotic lesion at L5.    C/o of left sided chest pounding.     Plan:  Chest Pain 2/2 Acute Inferior Wall MI   - EKG noted to have ST elevations leads II, III, AVF.   - Dr. Trimble notified. Cardiology Fellow called.   - F/u per cardiology's recommendations; start hep gtt, ASA load, plavix load. Obtain Neurosurgery clearance for antiplt and anticoagulation. Echo.  - Patient placed on telemetry. Cardiac Enzymes STAT.  - As per discussion with NSGx MD Shepherd, patient is okay to start antiplt and and anticoagulation tx.  - Patient's understands risks and benefits of AC and antiplatelet therapy and agrees.   - Dr. Ennis notified.    Endorse to AM team.   Follow up with Attending in AM.  Duyen WIGGINS  Dept of Medicine  [id number]    ----------- ADDENDUM 4:30    #Acute Inferior Wall STEMI  - Patient seen and assessed by me. Patient endorses that chest pain feels improved.  Per patient 'he does not feel anymore'.   - Cardiac Enzymes noted, w/increase in troponing; 1068 -->1371. F/u troponins q4hr.    - Repeat EKG (placed in chart), shows no acute changes compared to previous EKG. Cardiology fellow aware.   - Patient on hep gtt. PTT > 200; however PTT drawn after Heparin bolus given.   Per RN patient maintained on hep gtt for about 2 hours. however, STAT ptt drawn to correlate results and indicates . D/w cardiology fellow c/w heparin nomogram for ACS protocol.   - Will c/w heparin nomogram and monitor for any signs of acute bleeding or signs and symptoms of hypotension.  - Patient hemodynamically stable.   -C/w telemetry.  - Plan d/w Cardiology fellow.     --------- ADDENDUM     Per RN, John Paul Rivas called for patient agitated and combative. Patient w/acute MI attempting to go to the bathroom.  Patient endorses that he needed to have a bowel movement, however would only go to the bathroom, rather than commode. At the end of code gray, patient taken to bathroom by staff member to prevent any acute incidents.         Duyen WIGGINS  Dept of Medicine   [id number]

## 2021-03-30 NOTE — BEHAVIORAL HEALTH ASSESSMENT NOTE - HPI (INCLUDE ILLNESS QUALITY, SEVERITY, DURATION, TIMING, CONTEXT, MODIFYING FACTORS, ASSOCIATED SIGNS AND SYMPTOMS)
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 Patient was hospitalized in [facility] in Feb 2021 where CT Ab/P found 5.8x2.5cm lesion on the right pelvis concerning for malignancy with sclerotic lesion at L5, admitted w/ scrotal swelling / pelvic lesion/ weight loss, hospital course complicated by acute MI.  Psychiatry consulted to assess for capacity to refuse cardiac catheretization.     Patient seen and evaluated, awake and alert oriented to person, place ([facility], on the 8th floor), able to state the current year, not the month and day.  Reports came into the hospital about 2 weeks ago "for swollen lymph nodes."  Patient reports having issues with some of the nursing staff, states that staff don't allow him to walk to use the bathroom and that they "congregate around me and watch me use the bathroom."  Patient denies feeling depressed, denies anxiety.  He denies SI/HI, AVH or thoughts of paranoia.  He reports that he has lost a significant amount of weight unintentionally despite eating all 3 meals in the hospital.  He reports his sleep is no impaired.  He talks about his LE bilateral neuropathy, states that he has severe pain/numbness in his feet that radiate up his legs.  States he had been able to walk prior to coming to the hospital  He reports has been getting steroids in while the hospital for the swelling, however has told his doctors for several days that it is causing him to have chest pains.  He reports yesterday had a heart attack and currently "they want to put a china in my heart", becomes irritable, yelling and cursing, stating that the reason for his heart attack was due to the steroids.  Reports if his doctors stop the steroids that he would have any further chest pains or heart attacks.  Patient becomes irate, stating that he is going to leave AMA and sign himself into a rehab.  He then begins to curse, when asked to contact collateral, answer "fuck my wife, fuck you all." At present time, this patient does not have the capacity to refuse cardiac catheterization. The patient is able to communicate a consistent choice, however is unable to understand the relevant information, he is unable to appreciate his medical situation, and manipulate the information in a rational manner.

## 2021-03-30 NOTE — PROVIDER CONTACT NOTE (OTHER) - BACKGROUND
pt on heparin drip
Pt hx of DM II
Pt. A/Ox4, admitted for abnormal weight loss. Has hx HTN, hyperlipidemia, DM. 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 Pt states to me he is leaving AMA in the morning.

## 2021-03-30 NOTE — PROVIDER CONTACT NOTE (CRITICAL VALUE NOTIFICATION) - ACTION/TREATMENT ORDERED:
ROSALIE Geller made aware, stat PTT/INR to be drawn now as well as other lab work. Continue heparin drip at 8cc/hr pending results. Safety maintained, will cont to monitor

## 2021-03-30 NOTE — PROGRESS NOTE ADULT - ASSESSMENT
pt w/ scrotal swellin g / pelvic lesion/ weight loss    Cont steroids /change to po  and taper   added  bactrim or proph as per id   taper as per neuro. onc / sx  excisional Ln bx by sx  + for Bcell  f/u final path still pending  tx as per onc  neg  lext dopplers  dm/uncontrolled  fsg riss  c/w insulin  endo f/u  dvt proph  htn  c/w meds  neuro f/u  lp  neg thus far for leptomeningeal disease  id f/u noted  neurosx f/u  no sx now as per neuro sx  walking difficulty/spinal stenosis   neuro f/u  +mi  pt refusing cath   c/w medical tx   psych eval noted  card  f/u noted  urology f/u noted  no sx now  b/l hydrocele  bowel regimen  pt

## 2021-03-30 NOTE — BEHAVIORAL HEALTH ASSESSMENT NOTE - NSBHCHARTREVIEWINVESTIGATE_PSY_A_CORE FT
< from: 12 Lead ECG (03.21.21 @ 17:51) >      Ventricular Rate 71 BPM    Atrial Rate 71 BPM    P-R Interval 138 ms    QRS Duration 86 ms    Q-T Interval 384 ms    QTC Calculation(Bazett) 417 ms    P Axis 82 degrees    R Axis 69 degrees    T Axis 57 degrees    Diagnosis Line NORMAL SINUS RHYTHMWITH SINUS ARRHYTHMIA  NORMAL ECG  NO PREVIOUS ECGS AVAILABLE  Confirmed by MD VAZQUEZ, ANDREA ([id number]) on 3/22/2021 9:15:15 PM    < end of copied text >

## 2021-03-30 NOTE — PROGRESS NOTE ADULT - PROBLEM SELECTOR PLAN 2
: goal <130/80  BP close to goal   Currently on amlodipine and metoprolol, ACEI held due to GUANAKO   -adjustment as per primary team.    pager: [phone]   office:  [phone] (M-F 9a-5pm)               [phone] (nights/weekends)    -I spent a total time of 26 mins with the patient at bedside/on unit of which more than 50% of time was spent on counseling/coordination of care.

## 2021-03-30 NOTE — PROGRESS NOTE ADULT - SUBJECTIVE AND OBJECTIVE BOX
DATE OF SERVICE: 03-30-21 @ 14:36  CHIEF COMPLAINT:Patient is a 60y old  Male who presents with a chief complaint of LE pain (29 Mar 2021 16:18)    	        PAST MEDICAL & SURGICAL HISTORY:  Hypertension, unspecified type    Hyperlipidemia    Diabetes    No significant past surgical history            REVIEW OF SYSTEMS:  weak  RESPIRATORY: No cough, wheezing, chills or hemoptysis; No Shortness of Breath  CARDIOVASCULAR: No chest pain, palpitations, passing out, dizziness  GASTROINTESTINAL: No abdominal or epigastric pain. No nausea, vomiting, or hematemesis;  GENITOURINARY: No dysuria, frequency, hematuria, or incontinence  NEUROLOGICAL: No headaches,   MUSCULOSKELETAL: No joint pain or swelling; No muscle, back, or extremity pain    Medications:  MEDICATIONS  (STANDING):  allopurinol 300 milliGRAM(s) Oral daily  amLODIPine   Tablet 10 milliGRAM(s) Oral daily  atorvastatin 80 milliGRAM(s) Oral at bedtime  dexAMETHasone     Tablet 4 milliGRAM(s) Oral every 8 hours  dextrose 40% Gel 15 Gram(s) Oral once  dextrose 5%. 1000 milliLiter(s) (50 mL/Hr) IV Continuous <Continuous>  dextrose 5%. 1000 milliLiter(s) (100 mL/Hr) IV Continuous <Continuous>  dextrose 50% Injectable 12.5 Gram(s) IV Push once  dextrose 50% Injectable 25 Gram(s) IV Push once  dextrose 50% Injectable 25 Gram(s) IV Push once  glucagon  Injectable 1 milliGRAM(s) IntraMuscular once  heparin  Infusion.  Unit(s)/Hr (8 mL/Hr) IV Continuous <Continuous>  insulin glargine Injectable (LANTUS) 34 Unit(s) SubCutaneous at bedtime  insulin lispro (ADMELOG) corrective regimen sliding scale   SubCutaneous <User Schedule>  insulin lispro (ADMELOG) corrective regimen sliding scale   SubCutaneous three times a day before meals  insulin lispro Injectable (ADMELOG) 8 Unit(s) SubCutaneous three times a day before meals  lactated ringers. 1000 milliLiter(s) (50 mL/Hr) IV Continuous <Continuous>  metoprolol succinate ER 75 milliGRAM(s) Oral daily  nortriptyline 10 milliGRAM(s) Oral at bedtime  saline laxative (FLEET) Rectal Enema 1 Enema Rectal once  senna 2 Tablet(s) Oral at bedtime  sodium bicarbonate 650 milliGRAM(s) Oral two times a day  trimethoprim   80 mG/sulfamethoxazole 400 mG 1 Tablet(s) Oral daily    MEDICATIONS  (PRN):  acetaminophen   Tablet .. 650 milliGRAM(s) Oral every 6 hours PRN Mild Pain (1 - 3)  heparin   Injectable 4000 Unit(s) IV Push every 6 hours PRN For aPTT less than 40  HYDROmorphone   Tablet 2 milliGRAM(s) Oral every 6 hours PRN Moderate Pain (4 - 6)  magnesium hydroxide Suspension 30 milliLiter(s) Oral daily PRN Constipation  polyethylene glycol 3350 17 Gram(s) Oral daily PRN Constipation    	    PHYSICAL EXAM:  T(C): 37 (03-30-21 @ 07:43), Max: 37 (03-30-21 @ 07:43)  HR: 59 (03-30-21 @ 07:43) (59 - 78)  BP: 131/81 (03-30-21 @ 07:43) (128/91 - 147/88)  RR: 18 (03-30-21 @ 07:43) (18 - 18)  SpO2: 96% (03-30-21 @ 07:43) (96% - 100%)  Wt(kg): --  I&O's Summary    29 Mar 2021 07:01  -  30 Mar 2021 07:00  --------------------------------------------------------  IN: 980 mL / OUT: 2300 mL / NET: -1320 mL        Appearance: Normal	  HEENT:   Normal oral mucosa, PERRL, EOMI	  Lymphatic: No lymphadenopathy  Cardiovascular: Normal S1 S2, No JVD,  Respiratory: Lungs clear to auscultation	  Psychiatry: A & O  Gastrointestinal:  Soft, Non-tender, + BS	  Skin: No rashes, No ecchymoses, No cyanosis	  Neurologic: Non-focal/motor equal   sensory intact  Extremities: Normal range of motion, No clubbing, cyanosis or edema  Vascular: Peripheral pulses palpable 2+ bilaterally    TELEMETRY: 	    ECG:  	  RADIOLOGY:  OTHER: 	  	  LABS:	 	    CARDIAC MARKERS:  CARDIAC MARKERS ( 30 Mar 2021 00:33 )  x     / x     / 414 U/L / x     / 78.4 ng/mL                                18.0   14.08 )-----------( 408      ( 30 Mar 2021 08:34 )             54.2     03-29    130<L>  |  102  |  85<H>  ----------------------------<  283<H>  5.1   |  18<L>  |  1.46<H>    Ca    8.1<L>      29 Mar 2021 09:03      proBNP:   Lipid Profile:   HgA1c:   TSH:

## 2021-03-30 NOTE — CHART NOTE - NSCHARTNOTEFT_GEN_A_CORE
59 y/o male with hx with htn, dm2, hld, covid-19 infection, recently dx with pelvic mass 2/2012 osh --presented w/ scrotal swelling, pelvic lesion/ weight loss, s/p excisional biopsy path report pending; pt also noted with L5 lesion on decadron taper. In addition pt hospital course was c/b acute MI 3/29, on heparin drip, medical management recommended by cards. RN collected appt work several times,  however qns reported by lab; pt is now refusing to have blood work recollected; d/w with cardiology who recommended therapeutic  Lovenox, however pt is refusing Lovenox injection. Pt is also refusing decadron, reported that decadron caused his MI. Pt also is threatening to sign out AMA in the morning. Education provided to pt at length on his overall plan of care. Pt was seen by psych,  per psych team pt do not have capacity to sign out AMA or to refusing cardiac cath or anticoagulant.  Lovenox 100mg daily ordered,  Neuro/hematology informed of the above. Dr. Ennis called, awaiting call back.

## 2021-03-30 NOTE — PROVIDER CONTACT NOTE (OTHER) - ASSESSMENT
vss noted in sunrise
Pt doesn't have c/o pain/discomfort, no s/s distress. I educated pt. on consequences of refusing all care, bloods, meds, etc., pt. verbalized understanding and pt. still refusing.
VSS, asymptomatic, pt eating dinner

## 2021-03-30 NOTE — PROVIDER CONTACT NOTE (OTHER) - ACTION/TREATMENT ORDERED:
Notified PA of situation. No further orders given @ this time. Will continue to monitor pt.
NP notified, all dinner insulin to be held.  Will recheck glucose, continuing to monitor at this time.
Notified PA of situation, stated she would re-order exams.
Covering nurse but will monitor orders
Day NP ordered to place warm compress on pt incision site & to check temp, temp 98.6F. C-reactive protein & sed rate labs already drawn, results pending. ROSALIE Mendoza will come to bedside to assess pt.
will order ekg and cardiac enzymes
NOLAN Dyer made aware, no new orders given. Will continue to monitor pt overnight.
WDL

## 2021-03-30 NOTE — PROGRESS NOTE ADULT - SUBJECTIVE AND OBJECTIVE BOX
CARDIOLOGY FOLLOW UP - Dr. Trimble    CC: events noted - pt at time reported intermittent CP 3-4 hours, non pleuritic no radiation  no further cp, no sob      PHYSICAL EXAM:  T(C): 37 (03-30-21 @ 07:43), Max: 37 (03-30-21 @ 07:43)  HR: 59 (03-30-21 @ 07:43) (59 - 78)  BP: 131/81 (03-30-21 @ 07:43) (128/91 - 147/88)  RR: 18 (03-30-21 @ 07:43) (18 - 18)  SpO2: 96% (03-30-21 @ 07:43) (96% - 100%)  Wt(kg): --  I&O's Summary    29 Mar 2021 07:01  -  30 Mar 2021 07:00  --------------------------------------------------------  IN: 980 mL / OUT: 2300 mL / NET: -1320 mL        Appearance: Normal	  Cardiovascular: Normal S1 S2,RRR, No JVD, No murmurs  Respiratory: Lungs clear to auscultation	  Gastrointestinal:  Soft, Non-tender, + BS	  Extremities: Normal range of motion, No clubbing, cyanosis or edema      Home Medications:  amlodipine-benazepril 10 mg-40 mg oral capsule: 1 cap(s) orally once a day (15 Mar 2021 11:15)  HumaLOG 100 units/mL injectable solution: 8 unit(s) injectable 3 times a day (before meals) (15 Mar 2021 11:15)  Lipitor 80 mg oral tablet: 1 tab(s) orally once a day (15 Mar 2021 11:15)  Toprol-XL 50 mg oral tablet, extended release: 1 tab(s) orally once a day (15 Mar 2021 11:15)  Tresiba 100 units/mL subcutaneous solution: 40 unit(s) subcutaneous once a day (at bedtime) (15 Mar 2021 11:15)      MEDICATIONS  (STANDING):  allopurinol 300 milliGRAM(s) Oral daily  amLODIPine   Tablet 10 milliGRAM(s) Oral daily  atorvastatin 80 milliGRAM(s) Oral at bedtime  dexAMETHasone     Tablet 4 milliGRAM(s) Oral every 8 hours  dextrose 40% Gel 15 Gram(s) Oral once  dextrose 5%. 1000 milliLiter(s) (50 mL/Hr) IV Continuous <Continuous>  dextrose 5%. 1000 milliLiter(s) (100 mL/Hr) IV Continuous <Continuous>  dextrose 50% Injectable 12.5 Gram(s) IV Push once  dextrose 50% Injectable 25 Gram(s) IV Push once  dextrose 50% Injectable 25 Gram(s) IV Push once  glucagon  Injectable 1 milliGRAM(s) IntraMuscular once  heparin  Infusion.  Unit(s)/Hr (8 mL/Hr) IV Continuous <Continuous>  insulin glargine Injectable (LANTUS) 34 Unit(s) SubCutaneous at bedtime  insulin lispro (ADMELOG) corrective regimen sliding scale   SubCutaneous three times a day before meals  insulin lispro (ADMELOG) corrective regimen sliding scale   SubCutaneous <User Schedule>  insulin lispro Injectable (ADMELOG) 8 Unit(s) SubCutaneous three times a day before meals  lactated ringers. 1000 milliLiter(s) (50 mL/Hr) IV Continuous <Continuous>  metoprolol succinate ER 75 milliGRAM(s) Oral daily  nortriptyline 10 milliGRAM(s) Oral at bedtime  saline laxative (FLEET) Rectal Enema 1 Enema Rectal once  senna 2 Tablet(s) Oral at bedtime  sodium bicarbonate 650 milliGRAM(s) Oral two times a day  trimethoprim   80 mG/sulfamethoxazole 400 mG 1 Tablet(s) Oral daily      TELEMETRY: 	NSR 70-80s, PVCs    ECG:  	Inferior KENNETH without significant reciprocal changes  RADIOLOGY:   DIAGNOSTIC TESTING:  [ ] Echocardiogram:  [ ]  Catheterization:  [ ] Stress Test:    OTHER: 	    LABS:	 	    Troponin T, High Sensitivity Result: 1703 ng/L [0 - 51] (03-30 @ 12:19)  Troponin T, High Sensitivity Result: 1566 ng/L [0 - 51] (03-30 @ 08:34)  Troponin T, High Sensitivity Result: 1371 ng/L [0 - 51] (03-30 @ 04:37)  Creatine Kinase, Serum: 414 U/L [30 - 200] (03-30 @ 00:33)  CKMB Units: 78.4 ng/mL [0.0 - 6.7] (03-30 @ 00:33)  Troponin T, High Sensitivity Result: 1068 ng/L [0 - 51] (03-30 @ 00:33)                          18.0   14.08 )-----------( 408      ( 30 Mar 2021 08:34 )             54.2     03-29    130<L>  |  102  |  85<H>  ----------------------------<  283<H>  5.1   |  18<L>  |  1.46<H>    Ca    8.1<L>      29 Mar 2021 09:03      PT/INR - ( 30 Mar 2021 04:37 )   PT: 10.8 sec;   INR: 0.90 ratio         PTT - ( 30 Mar 2021 04:37 )  PTT:118.1 sec

## 2021-03-30 NOTE — BEHAVIORAL HEALTH ASSESSMENT NOTE - NSBHCONSULTFOLLOWDETAILS_PSY_A_CORE FT
pt does not have capacity to leave AMA at this time, pt does not have capacity to refuse bloodwork, anticoagulant therapy

## 2021-03-30 NOTE — PROGRESS NOTE ADULT - ASSESSMENT
59yo M with hx HTN, DM, HLD, COVID 2020 presents with LE pain and scrotal swelling    - GUANAKO on top of underlying CKD stage 3?     :  Increase in BUN may be from Steroids      :  High normal potassium-May be due to bactrim .        : Hyponatremia with hyperglycemia ~ 132.       -CV-  ; on metoprolol and amlodipine  Heparin gtt and medical management for NSTEMI     -Heme/onc- B cell lymphoma;   Possible mediport before dc     -Renal dosing of meds to CrCL ~ 50ml/min-- On Allopurinol 300mg po qd   Off IVF   DC NaHCO3 tabs     [facility]   [phone]

## 2021-03-30 NOTE — BEHAVIORAL HEALTH ASSESSMENT NOTE - NSBHCHARTREVIEWLAB_PSY_A_CORE FT
Vital Signs Last 24 Hrs  T(C): 37 (30 Mar 2021 07:43), Max: 37 (30 Mar 2021 07:43)  T(F): 98.6 (30 Mar 2021 07:43), Max: 98.6 (30 Mar 2021 07:43)  HR: 59 (30 Mar 2021 07:43) (59 - 78)  BP: 131/81 (30 Mar 2021 07:43) (128/91 - 147/88)  BP(mean): --  RR: 18 (30 Mar 2021 07:43) (18 - 18)  SpO2: 96% (30 Mar 2021 07:43) (96% - 100%)

## 2021-03-30 NOTE — PROVIDER CONTACT NOTE (CRITICAL VALUE NOTIFICATION) - ACTION/TREATMENT ORDERED:
ROSALIE Geller made aware, repeat troponins to be done at 430am. Safety maintained, pt on telemetry, will cont to monitor

## 2021-03-30 NOTE — PROVIDER CONTACT NOTE (OTHER) - NAME OF MD/NP/PA/DO NOTIFIED:
Day NOLAN Rose & night ROSALIE Mendoza
Delilah Barrios
NOLAN Jeffery
ROSALIE Mendoza
NOLAN Reaves
ROSALIE Mendoza
NOLAN Dyer

## 2021-03-31 ENCOUNTER — TRANSCRIPTION ENCOUNTER (OUTPATIENT)
Age: 61
End: 2021-03-31

## 2021-03-31 VITALS
RESPIRATION RATE: 18 BRPM | WEIGHT: 145.51 LBS | TEMPERATURE: 98 F | DIASTOLIC BLOOD PRESSURE: 70 MMHG | HEART RATE: 60 BPM | SYSTOLIC BLOOD PRESSURE: 111 MMHG | OXYGEN SATURATION: 95 %

## 2021-03-31 DIAGNOSIS — I24.9 ACUTE ISCHEMIC HEART DISEASE, UNSPECIFIED: ICD-10-CM

## 2021-03-31 LAB
CHROM ANALY OVERALL INTERP SPEC-IMP: SIGNIFICANT CHANGE UP
GLUCOSE BLDC GLUCOMTR-MCNC: 228 MG/DL — HIGH (ref 70–99)
GLUCOSE BLDC GLUCOMTR-MCNC: 233 MG/DL — HIGH (ref 70–99)

## 2021-03-31 PROCEDURE — 70552 MRI BRAIN STEM W/DYE: CPT

## 2021-03-31 PROCEDURE — 83615 LACTATE (LD) (LDH) ENZYME: CPT

## 2021-03-31 PROCEDURE — 80074 ACUTE HEPATITIS PANEL: CPT

## 2021-03-31 PROCEDURE — 71045 X-RAY EXAM CHEST 1 VIEW: CPT

## 2021-03-31 PROCEDURE — 82962 GLUCOSE BLOOD TEST: CPT

## 2021-03-31 PROCEDURE — 86900 BLOOD TYPING SEROLOGIC ABO: CPT

## 2021-03-31 PROCEDURE — 83036 HEMOGLOBIN GLYCOSYLATED A1C: CPT

## 2021-03-31 PROCEDURE — 85027 COMPLETE CBC AUTOMATED: CPT

## 2021-03-31 PROCEDURE — 81264 IGK REARRANGEABN CLONAL POP: CPT

## 2021-03-31 PROCEDURE — 72158 MRI LUMBAR SPINE W/O & W/DYE: CPT

## 2021-03-31 PROCEDURE — 97164 PT RE-EVAL EST PLAN CARE: CPT

## 2021-03-31 PROCEDURE — 87102 FUNGUS ISOLATION CULTURE: CPT

## 2021-03-31 PROCEDURE — 83735 ASSAY OF MAGNESIUM: CPT

## 2021-03-31 PROCEDURE — 83935 ASSAY OF URINE OSMOLALITY: CPT

## 2021-03-31 PROCEDURE — 82947 ASSAY GLUCOSE BLOOD QUANT: CPT

## 2021-03-31 PROCEDURE — 93306 TTE W/DOPPLER COMPLETE: CPT

## 2021-03-31 PROCEDURE — 85014 HEMATOCRIT: CPT

## 2021-03-31 PROCEDURE — 88342 IMHCHEM/IMCYTCHM 1ST ANTB: CPT

## 2021-03-31 PROCEDURE — 85652 RBC SED RATE AUTOMATED: CPT

## 2021-03-31 PROCEDURE — 88341 IMHCHEM/IMCYTCHM EA ADD ANTB: CPT

## 2021-03-31 PROCEDURE — 99285 EMERGENCY DEPT VISIT HI MDM: CPT

## 2021-03-31 PROCEDURE — A9585: CPT

## 2021-03-31 PROCEDURE — 96374 THER/PROPH/DIAG INJ IV PUSH: CPT

## 2021-03-31 PROCEDURE — 82164 ANGIOTENSIN I ENZYME TEST: CPT

## 2021-03-31 PROCEDURE — 97530 THERAPEUTIC ACTIVITIES: CPT

## 2021-03-31 PROCEDURE — G0103: CPT

## 2021-03-31 PROCEDURE — 83930 ASSAY OF BLOOD OSMOLALITY: CPT

## 2021-03-31 PROCEDURE — 88108 CYTOPATH CONCENTRATE TECH: CPT

## 2021-03-31 PROCEDURE — 88365 INSITU HYBRIDIZATION (FISH): CPT

## 2021-03-31 PROCEDURE — 88307 TISSUE EXAM BY PATHOLOGIST: CPT

## 2021-03-31 PROCEDURE — 82570 ASSAY OF URINE CREATININE: CPT

## 2021-03-31 PROCEDURE — 62328 DX LMBR SPI PNXR W/FLUOR/CT: CPT

## 2021-03-31 PROCEDURE — 88184 FLOWCYTOMETRY/ TC 1 MARKER: CPT

## 2021-03-31 PROCEDURE — 86769 SARS-COV-2 COVID-19 ANTIBODY: CPT

## 2021-03-31 PROCEDURE — 99232 SBSQ HOSP IP/OBS MODERATE 35: CPT

## 2021-03-31 PROCEDURE — 86480 TB TEST CELL IMMUN MEASURE: CPT

## 2021-03-31 PROCEDURE — 83605 ASSAY OF LACTIC ACID: CPT

## 2021-03-31 PROCEDURE — U0005: CPT

## 2021-03-31 PROCEDURE — 88280 CHROMOSOME KARYOTYPE STUDY: CPT

## 2021-03-31 PROCEDURE — 74177 CT ABD & PELVIS W/CONTRAST: CPT

## 2021-03-31 PROCEDURE — 86901 BLOOD TYPING SEROLOGIC RH(D): CPT

## 2021-03-31 PROCEDURE — 72141 MRI NECK SPINE W/O DYE: CPT

## 2021-03-31 PROCEDURE — 84484 ASSAY OF TROPONIN QUANT: CPT

## 2021-03-31 PROCEDURE — 85610 PROTHROMBIN TIME: CPT

## 2021-03-31 PROCEDURE — 80048 BASIC METABOLIC PNL TOTAL CA: CPT

## 2021-03-31 PROCEDURE — 97116 GAIT TRAINING THERAPY: CPT

## 2021-03-31 PROCEDURE — U0003: CPT

## 2021-03-31 PROCEDURE — 83880 ASSAY OF NATRIURETIC PEPTIDE: CPT

## 2021-03-31 PROCEDURE — 93970 EXTREMITY STUDY: CPT

## 2021-03-31 PROCEDURE — 97161 PT EVAL LOW COMPLEX 20 MIN: CPT

## 2021-03-31 PROCEDURE — 86140 C-REACTIVE PROTEIN: CPT

## 2021-03-31 PROCEDURE — 86403 PARTICLE AGGLUT ANTBDY SCRN: CPT

## 2021-03-31 PROCEDURE — 72157 MRI CHEST SPINE W/O & W/DYE: CPT

## 2021-03-31 PROCEDURE — 84443 ASSAY THYROID STIM HORMONE: CPT

## 2021-03-31 PROCEDURE — 85730 THROMBOPLASTIN TIME PARTIAL: CPT

## 2021-03-31 PROCEDURE — 89051 BODY FLUID CELL COUNT: CPT

## 2021-03-31 PROCEDURE — 88360 TUMOR IMMUNOHISTOCHEM/MANUAL: CPT

## 2021-03-31 PROCEDURE — 82553 CREATINE MB FRACTION: CPT

## 2021-03-31 PROCEDURE — 85018 HEMOGLOBIN: CPT

## 2021-03-31 PROCEDURE — 87116 MYCOBACTERIA CULTURE: CPT

## 2021-03-31 PROCEDURE — 84550 ASSAY OF BLOOD/URIC ACID: CPT

## 2021-03-31 PROCEDURE — 81261 IGH GENE REARRANGE AMP METH: CPT

## 2021-03-31 PROCEDURE — 85025 COMPLETE CBC W/AUTO DIFF WBC: CPT

## 2021-03-31 PROCEDURE — 84100 ASSAY OF PHOSPHORUS: CPT

## 2021-03-31 PROCEDURE — 86850 RBC ANTIBODY SCREEN: CPT

## 2021-03-31 PROCEDURE — 81001 URINALYSIS AUTO W/SCOPE: CPT

## 2021-03-31 PROCEDURE — 93975 VASCULAR STUDY: CPT

## 2021-03-31 PROCEDURE — 82435 ASSAY OF BLOOD CHLORIDE: CPT

## 2021-03-31 PROCEDURE — 88264 CHROMOSOME ANALYSIS 20-25: CPT

## 2021-03-31 PROCEDURE — 93005 ELECTROCARDIOGRAM TRACING: CPT

## 2021-03-31 PROCEDURE — 87529 HSV DNA AMP PROBE: CPT

## 2021-03-31 PROCEDURE — 72142 MRI NECK SPINE W/DYE: CPT

## 2021-03-31 PROCEDURE — 82945 GLUCOSE OTHER FLUID: CPT

## 2021-03-31 PROCEDURE — 70551 MRI BRAIN STEM W/O DYE: CPT

## 2021-03-31 PROCEDURE — 88331 PATH CONSLTJ SURG 1 BLK 1SPC: CPT

## 2021-03-31 PROCEDURE — 84157 ASSAY OF PROTEIN OTHER: CPT

## 2021-03-31 PROCEDURE — 86803 HEPATITIS C AB TEST: CPT

## 2021-03-31 PROCEDURE — 87389 HIV-1 AG W/HIV-1&-2 AB AG IA: CPT

## 2021-03-31 PROCEDURE — 87070 CULTURE OTHR SPECIMN AEROBIC: CPT

## 2021-03-31 PROCEDURE — 76870 US EXAM SCROTUM: CPT

## 2021-03-31 PROCEDURE — 82330 ASSAY OF CALCIUM: CPT

## 2021-03-31 PROCEDURE — 87205 SMEAR GRAM STAIN: CPT

## 2021-03-31 PROCEDURE — 86592 SYPHILIS TEST NON-TREP QUAL: CPT

## 2021-03-31 PROCEDURE — 84300 ASSAY OF URINE SODIUM: CPT

## 2021-03-31 PROCEDURE — 88364 INSITU HYBRIDIZATION (FISH): CPT

## 2021-03-31 PROCEDURE — 88185 FLOWCYTOMETRY/TC ADD-ON: CPT

## 2021-03-31 PROCEDURE — 71260 CT THORAX DX C+: CPT

## 2021-03-31 PROCEDURE — 84133 ASSAY OF URINE POTASSIUM: CPT

## 2021-03-31 PROCEDURE — 84295 ASSAY OF SERUM SODIUM: CPT

## 2021-03-31 PROCEDURE — 84681 ASSAY OF C-PEPTIDE: CPT

## 2021-03-31 PROCEDURE — 82803 BLOOD GASES ANY COMBINATION: CPT

## 2021-03-31 PROCEDURE — 87040 BLOOD CULTURE FOR BACTERIA: CPT

## 2021-03-31 PROCEDURE — 86038 ANTINUCLEAR ANTIBODIES: CPT

## 2021-03-31 PROCEDURE — 80053 COMPREHEN METABOLIC PANEL: CPT

## 2021-03-31 PROCEDURE — 88237 TISSUE CULTURE BONE MARROW: CPT

## 2021-03-31 PROCEDURE — 84132 ASSAY OF SERUM POTASSIUM: CPT

## 2021-03-31 PROCEDURE — 87483 CNS DNA AMP PROBE TYPE 12-25: CPT

## 2021-03-31 PROCEDURE — 86301 IMMUNOASSAY TUMOR CA 19-9: CPT

## 2021-03-31 PROCEDURE — 82550 ASSAY OF CK (CPK): CPT

## 2021-03-31 RX ORDER — INSULIN LISPRO 100/ML
8 VIAL (ML) SUBCUTANEOUS ONCE
Refills: 0 | Status: COMPLETED | OUTPATIENT
Start: 2021-03-31 | End: 2021-03-31

## 2021-03-31 RX ORDER — AMLODIPINE BESYLATE 2.5 MG/1
1 TABLET ORAL
Qty: 0 | Refills: 0 | DISCHARGE
Start: 2021-03-31

## 2021-03-31 RX ORDER — CLOPIDOGREL BISULFATE 75 MG/1
1 TABLET, FILM COATED ORAL
Qty: 0 | Refills: 0 | DISCHARGE
Start: 2021-03-31

## 2021-03-31 RX ORDER — ASPIRIN/CALCIUM CARB/MAGNESIUM 324 MG
1 TABLET ORAL
Qty: 0 | Refills: 0 | DISCHARGE
Start: 2021-03-31

## 2021-03-31 RX ORDER — INSULIN LISPRO 100/ML
VIAL (ML) SUBCUTANEOUS AT BEDTIME
Refills: 0 | Status: DISCONTINUED | OUTPATIENT
Start: 2021-03-31 | End: 2021-03-31

## 2021-03-31 RX ORDER — INSULIN LISPRO 100/ML
6 VIAL (ML) SUBCUTANEOUS
Refills: 0 | Status: DISCONTINUED | OUTPATIENT
Start: 2021-03-31 | End: 2021-03-31

## 2021-03-31 RX ORDER — ASPIRIN/CALCIUM CARB/MAGNESIUM 324 MG
1 TABLET ORAL
Qty: 30 | Refills: 0
Start: 2021-03-31 | End: 2021-04-29

## 2021-03-31 RX ORDER — INSULIN LISPRO 100/ML
VIAL (ML) SUBCUTANEOUS
Refills: 0 | Status: DISCONTINUED | OUTPATIENT
Start: 2021-03-31 | End: 2021-03-31

## 2021-03-31 RX ORDER — ALLOPURINOL 300 MG
1 TABLET ORAL
Qty: 30 | Refills: 0
Start: 2021-03-31 | End: 2021-04-29

## 2021-03-31 RX ORDER — CLOPIDOGREL BISULFATE 75 MG/1
1 TABLET, FILM COATED ORAL
Qty: 30 | Refills: 0
Start: 2021-03-31 | End: 2021-04-29

## 2021-03-31 RX ORDER — SENNA PLUS 8.6 MG/1
2 TABLET ORAL
Qty: 0 | Refills: 0 | DISCHARGE
Start: 2021-03-31

## 2021-03-31 RX ADMIN — Medication 300 MILLIGRAM(S): at 12:05

## 2021-03-31 RX ADMIN — CLOPIDOGREL BISULFATE 75 MILLIGRAM(S): 75 TABLET, FILM COATED ORAL at 12:06

## 2021-03-31 RX ADMIN — Medication 8 UNIT(S): at 08:55

## 2021-03-31 RX ADMIN — ENOXAPARIN SODIUM 100 MILLIGRAM(S): 100 INJECTION SUBCUTANEOUS at 12:06

## 2021-03-31 RX ADMIN — Medication 8 UNIT(S): at 12:53

## 2021-03-31 RX ADMIN — Medication 81 MILLIGRAM(S): at 12:05

## 2021-03-31 RX ADMIN — Medication 4: at 08:54

## 2021-03-31 RX ADMIN — Medication 1 TABLET(S): at 12:05

## 2021-03-31 NOTE — PHYSICAL THERAPY INITIAL EVALUATION ADULT - IMPAIRED TRANSFERS: SIT/STAND, REHAB EVAL
impaired balance/abnormal muscle tone/pain/decreased strength
impaired balance/pain/decreased ROM/decreased strength

## 2021-03-31 NOTE — PROGRESS NOTE ADULT - PROBLEM SELECTOR PROBLEM 1
Type 2 diabetes mellitus with hyperglycemia, with long-term current use of insulin

## 2021-03-31 NOTE — PROGRESS NOTE ADULT - REASON FOR ADMISSION
LE pain
leg weakness
Lymphadenopathy
leg pain
leg weakness
leg pain
leg weakness
leg pain
leg pain
leg weakness

## 2021-03-31 NOTE — PHYSICAL THERAPY INITIAL EVALUATION ADULT - BALANCE TRAINING, PT EVAL
GOAL: Pt will improve static and dynamic standing balance by 1 full grade in 4 weeks.
GOAL: Pt will improve static and dynamic standing balance by 1 full grade in 4 weeks.

## 2021-03-31 NOTE — PHYSICAL THERAPY INITIAL EVALUATION ADULT - CRITERIA FOR SKILLED THERAPEUTIC INTERVENTIONS
impairments found/anticipated discharge recommendation
impairments found/functional limitations in following categories/rehab potential/therapy frequency/predicted duration of therapy intervention/anticipated equipment needs at discharge/anticipated discharge recommendation

## 2021-03-31 NOTE — PROGRESS NOTE ADULT - PROBLEM SELECTOR PROBLEM 2
Hyperlipidemia, unspecified hyperlipidemia type
Hyperlipidemia, unspecified hyperlipidemia type
Hypertension, unspecified type
Hyperlipidemia, unspecified hyperlipidemia type
Hyperlipidemia, unspecified hyperlipidemia type
Hypertension, unspecified type
Hyperlipidemia, unspecified hyperlipidemia type
Hypertension, unspecified type
supervision

## 2021-03-31 NOTE — DISCHARGE NOTE PROVIDER - NSDCMRMEDTOKEN_GEN_ALL_CORE_FT
amlodipine-benazepril 10 mg-40 mg oral capsule: 1 cap(s) orally once a day  HumaLOG 100 units/mL injectable solution: 8 unit(s) injectable 3 times a day (before meals)  Lipitor 80 mg oral tablet: 1 tab(s) orally once a day  Toprol-XL 50 mg oral tablet, extended release: 1 tab(s) orally once a day  Tresiba 100 units/mL subcutaneous solution: 40 unit(s) subcutaneous once a day (at bedtime)   allopurinol 300 mg oral tablet: 1 tab(s) orally once a day  amlodipine-benazepril 10 mg-40 mg oral capsule: 1 cap(s) orally once a day  aspirin 81 mg oral delayed release tablet: 1 tab(s) orally once a day  clopidogrel 75 mg oral tablet: 1 tab(s) orally once a day  HumaLOG 100 units/mL injectable solution: 8 unit(s) injectable 3 times a day (before meals)  Lipitor 80 mg oral tablet: 1 tab(s) orally once a day  senna oral tablet: 2 tab(s) orally once a day (at bedtime)  Toprol-XL 50 mg oral tablet, extended release: 1 tab(s) orally once a day  Tresiba 100 units/mL subcutaneous solution: 40 unit(s) subcutaneous once a day (at bedtime)

## 2021-03-31 NOTE — PHYSICAL THERAPY INITIAL EVALUATION ADULT - ACTIVE RANGE OF MOTION EXAMINATION, REHAB EVAL
bilateral upper extremity Active ROM was WFL (within functional limits)/Left LE Active ROM was WFL (within functional limits)
bilateral upper extremity Active ROM was WFL (within functional limits)/Left LE Active ROM was WFL (within functional limits)

## 2021-03-31 NOTE — PROGRESS NOTE ADULT - PROBLEM SELECTOR PLAN 3
Plan: goal LDL<70   Continue stain.    pager: [phone]   office:  [phone] (M-F 9a-5pm)               [phone] (nights/weekends)    -I spent a total time of 26 mins with the patient at bedside/on unit of which more than 50% of time was spent on counseling/coordination of care.
Plan: goal LDL<70   Continue stain.    pager: [phone]   office:  [phone] (M-F 9a-5pm)               [phone] (nights/weekends)    -I spent a total time of 26 mins with the patient at bedside/on unit of which more than 50% of time was spent on counseling/coordination of care.
goal <130/80   Currently on amlodipine and metoprolol, ACEI held due to GUANAKO   -may need adjustment
goal <130/80   Currently on amlodipine and metoprolol, ACEI held due to GUANAKO   -may need adjustment    pager: [phone]  office:  [phone] (M-F 9a-5pm)               [phone] (nights/weekends)    -I spent a total time of 26 mins with the patient at bedside/on unit of which more than 50% of time was spent on counseling/coordination of care.
goal LDL<70   Continue stain.    pager: [phone]   office:  [phone] (M-F 9a-5pm)               [phone] (nights/weekends)    -I spent a total time of 26 mins with the patient at bedside/on unit of which more than 50% of time was spent on counseling/coordination of care.
c/w Atorvastatin    discussed w/pt and medicine NP

## 2021-03-31 NOTE — PROGRESS NOTE ADULT - NUTRITIONAL ASSESSMENT
This patient has been assessed with a concern for Malnutrition and has been determined to have a diagnosis/diagnoses of Severe protein-calorie malnutrition.    This patient is being managed with:   Diet Regular-  Consistent Carbohydrate {Evening Snack} (CSTCHOSN)  1500mL Fluid Restriction ([id number])  No Concentrated Potassium  Entered: Mar 27 2021  4:29PM

## 2021-03-31 NOTE — PHYSICAL THERAPY INITIAL EVALUATION ADULT - PERTINENT HX OF CURRENT PROBLEM, REHAB EVAL
61 yo M with PMHx: HTN, DM, HLD, COVID 2020. Presents to ED with BLE pain, R groin and back pain, scrotal swelling, 35 lb weight loss, subjective fevers, sweats - found to have bilateral inguinal adenopathy and a sclerotic L5 lesion. Patient was hospitalized in [facility] in Feb 2021 where CT Ab/P found 5.8x2.5cm lesion on the right pelvis concerning for malignancy with sclerotic lesion at L5. Pt left AMA before additional malignancy workup was performed.

## 2021-03-31 NOTE — PROGRESS NOTE ADULT - SUBJECTIVE AND OBJECTIVE BOX
DATE OF SERVICE: 03-31-21 @ 15:44  CHIEF COMPLAINT:Patient is a 60y old  Male who presents with a chief complaint of leg weakness (31 Mar 2021 10:19)    	        PAST MEDICAL & SURGICAL HISTORY:  Hypertension, unspecified type    Hyperlipidemia    Diabetes    No significant past surgical history            pt refusing card w/u   no cp now  no sob  no abd pain    Medications:  MEDICATIONS  (STANDING):  allopurinol 300 milliGRAM(s) Oral daily  amLODIPine   Tablet 10 milliGRAM(s) Oral daily  aspirin enteric coated 81 milliGRAM(s) Oral daily  atorvastatin 80 milliGRAM(s) Oral at bedtime  clopidogrel Tablet 75 milliGRAM(s) Oral daily  dexAMETHasone     Tablet 4 milliGRAM(s) Oral every 8 hours  dextrose 40% Gel 15 Gram(s) Oral once  dextrose 5%. 1000 milliLiter(s) (50 mL/Hr) IV Continuous <Continuous>  dextrose 5%. 1000 milliLiter(s) (100 mL/Hr) IV Continuous <Continuous>  dextrose 50% Injectable 12.5 Gram(s) IV Push once  dextrose 50% Injectable 25 Gram(s) IV Push once  dextrose 50% Injectable 25 Gram(s) IV Push once  enoxaparin Injectable 100 milliGRAM(s) SubCutaneous daily  glucagon  Injectable 1 milliGRAM(s) IntraMuscular once  insulin lispro (ADMELOG) corrective regimen sliding scale   SubCutaneous three times a day before meals  insulin lispro (ADMELOG) corrective regimen sliding scale   SubCutaneous at bedtime  insulin lispro Injectable (ADMELOG) 6 Unit(s) SubCutaneous three times a day before meals  lactated ringers. 1000 milliLiter(s) (50 mL/Hr) IV Continuous <Continuous>  metoprolol succinate ER 75 milliGRAM(s) Oral daily  nortriptyline 10 milliGRAM(s) Oral at bedtime  saline laxative (FLEET) Rectal Enema 1 Enema Rectal once  senna 2 Tablet(s) Oral at bedtime  trimethoprim   80 mG/sulfamethoxazole 400 mG 1 Tablet(s) Oral daily    MEDICATIONS  (PRN):  acetaminophen   Tablet .. 650 milliGRAM(s) Oral every 6 hours PRN Mild Pain (1 - 3)  HYDROmorphone   Tablet 2 milliGRAM(s) Oral every 6 hours PRN Moderate Pain (4 - 6)  magnesium hydroxide Suspension 30 milliLiter(s) Oral daily PRN Constipation  polyethylene glycol 3350 17 Gram(s) Oral daily PRN Constipation    	    PHYSICAL EXAM:  T(C): 36.6 (03-31-21 @ 08:30), Max: 36.6 (03-31-21 @ 08:30)  HR: 60 (03-31-21 @ 08:30) (60 - 60)  BP: 111/70 (03-31-21 @ 08:30) (111/70 - 111/70)  RR: 18 (03-31-21 @ 08:30) (18 - 18)  SpO2: 95% (03-31-21 @ 08:30) (95% - 95%)  Wt(kg): --  I&O's Summary    30 Mar 2021 07:01  -  31 Mar 2021 07:00  --------------------------------------------------------  IN: 0 mL / OUT: 500 mL / NET: -500 mL    31 Mar 2021 07:01  -  31 Mar 2021 15:44  --------------------------------------------------------  IN: 540 mL / OUT: 800 mL / NET: -260 mL        	  HEENT:   Normal oral mucosa, PERRL, EOMI	    Cardiovascular: Normal S1 S2, No JVD,   Respiratory: dec bs   Gastrointestinal:  Soft, Non-tender, + BS	    Neurologic: Non-focal  Extremities:dec rom    TELEMETRY: 	    ECG:  	  RADIOLOGY:  OTHER: 	  	  LABS:	 	    CARDIAC MARKERS:  CARDIAC MARKERS ( 30 Mar 2021 00:33 )  x     / x     / 414 U/L / x     / 78.4 ng/mL                                18.0   14.08 )-----------( 408      ( 30 Mar 2021 08:34 )             54.2           proBNP:   Lipid Profile:   HgA1c:   TSH:

## 2021-03-31 NOTE — CHART NOTE - NSCHARTNOTEFT_GEN_A_CORE
54 yo M with HTN, HLD, T2DM and extensive smoking history >40 pack year, presents to ED with right groin and back pain, 35 lb weight loss, scrotal swellin g / pelvic lesion/ weight loss, subjective fevers, sweats - found to have bilateral inguinal adenopathy,  a sclerotic L5 lesion neurosx, neurology consulted, started on decadron; s/p excisional  biopsy  of inguinal lymph node concerning for lymphoma , --Pathology results pending - frozen section consistent with B cell lymphoma; awaiting subtype DNA markers done on lymph node - consistent with monoclonal b-cell population; flow cytometry from lymph node, however, negative for neoplasm. Hospital course complicated by Acute MI 3/29, at that time pt decline treatment, started on Plavix and ASA. 3/30  RN unable to obtain aptt thus pt decline further blood draws thus heparin drip discontinued and Lovenox 100mg sc started which pt also decline. Pt is also decline decadron stating that decadron caused his heart attack. Pt seen and evaluated  by psych on 3/30, per psych pt do not have capacity to refuse medical care or sign out AMA. Plan of care provided at length to patient's surrogate decision maker and mother Norman.      Presently patient's life partner of 5 years/ surrogate decision maker Halima Regalado [phone] is at the bedside to sign pt out AMA. Via telephone Pt's mother Tatum Austin [phone] ( [phone]) request to sign patient out AMA. Education provider to Ms. Norman and Ms. Regalado at length about the risk of signing patient out AMA may result in Mr. Fernandez death; Ms. Brasher and Ms. Regalado verbalized understanding that was witness by unit MN and Primary assigned RN . Ms. Regalado verbalized that patient will likely go to another hospital closely to his home. 52 yo M with HTN, HLD, T2DM and extensive smoking history >40 pack year, presents to ED with right groin and back pain, 35 lb weight loss, scrotal swellin g / pelvic lesion/ weight loss, subjective fevers, sweats - found to have bilateral inguinal adenopathy,  a sclerotic L5 lesion neurosx, neurology consulted, started on decadron; s/p excisional  biopsy  of inguinal lymph node concerning for lymphoma , --Pathology results pending - frozen section consistent with B cell lymphoma; awaiting subtype DNA markers done on lymph node - consistent with monoclonal b-cell population; flow cytometry from lymph node, however, negative for neoplasm. Hospital course complicated by Acute MI 3/29, at that time pt decline treatment, started on Plavix and ASA. 3/30  RN unable to obtain aptt thus pt decline further blood draws thus heparin drip discontinued and Lovenox 100mg sc started which pt also decline. Pt is also decline decadron stating that decadron caused his heart attack. Pt seen and evaluated  by psych on 3/30, per psych pt do not have capacity to refuse medical care or sign out AMA. Patient expression that he is signing out AMA today 3/31 and continue to refused all treatment. The above discussed with Dr. Peña- Psych and Dr. Ennis. Plan of care provided at length to patient's surrogate decision maker and mother Norman.     Presently patient's life partner of 5 years/ surrogate decision maker Halima Regalado [phone] is at the bedside to sign pt out AMA. Via telephone Pt's mother Tatum Austin [phone] ( [phone]) request to sign patient out AMA. Education provider to Ms. Austin and Ms. Regalado at length about the risk of signing patient out AMA may result in Mr. Fernandez death; Ms. Brasher and Ms. Regalado verbalized understanding that was witness by unit MN and Primary assigned RN . Ms. Regalado verbalized that patient will likely go to another hospital closely to his home.  The above discussed with Dr. Ennis at length; Advised by Cardiologist of record to prescribed Plavix and ASA. Dr. Emery - Hematologist aslo informed. 52 yo M with HTN, HLD, T2DM and extensive smoking history >40 pack year, presents to ED with right groin and back pain, 35 lb weight loss, scrotal swellin g / pelvic lesion/ weight loss, subjective fevers, sweats - found to have bilateral inguinal adenopathy,  a sclerotic L5 lesion neurosx, neurology consulted, started on decadron; s/p excisional  biopsy  of inguinal lymph node concerning for lymphoma , --Pathology results pending - frozen section consistent with B cell lymphoma; awaiting subtype DNA markers done on lymph node - consistent with monoclonal b-cell population; flow cytometry from lymph node, however, negative for neoplasm. Hospital course complicated by Acute MI 3/29, at that time pt decline treatment, started on Plavix and ASA. 3/30  RN unable to obtain aptt thus pt decline further blood draws thus heparin drip discontinued and Lovenox 100mg sc started which pt also decline. Pt is also decline decadron stating that decadron caused his heart attack. Pt seen and evaluated  by psych on 3/30, per psych pt do not have capacity to refuse medical care or sign out AMA. Patient expression that he is signing out AMA today 3/31 and continue to refused all treatment. The above discussed with Dr. Peña- Psych and Dr. Ennis. Plan of care provided at length to patient's surrogate decision maker and mother Norman.     Presently patient's life partner of 5 years/ surrogate decision maker Halima Raymyla [phone] is at the bedside to sign pt out AMA. Via telephone Pt's mother Tatum Austin ( [phone]) request to sign patient out AMA. Education provider to Ms. Sewelljeramie and Ms. Jacintoon at length about the risk of signing patient out AMA may result in Mr. Fernandez death; Ms. Sewellnut and Ms. Regalado verbalized understanding that was witness by unit MN and Primary assigned RN . Ms. Rayteresitajose rafael verbalized that patient will likely go to another hospital closely to his home.  The above discussed with Dr. Ennis at length; Advised by Cardiologist of record to prescribed Plavix and ASA. Dr. Emery - Hematologist aslo informed. 54 yo M with HTN, HLD, T2DM and extensive smoking history >40 pack year, presents to ED with right groin and back pain, 35 lb weight loss, scrotal swellin g / pelvic lesion/ weight loss, subjective fevers, sweats - found to have bilateral inguinal adenopathy,  a sclerotic L5 lesion neurosx, neurology consulted, started on decadron; s/p excisional  biopsy  of inguinal lymph node concerning for lymphoma , --Pathology results pending - frozen section consistent with B cell lymphoma; awaiting subtype DNA markers done on lymph node - consistent with monoclonal b-cell population; flow cytometry from lymph node, however, negative for neoplasm. Hospital course complicated by Acute MI 3/29, at that time pt decline treatment, started on Plavix and ASA. 3/30  RN unable to obtain aptt thus pt decline further blood draws thus heparin drip discontinued and Lovenox 100mg sc started which pt also decline. Pt is also decline decadron stating that decadron caused his heart attack. Pt seen and evaluated  by psych on 3/30, per psych pt do not have capacity to refuse medical care or sign out AMA. Patient expression that he is signing out AMA today 3/31 and continue to refused all treatment. The above discussed with Dr. Peña- Psych and Dr. Ennis. Plan of care provided at length to patient's surrogate decision maker and mother Norman.     Presently patient's life partner of 5 years/ surrogate decision maker Halima Jacintoon [phone] is at the bedside to sign pt out AMA. Via telephone Pt's mother Tatum Austin ( [phone]) request to sign patient out AMA. Education provider to Ms. Austin and Ms. Regalado at length about the risk of signing patient out AMA may result in Mr. Fernandez death; Ms. Brasher and Ms. Regalado verbalized understanding witnessed by unit [place] and Primary assigned RN . Ms. Regalado stated that patient will likely go to another hospital closely to his home.  The above discussed with Dr. Ennis at length; Advised by Cardiologist of record to prescribed Plavix and ASA. Dr. Emery - Hematologist aslo informed.

## 2021-03-31 NOTE — PHYSICAL THERAPY INITIAL EVALUATION ADULT - PLANNED THERAPY INTERVENTIONS, PT EVAL
balance training/gait training/strengthening/transfer training
balance training/gait training/strengthening/transfer training

## 2021-03-31 NOTE — PROGRESS NOTE ADULT - PROBLEM SELECTOR PLAN 1
-test BG AC/HS  -Decrease Lantus 22 units QHS  -Decrease Admelog 6 units AC meals  -change Admelog to low correction scale AC and low HS scale  :Discharge: Tresiba and Novolog. Doses TBD  Recommend follow up with Endo in [place], Patient to find out the name.  Please make sure pt has insulin pens and DM supplies at home

## 2021-03-31 NOTE — PHYSICAL THERAPY INITIAL EVALUATION ADULT - GAIT DEVIATIONS NOTED, PT EVAL
decreased anitha/footdrop/decreased step length/decreased stride length
decreased anitha/footdrop/decreased step length/decreased stride length/decreased weight-shifting ability

## 2021-03-31 NOTE — DISCHARGE NOTE PROVIDER - DETAILS OF MALNUTRITION DIAGNOSIS/DIAGNOSES
This patient has been assessed with a concern for Malnutrition and was treated during this hospitalization for the following Nutrition diagnosis/diagnoses:     -  03/22/2021: Severe protein-calorie malnutrition

## 2021-03-31 NOTE — PHYSICAL THERAPY INITIAL EVALUATION ADULT - DIAGNOSIS, PT EVAL
Decreased functional strength and balance
Pt presents with impairments in pain, sensation, strength, balance, and endurance all impacting ability to perform ADLs and functional mobility

## 2021-03-31 NOTE — PHYSICAL THERAPY INITIAL EVALUATION ADULT - MANUAL MUSCLE TESTING RESULTS, REHAB EVAL
BUE 4+/5; L hip/knee 4/5; L ankle df 3/5; R hip/knee 1/5; R ankle df/pf 3-/5; BUE 4+/5; L hip/knee 3+/5; L ankle df 3/5; R hip/knee 1/5; R ankle df/pf 3-/5;

## 2021-03-31 NOTE — PHYSICAL THERAPY INITIAL EVALUATION ADULT - TRANSFER TRAINING, PT EVAL
GOAL: Pt will transfer independently with RW in 4 weeks.
GOAL: Pt will transfer independently with RW in 4 weeks.

## 2021-03-31 NOTE — CHART NOTE - NSCHARTNOTEFT_GEN_A_CORE
Refusing care    Patient refusing telemetry monitoring, blood draws, medications. Went in to room to speak with patient about refusal, as soon as writer entered the room, patient stated, I am not taking anything and I am not letting anyone draw my blood. Risk versus benefits explained and patient stated "If I die it is my problem"    Will endorse to primary day team, attending to follow.    Leah Dyer NP  [facility] [id number]

## 2021-03-31 NOTE — PROGRESS NOTE ADULT - ASSESSMENT
Echo 3/19/21: min MR, nl lv sys fx, small-moderate pericardial effusion, no evidence of pericardial tamponade     a/p  61 yo M with hx HTN, DM, HLD, COVID 2020 presents with LE pain and scrotal swelling    1. Acute MI  -EKG noted with inferior KENNETH without significant reciprocal changes  -hsT elevated, ck, ckmb, cpk elevated   -NSGY cleared pt for DAPT and heparin gtt  -s/p asa and plavix load, s/p hep gtt   -recent echo with min MR, nl lv sys fx, small-moderate pericardial effusion, no evidence of pericardial tamponade   -appreciate interventional cardio   -pt not candidate for cath at this time given ongoing workup for B-cell lymphoma, high risk of bleeding with possible Cauda-Equina Syndrome vs leptomeningeal spread of B-cell lymphoma  -cont medical management   - cw  asa, plavix, bb, on lovenox   -pending repeat echo     2. LE pain/weakness/scrotal swelling  -s/p LN excisional biopsy, + diffuse B cell, patho noted   -s/p LP -high lymphocytes prelim, protein slightly elevated, no cx growth   -per ID: MRI suggestive of CIDP, per neuro less likely AIDP, sarcoid, leptomeningeal carcinomatosis, infection  -per neuro: Clinical suspicion for B-cell lymphoma with leptomeningeal spread  -MRI Cspine, Tspine, head noted   -UA noted  -LE duplex neg 3/15 for DVT  -urology eval noted - No  intervention during this admission  -neurosx eval noted -no interventions at this time, possibly benefit from L5/S1 decompression fusion after workup of lymph node  -off abx per ID  -c/w deca for cauda equina  -f/u heme/neuro/ID    3. HTN  -bp acceptable   -c/w amlodipine and bb    4. HLD  -c/w statin    5. GUANAKO  -cr noted   -renal f/u    6. Pericardial effusion  -echo noted with min MR, nl lv sys fx, small-moderate pericardial effusion, no evidence of pericardial tamponade   -cont to monitor       dvt ppx

## 2021-03-31 NOTE — PROGRESS NOTE ADULT - ASSESSMENT
61yo M with hx HTN, DM, HLD, COVID 2020 presents with LE pain and scrotal swelling    - GUANAKO on top of underlying CKD stage 3?     :  Increase in BUN may be from Steroids      :  High normal potassium-May be due to bactrim .        -CV-  ; on metoprolol and amlodipine  Lovenox for  NSTEMI     -Heme/onc- B cell lymphoma;   Awaiting final path    -Renal dosing of meds to CrCL ~ 50ml/min-- On Allopurinol 300mg po qd   Off IVF   DC NaHCO3 tabs       Per psych he does not have capacity to leave     Sayed [facility]   [facility]   [phone]

## 2021-03-31 NOTE — PROGRESS NOTE ADULT - ASSESSMENT
60 year old male presents to ED with right groin and back pain, 35 lb weight loss, subjective fevers, sweats - found to have bilateral inguinal adenopathy and a sclerotic L5 lesion.    Inguinal Adenopathy and L1 Sclerotic Lesion  -- Concern for lymphoma based on fevers, weight loss and night sweats  -- Other malignancies also possible, as can be benign inflammatory processes  -- Tumor markers - PSA,, Ca 19,9, LDH and Uric Acid are normal  -- S/P excisional biopsy of inguinal lymph node  --Pathology results pending - frozen section consistent with B cell lymphoma; awaiting subtype  --DNA markers done on lymph node - consistent with monoclonal b-cell population  --However cytogenetic were normal male without abnormalities  --Flow cytometry from lymph node, however, negative for neoplasm  --Quantiferon results are indeterminate   --Chemotherapy (if indicated will be determined (as well as possible spinal RT) once we have final pathology results - will most likely be given as outpatient  --We request IR consultation for a medi port placement to be done prior to discharge but will wait until pathology results    Neuropathy  -- Originally resumed to be secondary to COVID infection per patient  -- Markedly abnormal MRI of spine  -- Neurology following closely with us  -- CSF cytology negative for malignancy (small lymphocytes only were seen) but CSF protein is elevated  -- Started dexamethasone for cauda equina 10 mg loading dose then 4 mg IV q6H - clinically improved  -- Have begun tapering Dexamethasone to 4 mg PO q8H  -- Discussed with neurology, MRI of cervical, thoracic spine as well as MRI head have been repeated. MRI head with non-specific findings.    Acute MI  --Occurred 3/29  --Patient refusing any intervention  --Planning to sign himself out AMA    If patient willing, he can follow up with Dr. Al Mackey as an outpatient to complete workup and potential treatment for presumed lymphoma.    We will continue to follow patient. Thank you for the opportunity to participate in Mr Fernandez's care.    Lobo Landeros PA-C  Hematology/Oncology  [facility]   [phone] (cell)  [phone] (office)  Evenings and weekends please call MD on call or office

## 2021-03-31 NOTE — PHYSICAL THERAPY INITIAL EVALUATION ADULT - IMPAIRMENTS CONTRIBUTING TO GAIT DEVIATIONS, PT EVAL
impaired balance/abnormal muscle tone/decreased strength
impaired balance/abnormal muscle tone/pain/decreased ROM/decreased sensation/decreased strength

## 2021-03-31 NOTE — PROGRESS NOTE ADULT - SUBJECTIVE AND OBJECTIVE BOX
NEPHROLOGY-[facility] [phone]        Patient seen and examined in bed.  He was frustrated and wanted to leave         MEDICATIONS  (STANDING):  allopurinol 300 milliGRAM(s) Oral daily  amLODIPine   Tablet 10 milliGRAM(s) Oral daily  aspirin enteric coated 81 milliGRAM(s) Oral daily  atorvastatin 80 milliGRAM(s) Oral at bedtime  clopidogrel Tablet 75 milliGRAM(s) Oral daily  dexAMETHasone     Tablet 4 milliGRAM(s) Oral every 8 hours  dextrose 40% Gel 15 Gram(s) Oral once  dextrose 5%. 1000 milliLiter(s) (50 mL/Hr) IV Continuous <Continuous>  dextrose 5%. 1000 milliLiter(s) (100 mL/Hr) IV Continuous <Continuous>  dextrose 50% Injectable 12.5 Gram(s) IV Push once  dextrose 50% Injectable 25 Gram(s) IV Push once  dextrose 50% Injectable 25 Gram(s) IV Push once  enoxaparin Injectable 100 milliGRAM(s) SubCutaneous daily  glucagon  Injectable 1 milliGRAM(s) IntraMuscular once  insulin lispro (ADMELOG) corrective regimen sliding scale   SubCutaneous three times a day before meals  insulin lispro (ADMELOG) corrective regimen sliding scale   SubCutaneous <User Schedule>  insulin lispro Injectable (ADMELOG) 8 Unit(s) SubCutaneous three times a day before meals  lactated ringers. 1000 milliLiter(s) (50 mL/Hr) IV Continuous <Continuous>  metoprolol succinate ER 75 milliGRAM(s) Oral daily  nortriptyline 10 milliGRAM(s) Oral at bedtime  saline laxative (FLEET) Rectal Enema 1 Enema Rectal once  senna 2 Tablet(s) Oral at bedtime  sodium bicarbonate 650 milliGRAM(s) Oral two times a day  trimethoprim   80 mG/sulfamethoxazole 400 mG 1 Tablet(s) Oral daily      VITAL:  T(C): , Max: 36.6 (03-31-21 @ 08:30)  T(F): , Max: 97.8 (03-31-21 @ 08:30)  HR: 60 (03-31-21 @ 08:30)  BP: 111/70 (03-31-21 @ 08:30)  BP(mean): --  RR: 18 (03-31-21 @ 08:30)  SpO2: 95% (03-31-21 @ 08:30)  Wt(kg): --    I and O's:    03-30 @ 07:01  -  03-31 @ 07:00  --------------------------------------------------------  IN: 0 mL / OUT: 500 mL / NET: -500 mL          PHYSICAL EXAM:    Constitutional: NAD  Neck:  No JVD  Respiratory: CTAB/L  Cardiovascular: S1 and S2  Gastrointestinal: BS+, soft, NT/ND  Extremities: No peripheral edema  Neurological: A/O x 3, no focal deficits  Psychiatric: Normal mood, normal affect  : No Weir  Skin: No rashes  Access: Not applicable    LABS:                        18.0   14.08 )-----------( 408      ( 30 Mar 2021 08:34 )             54.2                 Urine Studies:          RADIOLOGY & ADDITIONAL STUDIES:

## 2021-03-31 NOTE — PROGRESS NOTE ADULT - SUBJECTIVE AND OBJECTIVE BOX
Diabetes Follow up note:    Chief complaint: T2DM    Interval Hx: Pt refused FS and insulin yesterday. This AM fasting glucose 233mg/dl. Pt allowed for insulin administration and reports tolerating POs today. Pt is adamant about wanting to leave and only wants his legs fixed. Does not want to consent to any more blood draws (except Finger sticks) and is refusing steroids and cardiac cath. Pt is upset that the issue he came in for (inability to walk) has not improved.     Review of Systems:  General: as above.   GI: Tolerating POs. Denies N/V/D/Abd pain  CV: Denies CP/SOB  ENDO: No S&Sx of hypoglycemia  MEDS:  allopurinol 300 milliGRAM(s) Oral daily  atorvastatin 80 milliGRAM(s) Oral at bedtime  dexAMETHasone     Tablet 4 milliGRAM(s) Oral every 8 hours    insulin lispro (ADMELOG) corrective regimen sliding scale   SubCutaneous three times a day before meals  insulin lispro (ADMELOG) corrective regimen sliding scale   SubCutaneous <User Schedule>  insulin lispro Injectable (ADMELOG) 8 Unit(s) SubCutaneous three times a day before meals    trimethoprim   80 mG/sulfamethoxazole 400 mG 1 Tablet(s) Oral daily    Allergies    No Known Allergies        PE:  General: Male lying in bed. NAD.   Vital Signs Last 24 Hrs  T(C): 36.6 (31 Mar 2021 08:30), Max: 36.6 (31 Mar 2021 08:30)  T(F): 97.8 (31 Mar 2021 08:30), Max: 97.8 (31 Mar 2021 08:30)  HR: 60 (31 Mar 2021 08:30) (60 - 60)  BP: 111/70 (31 Mar 2021 08:30) (111/70 - 111/70)  BP(mean): --  RR: 18 (31 Mar 2021 08:30) (18 - 18)  SpO2: 95% (31 Mar 2021 08:30) (95% - 95%)  Abd: Soft, NT,ND,   Extremities: Warm  Neuro: A&O X3    LABS:  POCT Blood Glucose.: 233 mg/dL (03-31-21 @ 08:40)  POCT Blood Glucose.: 219 mg/dL (03-30-21 @ 11:56)  POCT Blood Glucose.: 92 mg/dL (03-30-21 @ 08:03)  POCT Blood Glucose.: 118 mg/dL (03-30-21 @ 02:03)  POCT Blood Glucose.: 167 mg/dL (03-29-21 @ 22:54)  POCT Blood Glucose.: 118 mg/dL (03-29-21 @ 21:20)  POCT Blood Glucose.: 167 mg/dL (03-29-21 @ 17:05)  POCT Blood Glucose.: 115 mg/dL (03-29-21 @ 11:39)  POCT Blood Glucose.: 314 mg/dL (03-29-21 @ 07:43)  POCT Blood Glucose.: 243 mg/dL (03-28-21 @ 22:36)  POCT Blood Glucose.: 274 mg/dL (03-28-21 @ 21:17)  POCT Blood Glucose.: 321 mg/dL (03-28-21 @ 16:38)  POCT Blood Glucose.: 217 mg/dL (03-28-21 @ 11:48)                            18.0   14.08 )-----------( 408      ( 30 Mar 2021 08:34 )             54.2               Thyroid Function Tests:  03-15 @ 09:19 TSH 4.86 FreeT4 -- T3 -- Anti TPO -- Anti Thyroglobulin Ab -- TSI --      A1C with Estimated Average Glucose Result: 10.6 % (03-15-21 @ 08:59)      C-Peptide, Serum: 2.2 ng/mL (03-16 @ 09:15)      Contact number: nino [phone] or [phone]

## 2021-03-31 NOTE — PHYSICAL THERAPY INITIAL EVALUATION ADULT - GAIT TRAINING, PT EVAL
GOAL: Pt will ambulate 200ft independently with RW in 4 weeks.
GOAL: Pt will ambulate 300ft independently with RW in 4 weeks.

## 2021-03-31 NOTE — PROGRESS NOTE ADULT - PROVIDER SPECIALTY LIST ADULT
Heme/Onc
Infectious Disease
Internal Medicine
Nephrology
Neurology
Neurology
Neuroradiology
Cardiology
Endocrinology
Heme/Onc
Internal Medicine
Nephrology
Neurology
Neuroradiology
Surgery
Urology
Cardiology
Heme/Onc
Heme/Onc
Infectious Disease
Internal Medicine
Nephrology
Neurology
Neurology
Neurosurgery
Surgery
Cardiology
Cardiology
Heme/Onc
Heme/Onc
Infectious Disease
Infectious Disease
Internal Medicine
Nephrology
Neurology
Cardiology
Endocrinology
Neurology
Endocrinology

## 2021-03-31 NOTE — PROGRESS NOTE ADULT - SUBJECTIVE AND OBJECTIVE BOX
Admitting Diagnosis:  Abnormal weight loss [R63.4]  ABNORMAL WEIGHT LOSS      Background:  This is a 60y year old Male  who presents with the chief complaint of low back pain and leg weakness. 61yo M with hx HTN, DM, HLD, COVID 2020 presents with LE pain and scrotal swelling. Patient was hospitalized in [facility] in Feb 2021 where CT Ab/P found 5.8x2.5cm lesion on the right pelvis concerning for malignancy with sclerotic lesion at L5. Pt states since early 2021 inc pain and weakness in the right leg, at first he was limping but now cannot walk.  He has pain in the feet, describes as pinching and needles like sensation.  Starting to have sx in left leg as well. Patient left AMA before additional malignancy workup was performed.  In the last month, patient had 45lb unintentional weight loss and decreased appetite. Notes abdominal swelling, mostly on the left side with tenderness along with b/l scrotal swelling.  At one point given neurontin, not helpful.  Denies sx in arms, changes in speech, swallow, vision, bladder control.  Says issues with bowel movements.     MRI done showing diffuse nerve root enhancement involving the roots of the cauda equina with some mild root thickening though the dominant finding is the enhancement.  There is also a nonspecific lesion in the L5 vertebral body and a possible septic facet at L4-5.       s/p LP , high lymphocytes prelim, protein slightly elevated, no cx growth, awaiting cytology.  Flow with small lymphocytes. our team has  Dr. Carney at [phone] who states that prelim cytology is small lymphocytes without large lymphocytes to suggest B-cell lymphoma in the spine, however official read pending    Interval Hx:  had an MI 3/29  refusing further steroids   wants to go home   apparently seen by psychiatry who felt he did not have capacity?  feels subjectively weaker  Still awaiting pathology!  ******    Past Medical History:  Diabetes [E11.9]    Hyperlipidemia [E78.5]    Hypertension, unspecified type [I10]        Past Surgical History:  No significant past surgical history [[id number]]        Social History:  No toxic habits    Family History:  FAMILY HISTORY:  No pertinent family history in first degree relatives        Allergies:  No Known Allergies      ROS:  Constitutional: Patient offers no complaints of fevers or significant weight loss  Ears, Nose, Mouth and Throat: The patient presents with no abnormalities of the head, ears, eyes, nose or throat  Skin: Patient offers no concerns of new rashes or lesions  Respiratory: The patient presents with no abnormalities of the respiratory tract  Cardiovascular: The patient presents with no cardiac abnormalities  Gastrointestinal: The patient presents with no abnormalities of the GI system  Genitourinary: The patient presents with no dysuria, hematuria or frequent urination  Neurological: See HPI  Endocrine: Patient offers no complaints of excessive thirst, urination, or heat/cold intolerance    Advanced care planning reviewed and noted in the chart.    Medications:  acetaminophen   Tablet .. 650 milliGRAM(s) Oral every 6 hours PRN  allopurinol 300 milliGRAM(s) Oral daily  amLODIPine   Tablet 10 milliGRAM(s) Oral daily  aspirin enteric coated 81 milliGRAM(s) Oral daily  atorvastatin 80 milliGRAM(s) Oral at bedtime  clopidogrel Tablet 75 milliGRAM(s) Oral daily  dexAMETHasone     Tablet 4 milliGRAM(s) Oral every 8 hours  dextrose 40% Gel 15 Gram(s) Oral once  dextrose 5%. 1000 milliLiter(s) IV Continuous <Continuous>  dextrose 5%. 1000 milliLiter(s) IV Continuous <Continuous>  dextrose 50% Injectable 12.5 Gram(s) IV Push once  dextrose 50% Injectable 25 Gram(s) IV Push once  dextrose 50% Injectable 25 Gram(s) IV Push once  enoxaparin Injectable 100 milliGRAM(s) SubCutaneous daily  glucagon  Injectable 1 milliGRAM(s) IntraMuscular once  HYDROmorphone   Tablet 2 milliGRAM(s) Oral every 6 hours PRN  insulin glargine Injectable (LANTUS) 34 Unit(s) SubCutaneous at bedtime  insulin lispro (ADMELOG) corrective regimen sliding scale   SubCutaneous <User Schedule>  insulin lispro (ADMELOG) corrective regimen sliding scale   SubCutaneous three times a day before meals  insulin lispro Injectable (ADMELOG) 8 Unit(s) SubCutaneous three times a day before meals  lactated ringers. 1000 milliLiter(s) IV Continuous <Continuous>  magnesium hydroxide Suspension 30 milliLiter(s) Oral daily PRN  metoprolol succinate ER 75 milliGRAM(s) Oral daily  nortriptyline 10 milliGRAM(s) Oral at bedtime  polyethylene glycol 3350 17 Gram(s) Oral daily PRN  saline laxative (FLEET) Rectal Enema 1 Enema Rectal once  senna 2 Tablet(s) Oral at bedtime  sodium bicarbonate 650 milliGRAM(s) Oral two times a day  trimethoprim   80 mG/sulfamethoxazole 400 mG 1 Tablet(s) Oral daily      Labs:  CBC Full  -  ( 30 Mar 2021 08:34 )  WBC Count : 14.08 K/uL  RBC Count : 6.08 M/uL  Hemoglobin : 18.0 g/dL  Hematocrit : 54.2 %  Platelet Count - Automated : 408 K/uL  Mean Cell Volume : 89.1 fl  Mean Cell Hemoglobin : 29.6 pg  Mean Cell Hemoglobin Concentration : 33.2 gm/dL  Auto Neutrophil # : x  Auto Lymphocyte # : x  Auto Monocyte # : x  Auto Eosinophil # : x  Auto Basophil # : x  Auto Neutrophil % : x  Auto Lymphocyte % : x  Auto Monocyte % : x  Auto Eosinophil % : x  Auto Basophil % : x          CAPILLARY BLOOD GLUCOSE      POCT Blood Glucose.: 233 mg/dL (31 Mar 2021 08:40)  POCT Blood Glucose.: 219 mg/dL (30 Mar 2021 11:56)      PT/INR - ( 30 Mar 2021 04:37 )   PT: 10.8 sec;   INR: 0.90 ratio         PTT - ( 30 Mar 2021 04:37 )  PTT:118.1 sec        Vitals:  Vital Signs Last 24 Hrs  T(C): 36.6 (31 Mar 2021 08:30), Max: 36.6 (31 Mar 2021 08:30)  T(F): 97.8 (31 Mar 2021 08:30), Max: 97.8 (31 Mar 2021 08:30)  HR: 60 (31 Mar 2021 08:30) (60 - 60)  BP: 111/70 (31 Mar 2021 08:30) (111/70 - 111/70)  BP(mean): --  RR: 18 (31 Mar 2021 08:30) (18 - 18)  SpO2: 95% (31 Mar 2021 08:30) (95% - 95%)    NEUROLOGICAL EXAM:    Mental status: Awake, alert, and in no apparent distress. Oriented to person, place and time. Language function is normal. speech clear and fluent. Recent memory, digit span and concentration were normal.     Cranial Nerves: Pupils were equal, round, reactive to light. Extraocular movements were intact. Visual field were full. Facial sensation was intact to light touch. There was no facial asymmetry. The palate was upgoing symmetrically and tongue was midline. Hearing acuity was intact to finger rub AU. Shoulder shrug was full bilaterally    Motor exam: Bulk and tone were normal.  Exam somewhat limited today as not excited about doing exam.  However overall appears unchanged: Strength was 5/5 in the arms.  Left leg  5/5 except dorsi 5-/5.  Right leg - low tone.  hip flexion/knee ext 2/5, dorsiflexion 4/5. Fine finger movements were symmetric and normal. There was no pronator drift pain on palpation of the leg    Reflexes: 2+ in the bilateral upper extremities. absent in legs. Toes were downgoing bilaterally.     Sensation: Intact to light touch, temperature, vibration and proprioception. says when touch right leg it is painful    Coordination: Finger-nose-finger was without dysmetria. cannot do heel shin    Gait: deferred    < from: CT Chest w/ IV Cont (03.14.21 @ 10:12) >    EXAM:  CT ABDOMEN AND PELVIS IC                          EXAM:  CT CHEST IC                          PROCEDURE DATE:  03/14/2021      IMPRESSION:  Enlarged right iliac lymph node measuring 5.9 x 2.4 cm. Additional smaller right iliac nodes are seen. There are also bilateral groin lymph nodes.      MOLINA PUGH M.D., RADIOLOGY RESIDENT  This documenthas been electronically signed.  GINA JENKINS M.D., ATTENDING RADIOLOGIST  This document has been electronically signed. Mar 14 2021 12:50PM    < end of copied text >          EXAM:  MR SPINE LUMBAR WAW IC                            PROCEDURE DATE:  03/15/2021            INTERPRETATION:  INDICATIONS:  Increased low back pain and right leg weakness for 4 months    TECHNIQUE:  Sagittal T1 weighted and T2 weighted images of the lumbosacral spine as well as axial T1 weighted and T2 weighted images were obtained.    COMPARISON EXAMINATION: None.    FINDINGS:  VERTEBRAL BODIES AND DISCS:  Nonspecific lesion in L1 low signal T1 hyperintense signal T2 with some enhancement.  ALIGNMENT:  No subluxations.  L1-L2 LEVEL:  Normal.  L2-L3 LEVEL:  Asymmetric bulge right with disc material in the region of the right neural foramen at this level. Clinical correlation for right L2 radiculopathy  L3-L4 LEVEL: Symmetric bulge with annular fissure in the 7:30 position. Bilateral facet arthropathy at this level.  L4-L5 LEVEL: Asymmetric bulge to the left with focal left-sided protrusion and some mass impression on the intracanal left L5 root.. Bilateral facet arthropathy.Some changes in the left paraspinal musculature extending from the left facet joint may represent septic facet as enhancement is seen in association with hyperintense T2 signal with associated muscular changes (9:19)  L5-S1 LEVEL:  Slight signal hyperintensity at the disc space with some enhancement ventrally without associated endplate abnormality favors degenerative change. Prominent asymmetric disc bulge with Bilateral foraminal disc material which is causing significant nerve root compression on the right greater than left. May be the cause of the patient's known right leg weakness.  SPINAL CANAL:  Evidence of diffuse nerve root enhancement appreciated involving the roots of the cauda equina with some mild thickening appreciated.  CONUS MEDULLARIS:  Normal.  MISCELLANEOUS:  None    IMPRESSION:  Diffuse nerve root enhancement involving the roots of the cauda equina with some mild root thickening though the dominant finding is the enhancement. Differential possibilities include acute inflammatory demyelinating polyneuropathy( Guillan Austin) versus chronic inflammatory demyelinating polyneuropathy(CIDP). Inflammatory pathologies such as sarcoid or infectious etiologies should be considered. Included in the differential is leptomeningeal carcinomatosis. Correlation with CSF strongly recommended. Nonspecific lesion in the L5 vertebral body as described. Enhancement with associated T2 abnormality involving the left L4-5 facet with extension to the paraspinal musculature may indicate a septic facet at this level. Correlation with clinical parameters suggested. Degenerative changes with disc material in the neural foramina at the L2-3 and L5-S1 levels on the right as well as in the L5-S1 neural foramen on the left. Prominent bulge with more focal protrusion L4-5 left                MONTY TRAN MD; Attending Radiologist  This document has been electronically signed. Mar 15 2021  8:19PM      EXAM:  MR SPINE CERVICAL                          EXAM:  MR BRAIN                            PROCEDURE DATE:  03/24/2021            INTERPRETATION:  INDICATIONS:  Right leg weakness and bilateral leg pain. Rule out metastatic disease    Brain:    TECHNIQUE:  Multiplanar imaging was performed using T1 weighted, T2 weighted and FLAIR sequences.  Diffusion weighted and susceptibility sensitive images were also obtained.    Patient could not continue the exam. Intravenous contrast was not administered.    COMPARISON EXAMINATION:  None.    FINDINGS: The study is degraded by motion artifact.  VENTRICLES AND SULCI:  Normal.  INTRA-AXIAL:  No mass, abnormal signal or evidence of acute cerebral ischemia.  EXTRA-AXIAL:  No mass or collection.  VISUALIZED SINUSES:  Mild mucosal thickening  VISUALIZED MASTOIDS:  Clear.  CALVARIUM:  Normal.  CAROTID FLOW VOIDS:  Present.  MISCELLANEOUS:  Tornwaldt cyst within the midline nasopharynx.    Cervical spine:    TECHNIQUE:  Sagittal T1 weighted, T2 weighted and STIR images of the cervical spine as well as axial T1 weighted and T2 weighted images were obtained.    The patient could not continue the exam. The study is limited by motion artifact.    COMPARISON EXAMINATION: None.    FINDINGS:  VERTEBRAL BODIES AND DISCS: Normal in height. No abnormal signal is identified. Multilevel marginal osteophyte formation.  ALIGNMENT:  No subluxations. Straightening of the cervical lordosis  C2-C3 LEVEL:  Normal.  C3-C4 LEVEL:  Mild disc bulge/ridge  C4-C5 LEVEL:  Diffuse disc bulge/ridge  C5-C6 LEVEL:  Diffuse disc bulge/ridge with bilateral foraminal narrowing  C6-C7 LEVEL:  Disc bulge/ridge with left greater than right foraminal narrowing  C7-T1 LEVEL:   Normal.  SPINAL CORD: Normal.  SPINAL CANAL:  No other intradural or extradural defects are seen.  MISCELLANEOUS:  None.      IMPRESSION:  Brain:  Motion degraded exam.    No intracranial abnormality identified. Detection of metastatic disease is limited without intravenous contrast.    Cervical spine:  Motion limited exam.    Multilevel spondylosis.      FABIANO ALEXANDRA MD; Attending Radiologist  This document has been electronically signed. Mar 24 2021  8:44AM      < from: MR Thoracic Spine w/wo IV Cont (03.25.21 @ 23:23) >    EXAM:  MR SPINE THORACIC WAW IC                          EXAM:  MR SPINE CERVICAL IC                            PROCEDURE DATE:  03/25/2021            INTERPRETATION:  STUDY: MR CERVICAL SPINE WITH CONTRAST.  MR THORACIC SPINE WITH AND WITHOUT CONTRAST    CLINICAL INDICATION: Right leg weakness. Metastatic disease.    TECHNIQUE: Contrast-enhanced Multiplanar MR imaging of the cervical spine was performed. Multiplanar multisequence MR imaging of the thoracic spine was performed.    CONTRAST: 6 mL of Gadavist.    COMPARISON: MR cervical spine 3/24/2021.    FINDINGS:  Cervical spine:    There is slight kyphosis of usual cervical lordosis. There is no significant subluxation. Vertebral body height is maintained. There is no focal enhancing marrow replacing lesion. There is no significant loss of intervertebral disc height throughout the cervical spine.    There is no definite enhancement within the cervical cord. There is no epidural or paraspinal tumor.    Stable multilevel degenerative changes as described on the corresponding noncontrast MR of the cervical spine from 3/24/2021.    Thoracic spine:    There is maintenance of usual thoracic kyphosis. There is no listhesis or scoliosis. Vertebral body height is preserved throughout the thoracic spine. There is no focal STIR hyperintense or enhancing marrow replacing lesion. There is no significant loss of intervertebral disc height throughout the thoracic spine.    There is no high-grade central canal stenosis.    There is no definite signal abnormality or enhancement within the spinal cord. There is no epidural or paraspinal tumor.    There is a 1.3 cm right renal cyst.      IMPRESSION:  Cervical spine: No evidence of metastatic disease.  Thoracic spine: No evidence of metastatic disease.                MIRIAM RAHMAN M.D., ATTENDING RADIOLOGIST  This document has been electronically signed. Mar 26 2021 10:34AM    < end of copied text >

## 2021-03-31 NOTE — DISCHARGE NOTE PROVIDER - HOSPITAL COURSE
54 yo M with HTN, HLD, T2DM and extensive smoking history >40 pack year, presents to ED with right groin and back pain, 35 lb weight loss, scrotal swellin g / pelvic lesion/ weight loss, subjective fevers, sweats - found to have bilateral inguinal adenopathy,  a sclerotic L5 lesion neurosx, neurology  consulted, started on decadron; s/p excisional  biopsy  of inguinal lymph node concerning for lymphoma , --Pathology results pending - frozen section consistent with B cell lymphoma; awaiting subtype DNA markers done on lymph node - consistent with monoclonal b-cell population; flow cytometry from lymph node, however, negative for neoplasm. Hospital course complicated by Acute MI 3/30, at that time pt decline LHC, started on Plavix and ASA. 3/30  RN unable to obtain aptt thus pt decline further blood draws thus heparin drip discontinued and Lovenox 100mg sc started which pt also decline. Pt is also decline decadron stating that decadron caused his heart attack. Pt seen and evaluated examined by psych 3/30, per psych pt do not have capacity to refuse medical care or sign out AMA       --Quantiferon results are indeterminate per ID possible due to decadron.    MRI Cervical spine: No evidence of metastatic disease.  MRI Thoracic spine: No evidence of metastatic disease.  MRI Brain: Diffuse mild pachymeningeal enhancement which is nonspecific and may be idiopathic or related to intracranial hypotension, infection, metastasis, or granulomatous disease.  CT: Enlarged right iliac lymph node measuring 5.9 x 2.4 cm. Additional smaller right iliac nodes are seen. There are also bilateral groin lymph nodes.               - - - 52 yo M with HTN, HLD, T2DM and extensive smoking history >40 pack year, presents to ED with right groin and back pain, 35 lb weight loss, scrotal swellin g / pelvic lesion/ weight loss, subjective fevers, sweats - found to have bilateral inguinal adenopathy,  a sclerotic L5 lesion neurosx, neurology  consulted, started on decadron; s/p excisional  biopsy  of inguinal lymph node concerning for lymphoma , --Pathology results pending - frozen section consistent with B cell lymphoma; awaiting subtype DNA markers done on lymph node - consistent with monoclonal b-cell population; flow cytometry from lymph node, however, negative for neoplasm. Hospital course complicated by Acute MI 3/30, at that time pt decline LHC, started on Plavix and ASA. 3/30  RN unable to obtain aptt thus pt decline further blood draws thus heparin drip discontinued and Lovenox 100mg sc started which pt also decline. Pt is also decline decadron stating that decadron caused his heart attack. Pt seen and evaluated examined by psych 3/30, per psych pt do not have capacity to refuse medical care or sign out AMA. Patient life partner/ surrogate decision maker Halima Bragg [phone] and pt's Mother Norman Sandoval [phone] ( [phone]) request to sign patient out AMA. Education provider to Mr. Fernandez, Ms. Austin and Ms. Mahsa signing patient out AMA may result in his death. Ms. Brasher/Ms. RayJacqueson verbalized understanding. Witness by unit [facility] and Primary assigned RN .      --Quantiferon results are indeterminate per ID possible due to decadron.    MRI Cervical spine: No evidence of metastatic disease.  MRI Thoracic spine: No evidence of metastatic disease.  MRI Brain: Diffuse mild pachymeningeal enhancement which is nonspecific and may be idiopathic or related to intracranial hypotension, infection, metastasis, or granulomatous disease.  CT: Enlarged right iliac lymph node measuring 5.9 x 2.4 cm. Additional smaller right iliac nodes are seen. There are also bilateral groin lymph nodes.

## 2021-03-31 NOTE — PROGRESS NOTE ADULT - NUTRITIONAL ASSESSMENT
This patient has been assessed with a concern for Malnutrition and has been determined to have a diagnosis/diagnoses of Severe protein-calorie malnutrition.    This patient is being managed with:   Diet Regular-  Consistent Carbohydrate {Evening Snack} (CSTCHOSN)  1500mL Fluid Restriction ([id number])  No Concentrated Potassium  Entered: Mar 27 2021  4:29PM

## 2021-03-31 NOTE — PROGRESS NOTE ADULT - TIME-BASED BILLING (NON-CRITICAL CARE)
Time-based billing (NON-critical care)

## 2021-03-31 NOTE — DISCHARGE NOTE PROVIDER - NSDCCPCAREPLAN_GEN_ALL_CORE_FT
PRINCIPAL DISCHARGE DIAGNOSIS  Diagnosis: Male pelvic pain  Assessment and Plan of Treatment:       SECONDARY DISCHARGE DIAGNOSES  Diagnosis: Type 2 diabetes mellitus with hyperglycemia, with long-term current use of insulin  Assessment and Plan of Treatment: Type 2 diabetes mellitus with hyperglycemia, with long-term current use of insulin    Diagnosis: ACS (acute coronary syndrome)  Assessment and Plan of Treatment:     Diagnosis: Hypertension, unspecified type  Assessment and Plan of Treatment: Hypertension, unspecified type    Diagnosis: Hyperlipidemia, unspecified hyperlipidemia type  Assessment and Plan of Treatment: Hyperlipidemia, unspecified hyperlipidemia type    Diagnosis: Lesion of lumbar spine  Assessment and Plan of Treatment:      PRINCIPAL DISCHARGE DIAGNOSIS  Diagnosis: Male pelvic pain  Assessment and Plan of Treatment: Inguinal Adenopathy and L1 Sclerotic Lesion  -- Concern for lymphoma based on fevers, weight loss and night sweats  -- Other malignancies also possible, as can be benign inflammatory processes  -- Tumor markers - PSA,, Ca 19,9, LDH and Uric Acid are normal  -- S/P excisional biopsy of inguinal lymph node  --Pathology results pending - frozen section consistent with B cell lymphoma; awaiting subtype  --DNA markers done on lymph node - consistent with monoclonal b-cell population  --However cytogenetic were normal male without abnormalities  --Flow cytometry from lymph node, however, negative for neoplasm  --Quantiferon results are indeterminate   --Chemotherapy (if indicated will be determined (as well as possible spinal RT) once we have final pathology results - will most likely be given as outpatient  --We request IR consultation for a medi port placement to be done prior to discharge but will wait until pathology results        SECONDARY DISCHARGE DIAGNOSES  Diagnosis: Type 2 diabetes mellitus with hyperglycemia, with long-term current use of insulin  Assessment and Plan of Treatment: Contnue home medication   Make sure you get your HgA1c checked every three months.  If you take oral diabetes medications, check your blood glucose two times a day.  If you take insulin, check your blood glucose before meals and at bedtime.  It's important not to skip any meals.  Keep a log of your blood glucose results and always take it with you to your doctor appointments.  Keep a list of your current medications including injectables and over the counter medications and bring this medication list with you to all your doctor appointments.  If you have not seen your ophthalmologist this year call for appointment.  Check your feet daily for redness, sores, or openings. Do not self treat. If no improvement in two days call your primary care physician for an appointment.  Low blood sugar (hypoglycemia) is a blood sugar below 70mg/dl. Check your blood sugar if you feel signs/symptoms of hypoglycemia. If your blood sugar is below 70 take 15 grams of carbohydrates (ex 4 oz of apple juice, 3-4 glucose tablets, or 4-6 oz of regular soda) wait 15 minutes and repeat blood sugar to make sure it comes up above 70.  If your blood sugar is above 70 and you are due for a meal, have a meal.  If you are not due for a meal have a snack.  This snack helps keeps your blood sugar at a safe range.    Diagnosis: Acute myocardial infarction  Assessment and Plan of Treatment: Acute MI  --Occurred 3/29  --Patient refusing any intervention  --signed  out AMA ( continue ASA and Plavix)    Diagnosis: Lesion of lumbar spine  Assessment and Plan of Treatment: -- Started dexamethasone for cauda equina ( Refused decardon)       Diagnosis: Hypertension, unspecified type  Assessment and Plan of Treatment: Low salt diet  Activity as tolerated.  Take all medication as prescribed.  Follow up with your medical doctor for routine blood pressure monitoring at your next visit.  Notify your doctor if you have any of the following symptoms:   Dizziness, Lightheadedness, Blurry vision, Headache, Chest pain, Shortness of breath    Diagnosis: Hyperlipidemia, unspecified hyperlipidemia type  Assessment and Plan of Treatment: Follow up with PCP for treatment goals, continue medication, have liver function testing every 3 months as anti lipid medications can cause liver irritation, eat low fat, low cholesterol meals

## 2021-03-31 NOTE — PROGRESS NOTE ADULT - ASSESSMENT
pt w/ scrotal swelling / pelvic lesion/ weight loss    Cont steroids /change to po  and taper   added  bactrim or proph as per id   taper as per neuro. onc / sx  excisional Ln bx by sx  + for Bcell  f/u final path still pending  tx as per onc  neg  lext dopplers  dm/uncontrolled  fsg riss  c/w insulin  endo f/u  dvt proph  htn  c/w meds  neuro f/u  lp  neg thus far for leptomeningeal disease  id f/u noted  neurosx f/u  no sx now as per neuro sx  walking difficulty/spinal stenosis   neuro f/u  +mi  pt refusing cath   c/w medical tx   psych eval noted  ?no capacity  will need psych reeval as pr wishes to sign out AMA  card  f/u noted  urology f/u noted  no sx now  b/l hydrocele  bowel regimen  pt

## 2021-03-31 NOTE — CHART NOTE - NSCHARTNOTESELECT_GEN_ALL_CORE
Event Note
Chest Pain/Event Note
Endocrine/Event Note
Event Note
Medicine NP/Event Note
Nutrition Services
Post Op Check
Pre-Op Checklist
Wound drainage/Event Note
neurosurgery/Event Note
neurosurgery/Event Note

## 2021-03-31 NOTE — PHYSICAL THERAPY INITIAL EVALUATION ADULT - ADDITIONAL COMMENTS
Pt lives with wife in  with 4 KENNETH and 0 steps to negotiate within home. PTA, pt independent in transfers and ambulation with RW; pt reports that he requires occasional assist given from wife when needed (i.e safety showering, household duties, etc.).
Pt lives with wife in  with 4 KENNETH and 0 steps to negotiate within home. PTA, pt independent in transfers and ambulation with RW; pt reports that he requires occasional assist given from wife when needed (i.e safety showering, household duties, etc.).

## 2021-03-31 NOTE — PHYSICAL THERAPY INITIAL EVALUATION ADULT - STRENGTHENING, PT EVAL
GOAL: Pt will improve MMT grade in BLE/BUE 1 full grade in 4 weeks to increase overall functional mobility and strength.
GOAL: Pt will improve MMT grade in BLE/BUE 1 full grade in 4 weeks to increase overall functional mobility and strength.

## 2021-03-31 NOTE — PHYSICAL THERAPY INITIAL EVALUATION ADULT - BALANCE DISTURBANCE, IDENTIFIED IMPAIRMENT CONTRIBUTE, REHAB EVAL
abnormal muscle tone/pain/decreased ROM/decreased sensation/decreased strength
abnormal muscle tone/decreased strength

## 2021-03-31 NOTE — PROGRESS NOTE ADULT - ATTENDING COMMENTS
agree with the above assessment and plan by NOLAN Sorto.  cv stable  ECHO w mild-mod effusion and no evidence of tamponade  awaiting typing of lymphoma  s/p LP  workup/treatment per oncology  dvt ppx
agree with the above assessment and plan by NOLAN Sorto.  cv stable  plan LN excisional biopsy to r/o lymphoma today  pt optimized for OR, can proceed from a cv standpoint   dvt ppx
agree with the above assessment and plan by NOLAN Sorto.  cv stable  + B cell lymphoma  awaiting LP's  dvt ppx
agree with the above assessment and plan by NOLAN Sorto.  cv stable  plan LN excisional biopsy to r/o lymphoma  pt optimized for OR, can proceed from a cv standpoint   dvt ppx
Agree with above
Agree with above
D/w PA. I called path lab, no prelim report, I left them my number to call to d/w pathologist. Total time spent 35min, >50% spent in discussion and coordination of care.
Agree with above NP note.  cv stable  cont current tx  monitor bp  inc toprol for better control
Agree with assessment and plan as above by Dr. Centeno. Reviewed all pertinent labs, glucose values, and imaging studies. Modifications made as indicated above. Monitor on insulin regimen as indicated above. Will adjust further as needed. Repeat TFTs as outpatient.    Tien Miller D.O  [phone]
D/w PA, still pending path results, awaiting call back from path lab. Total time spent 35 min, >50% spent in discussion and coordination of care.
Agree with above
Chasity Mcmullen (pager [phone])  On evenings and weekends, please call [phone] or page endocrine fellow on call.   Please note that this patient may be followed by different provider tomorrow. If no answer, contact endocrine fellow on call.
Chasity Mcmullen (pager [phone])  On evenings and weekends, please call [phone] or page endocrine fellow on call.   Please note that this patient may be followed by different provider tomorrow. If no answer, contact endocrine fellow on call.
Agree with assessment and plan as above by Dr Centeno, reviewed all pertinent labs, glucose values, and imaging studies. Modifications made as indicated above.   Patient was seen and examined independently by me as well after discussion of the case.   Steroid exacerbated DM, continue with basal bolus.  D/c on basal bolus.    Jhonathan Harmon MD

## 2021-03-31 NOTE — PROGRESS NOTE ADULT - SUBJECTIVE AND OBJECTIVE BOX
CARDIOLOGY FOLLOW UP - Dr. Trimble    CC no cp or sob         PHYSICAL EXAM:  T(C): 36.6 (03-31-21 @ 08:30), Max: 36.6 (03-31-21 @ 08:30)  HR: 60 (03-31-21 @ 08:30) (60 - 60)  BP: 111/70 (03-31-21 @ 08:30) (111/70 - 111/70)  RR: 18 (03-31-21 @ 08:30) (18 - 18)  SpO2: 95% (03-31-21 @ 08:30) (95% - 95%)  Wt(kg): --  I&O's Summary    30 Mar 2021 07:01  -  31 Mar 2021 07:00  --------------------------------------------------------  IN: 0 mL / OUT: 500 mL / NET: -500 mL    31 Mar 2021 07:01  -  31 Mar 2021 13:45  --------------------------------------------------------  IN: 240 mL / OUT: 500 mL / NET: -260 mL        Appearance: Normal	  Cardiovascular: Normal S1 S2,RRR, No JVD, No murmurs  Respiratory: Lungs clear to auscultation	  Gastrointestinal:  Soft, Non-tender, + BS	  Extremities: Normal range of motion, No clubbing, cyanosis or edema      Home Medications:  amlodipine-benazepril 10 mg-40 mg oral capsule: 1 cap(s) orally once a day (15 Mar 2021 11:15)  HumaLOG 100 units/mL injectable solution: 8 unit(s) injectable 3 times a day (before meals) (15 Mar 2021 11:15)  Lipitor 80 mg oral tablet: 1 tab(s) orally once a day (15 Mar 2021 11:15)  Toprol-XL 50 mg oral tablet, extended release: 1 tab(s) orally once a day (15 Mar 2021 11:15)  Tresiba 100 units/mL subcutaneous solution: 40 unit(s) subcutaneous once a day (at bedtime) (15 Mar 2021 11:15)      MEDICATIONS  (STANDING):  allopurinol 300 milliGRAM(s) Oral daily  amLODIPine   Tablet 10 milliGRAM(s) Oral daily  aspirin enteric coated 81 milliGRAM(s) Oral daily  atorvastatin 80 milliGRAM(s) Oral at bedtime  clopidogrel Tablet 75 milliGRAM(s) Oral daily  dexAMETHasone     Tablet 4 milliGRAM(s) Oral every 8 hours  dextrose 40% Gel 15 Gram(s) Oral once  dextrose 5%. 1000 milliLiter(s) (50 mL/Hr) IV Continuous <Continuous>  dextrose 5%. 1000 milliLiter(s) (100 mL/Hr) IV Continuous <Continuous>  dextrose 50% Injectable 12.5 Gram(s) IV Push once  dextrose 50% Injectable 25 Gram(s) IV Push once  dextrose 50% Injectable 25 Gram(s) IV Push once  enoxaparin Injectable 100 milliGRAM(s) SubCutaneous daily  glucagon  Injectable 1 milliGRAM(s) IntraMuscular once  insulin lispro (ADMELOG) corrective regimen sliding scale   SubCutaneous three times a day before meals  insulin lispro (ADMELOG) corrective regimen sliding scale   SubCutaneous at bedtime  insulin lispro Injectable (ADMELOG) 6 Unit(s) SubCutaneous three times a day before meals  lactated ringers. 1000 milliLiter(s) (50 mL/Hr) IV Continuous <Continuous>  metoprolol succinate ER 75 milliGRAM(s) Oral daily  nortriptyline 10 milliGRAM(s) Oral at bedtime  saline laxative (FLEET) Rectal Enema 1 Enema Rectal once  senna 2 Tablet(s) Oral at bedtime  trimethoprim   80 mG/sulfamethoxazole 400 mG 1 Tablet(s) Oral daily      TELEMETRY: 	    ECG:  	  RADIOLOGY:   DIAGNOSTIC TESTING:  [ ] Echocardiogram:  [ ]  Catheterization:  [ ] Stress Test:    OTHER: 	    LABS:	 	    Troponin T, High Sensitivity Result: 1673 ng/L [0 - 51] (03-30 @ 16:39)  Troponin T, High Sensitivity Result: 1703 ng/L [0 - 51] (03-30 @ 12:19)  Troponin T, High Sensitivity Result: 1566 ng/L [0 - 51] (03-30 @ 08:34)  Troponin T, High Sensitivity Result: 1371 ng/L [0 - 51] (03-30 @ 04:37)  Creatine Kinase, Serum: 414 U/L [30 - 200] (03-30 @ 00:33)  CKMB Units: 78.4 ng/mL [0.0 - 6.7] (03-30 @ 00:33)  Troponin T, High Sensitivity Result: 1068 ng/L [0 - 51] (03-30 @ 00:33)                            18.0   14.08 )-----------( 408      ( 30 Mar 2021 08:34 )             54.2           PT/INR - ( 30 Mar 2021 04:37 )   PT: 10.8 sec;   INR: 0.90 ratio         PTT - ( 30 Mar 2021 04:37 )  PTT:118.1 sec

## 2021-03-31 NOTE — PHYSICAL THERAPY INITIAL EVALUATION ADULT - PRECAUTIONS/LIMITATIONS, REHAB EVAL
CT that shows enlarging right iliac lymph node as well as an MRI that shows diffuse nerve root enhancement involving the roots of the cauda equina with some mild root thickening though the dominant finding is the enhancement. Does not seem infectious. Could be lymphoma with B symptoms though the LDH is low. MRI suggestive of CIDP, per neuro less likely AIDP, sarcoid, leptomeningeal carcinomatosis, infection. Pt is s/p LN biopsy. **FURTHER IMAGING: CT Chest: Enlarged right iliac lymph node measuring 5.9 x 2.4 cm. Additional smaller right iliac nodes are seen. There are also bilateral groin lymph nodes. US Doppler Scrotum:  Large complex bilateral hydroceles.  No testicular mass seen VA Duplex BLE: There is a 2.3 cm nodule superficial to the vascular structures in the right inguinal area, likely a lymph node. Noevidence of deep venous thrombosis in either lower extremity. MR Cervical Spine: Brain:  Motion degraded exam.  No intracranial abnormality identified. Detection of metastatic disease is limited without intravenous contrast. C/S spine:  Motion limited exam. Multilevel spondylosis.**

## 2021-03-31 NOTE — PROGRESS NOTE ADULT - PROBLEM SELECTOR PROBLEM 3
Hypertension, unspecified type
Hyperlipidemia, unspecified hyperlipidemia type
Hypertension, unspecified type
Hyperlipidemia, unspecified hyperlipidemia type
Hyperlipidemia, unspecified hyperlipidemia type
Hypertension, unspecified type
Hyperlipidemia, unspecified hyperlipidemia type

## 2021-03-31 NOTE — DISCHARGE NOTE NURSING/CASE MANAGEMENT/SOCIAL WORK - PATIENT PORTAL LINK FT
You can access the FollowMyHealth Patient Portal offered by [facility] by registering at the following website: [email or web address]. By joining [facility]’s FollowMyHealth portal, you will also be able to view your health information using other applications (apps) compatible with our system.

## 2021-03-31 NOTE — PROGRESS NOTE ADULT - SUBJECTIVE AND OBJECTIVE BOX
Patient is a 60y old  Male who presents with a chief complaint of leg weakness (31 Mar 2021 10:19)    Patient seen this morning. He suffered an acute MI in the evening of 3/29/2021 and is refusing all workup, including cardiac cath, telemetry, labwork. Patient believes that steroids caused his chest pain despite assurances that they did not. Behavioral health and multiple other providers (including myself) have not been able to convince patient to comply with management. He states that his brother is coming at 5pm to pick him up. He states that his legs are still weak despite steroids, although he had earlier endorsed that they were feeling better.    MEDICATIONS  (STANDING):  allopurinol 300 milliGRAM(s) Oral daily  amLODIPine   Tablet 10 milliGRAM(s) Oral daily  aspirin enteric coated 81 milliGRAM(s) Oral daily  atorvastatin 80 milliGRAM(s) Oral at bedtime  clopidogrel Tablet 75 milliGRAM(s) Oral daily  dexAMETHasone     Tablet 4 milliGRAM(s) Oral every 8 hours  dextrose 40% Gel 15 Gram(s) Oral once  dextrose 5%. 1000 milliLiter(s) (50 mL/Hr) IV Continuous <Continuous>  dextrose 5%. 1000 milliLiter(s) (100 mL/Hr) IV Continuous <Continuous>  dextrose 50% Injectable 12.5 Gram(s) IV Push once  dextrose 50% Injectable 25 Gram(s) IV Push once  dextrose 50% Injectable 25 Gram(s) IV Push once  enoxaparin Injectable 100 milliGRAM(s) SubCutaneous daily  glucagon  Injectable 1 milliGRAM(s) IntraMuscular once  insulin lispro (ADMELOG) corrective regimen sliding scale   SubCutaneous three times a day before meals  insulin lispro (ADMELOG) corrective regimen sliding scale   SubCutaneous at bedtime  insulin lispro Injectable (ADMELOG) 6 Unit(s) SubCutaneous three times a day before meals  lactated ringers. 1000 milliLiter(s) (50 mL/Hr) IV Continuous <Continuous>  metoprolol succinate ER 75 milliGRAM(s) Oral daily  nortriptyline 10 milliGRAM(s) Oral at bedtime  saline laxative (FLEET) Rectal Enema 1 Enema Rectal once  senna 2 Tablet(s) Oral at bedtime  trimethoprim   80 mG/sulfamethoxazole 400 mG 1 Tablet(s) Oral daily    MEDICATIONS  (PRN):  acetaminophen   Tablet .. 650 milliGRAM(s) Oral every 6 hours PRN Mild Pain (1 - 3)  HYDROmorphone   Tablet 2 milliGRAM(s) Oral every 6 hours PRN Moderate Pain (4 - 6)  magnesium hydroxide Suspension 30 milliLiter(s) Oral daily PRN Constipation  polyethylene glycol 3350 17 Gram(s) Oral daily PRN Constipation      Vital Signs Last 24 Hrs  T(C): 36.6 (31 Mar 2021 08:30), Max: 36.6 (31 Mar 2021 08:30)  T(F): 97.8 (31 Mar 2021 08:30), Max: 97.8 (31 Mar 2021 08:30)  HR: 60 (31 Mar 2021 08:30) (60 - 60)  BP: 111/70 (31 Mar 2021 08:30) (111/70 - 111/70)  BP(mean): --  RR: 18 (31 Mar 2021 08:30) (18 - 18)  SpO2: 95% (31 Mar 2021 08:30) (95% - 95%)    PE  NAD  Uncooperative  Awake, alert  Anicteric                            18.0   14.08 )-----------( 408      ( 30 Mar 2021 08:34 )             54.2

## 2021-03-31 NOTE — DISCHARGE NOTE PROVIDER - CARE PROVIDER_API CALL
Tatum Paiz  HEMATOLOGY  [address], [address]  [place] [address]  Phone: [phone]  Fax: [phone]  Follow Up Time:

## 2021-03-31 NOTE — PHYSICAL THERAPY INITIAL EVALUATION ADULT - IMPAIRMENTS CONTRIBUTING IMPAIRED BED MOBILITY, REHAB EVAL
impaired balance/pain/decreased ROM/decreased sensation/decreased strength
impaired balance/decreased strength

## 2021-03-31 NOTE — PROGRESS NOTE ADULT - ASSESSMENT
61yo M w/h/o uncontrolled T2DM (A1C 10.6%) on Tresiba and Humalog PTA. Also h/o HTN, HLD, COVID 2020. Here with LE pain and scrotal swelling. Patient was hospitalized in [facility] in Feb 2021 where CT Ab/P found 5.8x2.5cm lesion on the right pelvis concerning for malignancy with sclerotic lesion at L5, s/p excisional biopsy of inguinal lymph node with pending pathology results but frozen section consistent with B cell lymphoma. Endocrine following for steroid induced hyperglycemia and type 2 diabetes management. Pt refusing steroids/treatment for MI/meds/etc. Psych evaluated pt yesterday and deemed to not have capacity. Refused FS and insulin yesterday but allowed for this AM. Counseled pt at length regarding treatment decisions and is not agreeable at this time to having a cath performed. BG goal (100-180mg/dl).

## 2021-03-31 NOTE — PROGRESS NOTE ADULT - ASSESSMENT
Impression:  This is a 60y year old Male  who presents with the chief complaint of low back pain and leg weakness. 61yo M with hx HTN, DM, HLD, COVID 2020 presents with LE pain and scrotal swelling. Patient was hospitalized in [facility] in Feb 2021 where CT Ab/P found 5.8x2.5cm lesion on the right pelvis concerning for malignancy with sclerotic lesion at L5. Pt states since early 2021 inc pain and weakness in the right leg, at first he was limping but now cannot walk.  He has pain in the feet, describes as pinching and needles like sensation.  Starting to have sx in left leg as well. Patient left AMA before additional malignancy workup was performed.  In the last month, patient had 45lb unintentional weight loss and decreased appetite. Notes abdominal swelling, mostly on the left side with tenderness along with b/l scrotal swelling.  At one point given neurontin, not helpful.  Denies sx in arms, changes in speech, swallow, vision, bladder control.  Says issues with bowel movements.     MRI done showing diffuse nerve root enhancement involving the roots of the cauda equina with some mild root thickening though the dominant finding is the enhancement.  There is also a nonspecific lesion in the L5 vertebral body and a possible septic facet at L4-5.       s/p LP , high lymphocytes prelim, protein slightly elevated, no cx growth, awaiting cytology.  Flow with small lymphocytes. our team has  Dr. Carney at [phone] who states that prelim cytology is small lymphocytes without large lymphocytes to suggest B-cell lymphoma in the spine, however official read pending    had an MI 3/29  refusing further steroids   wants to go home   apparently seen by psychiatry who felt he did not have capacity?  feels subjectively weaker  Still awaiting pathology!    Diagnosis and Recommendations  Clinical suspicion for B-cell lymphoma with leptomeningeal spread.    Will follow up rest of CSF studies     MRI of T-spine as well as C spine without new pathology    infectious etiology raised due to findings of  L4-5 facet and potential immunosuppression in setting of possible lymphoma.  ID following and this appears less likely     Doubt demyelinating (AIDP or CIDP).  This is inconsistent with time course and the clinical presentation     Regarding the high cell protein of the three options being demyelinating, infx or neoplastic, for the reasons outlined above, seem most consistent with neoplastic.  The lack of positive cytology on the LP is not surprising as this is often the case, the classic teaching is that it requires up to three spinal taps to reasonably be assured that this is not a false negative.  Regarding the cauda equina findings, while the MRI indicated involved as there is diffuse root infiltration, this is not a "classic cauda equina syndrome" in which there is a large space occupying lesion affecting the nerves requiring immediate NS evacuation.  Steroids may reduce future CSF cytology yields if LP done in the future.  Would await results of lymph node pathology, as he is quite weak in the leg as a result of cauda equina findings that may improve with tx such as radiation, will defer tx to onc and rad onc.  Defer to heme/onc for tx    Unfortunately hospital course complicated by MI.  Cardiology following

## 2021-03-31 NOTE — PROGRESS NOTE ADULT - TIME BILLING
-plan of care/glycemic control  -explained rationale of treatment and allowed time for pt to explore his frustrations
Agree with above NP note.  cv stable  cont current tx  monitor bp  cont current dose of toprol  mgmt per oncology/medicing
Agree with above NP note.  cv stable  cont current tx  monitor bp  cont current dose of toprol
Agree with above NP note.  remains cv stable  cont current tx  bp stable  cont current dose of toprol  mgmt per oncology/medicine
Agree with above NP note.  cv stable  atypical chest pain not concerning for acs  cont current tx  med/once f/u
Agree with above NP note.  remains cv stable  cont current tx  bp stable  cont current dose of toprol  mgmt per oncology/medicine
Pt seen and examined, agree with the above assessment and plan by NOLAN Sorto.  CV stable  Trops downtrendingI  Pt a poor candidate for invasive mgmt given underlying malignancy  appreciate interventional cards eval  Hemodynamically stable  cont dual AP tx   DC heparin  Onc followup
Pt seen and examined, agree with the above assessment and plan by NOLAN Sorto.  Events noted  Acute Inferior wall MI  Pt a poor candidate for invasive mgmt given underlying malignancy  appreciate interventional cards eval  Hemodynamically stable  cont dual AP tx and heparin for now  Onc followup
Spent 25 minutes assessing pt/labs/meds and discussing plan of care with primary team  Moderate complexity encounter on patient with uncontrolled T2DM with multiple complications and comorbidities adjusting insulin as needed     plan d/w team       Karma Knight MD  Attending Physician   Department of Endocrinology, Diabetes and Metabolism   Please note that this patient will be  followed by a different provider tomorrow.    please contact [phone].  For final dc reccomendations, please call [phone] or page the endocrine fellow on call.

## 2021-04-02 LAB — HEMATOPATHOLOGY REPORT: SIGNIFICANT CHANGE UP

## 2021-04-17 LAB
CULTURE RESULTS: SIGNIFICANT CHANGE UP
SPECIMEN SOURCE: SIGNIFICANT CHANGE UP

## 2021-04-20 NOTE — PHYSICAL THERAPY INITIAL EVALUATION ADULT - PERTINENT HX OF CURRENT PROBLEM, REHAB EVAL
Problem: Safety  Goal: Will remain free from falls  Outcome: PROGRESSING AS EXPECTED  Intervention: Implement fall precautions  Flowsheets (Taken 4/20/2021 0116)  Bed Alarm: Yes - Alarm On  Environmental Precautions:   Treaded Slipper Socks on Patient   Bed in Low Position   Personal Belongings, Wastebasket, Call Bell etc. in Easy Reach  Note: Pt compliant to safety measures, calls appropriately, needs anticipated and attended. Pt free from call and injury.     Problem: Venous Thromboembolism (VTW)/Deep Vein Thrombosis (DVT) Prevention:  Goal: Patient will participate in Venous Thrombosis (VTE)/Deep Vein Thrombosis (DVT)Prevention Measures  Outcome: PROGRESSING AS EXPECTED     Problem: Bowel/Gastric:  Goal: Will not experience complications related to bowel motility  Outcome: PROGRESSING AS EXPECTED     Problem: Pain Management  Goal: Pain level will decrease to patient's comfort goal  Outcome: PROGRESSING AS EXPECTED  Intervention: Educate and implement non-pharmacologic comfort measures. Examples: relaxation, distration, play therapy, activity therapy, massage, etc.  Note: Denies pain, verbalizing comfort.      Pt is a 60y M PMH: HTN, DM, HLD, COVID 2020. Presents to ED with BLE pain, R groin and back pain, 35 lb weight loss, subjective fevers, sweats - found to have bilateral inguinal adenopathy and a sclerotic L5 lesion. Patient was hospitalized in [facility] in Feb 2021 where CT Ab/P found 5.8x2.5cm lesion on the right pelvis concerning for malignancy with sclerotic lesion at L5. Pt left AMA before additional malignancy workup was performed. Pt is a 60y M PMH: HTN, DM, HLD, COVID 2020. Presents to ED with BLE pain, R groin and back pain, scrotal swelling, 35 lb weight loss, subjective fevers, sweats - found to have bilateral inguinal adenopathy and a sclerotic L5 lesion. Patient was hospitalized in [facility] in Feb 2021 where CT Ab/P found 5.8x2.5cm lesion on the right pelvis concerning for malignancy with sclerotic lesion at L5. Pt left AMA before additional malignancy workup was performed.

## 2021-05-08 LAB
CULTURE RESULTS: SIGNIFICANT CHANGE UP
SPECIMEN SOURCE: SIGNIFICANT CHANGE UP

## 2022-04-29 NOTE — ED ADULT TRIAGE NOTE - BP NONINVASIVE SYSTOLIC (MM HG)
144 Adbry Pregnancy And Lactation Text: It is unknown if this medication will adversely affect pregnancy or breast feeding.
